# Patient Record
Sex: FEMALE | Race: WHITE | URBAN - METROPOLITAN AREA
[De-identification: names, ages, dates, MRNs, and addresses within clinical notes are randomized per-mention and may not be internally consistent; named-entity substitution may affect disease eponyms.]

---

## 2021-11-13 ENCOUNTER — EMERGENCY (EMERGENCY)
Facility: HOSPITAL | Age: 71
LOS: 0 days | Discharge: HOME | End: 2021-11-13
Attending: EMERGENCY MEDICINE
Payer: COMMERCIAL

## 2021-11-13 VITALS
OXYGEN SATURATION: 99 % | SYSTOLIC BLOOD PRESSURE: 170 MMHG | TEMPERATURE: 99 F | DIASTOLIC BLOOD PRESSURE: 78 MMHG | HEART RATE: 99 BPM | RESPIRATION RATE: 18 BRPM

## 2021-11-13 VITALS — HEIGHT: 64 IN | WEIGHT: 179.9 LBS

## 2021-11-13 DIAGNOSIS — J06.9 ACUTE UPPER RESPIRATORY INFECTION, UNSPECIFIED: ICD-10-CM

## 2021-11-13 DIAGNOSIS — R05.9 COUGH, UNSPECIFIED: ICD-10-CM

## 2021-11-13 DIAGNOSIS — J30.9 ALLERGIC RHINITIS, UNSPECIFIED: ICD-10-CM

## 2021-11-13 DIAGNOSIS — I10 ESSENTIAL (PRIMARY) HYPERTENSION: ICD-10-CM

## 2021-11-13 DIAGNOSIS — B97.89 OTHER VIRAL AGENTS AS THE CAUSE OF DISEASES CLASSIFIED ELSEWHERE: ICD-10-CM

## 2021-11-13 DIAGNOSIS — Z88.0 ALLERGY STATUS TO PENICILLIN: ICD-10-CM

## 2021-11-13 PROCEDURE — 99283 EMERGENCY DEPT VISIT LOW MDM: CPT

## 2021-11-13 PROCEDURE — 99282 EMERGENCY DEPT VISIT SF MDM: CPT

## 2021-11-13 NOTE — ED STATDOCS - PROGRESS NOTE DETAILS
70 yo female presents with post nasal drip, and cough. Pt had it since august which resolved initially after given montelukast but urgent cater. The end of august into september pt had the symptoms again and was tried on other medications. Went to urgent care again on 11/6 and started on clarithromycin without relief. Pt states she sometimes gets steroids with relief. Will prescribe prednisone and d/c to f/u with pmd. -Denys Flores PA-C

## 2021-11-13 NOTE — ED STATDOCS - PATIENT PORTAL LINK FT
You can access the FollowMyHealth Patient Portal offered by [facility] by registering at the following website: [email or web address]. By joining [employer]’s FollowMyHealth portal, you will also be able to view your health information using other applications (apps) compatible with our system.

## 2021-11-13 NOTE — ED STATDOCS - CLINICAL SUMMARY MEDICAL DECISION MAKING FREE TEXT BOX
Post nasal drip vs. URI vs. rhinitis. Will DC home with steroids. Post nasal drip vs. URI vs. rhinitis.  no suggestion of bacteria pna.  Pt well appearing. Will DC home with steroids.  Understands indications to return.  D/c home with strict return precautions and prompt outpatient f/u.

## 2021-11-13 NOTE — ED ADULT TRIAGE NOTE - CHIEF COMPLAINT QUOTE
pt. c/o post nasal drip with chronic cough, pt. states she went to  and was given nasal sprays and antibiotics but nothing has been helping

## 2021-11-13 NOTE — ED STATDOCS - OBJECTIVE STATEMENT
72 y/o female with PMHx of seasonal allergies, MS, thoracic radiculopathy, Lamberts Syndrome, GERD and HTN presents to the ED c/o cough. Pt went to Urgent Care last week and placed on antibiotics for yellow mucus since 11/6/21 with no relief. Pr reports she has been taking NyQuil and sucking on cough drops with no relief. Pt reports clear sputum when she coughs.

## 2021-11-13 NOTE — ED STATDOCS - CARE PLAN
Principal Discharge DX:	Allergic rhinitis with postnasal drip  Secondary Diagnosis:	Viral URI with cough   1

## 2021-11-17 PROBLEM — G35 MULTIPLE SCLEROSIS: Chronic | Status: ACTIVE | Noted: 2021-11-13

## 2021-11-17 PROBLEM — I10 ESSENTIAL (PRIMARY) HYPERTENSION: Chronic | Status: ACTIVE | Noted: 2021-11-13

## 2021-11-22 PROBLEM — Z00.00 ENCOUNTER FOR PREVENTIVE HEALTH EXAMINATION: Status: ACTIVE | Noted: 2021-11-22

## 2021-11-23 ENCOUNTER — APPOINTMENT (OUTPATIENT)
Dept: OTOLARYNGOLOGY | Facility: CLINIC | Age: 71
End: 2021-11-23
Payer: COMMERCIAL

## 2021-11-23 ENCOUNTER — NON-APPOINTMENT (OUTPATIENT)
Age: 71
End: 2021-11-23

## 2021-11-23 VITALS
TEMPERATURE: 97.9 F | HEIGHT: 64 IN | DIASTOLIC BLOOD PRESSURE: 90 MMHG | BODY MASS INDEX: 32.44 KG/M2 | SYSTOLIC BLOOD PRESSURE: 175 MMHG | HEART RATE: 92 BPM | WEIGHT: 190 LBS

## 2021-11-23 DIAGNOSIS — Z83.3 FAMILY HISTORY OF DIABETES MELLITUS: ICD-10-CM

## 2021-11-23 DIAGNOSIS — Z78.9 OTHER SPECIFIED HEALTH STATUS: ICD-10-CM

## 2021-11-23 DIAGNOSIS — H93.8X3 OTHER SPECIFIED DISORDERS OF EAR, BILATERAL: ICD-10-CM

## 2021-11-23 PROCEDURE — 69210 REMOVE IMPACTED EAR WAX UNI: CPT

## 2021-11-23 PROCEDURE — 99204 OFFICE O/P NEW MOD 45 MIN: CPT | Mod: 25

## 2021-11-23 PROCEDURE — 31231 NASAL ENDOSCOPY DX: CPT

## 2021-11-23 NOTE — HISTORY OF PRESENT ILLNESS
[de-identified] : c/o problems with cough. Does have hx of allergies and got allergy shots years ago.  c/o nasal congestion.  Cough worsened about 3 mos ago.  Was treated with bronchodilator at [place] [facility]  No help with flonase and mucinex. Was treated with flonase and azelastine - also had treatment with clarithromycin - also treated with prednisone.  \par Cough now somewhat improved - also has somewhat hoarse voice.  No hemoptysis or sputum.  \par Occ coffee and occ spicy foods.   Occ tomato - occ chocolate  - did have recent cxr = neg

## 2021-11-23 NOTE — PHYSICAL EXAM
[Midline] : trachea located in midline position [Normal] : no rashes [de-identified] : bilat impacted cerumen  [de-identified] : after cerumen removal

## 2021-11-23 NOTE — ASSESSMENT
[FreeTextEntry1] : Patient with problem with chronic cough for several mos.  No evidence of sinusitis at this time but patient does have impacted cerumen bilaterally - removed - plugged feeling better after removal.  Also has findings of LPR - patient advised that cough could be related to cerumen but since patient also has reflux recommended reflux diet and omeprazole once a day before breakfast.  Follow up in 2-3 mos   (discussed audio - patient deferred)

## 2021-12-02 ENCOUNTER — NON-APPOINTMENT (OUTPATIENT)
Age: 71
End: 2021-12-02

## 2022-02-14 ENCOUNTER — NON-APPOINTMENT (OUTPATIENT)
Age: 72
End: 2022-02-14

## 2022-02-28 ENCOUNTER — APPOINTMENT (OUTPATIENT)
Dept: OTOLARYNGOLOGY | Facility: CLINIC | Age: 72
End: 2022-02-28
Payer: COMMERCIAL

## 2022-02-28 VITALS — WEIGHT: 190 LBS | BODY MASS INDEX: 32.44 KG/M2 | HEIGHT: 64 IN | TEMPERATURE: 96.6 F

## 2022-02-28 PROCEDURE — 99213 OFFICE O/P EST LOW 20 MIN: CPT

## 2022-02-28 NOTE — ASSESSMENT
[FreeTextEntry1] : Patient with rhinitis and throat discomfort - has occ nasal congestion.  No evidence of sinusitis but patient does have findings of LPR and does have hx of large HH requiring surgery and prior esophageal surgery for stomach issues years ago.  recommended flonase and azelastine but strongly urged GI eval to further evaluate reflux sx.  Follow up after GI eval.

## 2022-02-28 NOTE — PHYSICAL EXAM
[Nasal Endoscopy Performed] : nasal endoscopy was performed, see procedure section for findings [] : septum deviated to the right [Midline] : trachea located in midline position [Normal] : no rashes [de-identified] : mod inferior turb hypertrophy

## 2022-02-28 NOTE — HISTORY OF PRESENT ILLNESS
[de-identified] : Patient  continues with watery itchy eyes and continues with coughing fits.   Feels somewhat better with meds but still has refux sx.  Having difficulty watching diet.    On omeprazole before breakfast.   Hx of huge HH noted in 1990's - was repaired and had esophageal procedure as well.   Many years since last gi eval.

## 2022-03-16 ENCOUNTER — APPOINTMENT (OUTPATIENT)
Dept: GASTROENTEROLOGY | Facility: CLINIC | Age: 72
End: 2022-03-16
Payer: COMMERCIAL

## 2022-03-16 VITALS — BODY MASS INDEX: 32.61 KG/M2 | HEIGHT: 64 IN | WEIGHT: 191 LBS

## 2022-03-16 PROCEDURE — 99204 OFFICE O/P NEW MOD 45 MIN: CPT

## 2022-03-16 NOTE — HISTORY OF PRESENT ILLNESS
[de-identified] : 73yo female for evaluation of cough\par \par She is with hx HH repair about 20 years ago. EGD and other testing led to diagnosis of large HH/ She underwent repair for years. She had significant gerd and dyspepsia - improved after procedure and has not recurred.\par \par She has noticed increased globus, with cough and increased nasal discharge\par Better with sitting up and water. Some issues with dysphagia a times, over last 6 weeks

## 2022-03-16 NOTE — ASSESSMENT
[FreeTextEntry1] : 71yo female with cough, globus\par \par Will check ugi series to characterize HH repair, ?changes\par Will check egd\par Risks and benefits of procedure(s) discussed with patient in detail, including but not limited to, perforation, bleeding, reaction to anesthesia, missed lesions.\par \par Omeprazole\par will add singulair back as has helped in the past\par \par Pt declining colonoscopy at this time

## 2022-03-29 ENCOUNTER — LABORATORY RESULT (OUTPATIENT)
Age: 72
End: 2022-03-29

## 2022-03-29 ENCOUNTER — APPOINTMENT (OUTPATIENT)
Dept: PEDIATRIC ALLERGY IMMUNOLOGY | Facility: CLINIC | Age: 72
End: 2022-03-29
Payer: COMMERCIAL

## 2022-03-29 VITALS
HEIGHT: 63.98 IN | HEART RATE: 96 BPM | BODY MASS INDEX: 32.97 KG/M2 | SYSTOLIC BLOOD PRESSURE: 133 MMHG | DIASTOLIC BLOOD PRESSURE: 66 MMHG | WEIGHT: 193.12 LBS

## 2022-03-29 DIAGNOSIS — T50.905A ADVERSE EFFECT OF UNSPECIFIED DRUGS, MEDICAMENTS AND BIOLOGICAL SUBSTANCES, INITIAL ENCOUNTER: ICD-10-CM

## 2022-03-29 PROCEDURE — 99204 OFFICE O/P NEW MOD 45 MIN: CPT

## 2022-03-29 RX ORDER — OMEPRAZOLE 40 MG/1
40 CAPSULE, DELAYED RELEASE ORAL
Qty: 90 | Refills: 0 | Status: DISCONTINUED | COMMUNITY
Start: 2021-11-23 | End: 2022-03-29

## 2022-03-29 RX ORDER — OMEPRAZOLE 40 MG/1
40 CAPSULE, DELAYED RELEASE ORAL
Qty: 60 | Refills: 1 | Status: DISCONTINUED | COMMUNITY
Start: 2022-02-14 | End: 2022-03-29

## 2022-03-29 NOTE — REVIEW OF SYSTEMS
[Rhinorrhea] : rhinorrhea [Nasal Congestion] : nasal congestion [Post Nasal Drip] : post nasal drip [Sneezing] : sneezing [Cough] : cough [Nl] : Genitourinary [Immunizations are up to date] : Immunizations are up to date [Received Influenza Vaccine this Past Year] : patient has received the Influenza vaccine this past year [Dry Skin] : ~L dry skin [FreeTextEntry8] : MS

## 2022-03-29 NOTE — HISTORY OF PRESENT ILLNESS
[Food Allergies] : food allergies [de-identified] : 72 year old female with HTN (followed by cardiology), GERD (followed by G), MS (followed by neurology), presents with chronic rhinitis, postnasal drip, cough, dry skin, food allergy/adverse food reaction and adverse drug reaction history:\par \par Patient has chronic nasal congestion, postnasal drip and associated cough. As a teenager she used to be on allergy shots for 3 years. She used to wear masks during high pollen counts at the time. Noticed an improvement of her symptoms after treatment with allergy shots. Was doing well until her 40s. Since her 60s has noticed increased symptoms of rhinitis, postnasal drip. Since the past summer, she has had increased runny nose, congestions, postnasal drip, watery eyes, headaches and cough.\par In August of last years she was seen at an urgent care, started on Montelukast, noticed an improvement with montelukast. Also treated with po steroids at the time. Reports limited symptom relief with Fluticasone nose and Azelastine nose spray. Also tried Allegra, Cetirizine with limited relief. Was advised against Claritin by her cardiologist due to hypertension. Tried Netti pots with no relief. Stopped Lisinopril in 2019 due to cough.\par Followed by GI for globus sensation and current diagnosis of GERD, planning to have EGD and esophagogram. Also had a hiatal hernia repair 20 years ago. \par Seen by ENT and diagnosed with laryngopharyngeal reflux, recommended use of Flonase and Azelastine nose sprays. Per patient report she had a normal CXR within the past year.\par \par She has dry skin, uses Dove unscented, and moisturizes with creams from [employer].\par \par She has h/o rash with penicillin intake. Denies associated symptoms of angioedema of the tongue, respiratory, gastrointestinal complaints, joint swelling, blistering or peeling of the skin, and did not require hospitalization for her reaction. \par Has h/o hallucinations with Percocet, which has been avoided since then.\par \par Has h/o perioral rash and lip swelling after eating saurav in 2018. H/o abdominal discomfort with peanuts, but no other symptoms such as rashes, swelling or respiratory symptoms. Doesn't eat tree nuts. No issues with dairy, egg, wheat or seafood.\par

## 2022-03-29 NOTE — REASON FOR VISIT
[Initial Consultation] : an initial consultation for [FreeTextEntry2] : chronic rhinitis, postnasal drip, cough, dry skin, food allergy/adverse food reaction and adverse drug reaction history

## 2022-03-29 NOTE — PHYSICAL EXAM
[Alert] : alert [Well Nourished] : well nourished [Healthy Appearance] : healthy appearance [No Acute Distress] : no acute distress [Well Developed] : well developed [Normal Pupil & Iris Size/Symmetry] : normal pupil and iris size and symmetry [No Discharge] : no discharge [No Photophobia] : no photophobia [Sclera Not Icteric] : sclera not icteric [Normal Outer Ear/Nose] : the ears and nose were normal in appearance [Supple] : the neck was supple [Normal Rate and Effort] : normal respiratory rhythm and effort [No Crackles] : no crackles [No Retractions] : no retractions [Bilateral Audible Breath Sounds] : bilateral audible breath sounds [Normal Rate] : heart rate was normal  [Normal S1, S2] : normal S1 and S2 [No murmur] : no murmur [Regular Rhythm] : with a regular rhythm [Normal Cervical Lymph Nodes] : cervical [Skin Intact] : skin intact  [No Rash] : no rash [No Skin Lesions] : no skin lesions [No Edema] : no edema [No Cyanosis] : no cyanosis [Normal Mood] : mood was normal [Normal Affect] : affect was normal [Alert, Awake, Oriented as Age-Appropriate] : alert, awake, oriented as age appropriate [Conjunctival Erythema] : no conjunctival erythema [Wheezing] : no wheezing was heard

## 2022-03-31 LAB
A ALTERNATA IGE QN: <0.1 KUA/L
A FUMIGATUS IGE QN: <0.1 KUA/L
C HERBARUM IGE QN: <0.1 KUA/L
C LUNATA IGE QN: <0.1 KUA/L
CAT (RFEL D) 1 IGE QN: <0.1 KUA/L
CAT (RFEL D) 4 IGE QN: <0.1 KUA/L
CAT (RFEL D) 7 IGE QN: <0.1 KUA/L
CAT DANDER IGE QN: <0.1 KUA/L
CAT SERUM ALB IGE QN: <0.1 KUA/L
CMN PIGWEED IGE QN: <0.1 KUA/L
COCKLEBUR IGE QN: <0.1 KUA/L
COCKSFOOT IGE QN: <0.1 KUA/L
COMMON RAGWEED IGE QN: <0.1 KUA/L
D FARINAE IGE QN: <0.1 KUA/L
D PTERONYSS IGE QN: <0.1 KUA/L
DEPRECATED A ALTERNATA IGE RAST QL: 0
DEPRECATED A FUMIGATUS IGE RAST QL: 0
DEPRECATED A PULLULANS IGE RAST QL: 0
DEPRECATED C HERBARUM IGE RAST QL: 0
DEPRECATED C LUNATA IGE RAST QL: 0
DEPRECATED CAT (RFEL D) 1 IGE RAST QL: 0
DEPRECATED CAT (RFEL D) 4 IGE RAST QL: 0
DEPRECATED CAT (RFEL D) 7 IGE RAST QL: 0
DEPRECATED CAT DANDER IGE RAST QL: 0
DEPRECATED CAT SERUM ALB IGE RAST QL: 0
DEPRECATED COCKLEBUR IGE RAST QL: 0
DEPRECATED COCKSFOOT IGE RAST QL: 0
DEPRECATED COMMON PIGWEED IGE RAST QL: 0
DEPRECATED COMMON RAGWEED IGE RAST QL: 0
DEPRECATED D FARINAE IGE RAST QL: 0
DEPRECATED D PTERONYSS IGE RAST QL: 0
DEPRECATED DOG (RCAN F) 1 IGE RAST QL: 0
DEPRECATED DOG (RCAN F) 2 IGE RAST QL: 0
DEPRECATED DOG (RCAN F) 4 IGE RAST QL: 0
DEPRECATED DOG (RCAN F) 5 IGE RAST QL: 0
DEPRECATED DOG (RCAN F) 6 IGE RAST QL: 0
DEPRECATED DOG DANDER IGE RAST QL: 0
DEPRECATED DOG SERUM ALB IGE RAST QL: 0
DEPRECATED ENGL PLANTAIN IGE RAST QL: 0
DEPRECATED F MONILIFORME IGE RAST QL: 0
DEPRECATED GIANT RAGWEED IGE RAST QL: 0
DEPRECATED GOOSE FEATHER IGE RAST QL: 0
DEPRECATED GOOSEFOOT IGE RAST QL: 0
DEPRECATED KENT BLUE GRASS IGE RAST QL: 0
DEPRECATED LONDON PLANE IGE RAST QL: 0
DEPRECATED MANGO IGE RAST QL: 0
DEPRECATED MUGWORT IGE RAST QL: 0
DEPRECATED P NOTATUM IGE RAST QL: 0
DEPRECATED PEANUT IGE RAST QL: 0
DEPRECATED R NIGRICANS IGE RAST QL: 0
DEPRECATED RED CEDAR IGE RAST QL: 0
DEPRECATED RED TOP GRASS IGE RAST QL: 0
DEPRECATED ROACH IGE RAST QL: 0
DEPRECATED RYE IGE RAST QL: 0
DEPRECATED SILVER BIRCH IGE RAST QL: 0
DEPRECATED TIMOTHY IGE RAST QL: 0
DEPRECATED WHITE ASH IGE RAST QL: 0
DEPRECATED WHITE HICKORY IGE RAST QL: 0
DEPRECATED WHITE OAK IGE RAST QL: 0
DOG (RCAN F) 1 IGE QN: <0.1 KUA/L
DOG (RCAN F) 2 IGE QN: <0.1 KUA/L
DOG (RCAN F) 4 IGE QN: <0.1 KUA/L
DOG (RCAN F) 5 IGE QN: <0.1 KUA/L
DOG (RCAN F) 6 IGE QN: <0.1 KUA/L
DOG DANDER IGE QN: <0.1 KUA/L
DOG SERUM ALB IGE QN: <0.1 KUA/L
ENGL PLANTAIN IGE QN: <0.1 KUA/L
F MONILIFORME IGE QN: <0.1 KUA/L
GIANT RAGWEED IGE QN: <0.1 KUA/L
GOOSE FEATHER IGE QN: <0.1 KUA/L
GOOSEFOOT IGE QN: <0.1 KUA/L
GRAY ALDER (T2) CLASS: 0
GRAY ALDER (T2) CONC: <0.1 KUA/L
HORSE (REQU C) 1 IGE QN: 0
HORSE REQU C 1 IGE E227 CONC: <0.1 KUA/L
KENT BLUE GRASS IGE QN: <0.1 KUA/L
LONDON PLANE IGE QN: <0.1 KUA/L
MANGO IGE QN: <0.1 KUA/L
MOLD (AUREOBASIDIUM M12) CONC: <0.1 KUA/L
MUGWORT IGE QN: <0.1 KUA/L
MULBERRY (T70) CLASS: 0
MULBERRY (T70) CONC: <0.1 KUA/L
P NOTATUM IGE QN: <0.1 KUA/L
PEANUT (RARA H) 1 IGE QN: <0.1 KUA/L
PEANUT (RARA H) 2 IGE QN: <0.1 KUA/L
PEANUT (RARA H) 3 IGE QN: <0.1 KUA/L
PEANUT (RARA H) 6 IGE QN: <0.1 KUA/L
PEANUT (RARA H) 8 IGE QN: <0.1 KUA/L
PEANUT (RARA H) 9 IGE QN: <0.1 KUA/L
PEANUT IGE QN: <0.1 KUA/L
R NIGRICANS IGE QN: <0.1 KUA/L
RARA H 6 STORAGE PROTEIN (F447) CLASS: 0
RARA H1 STORAGE PROTEIN (F422) CLASS: 0
RARA H2 STORAGE PROTEIN (F423) CLASS: 0
RARA H3 STORAGE PROTEIN (F424) CLASS: 0
RARA H8 PR-10 PROTEIN (F352) CLASS: 0
RARA H9 LIPID TRANSFERTP (F427) CLASS: 0
RED CEDAR IGE QN: <0.1 KUA/L
RED TOP GRASS IGE QN: <0.1 KUA/L
ROACH IGE QN: <0.1 KUA/L
RYE IGE QN: <0.1 KUA/L
SILVER BIRCH IGE QN: <0.1 KUA/L
TIMOTHY IGE QN: <0.1 KUA/L
WHITE ASH IGE QN: <0.1 KUA/L
WHITE ELM IGE QN: 0
WHITE ELM IGE QN: <0.1 KUA/L
WHITE HICKORY IGE QN: <0.1 KUA/L
WHITE OAK IGE QN: <0.1 KUA/L

## 2022-04-05 ENCOUNTER — INPATIENT (INPATIENT)
Facility: HOSPITAL | Age: 72
LOS: 2 days | Discharge: HOME | DRG: 59 | End: 2022-04-08
Attending: FAMILY MEDICINE | Admitting: INTERNAL MEDICINE
Payer: COMMERCIAL

## 2022-04-05 ENCOUNTER — APPOINTMENT (OUTPATIENT)
Dept: PEDIATRIC ALLERGY IMMUNOLOGY | Facility: CLINIC | Age: 72
End: 2022-04-05

## 2022-04-05 VITALS — HEIGHT: 64 IN

## 2022-04-05 DIAGNOSIS — R29.898 OTHER SYMPTOMS AND SIGNS INVOLVING THE MUSCULOSKELETAL SYSTEM: ICD-10-CM

## 2022-04-05 LAB
ALBUMIN SERPL ELPH-MCNC: 4.4 G/DL — SIGNIFICANT CHANGE UP (ref 3.3–5)
ALP SERPL-CCNC: 91 U/L — SIGNIFICANT CHANGE UP (ref 40–120)
ALT FLD-CCNC: 36 U/L — SIGNIFICANT CHANGE UP (ref 12–78)
ANION GAP SERPL CALC-SCNC: 7 MMOL/L — SIGNIFICANT CHANGE UP (ref 5–17)
APPEARANCE UR: CLEAR — SIGNIFICANT CHANGE UP
AST SERPL-CCNC: 28 U/L — SIGNIFICANT CHANGE UP (ref 15–37)
BASOPHILS # BLD AUTO: 0.04 K/UL — SIGNIFICANT CHANGE UP (ref 0–0.2)
BASOPHILS NFR BLD AUTO: 0.5 % — SIGNIFICANT CHANGE UP (ref 0–2)
BILIRUB SERPL-MCNC: 0.6 MG/DL — SIGNIFICANT CHANGE UP (ref 0.2–1.2)
BILIRUB UR-MCNC: NEGATIVE — SIGNIFICANT CHANGE UP
BUN SERPL-MCNC: 17 MG/DL — SIGNIFICANT CHANGE UP (ref 7–23)
CALCIUM SERPL-MCNC: 10.2 MG/DL — HIGH (ref 8.5–10.1)
CHLORIDE SERPL-SCNC: 105 MMOL/L — SIGNIFICANT CHANGE UP (ref 96–108)
CO2 SERPL-SCNC: 28 MMOL/L — SIGNIFICANT CHANGE UP (ref 22–31)
COLOR SPEC: YELLOW — SIGNIFICANT CHANGE UP
CREAT SERPL-MCNC: 0.68 MG/DL — SIGNIFICANT CHANGE UP (ref 0.5–1.3)
DIFF PNL FLD: ABNORMAL
EGFR: 92 ML/MIN/1.73M2 — SIGNIFICANT CHANGE UP
EOSINOPHIL # BLD AUTO: 0.01 K/UL — SIGNIFICANT CHANGE UP (ref 0–0.5)
EOSINOPHIL NFR BLD AUTO: 0.1 % — SIGNIFICANT CHANGE UP (ref 0–6)
GLUCOSE SERPL-MCNC: 122 MG/DL — HIGH (ref 70–99)
GLUCOSE UR QL: NEGATIVE — SIGNIFICANT CHANGE UP
HCT VFR BLD CALC: 42.1 % — SIGNIFICANT CHANGE UP (ref 34.5–45)
HGB BLD-MCNC: 13.8 G/DL — SIGNIFICANT CHANGE UP (ref 11.5–15.5)
IMM GRANULOCYTES NFR BLD AUTO: 0.2 % — SIGNIFICANT CHANGE UP (ref 0–1.5)
KETONES UR-MCNC: NEGATIVE — SIGNIFICANT CHANGE UP
LEUKOCYTE ESTERASE UR-ACNC: ABNORMAL
LYMPHOCYTES # BLD AUTO: 1.47 K/UL — SIGNIFICANT CHANGE UP (ref 1–3.3)
LYMPHOCYTES # BLD AUTO: 17.9 % — SIGNIFICANT CHANGE UP (ref 13–44)
MAGNESIUM SERPL-MCNC: 2.3 MG/DL — SIGNIFICANT CHANGE UP (ref 1.6–2.6)
MCHC RBC-ENTMCNC: 29.4 PG — SIGNIFICANT CHANGE UP (ref 27–34)
MCHC RBC-ENTMCNC: 32.8 GM/DL — SIGNIFICANT CHANGE UP (ref 32–36)
MCV RBC AUTO: 89.8 FL — SIGNIFICANT CHANGE UP (ref 80–100)
MONOCYTES # BLD AUTO: 0.49 K/UL — SIGNIFICANT CHANGE UP (ref 0–0.9)
MONOCYTES NFR BLD AUTO: 6 % — SIGNIFICANT CHANGE UP (ref 2–14)
NEUTROPHILS # BLD AUTO: 6.19 K/UL — SIGNIFICANT CHANGE UP (ref 1.8–7.4)
NEUTROPHILS NFR BLD AUTO: 75.3 % — SIGNIFICANT CHANGE UP (ref 43–77)
NITRITE UR-MCNC: NEGATIVE — SIGNIFICANT CHANGE UP
PH UR: 8 — SIGNIFICANT CHANGE UP (ref 5–8)
PHOSPHATE SERPL-MCNC: 3.1 MG/DL — SIGNIFICANT CHANGE UP (ref 2.5–4.5)
PLATELET # BLD AUTO: 314 K/UL — SIGNIFICANT CHANGE UP (ref 150–400)
POTASSIUM SERPL-MCNC: 3.1 MMOL/L — LOW (ref 3.5–5.3)
POTASSIUM SERPL-SCNC: 3.1 MMOL/L — LOW (ref 3.5–5.3)
PROT SERPL-MCNC: 8.1 GM/DL — SIGNIFICANT CHANGE UP (ref 6–8.3)
PROT UR-MCNC: NEGATIVE — SIGNIFICANT CHANGE UP
RAPID RVP RESULT: SIGNIFICANT CHANGE UP
RBC # BLD: 4.69 M/UL — SIGNIFICANT CHANGE UP (ref 3.8–5.2)
RBC # FLD: 14.6 % — HIGH (ref 10.3–14.5)
SARS-COV-2 RNA SPEC QL NAA+PROBE: SIGNIFICANT CHANGE UP
SODIUM SERPL-SCNC: 140 MMOL/L — SIGNIFICANT CHANGE UP (ref 135–145)
SP GR SPEC: 1.01 — SIGNIFICANT CHANGE UP (ref 1.01–1.02)
UROBILINOGEN FLD QL: NEGATIVE — SIGNIFICANT CHANGE UP
WBC # BLD: 8.22 K/UL — SIGNIFICANT CHANGE UP (ref 3.8–10.5)
WBC # FLD AUTO: 8.22 K/UL — SIGNIFICANT CHANGE UP (ref 3.8–10.5)

## 2022-04-05 PROCEDURE — 86803 HEPATITIS C AB TEST: CPT

## 2022-04-05 PROCEDURE — 80048 BASIC METABOLIC PNL TOTAL CA: CPT

## 2022-04-05 PROCEDURE — 71045 X-RAY EXAM CHEST 1 VIEW: CPT | Mod: 26

## 2022-04-05 PROCEDURE — 99223 1ST HOSP IP/OBS HIGH 75: CPT

## 2022-04-05 PROCEDURE — 83036 HEMOGLOBIN GLYCOSYLATED A1C: CPT

## 2022-04-05 PROCEDURE — 99283 EMERGENCY DEPT VISIT LOW MDM: CPT

## 2022-04-05 PROCEDURE — 82962 GLUCOSE BLOOD TEST: CPT

## 2022-04-05 PROCEDURE — 93010 ELECTROCARDIOGRAM REPORT: CPT

## 2022-04-05 PROCEDURE — 97162 PT EVAL MOD COMPLEX 30 MIN: CPT | Mod: GP

## 2022-04-05 PROCEDURE — 36415 COLL VENOUS BLD VENIPUNCTURE: CPT

## 2022-04-05 PROCEDURE — 93005 ELECTROCARDIOGRAM TRACING: CPT

## 2022-04-05 PROCEDURE — 99285 EMERGENCY DEPT VISIT HI MDM: CPT | Mod: GC

## 2022-04-05 PROCEDURE — 85027 COMPLETE CBC AUTOMATED: CPT

## 2022-04-05 PROCEDURE — 74174 CTA ABD&PLVS W/CONTRAST: CPT | Mod: 26,MA

## 2022-04-05 PROCEDURE — 71275 CT ANGIOGRAPHY CHEST: CPT | Mod: 26,MA

## 2022-04-05 PROCEDURE — 97116 GAIT TRAINING THERAPY: CPT | Mod: GP

## 2022-04-05 PROCEDURE — 97530 THERAPEUTIC ACTIVITIES: CPT | Mod: GP

## 2022-04-05 PROCEDURE — 94640 AIRWAY INHALATION TREATMENT: CPT

## 2022-04-05 RX ORDER — HYDRALAZINE HCL 50 MG
100 TABLET ORAL THREE TIMES A DAY
Refills: 0 | Status: DISCONTINUED | OUTPATIENT
Start: 2022-04-05 | End: 2022-04-08

## 2022-04-05 RX ORDER — FAMOTIDINE 10 MG/ML
20 INJECTION INTRAVENOUS AT BEDTIME
Refills: 0 | Status: DISCONTINUED | OUTPATIENT
Start: 2022-04-05 | End: 2022-04-08

## 2022-04-05 RX ORDER — ONDANSETRON 8 MG/1
4 TABLET, FILM COATED ORAL EVERY 8 HOURS
Refills: 0 | Status: DISCONTINUED | OUTPATIENT
Start: 2022-04-05 | End: 2022-04-08

## 2022-04-05 RX ORDER — ONDANSETRON 8 MG/1
4 TABLET, FILM COATED ORAL ONCE
Refills: 0 | Status: COMPLETED | OUTPATIENT
Start: 2022-04-05 | End: 2022-04-05

## 2022-04-05 RX ORDER — ENOXAPARIN SODIUM 100 MG/ML
40 INJECTION SUBCUTANEOUS EVERY 24 HOURS
Refills: 0 | Status: DISCONTINUED | OUTPATIENT
Start: 2022-04-05 | End: 2022-04-08

## 2022-04-05 RX ORDER — LANOLIN ALCOHOL/MO/W.PET/CERES
3 CREAM (GRAM) TOPICAL AT BEDTIME
Refills: 0 | Status: DISCONTINUED | OUTPATIENT
Start: 2022-04-05 | End: 2022-04-08

## 2022-04-05 RX ORDER — LOSARTAN POTASSIUM 100 MG/1
100 TABLET, FILM COATED ORAL DAILY
Refills: 0 | Status: DISCONTINUED | OUTPATIENT
Start: 2022-04-05 | End: 2022-04-08

## 2022-04-05 RX ORDER — FLUTICASONE PROPIONATE 50 MCG
1 SPRAY, SUSPENSION NASAL
Refills: 0 | Status: DISCONTINUED | OUTPATIENT
Start: 2022-04-05 | End: 2022-04-08

## 2022-04-05 RX ORDER — ACETAMINOPHEN 500 MG
650 TABLET ORAL EVERY 6 HOURS
Refills: 0 | Status: DISCONTINUED | OUTPATIENT
Start: 2022-04-05 | End: 2022-04-08

## 2022-04-05 RX ORDER — GABAPENTIN 400 MG/1
600 CAPSULE ORAL DAILY
Refills: 0 | Status: DISCONTINUED | OUTPATIENT
Start: 2022-04-05 | End: 2022-04-08

## 2022-04-05 RX ORDER — POTASSIUM CHLORIDE 20 MEQ
40 PACKET (EA) ORAL ONCE
Refills: 0 | Status: COMPLETED | OUTPATIENT
Start: 2022-04-05 | End: 2022-04-05

## 2022-04-05 RX ORDER — ATORVASTATIN CALCIUM 80 MG/1
40 TABLET, FILM COATED ORAL AT BEDTIME
Refills: 0 | Status: DISCONTINUED | OUTPATIENT
Start: 2022-04-05 | End: 2022-04-08

## 2022-04-05 RX ORDER — PANTOPRAZOLE SODIUM 20 MG/1
40 TABLET, DELAYED RELEASE ORAL
Refills: 0 | Status: DISCONTINUED | OUTPATIENT
Start: 2022-04-05 | End: 2022-04-08

## 2022-04-05 RX ORDER — GABAPENTIN 400 MG/1
300 CAPSULE ORAL
Refills: 0 | Status: DISCONTINUED | OUTPATIENT
Start: 2022-04-05 | End: 2022-04-08

## 2022-04-05 RX ORDER — BACLOFEN 100 %
10 POWDER (GRAM) MISCELLANEOUS
Refills: 0 | Status: DISCONTINUED | OUTPATIENT
Start: 2022-04-05 | End: 2022-04-08

## 2022-04-05 RX ORDER — AMLODIPINE BESYLATE 2.5 MG/1
10 TABLET ORAL DAILY
Refills: 0 | Status: DISCONTINUED | OUTPATIENT
Start: 2022-04-05 | End: 2022-04-08

## 2022-04-05 RX ORDER — MONTELUKAST 4 MG/1
10 TABLET, CHEWABLE ORAL DAILY
Refills: 0 | Status: DISCONTINUED | OUTPATIENT
Start: 2022-04-05 | End: 2022-04-06

## 2022-04-05 RX ADMIN — Medication 100 MILLIGRAM(S): at 22:19

## 2022-04-05 RX ADMIN — FAMOTIDINE 20 MILLIGRAM(S): 10 INJECTION INTRAVENOUS at 22:19

## 2022-04-05 RX ADMIN — ONDANSETRON 4 MILLIGRAM(S): 8 TABLET, FILM COATED ORAL at 12:02

## 2022-04-05 RX ADMIN — Medication 258 MILLIGRAM(S): at 14:22

## 2022-04-05 RX ADMIN — Medication 1 SPRAY(S): at 22:20

## 2022-04-05 RX ADMIN — MONTELUKAST 10 MILLIGRAM(S): 4 TABLET, CHEWABLE ORAL at 22:22

## 2022-04-05 RX ADMIN — Medication 10 MILLIGRAM(S): at 22:20

## 2022-04-05 RX ADMIN — GABAPENTIN 600 MILLIGRAM(S): 400 CAPSULE ORAL at 16:13

## 2022-04-05 RX ADMIN — ENOXAPARIN SODIUM 40 MILLIGRAM(S): 100 INJECTION SUBCUTANEOUS at 16:11

## 2022-04-05 RX ADMIN — ATORVASTATIN CALCIUM 40 MILLIGRAM(S): 80 TABLET, FILM COATED ORAL at 22:20

## 2022-04-05 RX ADMIN — Medication 40 MILLIEQUIVALENT(S): at 14:22

## 2022-04-05 RX ADMIN — GABAPENTIN 300 MILLIGRAM(S): 400 CAPSULE ORAL at 22:17

## 2022-04-05 NOTE — PHARMACOTHERAPY INTERVENTION NOTE - COMMENTS
Medication history complete, [facility] with [facility]

## 2022-04-05 NOTE — PHYSICAL THERAPY INITIAL EVALUATION ADULT - IMPAIRMENTS CONTRIBUTING TO GAIT DEVIATIONS, PT EVAL
decreased endurance/impaired balance/decreased ROM/decreased sensation/impaired sensory feedback/decreased strength

## 2022-04-05 NOTE — ED STATDOCS - PROGRESS NOTE DETAILS
Jason Jeffery for attending Dr. Dawson: 71 y/o female with a PMHx of HTN, MS presents to the ED c/o progressive weakness and difficulty ambulating. Pt reports she feels like she is having an MS exacerbation. Pt scheduled for an MRI in [facility] on 4/8. No other complaints at this time. Will send pt to main ED for further evaluation.

## 2022-04-05 NOTE — ED PROVIDER NOTE - CLINICAL SUMMARY MEDICAL DECISION MAKING FREE TEXT BOX
71 y/o female with likely ms flare. Pt symptoms so severe she is having trouble ambulating. Will admit to medicine for neuro consultation, high dose steroids and Physical Therapy. Pt also complains about vague upper abdominal band like pain. Will evaluate aorta.

## 2022-04-05 NOTE — PATIENT PROFILE ADULT - FALL HARM RISK - RISK INTERVENTIONS
Assistance OOB with selected safe patient handling equipment/Assistance with ambulation/Communicate Fall Risk and Risk Factors to all staff, patient, and family/Discuss with provider need for PT consult/Monitor gait and stability/Reinforce activity limits and safety measures with patient and family/Visual Cue: Yellow wristband/Bed in lowest position, wheels locked, appropriate side rails in place/Call bell, personal items and telephone in reach/Instruct patient to call for assistance before getting out of bed or chair/Non-slip footwear when patient is out of bed/[place] to call system/Physically safe environment - no spills, clutter or unnecessary equipment/Purposeful Proactive Rounding/Room/bathroom lighting operational, light cord in reach

## 2022-04-05 NOTE — H&P ADULT - ASSESSMENT
73 y/o female with PMHx of HTN and MS (initially dx in 1998) on no maintenance meds aside from baclofen/ neurontin who presented to  with worsening weakness, numbness, paresthesias and muscle stiffness in her lower legs and core.  Admitted for MS flair.      #Worsening weakness/ paresthesias/ muscle spasms-- concern for MS flair:    Admit to medsurg.    CT head reviewed.    Check MRI brain / cervical spine/ thoracic spine as per neuro.    Solumedrol 1 g IV daily for 3 days then prednisone taper as per neuro.    PT juan.    NELSON PRESTON d/w her neurologist @ [place].  Dr. Gem Palencia.    Cont baclofen/ neurontin for muscle spasms/ pain.      #Band like pain in thorax:    Concerning for as above-- MS flair.    CTA chest negative for dissection.    F/u MRI spine.      #Dysuria/ Frequency:    Patient notes this often happens with MS flairs.    UA negative.    F/u urine culture.    Hold on ABX for now.      #HTN:    Cont home meds.  Losaratan.  Hydralazine.  Norvasc.      #DVT Proph:  Lovenox.      #GI proph:  Protonix while on steroids.

## 2022-04-05 NOTE — H&P ADULT - NSHPPHYSICALEXAM_GEN_ALL_CORE
PEx  T(C): 36.8 (04-05-22 @ 15:10), Max: 36.9 (04-05-22 @ 10:08)  HR: 67 (04-05-22 @ 15:10) (67 - 87)  BP: 138/67 (04-05-22 @ 15:10) (138/67 - 171/73)  RR: 14 (04-05-22 @ 15:10) (14 - 19)  SpO2: 95% (04-05-22 @ 15:10) (95% - 96%)    General:     Well appearing, no distress, no identifying marks , scars, or tattoos.  Skin: no rash or prominent lesions  Head: normocephalic, atraumatic     Sinuses: non-tender  Nose: no external lesions, mucosa non-inflamed, septum and turbinates normal  Throat: no erythema, exudates or lesions.  Neck: Supple without lymphadenopathy. Thyroid no thyromegaly, no palpable thyroid nodules, no palpable nodules or masses, carotid arteries no bruits.   Breasts: No palpable masses or lesions.  Heart: RRR, no murmur or gallop.  Normal S1, S2.  No S3, S4.   Lungs: CTA bilaterally, no wheezes, rhonchi, rales.  Breathing unlabored.   Chest wall: Normal insp   Abdomen:  Soft, NT/ND, normal bowel sounds, no HSM, no masses.  No peritoneal signs.   Back: spine normal without deformity or tenderness.  Normal ROM   : Exam normal.  no inguinal hernias.  Extremities: No deformities, clubbing, cyanosis, or edema.  Musculoskeletal: stiffness and decreased ROM of legs/ hips/ knees/ ankles.    Neurologic: decreased sensation b/l lower legs.    decreased strength 4/5 hip flexors, knee extenders, knee flexors.  3/5 dorsi/ plantar flexion.    did not stand patient.    Psychiatric: Oriented X3, intact recent and remote memory, judgement and insight, normal mood and affect.

## 2022-04-05 NOTE — ED PROVIDER NOTE - NS ED ROS FT
Constitutional: nad, well appearing  HEENT:  no nasal congestion, eye drainage or ear pain.    CVS:  no cp  Resp:  No sob, no cough  GI:  no abdominal pain, no nausea or vomiting  :  no dysuria  MSK: no joint pain or limited ROM  Skin: no rash  Neuro: no change in mental status or level of consciousness, +weakness  Heme/lymph: no bleeding

## 2022-04-05 NOTE — CONSULT NOTE ADULT - ASSESSMENT
72 year old woman hx of MS, c/o increasing difficulty walking, dysesthesias of the legs. Likely MS exacerbation.  Suggest:  MR head, cervical, thoracic spine; w/wo  solumedrol 1 gm IVPB QD x 3 days, then followed by prednisone 60 mg po QD, then taper every third day by 10 mg.  PT evaluation  DVT prophylaxis  Once d/c f/u with her neurologist for further management.

## 2022-04-05 NOTE — ED PROVIDER NOTE - OBJECTIVE STATEMENT
72 y.o female with a PMH of HTN and MS presents to the ED for suspected MS flare up. Pt states for the past few weeks she has had difficulty walking due to weakness in back and legs. Pt states its hard to stand up straight. Complaining of electric like sensation in back and neck. Also with associated pins and needles and toes. Pt states tightening in upper abdomen present for weeks. Pt reports "ms katy". Pt states she hasn't been hospitalized in years for MS but feels like prior flare ups. Not on medication or steroids. Pt has had Ct on neck which was normal.  Pt has neuro stimulator which is functional. Pt scheduled for MRI on neck head/neck/ back two weeks from today. Denies fevers, chest pain ,sob. Pt reports two days of urinary frequency and urine smells off.

## 2022-04-05 NOTE — CONSULT NOTE ADULT - SUBJECTIVE AND OBJECTIVE BOX
Neurology Consult requested by:   Patient is a 72y old  Female who presents with a chief complaint of    HPI:  72 y.o woman with a PMH of HTN and MS presents to the ED for suspected MS flare up. Pt states for the past few weeks she has had difficulty walking, weakness,  numbness, pins and needle feeling from waist down to the legs. Worsening over the last few days. Not on medication or steroids.  Symptoms have been progressing over the last 6 months or longer, came today for worsening gait.  Is being followed by outside neurologist, who recently ordered lumbar spine Ct   Pt has neuro stimulator which is non functional. Pt was scheduled for MRI on neck head/neck/ back two weeks from today.   Denies fevers, chest pain ,sob. Pt reports two days of urinary frequency and urine smells off.    PAST MEDICAL & SURGICAL HISTORY:  HTN (hypertension)    MS (multiple sclerosis)      FAMILY HISTORY:    Social Hx:  Nonsmoker, no drug or alcohol use  Medications and Allergies ReviewedMEDICATIONS  (STANDING):  methylPREDNISolone sodium succinate IVPB 1000 milliGRAM(s) IV Intermittent Once  potassium chloride    Tablet ER 40 milliEquivalent(s) Oral once     ROS: Pertinent positives in HPI, all other ROS were reviewed and are negative.      Examination:   Vital Signs Last 24 Hrs  T(C): 36.9 (05 Apr 2022 10:08), Max: 36.9 (05 Apr 2022 10:08)  T(F): 98.5 (05 Apr 2022 10:08), Max: 98.5 (05 Apr 2022 10:08)  HR: 87 (05 Apr 2022 10:08) (87 - 87)  BP: 171/73 (05 Apr 2022 10:08) (171/73 - 171/73)  BP(mean): 102 (05 Apr 2022 10:08) (102 - 102)  RR: 19 (05 Apr 2022 10:08) (19 - 19)  SpO2: 96% (05 Apr 2022 10:08) (96% - 96%)  General: Cooperative, NAD   NECK: supple, no masses  ENT: Normal hearing   Vascular : no carotid bruits,   Lungs: CTAB  Chest: RRR, no murmurs  Extremities: nontender, no edema  Musculoskeletal: no adventitious movements, no joint stiffness  Skin: no rash    Neurological Examination:  NIHSS:4  MS: AOx3. Appropriately interactive, normal affect. Speech fluent w/o paraphasic error, repetition, naming,  intact   CN: VFFTC, PERLL, EOMI, V1-3 sensation intact, face symmetric, hearing intact, palate elevates symmetrically, tongue midline, SCM equal bilaterally  Motor: normal bulk,+ bilateral legs increased tone, no drift of the UE, + UEs tremor, UEs  5/5, LEs proximal ~ 4/5, distal R 4/5, L 3/5   Sens: decreased in legs to light touch, impaired JPS in leg, feet..    Reflexes: 0-1/4 all over, mute toes b/l  Coord:  No dysmetria, BRIAN slow bilaterally    Gait: Cannot test    Labs: Reviewed  Imaging:   Complete Blood Count   WBC Count: 8.22 K/uL   RBC Count: 4.69 M/uL   Hemoglobin: 13.8 g/dL   Hematocrit: 42.1 %   Mean Cell Volume: 89.8 fl   Mean Cell Hemoglobin: 29.4 pg   Mean Cell Hemoglobin Conc: 32.8 gm/dL   Red Cell Distrib Width: 14.6 %   Platelet Count - Automated: 314 K/uL     Comprehensive Metabolic Panel (04.05.22 @ 11:05)   Sodium, Serum: 140 mmol/L   Potassium, Serum: 3.1 mmol/L   Chloride, Serum: 105 mmol/L   Carbon Dioxide, Serum: 28 mmol/L   Anion Gap, Serum: 7 mmol/L   Blood Urea Nitrogen, Serum: 17 mg/dL   Creatinine, Serum: 0.68 mg/dL   Glucose, Serum: 122 mg/dL   Calcium, Total Serum: 10.2 mg/dL   Protein Total, Serum: 8.1 gm/dL   Albumin, Serum: 4.4 g/dL   Bilirubin Total, Serum: 0.6 mg/dL   Alkaline Phosphatase, Serum: 91 U/L   Aspartate Aminotransferase (AST/SGOT): 28 U/L   Alanine Aminotransferase (ALT/SGPT): 36 U/L   eGFR: 92:

## 2022-04-05 NOTE — H&P ADULT - NSHPLABSRESULTS_GEN_ALL_CORE
13.8   8  )-----------( 314      ( 2022 11:05 )             42.1     04-05    140  |  105  |  17  ----------------------------<  122<H>  3.1<L>   |  28  |  0.68    Ca    10.2<H>      2022 11:05  Phos  3.1     04-05  Mg     2.3     04-05    TPro  8.1  /  Alb  4.4  /  TBili  0.6  /  DBili  x   /  AST  28  /  ALT  36  /  AlkPhos  91  04-05    CAPILLARY BLOOD GLUCOSE          Urinalysis Basic - ( 2022 11:05 )    Color: Yellow / Appearance: Clear / S.010 / pH: x  Gluc: x / Ketone: Negative  / Bili: Negative / Urobili: Negative   Blood: x / Protein: Negative / Nitrite: Negative   Leuk Esterase: Small / RBC: Negative /HPF / WBC 3-5   Sq Epi: x / Non Sq Epi: Few / Bacteria: x    < from: CT Angio Chest Aorta w/wo IV Cont (22 @ 11:47) >      IMPRESSION:    No aortic aneurysm.    Left lower lobe 3 mm nodule. Recommend 1 year follow-up in a high risk   patient.    --- End of Report ---            < end of copied text >

## 2022-04-05 NOTE — ED PROVIDER NOTE - NS_ATTENDINGSCRIBE_ED_ALL_ED
9.39 (Emergency)
I personally performed the service described in the documentation recorded by the scribe in my presence, and it accurately and completely records my words and actions.

## 2022-04-05 NOTE — H&P ADULT - NSHPREVIEWOFSYSTEMS_GEN_ALL_CORE
ROS:  General:  weakness  Skin: No rash or bothersome skin lesions  Musculoskeletal: weakness, stiffness, numbness  Eyes: No visual changes or eye pain  Ears: No hearing loss , otorrhea or ear pain  Nose, Mouth, Throat: No nasal congestion, rhinorrhea, oral lesions, postnasal drip or sore throat  Cardio: No chest pain or palpitations. no lower extremity edema. no syncope. no claudication.   Respiratory: No cough, shortness of breath or wheezing   GI: No diarrhea, constipation, blood in stools, abdominal pain, vomiting or heartburn  : No urinary frequency, hematuria, incontinence, or dysuria  Neurologic: see HPI  Psychiatric:  No anxiety or depression  Lymphatic:  No easy bruising, easy bleeding or swollen glands  Allergic: No itching, sneezing , watery eyes, clear rhinorrhea or recurrent infections

## 2022-04-05 NOTE — ED PROVIDER NOTE - PROGRESS NOTE DETAILS
I spoke to Gem Will (her neurologist) who does not have privileges here asked to call house neurology.   Dr. Valiente called, agreed with plan for steroids and MRI brain/cervical/thorax and admission. Pt without lateralizing symptoms.  Was believed in past to have lesion to thoracic spine related to MS.  Has sensation and some mobility to LEs, but significantly worse than typical.  No obvious e/o underlying infectious process as etiology of symptoms.  Will admit for further w/u and monitoring.  Further care per inpatient treatment team.

## 2022-04-05 NOTE — ED PROVIDER NOTE - PHYSICAL EXAMINATION
Constitutional: NAD, well appearing  HEENT: no rhinorrhea, PERRL, no oropharyngeal erythema or exudates, midline uvula.  TMs clear.  CVS:  RRR, no m/r/g  Resp:  CTAB  GI: soft, ntnd  MSK:  no restriction to rom, full ROM to all extremities,  Neuro:  A&Ox3, SILT to all extremities, Arm Strength bilateral 5/5 . Leg strength 4/5 bilateral, CN 2-12 intact  Skin: no rash  psych: clear thought content  Heme/lymph:  No LAD

## 2022-04-05 NOTE — PHYSICAL THERAPY INITIAL EVALUATION ADULT - PRECAUTIONS/LIMITATIONS, REHAB EVAL
CTA CHEST/AP: No aortic aneurysm. Left lower lobe 3 mm nodule. Recommend 1 year follow-up in a high risk patient.; CXR:  No acute lung finding. Other findings as above./fall precautions DVT PPx  -On coumadin -Cont. finasteride

## 2022-04-05 NOTE — PHYSICAL THERAPY INITIAL EVALUATION ADULT - PERTINENT HX OF CURRENT PROBLEM, REHAB EVAL
72 y.o woman with a PMH of HTN and MS presents to the ED for suspected MS flare up. Pt states for the past few weeks she has had difficulty walking, weakness,  numbness, pins and needle feeling from waist down to the legs. Worsening over the last few days.

## 2022-04-05 NOTE — PHYSICAL THERAPY INITIAL EVALUATION ADULT - GENERAL OBSERVATIONS, REHAB EVAL
Pt seen for 45min PT Eval. Pt s/p MS flare up, UTI?. Pt rec'd semi supine in bed in NAD. sensory to Light touch impaired BLE, reports "feet feel as though they are in water when standing." Pt trans supine>sit indep. Pt trans sit<>Stand with S at RW. Pt amb 75ft with RW and S. Pt dem slow gait, crouched, dec step length. Pt returned semi supine all needs met, RN Chidi aware.

## 2022-04-05 NOTE — ED ADULT TRIAGE NOTE - CHIEF COMPLAINT QUOTE
pt presents to ed with progressive weakness and difficulty ambulating. pt feels that she is having a MS exacerbation. pt also feels that she may have a uti

## 2022-04-05 NOTE — H&P ADULT - HISTORY OF PRESENT ILLNESS
73 y/o female with PMHx of HTN and MS (initially dx in 1998) on no maintenance meds aside from baclofen/ neurontin who presented to  with worsening MS sx.  Patient notes she hasn't been doing well since August 2021.  Patient notes she has been having increasing trouble ambulating.  She has to lean forward when she walks to be comfortable.  She notes increasing difficulty with walking.  She does not use a cane or a walker at home.  She notes her legs feel increasingly heavy-- like bricks.  She also notes muscle spasms/ stiffness/ numbness/ paresthesias.  Her symptoms are bilateral but worse on the right side compared to her left.  She does not have significant symptoms in her arms.  She does have some slight sensation issues in her hands.  In the last 24 hours, she also noticed development of neuro symptoms around her chest-- particularly on the right side.  She calls this the "hug" and has had this before with MS flairs.  She was seeing her neurologist outpatient who initially did a CT looking more for spinal stenosis but this was reviewed and thought not to be signficant.  She was scheduled to have MRI of the brain/ neck/ thoracic spine this coming friday as outpatient but in the last 24 hours, she has had difficulty even standing/ getting around at home and presented to the ER for further evaluation. Seen by neuro who recommended admission for High dose IV steroids and MRI.        PAST MEDICAL & SURGICAL HISTORY:  HTN (hypertension)  MS (multiple sclerosis)      FAMILY HISTORY:  Mother:  living in 90's.    Family hx of HTN    Social History:    No tob/ etoh / drug use.    Retired nurse.      Allergies:    lisinopril (Unknown)  [facility] (Unknown)  peanuts (Unknown)  penicillin (Unknown)  Percocet (Unknown)

## 2022-04-06 ENCOUNTER — TRANSCRIPTION ENCOUNTER (OUTPATIENT)
Age: 72
End: 2022-04-06

## 2022-04-06 LAB
ANION GAP SERPL CALC-SCNC: 5 MMOL/L — SIGNIFICANT CHANGE UP (ref 5–17)
BUN SERPL-MCNC: 27 MG/DL — HIGH (ref 7–23)
CALCIUM SERPL-MCNC: 9.8 MG/DL — SIGNIFICANT CHANGE UP (ref 8.5–10.1)
CHLORIDE SERPL-SCNC: 105 MMOL/L — SIGNIFICANT CHANGE UP (ref 96–108)
CO2 SERPL-SCNC: 26 MMOL/L — SIGNIFICANT CHANGE UP (ref 22–31)
CREAT SERPL-MCNC: 0.75 MG/DL — SIGNIFICANT CHANGE UP (ref 0.5–1.3)
EGFR: 84 ML/MIN/1.73M2 — SIGNIFICANT CHANGE UP
GLUCOSE SERPL-MCNC: 158 MG/DL — HIGH (ref 70–99)
HCT VFR BLD CALC: 39.3 % — SIGNIFICANT CHANGE UP (ref 34.5–45)
HCV AB S/CO SERPL IA: 0.1 S/CO — SIGNIFICANT CHANGE UP (ref 0–0.99)
HCV AB SERPL-IMP: SIGNIFICANT CHANGE UP
HGB BLD-MCNC: 12.9 G/DL — SIGNIFICANT CHANGE UP (ref 11.5–15.5)
MCHC RBC-ENTMCNC: 29.5 PG — SIGNIFICANT CHANGE UP (ref 27–34)
MCHC RBC-ENTMCNC: 32.8 GM/DL — SIGNIFICANT CHANGE UP (ref 32–36)
MCV RBC AUTO: 89.9 FL — SIGNIFICANT CHANGE UP (ref 80–100)
PLATELET # BLD AUTO: 313 K/UL — SIGNIFICANT CHANGE UP (ref 150–400)
POTASSIUM SERPL-MCNC: 3.9 MMOL/L — SIGNIFICANT CHANGE UP (ref 3.5–5.3)
POTASSIUM SERPL-SCNC: 3.9 MMOL/L — SIGNIFICANT CHANGE UP (ref 3.5–5.3)
RBC # BLD: 4.37 M/UL — SIGNIFICANT CHANGE UP (ref 3.8–5.2)
RBC # FLD: 14.4 % — SIGNIFICANT CHANGE UP (ref 10.3–14.5)
SODIUM SERPL-SCNC: 136 MMOL/L — SIGNIFICANT CHANGE UP (ref 135–145)
WBC # BLD: 9.35 K/UL — SIGNIFICANT CHANGE UP (ref 3.8–10.5)
WBC # FLD AUTO: 9.35 K/UL — SIGNIFICANT CHANGE UP (ref 3.8–10.5)

## 2022-04-06 PROCEDURE — 99232 SBSQ HOSP IP/OBS MODERATE 35: CPT

## 2022-04-06 RX ORDER — NITROFURANTOIN MACROCRYSTAL 50 MG
100 CAPSULE ORAL
Refills: 0 | Status: DISCONTINUED | OUTPATIENT
Start: 2022-04-06 | End: 2022-04-08

## 2022-04-06 RX ORDER — MONTELUKAST 4 MG/1
10 TABLET, CHEWABLE ORAL DAILY
Refills: 0 | Status: DISCONTINUED | OUTPATIENT
Start: 2022-04-06 | End: 2022-04-08

## 2022-04-06 RX ADMIN — MONTELUKAST 10 MILLIGRAM(S): 4 TABLET, CHEWABLE ORAL at 11:18

## 2022-04-06 RX ADMIN — ENOXAPARIN SODIUM 40 MILLIGRAM(S): 100 INJECTION SUBCUTANEOUS at 15:28

## 2022-04-06 RX ADMIN — LOSARTAN POTASSIUM 100 MILLIGRAM(S): 100 TABLET, FILM COATED ORAL at 10:05

## 2022-04-06 RX ADMIN — Medication 100 MILLIGRAM(S): at 15:28

## 2022-04-06 RX ADMIN — GABAPENTIN 300 MILLIGRAM(S): 400 CAPSULE ORAL at 10:07

## 2022-04-06 RX ADMIN — ATORVASTATIN CALCIUM 40 MILLIGRAM(S): 80 TABLET, FILM COATED ORAL at 22:48

## 2022-04-06 RX ADMIN — Medication 100 MILLIGRAM(S): at 05:40

## 2022-04-06 RX ADMIN — GABAPENTIN 300 MILLIGRAM(S): 400 CAPSULE ORAL at 22:48

## 2022-04-06 RX ADMIN — Medication 3 MILLIGRAM(S): at 22:48

## 2022-04-06 RX ADMIN — Medication 1 SPRAY(S): at 22:48

## 2022-04-06 RX ADMIN — Medication 100 MILLIGRAM(S): at 22:48

## 2022-04-06 RX ADMIN — Medication 1 SPRAY(S): at 10:05

## 2022-04-06 RX ADMIN — AMLODIPINE BESYLATE 10 MILLIGRAM(S): 2.5 TABLET ORAL at 10:05

## 2022-04-06 RX ADMIN — FAMOTIDINE 20 MILLIGRAM(S): 10 INJECTION INTRAVENOUS at 22:48

## 2022-04-06 RX ADMIN — PANTOPRAZOLE SODIUM 40 MILLIGRAM(S): 20 TABLET, DELAYED RELEASE ORAL at 05:40

## 2022-04-06 RX ADMIN — Medication 10 MILLIGRAM(S): at 10:05

## 2022-04-06 RX ADMIN — GABAPENTIN 600 MILLIGRAM(S): 400 CAPSULE ORAL at 12:39

## 2022-04-06 RX ADMIN — Medication 172 MILLIGRAM(S): at 10:04

## 2022-04-06 RX ADMIN — Medication 10 MILLIGRAM(S): at 22:48

## 2022-04-06 NOTE — DISCHARGE NOTE NURSING/CASE MANAGEMENT/SOCIAL WORK - PATIENT PORTAL LINK FT
You can access the FollowMyHealth Patient Portal offered by [facility] by registering at the following website: [email or web address]. By joining [employer]’s FollowMyHealth portal, you will also be able to view your health information using other applications (apps) compatible with our system.

## 2022-04-06 NOTE — DISCHARGE NOTE NURSING/CASE MANAGEMENT/SOCIAL WORK - NSDCPEFALRISK_GEN_ALL_CORE
For information on Fall & Injury Prevention, visit: [email or web address] OR  [email or web address] OR  https://www.cdc.gov/steadi/patient.html

## 2022-04-06 NOTE — PROGRESS NOTE ADULT - SUBJECTIVE AND OBJECTIVE BOX
71 y/o female with PMHx of HTN and MS (initially dx in ) on no maintenance meds aside from baclofen/ neurontin who presented to  with worsening MS sx.  Patient notes she hasn't been doing well since 2021.  Patient notes she has been having increasing trouble ambulating.  She has to lean forward when she walks to be comfortable.  She notes increasing difficulty with walking.  She does not use a cane or a walker at home.  She notes her legs feel increasingly heavy-- like bricks.  She also notes muscle spasms/ stiffness/ numbness/ paresthesias.  Her symptoms are bilateral but worse on the right side compared to her left.  She does not have significant symptoms in her arms.  She does have some slight sensation issues in her hands.  In the last 24 hours, she also noticed development of neuro symptoms around her chest-- particularly on the right side.  She calls this the "hug" and has had this before with MS flairs.  She was seeing her neurologist outpatient who initially did a CT looking more for spinal stenosis but this was reviewed and thought not to be signficant.  She was scheduled to have MRI of the brain/ neck/ thoracic spine this coming friday as outpatient but in the last 24 hours, she has had difficulty even standing/ getting around at home and presented to the ER for further evaluation. Seen by neuro who recommended admission for High dose IV steroids and MRI.    71 y/o female with PMHx of HTN and MS (initially dx in ) on no maintenance meds aside from baclofen/ neurontin who presented to  with worsening MS sx.  Patient notes she hasn't been doing well since 2021.  Patient notes she has been having increasing trouble ambulating.  She has to lean forward when she walks to be comfortable.  She notes increasing difficulty with walking.  She does not use a cane or a walker at home.  She notes her legs feel increasingly heavy-- like bricks.  She also notes muscle spasms/ stiffness/ numbness/ paresthesias.  Her symptoms are bilateral but worse on the right side compared to her left.  She does not have significant symptoms in her arms.  She does have some slight sensation issues in her hands.  In the last 24 hours, she also noticed development of neuro symptoms around her chest-- particularly on the right side.  She calls this the "hug" and has had this before with MS flairs.  She was seeing her neurologist outpatient who initially did a CT looking more for spinal stenosis but this was reviewed and thought not to be signficant.  She was scheduled to have MRI of the brain/ neck/ thoracic spine this coming friday as outpatient but in the last 24 hours, she has had difficulty even standing/ getting around at home and presented to the ER for further evaluation. Seen by neuro who recommended admission for High dose IV steroids and MRI.    / pt reports dysuria , reports her weakness and paresthesia slightly improved today   ROS:  General:  weakness  Skin: No rash or bothersome skin lesions  Musculoskeletal: weakness, stiffness, numbness  Eyes: No visual changes or eye pain  Ears: No hearing loss , otorrhea or ear pain  Nose, Mouth, Throat: No nasal congestion, rhinorrhea, oral lesions, postnasal drip or sore throatCardio: No chest pain or palpitations. no lower extremity edema. no syncope. no claudication.   Respiratory: No cough, shortness of breath or wheezing   GI: No diarrhea, constipation, blood in stools, abdominal pain, vomiting or heartburn  : No urinary frequency, hematuria, incontinence, or dysuria  Neurologic: see HPI  Psychiatric:  No anxiety or depression  Lymphatic:  No easy bruising, easy bleeding or swollen glands  Allergic: No itching, sneezing , watery eyes, clear rhinorrhea or recurrent infections    Physical exam   General:     Well appearing, no distress, no identifying marks , scars, or tattoos.  Skin: no rash or prominent lesions  Head: normocephalic, atraumatic     Sinuses: non-tender  Nose: no external lesions, mucosa non-inflamed, septum and turbinates normal  Throat: no erythema, exudates or lesions.  Neck: Supple without lymphadenopathy. Thyroid no thyromegaly, no palpable thyroid nodules, no palpable nodules or masses, carotid arteries no bruits.   Breasts: No palpable masses or lesions.  Heart: RRR, no murmur or gallop.  Normal S1, S2.  No S3, S4.   Lungs: CTA bilaterally, no wheezes, rhonchi, rales.  Breathing unlabored.   Chest wall: Normal insp   Abdomen:  Soft, NT/ND, normal bowel sounds, no HSM, no masses.  No peritoneal signs.   Back: spine normal without deformity or tenderness.  Normal ROM   : Exam normal.  no inguinal hernias.  Extremities: No deformities, clubbing, cyanosis, or edema.  Musculoskeletal: stiffness and decreased ROM of legs/ hips/ knees/ ankles.        PHYSICAL EXAM:    Daily     Daily     ICU Vital Signs Last 24 Hrs  T(C): 36.9 (2022 09:04), Max: 37 (2022 16:00)  T(F): 98.4 (2022 09:04), Max: 98.6 (2022 16:00)  HR: 84 (2022 09:04) (63 - 84)  BP: 142/71 (2022 09:04) (113/55 - 143/73)  BP(mean): --  ABP: --  ABP(mean): --  RR: 17 (2022 09:04) (16 - 17)  SpO2: 95% (2022 09:04) (93% - 97%)                              12.9   9.35  )-----------( 313      ( 2022 06:48 )             39.3       CBC Full  -  ( 2022 06:48 )  WBC Count : 9.35 K/uL  RBC Count : 4.37 M/uL  Hemoglobin : 12.9 g/dL  Hematocrit : 39.3 %  Platelet Count - Automated : 313 K/uL  Mean Cell Volume : 89.9 fl  Mean Cell Hemoglobin : 29.5 pg  Mean Cell Hemoglobin Concentration : 32.8 gm/dL  Auto Neutrophil # : x  Auto Lymphocyte # : x  Auto Monocyte # : x  Auto Eosinophil # : x  Auto Basophil # : x  Auto Neutrophil % : x  Auto Lymphocyte % : x  Auto Monocyte % : x  Auto Eosinophil % : x  Auto Basophil % : x      -    136  |  105  |  27<H>  ----------------------------<  158<H>  3.9   |  26  |  0.75    Ca    9.8      2022 06:48  Phos  3.1     04-05  Mg     2.3     04-05    TPro  8.1  /  Alb  4.4  /  TBili  0.6  /  DBili  x   /  AST  28  /  ALT  36  /  AlkPhos  91  04-05      LIVER FUNCTIONS - ( 2022 11:05 )  Alb: 4.4 g/dL / Pro: 8.1 gm/dL / ALK PHOS: 91 U/L / ALT: 36 U/L / AST: 28 U/L / GGT: x                       Urinalysis Basic - ( 2022 11:05 )    Color: Yellow / Appearance: Clear / S.010 / pH: x  Gluc: x / Ketone: Negative  / Bili: Negative / Urobili: Negative   Blood: x / Protein: Negative / Nitrite: Negative   Leuk Esterase: Small / RBC: Negative /HPF / WBC 3-5   Sq Epi: x / Non Sq Epi: Few / Bacteria: x            MEDICATIONS  (STANDING):  amLODIPine   Tablet 10 milliGRAM(s) Oral daily  atorvastatin 40 milliGRAM(s) Oral at bedtime  baclofen 10 milliGRAM(s) Oral two times a day  enoxaparin Injectable 40 milliGRAM(s) SubCutaneous every 24 hours  famotidine  Oral Tab/Cap - Peds 20 milliGRAM(s) Oral at bedtime  fluticasone propionate 50 MICROgram(s)/spray Nasal Spray 1 Spray(s) Both Nostrils two times a day  gabapentin 300 milliGRAM(s) Oral two times a day  gabapentin 600 milliGRAM(s) Oral daily  hydrALAZINE 100 milliGRAM(s) Oral three times a day  losartan 100 milliGRAM(s) Oral daily  methylPREDNISolone sodium succinate IVPB 1000 milliGRAM(s) IV Intermittent daily  montelukast 10 milliGRAM(s) Oral daily  pantoprazole    Tablet 40 milliGRAM(s) Oral before breakfast        Neurologic: decreased sensation b/l lower legs.    decreased strength 4/5 hip flexors, knee extenders, knee flexors.  3/5 dorsi/ plantar flexion.    did not stand patient.    Psychiatric: Oriented X3, intact recent and remote memory, judgement and insight, normal mood and affect.

## 2022-04-06 NOTE — PROGRESS NOTE ADULT - ASSESSMENT
73 y/o female with PMHx of HTN and MS (initially dx in 1998) on no maintenance meds aside from baclofen/ neurontin who presented to  with worsening weakness, numbness, paresthesias and muscle stiffness in her lower legs and core.  Admitted for MS flair.      #Worsening weakness/ paresthesias/ muscle spasms-- concern for MS flair:    Admit to medsurg.    CT head reviewed.    Check MRI brain / cervical spine/ thoracic spine as per neuro.    Solumedrol 1 g IV daily for 3 days then prednisone taper as per neuro.    PT juan.    NELSON PRESTON d/w her neurologist @ [facility].  Dr. Gem Palencia.    Cont baclofen/ neurontin for muscle spasms/ pain.      #Band like pain in thorax:    Concerning for as above-- MS flair.    CTA chest negative for dissection.    F/u MRI spine.      #Dysuria/ Frequency:    Patient notes this often happens with MS flairs.    UA negative.    F/u urine culture.    ceftriaxone    #HTN:    Cont home meds.  Losaratan.  Hydralazine.  Norvasc.      #DVT Proph:  Lovenox.      #GI proph:  Protonix while on steroids.

## 2022-04-07 LAB
-  AMIKACIN: SIGNIFICANT CHANGE UP
-  AMOXICILLIN/CLAVULANIC ACID: SIGNIFICANT CHANGE UP
-  AMPICILLIN/SULBACTAM: SIGNIFICANT CHANGE UP
-  AMPICILLIN: SIGNIFICANT CHANGE UP
-  AZTREONAM: SIGNIFICANT CHANGE UP
-  CEFAZOLIN: SIGNIFICANT CHANGE UP
-  CEFEPIME: SIGNIFICANT CHANGE UP
-  CEFOXITIN: SIGNIFICANT CHANGE UP
-  CEFTRIAXONE: SIGNIFICANT CHANGE UP
-  CIPROFLOXACIN: SIGNIFICANT CHANGE UP
-  ERTAPENEM: SIGNIFICANT CHANGE UP
-  GENTAMICIN: SIGNIFICANT CHANGE UP
-  IMIPENEM: SIGNIFICANT CHANGE UP
-  LEVOFLOXACIN: SIGNIFICANT CHANGE UP
-  MEROPENEM: SIGNIFICANT CHANGE UP
-  NITROFURANTOIN: SIGNIFICANT CHANGE UP
-  PIPERACILLIN/TAZOBACTAM: SIGNIFICANT CHANGE UP
-  TIGECYCLINE: SIGNIFICANT CHANGE UP
-  TOBRAMYCIN: SIGNIFICANT CHANGE UP
-  TRIMETHOPRIM/SULFAMETHOXAZOLE: SIGNIFICANT CHANGE UP
ANION GAP SERPL CALC-SCNC: -3 MMOL/L — LOW (ref 5–17)
BUN SERPL-MCNC: 35 MG/DL — HIGH (ref 7–23)
CALCIUM SERPL-MCNC: 9.6 MG/DL — SIGNIFICANT CHANGE UP (ref 8.5–10.1)
CHLORIDE SERPL-SCNC: 105 MMOL/L — SIGNIFICANT CHANGE UP (ref 96–108)
CO2 SERPL-SCNC: 35 MMOL/L — HIGH (ref 22–31)
CREAT SERPL-MCNC: 0.75 MG/DL — SIGNIFICANT CHANGE UP (ref 0.5–1.3)
CULTURE RESULTS: SIGNIFICANT CHANGE UP
EGFR: 84 ML/MIN/1.73M2 — SIGNIFICANT CHANGE UP
GLUCOSE SERPL-MCNC: 155 MG/DL — HIGH (ref 70–99)
METHOD TYPE: SIGNIFICANT CHANGE UP
ORGANISM # SPEC MICROSCOPIC CNT: SIGNIFICANT CHANGE UP
ORGANISM # SPEC MICROSCOPIC CNT: SIGNIFICANT CHANGE UP
POTASSIUM SERPL-MCNC: 3.6 MMOL/L — SIGNIFICANT CHANGE UP (ref 3.5–5.3)
POTASSIUM SERPL-SCNC: 3.6 MMOL/L — SIGNIFICANT CHANGE UP (ref 3.5–5.3)
SODIUM SERPL-SCNC: 137 MMOL/L — SIGNIFICANT CHANGE UP (ref 135–145)
SPECIMEN SOURCE: SIGNIFICANT CHANGE UP

## 2022-04-07 PROCEDURE — 99232 SBSQ HOSP IP/OBS MODERATE 35: CPT

## 2022-04-07 RX ORDER — INSULIN LISPRO 100/ML
VIAL (ML) SUBCUTANEOUS
Refills: 0 | Status: DISCONTINUED | OUTPATIENT
Start: 2022-04-07 | End: 2022-04-08

## 2022-04-07 RX ORDER — DEXTROSE 50 % IN WATER 50 %
25 SYRINGE (ML) INTRAVENOUS ONCE
Refills: 0 | Status: DISCONTINUED | OUTPATIENT
Start: 2022-04-07 | End: 2022-04-08

## 2022-04-07 RX ORDER — SODIUM CHLORIDE 9 MG/ML
1000 INJECTION, SOLUTION INTRAVENOUS
Refills: 0 | Status: DISCONTINUED | OUTPATIENT
Start: 2022-04-07 | End: 2022-04-08

## 2022-04-07 RX ORDER — INSULIN LISPRO 100/ML
VIAL (ML) SUBCUTANEOUS AT BEDTIME
Refills: 0 | Status: DISCONTINUED | OUTPATIENT
Start: 2022-04-07 | End: 2022-04-08

## 2022-04-07 RX ORDER — GLUCAGON INJECTION, SOLUTION 0.5 MG/.1ML
1 INJECTION, SOLUTION SUBCUTANEOUS ONCE
Refills: 0 | Status: DISCONTINUED | OUTPATIENT
Start: 2022-04-07 | End: 2022-04-08

## 2022-04-07 RX ORDER — DEXTROSE 50 % IN WATER 50 %
15 SYRINGE (ML) INTRAVENOUS ONCE
Refills: 0 | Status: DISCONTINUED | OUTPATIENT
Start: 2022-04-07 | End: 2022-04-08

## 2022-04-07 RX ORDER — DEXTROSE 50 % IN WATER 50 %
12.5 SYRINGE (ML) INTRAVENOUS ONCE
Refills: 0 | Status: DISCONTINUED | OUTPATIENT
Start: 2022-04-07 | End: 2022-04-08

## 2022-04-07 RX ADMIN — AMLODIPINE BESYLATE 10 MILLIGRAM(S): 2.5 TABLET ORAL at 08:58

## 2022-04-07 RX ADMIN — Medication 1 SPRAY(S): at 22:09

## 2022-04-07 RX ADMIN — Medication 172 MILLIGRAM(S): at 08:56

## 2022-04-07 RX ADMIN — FAMOTIDINE 20 MILLIGRAM(S): 10 INJECTION INTRAVENOUS at 22:08

## 2022-04-07 RX ADMIN — PANTOPRAZOLE SODIUM 40 MILLIGRAM(S): 20 TABLET, DELAYED RELEASE ORAL at 06:33

## 2022-04-07 RX ADMIN — Medication 10 MILLIGRAM(S): at 22:08

## 2022-04-07 RX ADMIN — Medication 100 MILLIGRAM(S): at 22:08

## 2022-04-07 RX ADMIN — Medication 100 MILLIGRAM(S): at 13:54

## 2022-04-07 RX ADMIN — Medication 100 MILLIGRAM(S): at 08:57

## 2022-04-07 RX ADMIN — Medication 1: at 16:14

## 2022-04-07 RX ADMIN — LOSARTAN POTASSIUM 100 MILLIGRAM(S): 100 TABLET, FILM COATED ORAL at 08:57

## 2022-04-07 RX ADMIN — Medication 3 MILLIGRAM(S): at 22:07

## 2022-04-07 RX ADMIN — ENOXAPARIN SODIUM 40 MILLIGRAM(S): 100 INJECTION SUBCUTANEOUS at 16:11

## 2022-04-07 RX ADMIN — ATORVASTATIN CALCIUM 40 MILLIGRAM(S): 80 TABLET, FILM COATED ORAL at 22:08

## 2022-04-07 RX ADMIN — GABAPENTIN 600 MILLIGRAM(S): 400 CAPSULE ORAL at 13:32

## 2022-04-07 RX ADMIN — Medication 100 MILLIGRAM(S): at 06:33

## 2022-04-07 RX ADMIN — Medication 1 SPRAY(S): at 08:58

## 2022-04-07 RX ADMIN — GABAPENTIN 300 MILLIGRAM(S): 400 CAPSULE ORAL at 08:56

## 2022-04-07 RX ADMIN — Medication 10 MILLIGRAM(S): at 08:57

## 2022-04-07 RX ADMIN — GABAPENTIN 300 MILLIGRAM(S): 400 CAPSULE ORAL at 22:24

## 2022-04-07 RX ADMIN — MONTELUKAST 10 MILLIGRAM(S): 4 TABLET, CHEWABLE ORAL at 08:56

## 2022-04-07 NOTE — PROGRESS NOTE ADULT - SUBJECTIVE AND OBJECTIVE BOX
71 y/o female with PMHx of HTN and MS (initially dx in ) on no maintenance meds aside from baclofen/ neurontin who presented to  with worsening MS sx.  Patient notes she hasn't been doing well since 2021.  Patient notes she has been having increasing trouble ambulating.  She has to lean forward when she walks to be comfortable.  She notes increasing difficulty with walking.  She does not use a cane or a walker at home.  She notes her legs feel increasingly heavy-- like bricks.  She also notes muscle spasms/ stiffness/ numbness/ paresthesias.  Her symptoms are bilateral but worse on the right side compared to her left.  She does not have significant symptoms in her arms.  She does have some slight sensation issues in her hands.  In the last 24 hours, she also noticed development of neuro symptoms around her chest-- particularly on the right side.  She calls this the "hug" and has had this before with MS flairs.  She was seeing her neurologist outpatient who initially did a CT looking more for spinal stenosis but this was reviewed and thought not to be signficant.  She was scheduled to have MRI of the brain/ neck/ thoracic spine this coming friday as outpatient but in the last 24 hours, she has had difficulty even standing/ getting around at home and presented to the ER for further evaluation. Seen by neuro who recommended admission for High dose IV steroids and MRI.        - patient was seen and examined. stated that she feels better this AM compared to yesterday. Denies pain, fever or chills.    Vital Signs Last 24 Hrs  T(C): 37.1 (2022 21:56), Max: 37.1 (2022 21:56)  T(F): 98.8 (2022 21:56), Max: 98.8 (2022 21:56)  HR: 71 (2022 06:31) (71 - 83)  BP: 133/71 (2022 06:31) (107/87 - 133/71)  BP(mean): --  RR: 18 (2022 06:31) (18 - 18)  SpO2: 96% (2022 06:31) (93% - 96%)    ROS:   All 10 systems reviewed and found to be negative with the exception of what has been described above.  PE:  Constitutional: NAD, laying in bed  HEENT: NC/AT  Back: no tenderness  Respiratory: respirations even and non labored, LCTA  Cardiovascular: S1S2 regular, no murmurs  Abdomen: soft, not tender, not distended, positive BS  Genitourinary: voiding  Rectal: deferred  Musculoskeletal: no muscle tenderness, no joint swelling or tenderness  Extremities: no pedal edema   Neurological: no focal deficits          137  |  105  |  35<H>  ----------------------------<  155<H>  3.6   |  35<H>  |  0.75    Ca    9.6      2022 07:30                          12.9   9.35  )-----------( 313      ( 2022 06:48 )             39.3                           12.9   9.35  )-----------( 313      ( 2022 06:48 )             39.3             Urinalysis Basic - ( 2022 11:05 )    Color: Yellow / Appearance: Clear / S.010 / pH: x  Gluc: x / Ketone: Negative  / Bili: Negative / Urobili: Negative   Blood: x / Protein: Negative / Nitrite: Negative   Leuk Esterase: Small / RBC: Negative /HPF / WBC 3-5   Sq Epi: x / Non Sq Epi: Few / Bacteria: x            MEDICATIONS  (STANDING):  amLODIPine   Tablet 10 milliGRAM(s) Oral daily  atorvastatin 40 milliGRAM(s) Oral at bedtime  baclofen 10 milliGRAM(s) Oral two times a day  enoxaparin Injectable 40 milliGRAM(s) SubCutaneous every 24 hours  famotidine  Oral Tab/Cap - Peds 20 milliGRAM(s) Oral at bedtime  fluticasone propionate 50 MICROgram(s)/spray Nasal Spray 1 Spray(s) Both Nostrils two times a day  gabapentin 300 milliGRAM(s) Oral two times a day  gabapentin 600 milliGRAM(s) Oral daily  hydrALAZINE 100 milliGRAM(s) Oral three times a day  losartan 100 milliGRAM(s) Oral daily  methylPREDNISolone sodium succinate IVPB 1000 milliGRAM(s) IV Intermittent daily  montelukast 10 milliGRAM(s) Oral daily  pantoprazole    Tablet 40 milliGRAM(s) Oral before breakfast        Neurologic: decreased sensation b/l lower legs.    decreased strength 4/5 hip flexors, knee extenders, knee flexors.  3/5 dorsi/ plantar flexion.    did not stand patient.    Psychiatric: Oriented X3, intact recent and remote memory, judgement and insight, normal mood and affect.

## 2022-04-07 NOTE — PROGRESS NOTE ADULT - ASSESSMENT
73 y/o female with PMHx of HTN and MS (initially dx in 1998) on no maintenance meds aside from baclofen/ neurontin who presented to  with worsening weakness, numbness, paresthesias and muscle stiffness in her lower legs and core.  Admitted for MS flair.      #Worsening weakness/ paresthesias/ muscle spasms-- concern for MS flair:    CT head reviewed.    Check MRI brain / cervical spine/ thoracic spine as per neuro- unable to obtain MRI- pt with spinal stimulator     Solumedrol 1 g IV daily for 3 days then prednisone taper as per neuro.    PT juan.    NELSON PRESTON d/w her neurologist @ [place].  Dr. Gem Palencia.    Cont baclofen/ neurontin for muscle spasms/ pain.      #Band like pain in thorax:    Concerning for as above-- MS flair.    CTA chest negative for dissection.      #Dysuria/ Frequency:    Patient notes this often happens with MS flairs.    UC- Ecoli- awaiting sensitivities   Macrobid     #HTN:    Cont home meds.  Losaratan.  Hydralazine.  Norvasc.      #DVT Proph:  Lovenox.      #GI proph:  Protonix while on steroids.      Case d/w team on IDR.    71 y/o female with PMHx of HTN and MS (initially dx in 1998) on no maintenance meds aside from baclofen/ neurontin who presented to  with worsening weakness, numbness, paresthesias and muscle stiffness in her lower legs and core.  Admitted for MS flair.      #Worsening weakness/ paresthesias/ muscle spasms-- concern for MS flair:    CT head reviewed.    Check MRI brain / cervical spine/ thoracic spine as per neuro- unable to obtain MRI- pt with spinal stimulator     Solumedrol 1 g IV daily for 3 days then prednisone taper as per neuro.    PT juan.    NELSON PRESTON d/w her neurologist @ [place].  Dr. Gem Palencia.    Cont baclofen/ neurontin for muscle spasms/ pain.      #Band like pain in thorax:    Concerning for as above-- MS flair.    CTA chest negative for dissection.      #Dysuria/ Frequency:    Patient notes this often happens with MS flairs.    UC- Ecoli- awaiting sensitivities   Macrobid     #HTN:    Cont home meds.  Losaratan.  Hydralazine.  Norvasc.      #DVT Proph:  Lovenox.      #GI proph:  Protonix while on steroids.      #CT chest showed LLL nodule- outpatient f/u     Case d/w team on IDR.

## 2022-04-08 ENCOUNTER — TRANSCRIPTION ENCOUNTER (OUTPATIENT)
Age: 72
End: 2022-04-08

## 2022-04-08 VITALS
SYSTOLIC BLOOD PRESSURE: 157 MMHG | HEART RATE: 80 BPM | RESPIRATION RATE: 18 BRPM | OXYGEN SATURATION: 99 % | TEMPERATURE: 98 F | DIASTOLIC BLOOD PRESSURE: 79 MMHG

## 2022-04-08 LAB
A1C WITH ESTIMATED AVERAGE GLUCOSE RESULT: 6.1 % — HIGH (ref 4–5.6)
ESTIMATED AVERAGE GLUCOSE: 128 MG/DL — HIGH (ref 68–114)

## 2022-04-08 PROCEDURE — 99239 HOSP IP/OBS DSCHRG MGMT >30: CPT

## 2022-04-08 RX ORDER — LANOLIN ALCOHOL/MO/W.PET/CERES
1 CREAM (GRAM) TOPICAL
Qty: 0 | Refills: 0 | DISCHARGE
Start: 2022-04-08

## 2022-04-08 RX ORDER — ACETAMINOPHEN 500 MG
2 TABLET ORAL
Qty: 0 | Refills: 0 | DISCHARGE
Start: 2022-04-08

## 2022-04-08 RX ORDER — NITROFURANTOIN MACROCRYSTAL 50 MG
1 CAPSULE ORAL
Qty: 7 | Refills: 0
Start: 2022-04-08 | End: 2022-04-10

## 2022-04-08 RX ORDER — ONDANSETRON 8 MG/1
1 TABLET, FILM COATED ORAL
Qty: 9 | Refills: 0
Start: 2022-04-08 | End: 2022-04-10

## 2022-04-08 RX ADMIN — AMLODIPINE BESYLATE 10 MILLIGRAM(S): 2.5 TABLET ORAL at 09:18

## 2022-04-08 RX ADMIN — Medication 100 MILLIGRAM(S): at 09:18

## 2022-04-08 RX ADMIN — Medication 100 MILLIGRAM(S): at 13:39

## 2022-04-08 RX ADMIN — MONTELUKAST 10 MILLIGRAM(S): 4 TABLET, CHEWABLE ORAL at 09:20

## 2022-04-08 RX ADMIN — Medication 60 MILLIGRAM(S): at 06:08

## 2022-04-08 RX ADMIN — LOSARTAN POTASSIUM 100 MILLIGRAM(S): 100 TABLET, FILM COATED ORAL at 09:21

## 2022-04-08 RX ADMIN — Medication 10 MILLIGRAM(S): at 09:18

## 2022-04-08 RX ADMIN — Medication 1 SPRAY(S): at 09:17

## 2022-04-08 RX ADMIN — ONDANSETRON 4 MILLIGRAM(S): 8 TABLET, FILM COATED ORAL at 09:18

## 2022-04-08 RX ADMIN — GABAPENTIN 600 MILLIGRAM(S): 400 CAPSULE ORAL at 13:39

## 2022-04-08 RX ADMIN — PANTOPRAZOLE SODIUM 40 MILLIGRAM(S): 20 TABLET, DELAYED RELEASE ORAL at 06:08

## 2022-04-08 RX ADMIN — GABAPENTIN 300 MILLIGRAM(S): 400 CAPSULE ORAL at 09:20

## 2022-04-08 RX ADMIN — Medication 100 MILLIGRAM(S): at 06:08

## 2022-04-08 NOTE — DISCHARGE NOTE PROVIDER - NSDCCPCAREPLAN_GEN_ALL_CORE_FT
PRINCIPAL DISCHARGE DIAGNOSIS  Diagnosis: Lower extremity weakness  Assessment and Plan of Treatment: possible MS flair  completed IV steroid x3 days - you will need ot beon the taper- follow instructions as stated on your DC medication paperowrk  - f/u with neurolgist.  - notify PCP for worsening symptoms/ fever. pain, chills or SOB  c/w the rest of your MS meds  *UTI  complete antibiotic regimen as prescribed. Complete the full course of antibiotics- do not skip or miss doses- do not stop even when you start to feel better.   Notify your PCP if your symptom has worsened or if you develop excessive diarrhea while on antibiotic.   you will need 3 more dyas of Macrobid.

## 2022-04-08 NOTE — DISCHARGE NOTE PROVIDER - NSDCMRMEDTOKEN_GEN_ALL_CORE_FT
acetaminophen 325 mg oral tablet: 2 tab(s) orally every 6 hours, As needed, Temp greater or equal to 38C (100.4F), Mild Pain (1 - 3)  amLODIPine 10 mg oral tablet: 1 tab(s) orally once a day  atorvastatin 40 mg oral tablet: 1 tab(s) orally once a day (at bedtime)  azelastine 137 mcg/inh (0.1%) nasal spray: 2 spray(s) in each nostril 2 times a day  baclofen 5 mg oral tablet: 2 tab(s) orally 2 times a day  chlorthalidone 25 mg oral tablet: 1 tab(s) orally once a day  famotidine 20 mg oral tablet: 1 tab(s) orally once a day (at bedtime)  fluticasone 50 mcg/inh nasal spray: 1 spray(s) in each nostril once a day  gabapentin 300 mg oral capsule: 1 cap(s) orally 2 times a day in the morning and at bedtime  gabapentin 300 mg oral capsule: 2 cap(s) orally once a day (in the afternoon)  hydrALAZINE 100 mg oral tablet: 1 tab(s) orally 3 times a day  losartan 100 mg oral tablet: 1 tab(s) orally once a day  melatonin 3 mg oral tablet: 1 tab(s) orally once a day (at bedtime), As needed, Insomnia  montelukast 10 mg oral tablet: 1 tab(s) orally once a day (at bedtime)  Multiple Vitamins oral tablet: 1 tab(s) orally once a day  nitrofurantoin macrocrystals-monohydrate 100 mg oral capsule: 1 cap(s) orally 2 times a day  omeprazole 40 mg oral delayed release capsule: 1 cap(s) orally once a day before breakfast  predniSONE 10 mg oral tablet: 60mg po oral once daily x 2D  50mg po daily x3 days   40mg po oral daily x3D  30mg po oral daily x3D  20mg po oral daily x3D  10mg po daily x3 days then DC   acetaminophen 325 mg oral tablet: 2 tab(s) orally every 6 hours, As needed, Temp greater or equal to 38C (100.4F), Mild Pain (1 - 3)  amLODIPine 10 mg oral tablet: 1 tab(s) orally once a day  atorvastatin 40 mg oral tablet: 1 tab(s) orally once a day (at bedtime)  azelastine 137 mcg/inh (0.1%) nasal spray: 2 spray(s) in each nostril 2 times a day  baclofen 5 mg oral tablet: 2 tab(s) orally 2 times a day  chlorthalidone 25 mg oral tablet: 1 tab(s) orally once a day  famotidine 20 mg oral tablet: 1 tab(s) orally once a day (at bedtime)  fluticasone 50 mcg/inh nasal spray: 1 spray(s) in each nostril once a day  gabapentin 300 mg oral capsule: 1 cap(s) orally 2 times a day in the morning and at bedtime  gabapentin 300 mg oral capsule: 2 cap(s) orally once a day (in the afternoon)  hydrALAZINE 100 mg oral tablet: 1 tab(s) orally 3 times a day  losartan 100 mg oral tablet: 1 tab(s) orally once a day  melatonin 3 mg oral tablet: 1 tab(s) orally once a day (at bedtime), As needed, Insomnia  montelukast 10 mg oral tablet: 1 tab(s) orally once a day (at bedtime)  Multiple Vitamins oral tablet: 1 tab(s) orally once a day  nitrofurantoin macrocrystals-monohydrate 100 mg oral capsule: 1 cap(s) orally 2 times a day  omeprazole 40 mg oral delayed release capsule: 1 cap(s) orally once a day before breakfast  ondansetron 4 mg oral disintegrating strip: 1 each orally 3 times a day, As Needed -for nausea   predniSONE 10 mg oral tablet: 60mg po oral once daily x 2D  50mg po daily x3 days   40mg po oral daily x3D  30mg po oral daily x3D  20mg po oral daily x3D  10mg po daily x3 days then DC

## 2022-04-08 NOTE — DISCHARGE NOTE PROVIDER - YES NO FOR ML[facility] POSITIVE OR NEGATIVE COVID RESULT
Visit Information    Have you changed or started any medications since your last visit including any over-the-counter medicines, vitamins, or herbal medicines? no   Have you stopped taking any of your medications? Is so, why? -  no  Are you having any side effects from any of your medications? - no    Have you seen any other physician or provider since your last visit?  no   Have you had any other diagnostic tests since your last visit?  no   Have you been seen in the emergency room and/or had an admission in a hospital since we last saw you?  no   Have you had your routine dental cleaning in the past 6 months?  no     Do you have an active MyChart account? If no, what is the barrier?   Yes    Patient Care Team:  LORNA Young CNP as PCP - General (Nurse Practitioner)  LORNA Young CNP as PCP - [facility] Provider    Medical History Review  Past Medical, Family, and Social History reviewed and  contribute to the patient presenting condition    Health Maintenance   Topic Date Due    COVID-19 Vaccine (1) Never done    Hepatitis B vaccine (1 of 3 - Risk 3-dose series) Never done    Shingles Vaccine (2 of 3) 12/05/2017    Diabetic microalbuminuria test  01/14/2021    Diabetic retinal exam  07/02/2021    DTaP/Tdap/Td vaccine (1 - Tdap) 12/17/2021 (Originally 6/27/1984)    Cervical cancer screen  02/07/2031 (Originally 6/27/1985)    Flu vaccine (1) 09/01/2021    Diabetic foot exam  12/17/2021    A1C test (Diabetic or Prediabetic)  01/27/2022    Lipid screen  01/27/2022    Potassium monitoring  01/27/2022    Creatinine monitoring  01/27/2022    Breast cancer screen  09/09/2022    Colon cancer screen colonoscopy  11/18/2022    Pneumococcal 0-64 years Vaccine (2 of 2 - PPSV23) 06/27/2029    Hepatitis C screen  Completed    HIV screen  Completed    Hepatitis A vaccine  Aged Out    Hib vaccine  Aged Out    Meningococcal (ACWY) vaccine  Aged Out ,

## 2022-04-08 NOTE — DISCHARGE NOTE PROVIDER - HOSPITAL COURSE
73 y/o female with PMHx of HTN and MS (initially dx in 1998) on no maintenance meds aside from baclofen/ neurontin who presented to  with worsening MS sx.  Patient notes she hasn't been doing well since August 2021.  Patient notes she has been having increasing trouble ambulating.  She has to lean forward when she walks to be comfortable.  She notes increasing difficulty with walking.  She does not use a cane or a walker at home.  She notes her legs feel increasingly heavy-- like bricks.  She also notes muscle spasms/ stiffness/ numbness/ paresthesias.  Her symptoms are bilateral but worse on the right side compared to her left.  She does not have significant symptoms in her arms.  She does have some slight sensation issues in her hands.  In the last 24 hours, she also noticed development of neuro symptoms around her chest-- particularly on the right side.  She calls this the "hug" and has had this before with MS flairs.  She was seeing her neurologist outpatient who initially did a CT looking more for spinal stenosis but this was reviewed and thought not to be signficant.  She was scheduled to have MRI of the brain/ neck/ thoracic spine this coming friday as outpatient but in the last 24 hours, she has had difficulty even standing/ getting around at home and presented to the ER for further evaluation. Seen by neuro who recommended admission for High dose IV steroids and MRI.    Neurolgoist consulted and patient was started on IV solumedrol 1G x3 days- patient symptoms improved- plan for dc home on prednisone taper. - She will f/u with her outpatient neurologist next week at [facility].    4/8- patient was seen examined- stated that she feels 85% improved. NO acute overnight events.     Vital Signs Last 24 Hrs  T(C): 36.6 (08 Apr 2022 07:50), Max: 36.6 (07 Apr 2022 16:00)  T(F): 97.9 (08 Apr 2022 07:50), Max: 97.9 (07 Apr 2022 16:00)  HR: 80 (08 Apr 2022 07:50) (65 - 80)  BP: 157/79 (08 Apr 2022 07:50) (110/56 - 157/79)  BP(mean): --  RR: 18 (08 Apr 2022 07:50) (18 - 18)  SpO2: 99% (08 Apr 2022 07:50) (92% - 99%)    ROS:   All 10 systems reviewed and found to be negative with the exception of what has been described above.     PE:  Constitutional: NAD, laying in bed  HEENT: NC/AT  Back: no tenderness  Respiratory: respirations even and non labored, LCTA  Cardiovascular: S1S2 regular, no murmurs  Abdomen: soft, not tender, not distended, positive BS  Genitourinary: voiding  Musculoskeletal: no muscle tenderness, no joint swelling or tenderness  Extremities: no pedal edema   Neurological: no focal deficits    PLAN   #Worsening weakness/ paresthesias/ muscle spasms-- concern for MS flair:    CT head reviewed.    Check MRI brain / cervical spine/ thoracic spine as per neuro- unable to obtain MRI- pt with spinal stimulator     Solumedrol 1 g IV daily for 3 days then prednisone taper as per neuro.    PT juan.    NELSON PRESTON d/w her neurologist @ [place].  Dr. Gem Palencia.    Cont baclofen/ neurontin for muscle spasms/ pain.      #Band like pain in thorax- improved  Concerning for as above-- MS flair.    CTA chest negative for dissection.      #Dysuria/ Frequency:    Patient notes this often happens with MS flairs.    UC- Ecoli- awaiting sensitivities   Macrobid     #HTN:    Cont home meds.  Losaratan.  Hydralazine.  Norvasc.      #DVT Proph:  Lovenox.      #GI proph:  Protonix while on steroids.      #CT chest showed LLL nodule- outpatient f/u     Case d/w team on IDR. Plan for dc. 73 y/o female with PMHx of HTN and MS (initially dx in 1998) on no maintenance meds aside from baclofen/ neurontin who presented to  with worsening MS sx.  Patient notes she hasn't been doing well since August 2021.  Patient notes she has been having increasing trouble ambulating.  She has to lean forward when she walks to be comfortable.  She notes increasing difficulty with walking.  She does not use a cane or a walker at home.  She notes her legs feel increasingly heavy-- like bricks.  She also notes muscle spasms/ stiffness/ numbness/ paresthesias.  Her symptoms are bilateral but worse on the right side compared to her left.  She does not have significant symptoms in her arms.  She does have some slight sensation issues in her hands.  In the last 24 hours, she also noticed development of neuro symptoms around her chest-- particularly on the right side.  She calls this the "hug" and has had this before with MS flairs.  She was seeing her neurologist outpatient who initially did a CT looking more for spinal stenosis but this was reviewed and thought not to be signficant.  She was scheduled to have MRI of the brain/ neck/ thoracic spine this coming friday as outpatient but in the last 24 hours, she has had difficulty even standing/ getting around at home and presented to the ER for further evaluation. Seen by neuro who recommended admission for High dose IV steroids and MRI.    admitted for MS flare  Neurolgoist consulted and patient was started on IV solumedrol 1G x3 days- patient symptoms improved- plan for dc home on prednisone taper. - She will f/u with her outpatient neurologist next week at [facility].pt was also treated for uncomplicated ecoli cystitis     4/8- patient was seen examined- stated that she feels 85% improved. NO acute overnight events.     Vital Signs Last 24 Hrs  T(C): 36.6 (08 Apr 2022 07:50), Max: 36.6 (07 Apr 2022 16:00)  T(F): 97.9 (08 Apr 2022 07:50), Max: 97.9 (07 Apr 2022 16:00)  HR: 80 (08 Apr 2022 07:50) (65 - 80)  BP: 157/79 (08 Apr 2022 07:50) (110/56 - 157/79)  BP(mean): --  RR: 18 (08 Apr 2022 07:50) (18 - 18)  SpO2: 99% (08 Apr 2022 07:50) (92% - 99%)    ROS:   All 10 systems reviewed and found to be negative with the exception of what has been described above.     PE:  Constitutional: NAD, laying in bed  HEENT: NC/AT  Back: no tenderness  Respiratory: respirations even and non labored, LCTA  Cardiovascular: S1S2 regular, no murmurs  Abdomen: soft, not tender, not distended, positive BS  Genitourinary: voiding  Musculoskeletal: no muscle tenderness, no joint swelling or tenderness  Extremities: no pedal edema   Neurological: no focal deficits    A/P  #weakness/ paresthesias/ muscle spasms-- concern for MS flair:  sx resolved   CT head reviewed.    Check MRI brain / cervical spine/ thoracic spine as per neuro- unable to obtain MRI- pt with spinal stimulator   - pt does not have her remote control  Solumedrol 1 g IV daily for 3 days then prednisone taper as per neuro.    PT juan.    NELSON PRESTON d/w her neurologist @ [place].  Dr. Gem Palencia.    Cont baclofen/ neurontin for muscle spasms/ pain.      #Band like pain in thorax- improved  Concerning for as above-- MS flair.    CTA chest negative for dissection.      #Dysuria/ Frequency:    Patient notes this often happens with MS flairs.    UC- Ecoli- sensitive to macrobid  Macrobid     #HTN:    Cont home meds.  Losaratan.  Hydralazine.  Norvasc.      #DVT Proph:  Lovenox.      #GI proph:  Protonix while on steroids.      #CT chest showed LLL nodule- outpatient f/u     Case d/w team on IDR. Plan for dc.

## 2022-04-08 NOTE — DISCHARGE NOTE PROVIDER - ATTENDING DISCHARGE PHYSICAL EXAMINATION:
PE:  Constitutional: NAD, laying in bed  HEENT: NC/AT  Back: no tenderness  Respiratory: respirations even and non labored, LCTA  Cardiovascular: S1S2 regular, no murmurs  Abdomen: soft, not tender, not distended, positive BS  Genitourinary: voiding  Musculoskeletal: no muscle tenderness, no joint swelling or tenderness  Extremities: no pedal edema   Neurological: no focal deficits PE:  Constitutional: NAD, laying in bed  HEENT: NC/AT  Back: no tenderness  Respiratory: respirations even and non labored, LCTA  Cardiovascular: S1S2 regular, no murmurs  Abdomen: soft, not tender, not distended, positive BS  Genitourinary: voiding  Musculoskeletal: no muscle tenderness, no joint swelling or tenderness  Extremities: no pedal edema   Neurological: AOx3 decreased in legs to light touch but improved since admission 5/5, LEs proximal ~ 4/5, distal R 4/5, L 3/5

## 2022-04-13 DIAGNOSIS — B96.20 UNSPECIFIED ESCHERICHIA COLI [E. COLI] AS THE CAUSE OF DISEASES CLASSIFIED ELSEWHERE: ICD-10-CM

## 2022-04-13 DIAGNOSIS — N39.0 URINARY TRACT INFECTION, SITE NOT SPECIFIED: ICD-10-CM

## 2022-04-13 DIAGNOSIS — G35 MULTIPLE SCLEROSIS: ICD-10-CM

## 2022-04-13 DIAGNOSIS — Z79.899 OTHER LONG TERM (CURRENT) DRUG THERAPY: ICD-10-CM

## 2022-04-13 DIAGNOSIS — Z20.822 CONTACT WITH AND (SUSPECTED) EXPOSURE TO COVID-19: ICD-10-CM

## 2022-04-13 DIAGNOSIS — Z88.0 ALLERGY STATUS TO PENICILLIN: ICD-10-CM

## 2022-04-13 DIAGNOSIS — I10 ESSENTIAL (PRIMARY) HYPERTENSION: ICD-10-CM

## 2022-04-13 DIAGNOSIS — Z79.52 LONG TERM (CURRENT) USE OF SYSTEMIC STEROIDS: ICD-10-CM

## 2022-04-13 DIAGNOSIS — R91.1 SOLITARY PULMONARY NODULE: ICD-10-CM

## 2022-04-29 ENCOUNTER — NON-APPOINTMENT (OUTPATIENT)
Age: 72
End: 2022-04-29

## 2022-05-20 ENCOUNTER — APPOINTMENT (OUTPATIENT)
Dept: PEDIATRIC ALLERGY IMMUNOLOGY | Facility: CLINIC | Age: 72
End: 2022-05-20

## 2022-05-21 ENCOUNTER — EMERGENCY (EMERGENCY)
Facility: HOSPITAL | Age: 72
LOS: 0 days | Discharge: HOME | End: 2022-05-21
Attending: EMERGENCY MEDICINE
Payer: COMMERCIAL

## 2022-05-21 VITALS
HEART RATE: 75 BPM | TEMPERATURE: 99 F | DIASTOLIC BLOOD PRESSURE: 69 MMHG | OXYGEN SATURATION: 93 % | RESPIRATION RATE: 17 BRPM | SYSTOLIC BLOOD PRESSURE: 165 MMHG

## 2022-05-21 VITALS — HEIGHT: 64 IN | WEIGHT: 186.07 LBS

## 2022-05-21 DIAGNOSIS — N39.0 URINARY TRACT INFECTION, SITE NOT SPECIFIED: ICD-10-CM

## 2022-05-21 DIAGNOSIS — I10 ESSENTIAL (PRIMARY) HYPERTENSION: ICD-10-CM

## 2022-05-21 DIAGNOSIS — Z88.0 ALLERGY STATUS TO PENICILLIN: ICD-10-CM

## 2022-05-21 DIAGNOSIS — R35.0 FREQUENCY OF MICTURITION: ICD-10-CM

## 2022-05-21 DIAGNOSIS — Z91.010 ALLERGY TO PEANUTS: ICD-10-CM

## 2022-05-21 DIAGNOSIS — G35 MULTIPLE SCLEROSIS: ICD-10-CM

## 2022-05-21 DIAGNOSIS — Z91.018 ALLERGY TO OTHER FOODS: ICD-10-CM

## 2022-05-21 LAB
APPEARANCE UR: CLEAR — SIGNIFICANT CHANGE UP
BILIRUB UR-MCNC: NEGATIVE — SIGNIFICANT CHANGE UP
COLOR SPEC: YELLOW — SIGNIFICANT CHANGE UP
DIFF PNL FLD: ABNORMAL
GLUCOSE UR QL: NEGATIVE — SIGNIFICANT CHANGE UP
KETONES UR-MCNC: NEGATIVE — SIGNIFICANT CHANGE UP
LEUKOCYTE ESTERASE UR-ACNC: ABNORMAL
NITRITE UR-MCNC: NEGATIVE — SIGNIFICANT CHANGE UP
PH UR: 7 — SIGNIFICANT CHANGE UP (ref 5–8)
PROT UR-MCNC: NEGATIVE — SIGNIFICANT CHANGE UP
SP GR SPEC: 1.01 — SIGNIFICANT CHANGE UP (ref 1.01–1.02)
UROBILINOGEN FLD QL: NEGATIVE — SIGNIFICANT CHANGE UP

## 2022-05-21 PROCEDURE — 87086 URINE CULTURE/COLONY COUNT: CPT

## 2022-05-21 PROCEDURE — 99283 EMERGENCY DEPT VISIT LOW MDM: CPT

## 2022-05-21 PROCEDURE — 81001 URINALYSIS AUTO W/SCOPE: CPT

## 2022-05-21 PROCEDURE — 87077 CULTURE AEROBIC IDENTIFY: CPT

## 2022-05-21 PROCEDURE — 87186 SC STD MICRODIL/AGAR DIL: CPT

## 2022-05-21 RX ORDER — NITROFURANTOIN MACROCRYSTAL 50 MG
1 CAPSULE ORAL
Qty: 14 | Refills: 0
Start: 2022-05-21 | End: 2022-05-27

## 2022-05-21 RX ORDER — SODIUM CHLORIDE 9 MG/ML
1000 INJECTION INTRAMUSCULAR; INTRAVENOUS; SUBCUTANEOUS ONCE
Refills: 0 | Status: DISCONTINUED | OUTPATIENT
Start: 2022-05-21 | End: 2022-05-21

## 2022-05-21 RX ORDER — NITROFURANTOIN MACROCRYSTAL 50 MG
100 CAPSULE ORAL ONCE
Refills: 0 | Status: COMPLETED | OUTPATIENT
Start: 2022-05-21 | End: 2022-05-21

## 2022-05-21 RX ADMIN — Medication 100 MILLIGRAM(S): at 13:28

## 2022-05-21 NOTE — ED STATDOCS - OBJECTIVE STATEMENT
73 y/o F with PMHx of MS, HTN, UTI, presents to ED c/o UTI symptoms. Pt reports having MS exacerbation. Pt reports she may have waited too long. Pt reports she has "tingling" from MS. Thought it was MS exacerbation only. Was on prednisone. Had a UTI last year which was treated. Pt currently has neurostimulator, mri on May 13th. MS May 13th during MRI,  bp was 91/43 and vomiting. episode. No self- catheter. +urinary urgency. No fever. no chills. No prolapse. Pt takes gabapentin for MS.

## 2022-05-21 NOTE — ED STATDOCS - PATIENT PORTAL LINK FT
You can access the FollowMyHealth Patient Portal offered by [facility] by registering at the following website: [email or web address]. By joining [facility]’s FollowMyHealth portal, you will also be able to view your health information using other applications (apps) compatible with our system.

## 2022-05-21 NOTE — ED STATDOCS - PROGRESS NOTE DETAILS
71 yo female with a PMH of MS, (on gabapentin and baclofen), has a neurostimulator in place, HTN presents with tingling, dysuria, and urgency for the last few days. 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 Pt has tingling from her waist down from her MS buts states this felt diff and more consistent with her UTis. Denies any othe symptoms. Will check UA/UC and reeval. -Denys Flores PA-C

## 2022-05-21 NOTE — ED STATDOCS - NS ED ATTENDING STATEMENT MOD
This was a shared visit with the KESHAWN. I reviewed and verified the documentation and independently performed the documented:

## 2022-05-23 LAB
-  AMIKACIN: SIGNIFICANT CHANGE UP
-  AMOXICILLIN/CLAVULANIC ACID: SIGNIFICANT CHANGE UP
-  AMPICILLIN/SULBACTAM: SIGNIFICANT CHANGE UP
-  AMPICILLIN: SIGNIFICANT CHANGE UP
-  AZTREONAM: SIGNIFICANT CHANGE UP
-  CEFAZOLIN: SIGNIFICANT CHANGE UP
-  CEFEPIME: SIGNIFICANT CHANGE UP
-  CEFTRIAXONE: SIGNIFICANT CHANGE UP
-  CIPROFLOXACIN: SIGNIFICANT CHANGE UP
-  ERTAPENEM: SIGNIFICANT CHANGE UP
-  GENTAMICIN: SIGNIFICANT CHANGE UP
-  IMIPENEM: SIGNIFICANT CHANGE UP
-  LEVOFLOXACIN: SIGNIFICANT CHANGE UP
-  MEROPENEM: SIGNIFICANT CHANGE UP
-  NITROFURANTOIN: SIGNIFICANT CHANGE UP
-  PIPERACILLIN/TAZOBACTAM: SIGNIFICANT CHANGE UP
-  TIGECYCLINE: SIGNIFICANT CHANGE UP
-  TOBRAMYCIN: SIGNIFICANT CHANGE UP
-  TRIMETHOPRIM/SULFAMETHOXAZOLE: SIGNIFICANT CHANGE UP
CULTURE RESULTS: SIGNIFICANT CHANGE UP
METHOD TYPE: SIGNIFICANT CHANGE UP
ORGANISM # SPEC MICROSCOPIC CNT: SIGNIFICANT CHANGE UP
ORGANISM # SPEC MICROSCOPIC CNT: SIGNIFICANT CHANGE UP
SPECIMEN SOURCE: SIGNIFICANT CHANGE UP

## 2022-05-23 NOTE — ED POST DISCHARGE NOTE - DETAILS
LMTCB, pt sensitviity to Macrboid is "I"  pt has allergies to PCN and all other po antibiotics are "R" if she isn't better with Macrobid she may have to return for IV antibiotics. FINN ORELLANA pt here in Ed admitted receiving IV antibotics now for ESBL. FINN ORELLANA

## 2022-05-24 ENCOUNTER — INPATIENT (INPATIENT)
Facility: HOSPITAL | Age: 72
LOS: 5 days | Discharge: HOME | DRG: 690 | End: 2022-05-30
Attending: FAMILY MEDICINE | Admitting: FAMILY MEDICINE
Payer: COMMERCIAL

## 2022-05-24 VITALS
SYSTOLIC BLOOD PRESSURE: 158 MMHG | HEIGHT: 64 IN | TEMPERATURE: 99 F | WEIGHT: 184.97 LBS | OXYGEN SATURATION: 95 % | DIASTOLIC BLOOD PRESSURE: 85 MMHG | RESPIRATION RATE: 18 BRPM | HEART RATE: 87 BPM

## 2022-05-24 DIAGNOSIS — Z95.828 PRESENCE OF OTHER VASCULAR IMPLANTS AND GRAFTS: Chronic | ICD-10-CM

## 2022-05-24 DIAGNOSIS — N39.0 URINARY TRACT INFECTION, SITE NOT SPECIFIED: ICD-10-CM

## 2022-05-24 LAB
APPEARANCE UR: CLEAR — SIGNIFICANT CHANGE UP
BASOPHILS # BLD AUTO: 0.04 K/UL — SIGNIFICANT CHANGE UP (ref 0–0.2)
BASOPHILS NFR BLD AUTO: 0.4 % — SIGNIFICANT CHANGE UP (ref 0–2)
BILIRUB UR-MCNC: NEGATIVE — SIGNIFICANT CHANGE UP
COLOR SPEC: YELLOW — SIGNIFICANT CHANGE UP
DIFF PNL FLD: NEGATIVE — SIGNIFICANT CHANGE UP
EOSINOPHIL # BLD AUTO: 0 K/UL — SIGNIFICANT CHANGE UP (ref 0–0.5)
EOSINOPHIL NFR BLD AUTO: 0 % — SIGNIFICANT CHANGE UP (ref 0–6)
GLUCOSE UR QL: NEGATIVE — SIGNIFICANT CHANGE UP
HCT VFR BLD CALC: 37.9 % — SIGNIFICANT CHANGE UP (ref 34.5–45)
HGB BLD-MCNC: 12.4 G/DL — SIGNIFICANT CHANGE UP (ref 11.5–15.5)
IMM GRANULOCYTES NFR BLD AUTO: 0.5 % — SIGNIFICANT CHANGE UP (ref 0–1.5)
KETONES UR-MCNC: ABNORMAL
LEUKOCYTE ESTERASE UR-ACNC: ABNORMAL
LYMPHOCYTES # BLD AUTO: 1.13 K/UL — SIGNIFICANT CHANGE UP (ref 1–3.3)
LYMPHOCYTES # BLD AUTO: 10.8 % — LOW (ref 13–44)
MCHC RBC-ENTMCNC: 29.5 PG — SIGNIFICANT CHANGE UP (ref 27–34)
MCHC RBC-ENTMCNC: 32.7 GM/DL — SIGNIFICANT CHANGE UP (ref 32–36)
MCV RBC AUTO: 90 FL — SIGNIFICANT CHANGE UP (ref 80–100)
MONOCYTES # BLD AUTO: 0.89 K/UL — SIGNIFICANT CHANGE UP (ref 0–0.9)
MONOCYTES NFR BLD AUTO: 8.5 % — SIGNIFICANT CHANGE UP (ref 2–14)
NEUTROPHILS # BLD AUTO: 8.34 K/UL — HIGH (ref 1.8–7.4)
NEUTROPHILS NFR BLD AUTO: 79.8 % — HIGH (ref 43–77)
NITRITE UR-MCNC: NEGATIVE — SIGNIFICANT CHANGE UP
PH UR: 8 — SIGNIFICANT CHANGE UP (ref 5–8)
PLATELET # BLD AUTO: 375 K/UL — SIGNIFICANT CHANGE UP (ref 150–400)
PROT UR-MCNC: NEGATIVE — SIGNIFICANT CHANGE UP
RBC # BLD: 4.21 M/UL — SIGNIFICANT CHANGE UP (ref 3.8–5.2)
RBC # FLD: 14.5 % — SIGNIFICANT CHANGE UP (ref 10.3–14.5)
SARS-COV-2 RNA SPEC QL NAA+PROBE: SIGNIFICANT CHANGE UP
SP GR SPEC: 1.01 — SIGNIFICANT CHANGE UP (ref 1.01–1.02)
UROBILINOGEN FLD QL: NEGATIVE — SIGNIFICANT CHANGE UP
WBC # BLD: 10.45 K/UL — SIGNIFICANT CHANGE UP (ref 3.8–10.5)
WBC # FLD AUTO: 10.45 K/UL — SIGNIFICANT CHANGE UP (ref 3.8–10.5)

## 2022-05-24 PROCEDURE — 93010 ELECTROCARDIOGRAM REPORT: CPT

## 2022-05-24 PROCEDURE — 36415 COLL VENOUS BLD VENIPUNCTURE: CPT

## 2022-05-24 PROCEDURE — 83735 ASSAY OF MAGNESIUM: CPT

## 2022-05-24 PROCEDURE — 99223 1ST HOSP IP/OBS HIGH 75: CPT

## 2022-05-24 PROCEDURE — 71045 X-RAY EXAM CHEST 1 VIEW: CPT | Mod: 26

## 2022-05-24 PROCEDURE — 94640 AIRWAY INHALATION TREATMENT: CPT

## 2022-05-24 PROCEDURE — 85027 COMPLETE CBC AUTOMATED: CPT

## 2022-05-24 PROCEDURE — 99285 EMERGENCY DEPT VISIT HI MDM: CPT

## 2022-05-24 PROCEDURE — 80048 BASIC METABOLIC PNL TOTAL CA: CPT

## 2022-05-24 RX ORDER — POTASSIUM CHLORIDE 20 MEQ
40 PACKET (EA) ORAL ONCE
Refills: 0 | Status: COMPLETED | OUTPATIENT
Start: 2022-05-24 | End: 2022-05-24

## 2022-05-24 RX ORDER — LOSARTAN POTASSIUM 100 MG/1
100 TABLET, FILM COATED ORAL DAILY
Refills: 0 | Status: DISCONTINUED | OUTPATIENT
Start: 2022-05-24 | End: 2022-05-30

## 2022-05-24 RX ORDER — HYDROMORPHONE HYDROCHLORIDE 2 MG/ML
0.25 INJECTION INTRAMUSCULAR; INTRAVENOUS; SUBCUTANEOUS ONCE
Refills: 0 | Status: DISCONTINUED | OUTPATIENT
Start: 2022-05-24 | End: 2022-05-24

## 2022-05-24 RX ORDER — FLUTICASONE PROPIONATE 50 MCG
1 SPRAY, SUSPENSION NASAL
Refills: 0 | Status: DISCONTINUED | OUTPATIENT
Start: 2022-05-24 | End: 2022-05-30

## 2022-05-24 RX ORDER — MEROPENEM 1 G/30ML
1000 INJECTION INTRAVENOUS ONCE
Refills: 0 | Status: COMPLETED | OUTPATIENT
Start: 2022-05-24 | End: 2022-05-24

## 2022-05-24 RX ORDER — MONTELUKAST 4 MG/1
10 TABLET, CHEWABLE ORAL AT BEDTIME
Refills: 0 | Status: DISCONTINUED | OUTPATIENT
Start: 2022-05-24 | End: 2022-05-30

## 2022-05-24 RX ORDER — HYDRALAZINE HCL 50 MG
100 TABLET ORAL EVERY 8 HOURS
Refills: 0 | Status: DISCONTINUED | OUTPATIENT
Start: 2022-05-24 | End: 2022-05-30

## 2022-05-24 RX ORDER — GABAPENTIN 400 MG/1
1 CAPSULE ORAL
Qty: 0 | Refills: 0 | DISCHARGE

## 2022-05-24 RX ORDER — FAMOTIDINE 10 MG/ML
20 INJECTION INTRAVENOUS
Refills: 0 | Status: DISCONTINUED | OUTPATIENT
Start: 2022-05-24 | End: 2022-05-30

## 2022-05-24 RX ORDER — GABAPENTIN 400 MG/1
2 CAPSULE ORAL
Qty: 0 | Refills: 0 | DISCHARGE

## 2022-05-24 RX ORDER — PANTOPRAZOLE SODIUM 20 MG/1
40 TABLET, DELAYED RELEASE ORAL
Refills: 0 | Status: DISCONTINUED | OUTPATIENT
Start: 2022-05-24 | End: 2022-05-30

## 2022-05-24 RX ORDER — MEROPENEM 1 G/30ML
1000 INJECTION INTRAVENOUS EVERY 8 HOURS
Refills: 0 | Status: DISCONTINUED | OUTPATIENT
Start: 2022-05-24 | End: 2022-05-30

## 2022-05-24 RX ORDER — AMLODIPINE BESYLATE 2.5 MG/1
10 TABLET ORAL DAILY
Refills: 0 | Status: DISCONTINUED | OUTPATIENT
Start: 2022-05-24 | End: 2022-05-30

## 2022-05-24 RX ORDER — ATORVASTATIN CALCIUM 80 MG/1
40 TABLET, FILM COATED ORAL AT BEDTIME
Refills: 0 | Status: DISCONTINUED | OUTPATIENT
Start: 2022-05-24 | End: 2022-05-30

## 2022-05-24 RX ORDER — BACLOFEN 100 %
10 POWDER (GRAM) MISCELLANEOUS EVERY 12 HOURS
Refills: 0 | Status: DISCONTINUED | OUTPATIENT
Start: 2022-05-24 | End: 2022-05-30

## 2022-05-24 RX ORDER — ENOXAPARIN SODIUM 100 MG/ML
40 INJECTION SUBCUTANEOUS EVERY 24 HOURS
Refills: 0 | Status: DISCONTINUED | OUTPATIENT
Start: 2022-05-24 | End: 2022-05-30

## 2022-05-24 RX ORDER — CEFTRIAXONE 500 MG/1
1000 INJECTION, POWDER, FOR SOLUTION INTRAMUSCULAR; INTRAVENOUS ONCE
Refills: 0 | Status: COMPLETED | OUTPATIENT
Start: 2022-05-24 | End: 2022-05-24

## 2022-05-24 RX ADMIN — ENOXAPARIN SODIUM 40 MILLIGRAM(S): 100 INJECTION SUBCUTANEOUS at 15:18

## 2022-05-24 RX ADMIN — HYDROMORPHONE HYDROCHLORIDE 0.25 MILLIGRAM(S): 2 INJECTION INTRAMUSCULAR; INTRAVENOUS; SUBCUTANEOUS at 23:05

## 2022-05-24 RX ADMIN — Medication 100 MILLIGRAM(S): at 15:18

## 2022-05-24 RX ADMIN — MONTELUKAST 10 MILLIGRAM(S): 4 TABLET, CHEWABLE ORAL at 21:53

## 2022-05-24 RX ADMIN — CEFTRIAXONE 100 MILLIGRAM(S): 500 INJECTION, POWDER, FOR SOLUTION INTRAMUSCULAR; INTRAVENOUS at 10:43

## 2022-05-24 RX ADMIN — AMLODIPINE BESYLATE 10 MILLIGRAM(S): 2.5 TABLET ORAL at 12:00

## 2022-05-24 RX ADMIN — Medication 100 MILLIGRAM(S): at 21:53

## 2022-05-24 RX ADMIN — HYDROMORPHONE HYDROCHLORIDE 0.25 MILLIGRAM(S): 2 INJECTION INTRAMUSCULAR; INTRAVENOUS; SUBCUTANEOUS at 23:35

## 2022-05-24 RX ADMIN — PANTOPRAZOLE SODIUM 40 MILLIGRAM(S): 20 TABLET, DELAYED RELEASE ORAL at 11:59

## 2022-05-24 RX ADMIN — HYDROMORPHONE HYDROCHLORIDE 0.25 MILLIGRAM(S): 2 INJECTION INTRAMUSCULAR; INTRAVENOUS; SUBCUTANEOUS at 20:28

## 2022-05-24 RX ADMIN — LOSARTAN POTASSIUM 100 MILLIGRAM(S): 100 TABLET, FILM COATED ORAL at 12:00

## 2022-05-24 RX ADMIN — Medication 40 MILLIEQUIVALENT(S): at 11:59

## 2022-05-24 RX ADMIN — FAMOTIDINE 20 MILLIGRAM(S): 10 INJECTION INTRAVENOUS at 21:53

## 2022-05-24 RX ADMIN — MEROPENEM 100 MILLIGRAM(S): 1 INJECTION INTRAVENOUS at 21:55

## 2022-05-24 RX ADMIN — ATORVASTATIN CALCIUM 40 MILLIGRAM(S): 80 TABLET, FILM COATED ORAL at 21:53

## 2022-05-24 RX ADMIN — Medication 10 MILLIGRAM(S): at 21:53

## 2022-05-24 RX ADMIN — FAMOTIDINE 20 MILLIGRAM(S): 10 INJECTION INTRAVENOUS at 12:00

## 2022-05-24 RX ADMIN — HYDROMORPHONE HYDROCHLORIDE 0.25 MILLIGRAM(S): 2 INJECTION INTRAMUSCULAR; INTRAVENOUS; SUBCUTANEOUS at 21:00

## 2022-05-24 RX ADMIN — MEROPENEM 100 MILLIGRAM(S): 1 INJECTION INTRAVENOUS at 11:59

## 2022-05-24 NOTE — ED ADULT NURSE NOTE - NSIMPLEMENTINTERV_GEN_ALL_ED
Implemented All Fall Risk Interventions:  [place] to call system. Call bell, personal items and telephone within reach. Instruct patient to call for assistance. Room bathroom lighting operational. Non-slip footwear when patient is off stretcher. Physically safe environment: no spills, clutter or unnecessary equipment. Stretcher in lowest position, wheels locked, appropriate side rails in place. Provide visual cue, wrist band, yellow gown, etc. Monitor gait and stability. Monitor for mental status changes and reorient to person, place, and time. Review medications for side effects contributing to fall risk. Reinforce activity limits and safety measures with patient and family.

## 2022-05-24 NOTE — PATIENT PROFILE ADULT - FALL HARM RISK - RISK INTERVENTIONS
Assistance OOB with selected safe patient handling equipment/Assistance with ambulation/Communicate Fall Risk and Risk Factors to all staff, patient, and family/Discuss with provider need for PT consult/Monitor gait and stability/Reinforce activity limits and safety measures with patient and family/Visual Cue: Yellow wristband/Bed in lowest position, wheels locked, appropriate side rails in place/Call bell, personal items and telephone in reach/Instruct patient to call for assistance before getting out of bed or chair/Non-slip footwear when patient is out of bed/[place] to call system/Physically safe environment - no spills, clutter or unnecessary equipment/Purposeful Proactive Rounding/Room/bathroom lighting operational, light cord in reach

## 2022-05-24 NOTE — ED ADULT NURSE NOTE - CHIEF COMPLAINT QUOTE
pt seen friday at  for UTI where she was put on macrobid. pt states since yesterday she has been experiencing worsening groin pain and feels " pressure around abdomen and tingling in legs." pt has PMH of MS and feels like she might be having an MS flare up. pt alert and oriented. pt endorses nausea without vomiting, lightheaded and dizziness, and chills. denies chest pain and fevers at home.

## 2022-05-24 NOTE — H&P ADULT - ASSESSMENT
1. Generalized weakness/difficulty to ambulate - due to UTI induced MS manifestations vs MS exacerbation   - discussed with neurology, will treat UTI and reevaluate   - no MS exacerbation Tx at the moment    2. ESBL E.Coli UTI - IV meropenem, ID eval    3. HTN - resume home meds    4. Neuropathy with MS - cont current mes    5. VTE proph - LMWH    Discussed with Dr. Bernard, Dr. Pritchard    Time spent 64 min

## 2022-05-24 NOTE — ED ADULT TRIAGE NOTE - CHIEF COMPLAINT QUOTE
pt seen friday at  for UTI where she was put on macrobid. pt states since yesterday she has been experiencing worsening groin pain and feels " pressure around abdomen and tingling in legs." pt has PMH of MS and feels like she might be having an MS flare up. pt alert and oriented. pt endorses nausea without vomiting, lightheaded and dizziness, denies chest pain. pt seen friday at  for UTI where she was put on macrobid. pt states since yesterday she has been experiencing worsening groin pain and feels " pressure around abdomen and tingling in legs." pt has PMH of MS and feels like she might be having an MS flare up. pt alert and oriented. pt endorses nausea without vomiting, lightheaded and dizziness, and chills. denies chest pain and fevers at home.

## 2022-05-24 NOTE — PATIENT PROFILE ADULT - NSPRESCRALCFREQ_GEN_A_NUR
Subjective:       Patient ID: Pantera Posey is a 67 y.o. female.    Chief Complaint: No chief complaint on file.    HPI   Mrs. Posey returns today for follow up.  As of mid September 2017 she has been on anastrazole 1 mg daily.     Briefly, she  is a  66-year-old  female who has a remote history of breast cancer treated in [place] 20 years ago with mastectomy and five years of tamoxifen.      A recent routine mammogram on 06/27/2017 was read as BIRADS category 0 and showed an asymmetry in the upper region of the left breast   posteriorly.  A diagnostic mammogram two days later was read as BIRADS category 4 and a biopsy was performed on 07/11/2017 and showed a carcinoma that was ER strongly positive, MA positive, and HER-2 negative.    The patient was seen by Dr. Martin and eventually underwent a left-sided mastectomy and sentinel lymph node biopsy on 08/03/2017 with insertion of bilateral tissue expanders.    The pathology report from the procedure indicates that 4 left axillary lymph nodes were negative.  On the mastectomy sample, she had a 15 mm invasive ductal carcinoma; resection margins were clear.  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  There was no lymphovascular invasion.  Her Oncotype score was 10, suggesting a 9 % chance of recurrence at ten years with tamoxifen alone.    Her DXA scan showed mild osteopenia of the hip but normal density on the L spine.    The medical, surgical, as well as family history remain unchanged.        Review of Systems    Overall she feels well.  She states that her hot flashes intermittent joint stiffness have improved significantly. Her ECOG PS is 1.   She denies any anxiety, depression, easy bruising, fevers, chills, night  sweats, weight loss, nausea, vomiting, diarrhea, constipation, diplopia, blurred vision, headache, chest pain, palpitations, shortness of breath, breast pain, abdominal pain, extremity pain, or difficulty ambulating.  The  remainder of the ten-point ROS, including general, skin, lymph, H/N, cardiorespiratory, GI, , Neuro, Endocrine, and psychiatric is negative.       Objective:      Physical Exam    She is alert, oriented to time, place, person, pleasant, well      nourished, in no acute physical distress.   She is here with her .                                 VITAL SIGNS:  Reviewed                                      HEENT:    There are no nasal, oral, lip, gingival, auricular, lid,    or conjunctival lesions.  Mucosae are moist and pink, and there is no        thrush.  Pupils are equal, reactive to light and accommodation.              Extraocular muscle movements are intact.  Dentition is fair. Maxillary teeth are missing and she has dentures.                                       NECK:  Supple without JVD, adenopathy, or thyromegaly.                       LUNGS:  Clear to auscultation without wheezing, rales, or rhonchi.           CARDIOVASCULAR:  Reveals an S1, S2, no murmurs, no rubs, no gallops.         ABDOMEN:  Soft, nontender, without organomegaly.  Bowel sounds are    present.                                                                     EXTREMITIES:  No cyanosis, clubbing, or edema.                               BREASTS:  She is status post bilateral mastectomies with reconstructions,.  Her nipples have recently been tattooed.                                        LYMPHATIC:  There is no cervical, axillary, or supraclavicular adenopathy.   SKIN:  Warm and moist, without petechiae, rashes, induration, or ecchymoses.           NEUROLOGIC:  DTRs are 0-1+ bilaterally, symmetrical, motor function is 5/5,  and cranial nerves are  within normal limits.    Assessment:       1. Malignant neoplasm of lower-outer quadrant of left breast of female, estrogen receptor positive    2. Use of aromatase inhibitors      3.    Musculoskeletal pains secondary to AIs, improved.  Plan:        I have asked her to remain on  anastrazole, which she will take through September 2022. I will see her again in 4 months.  Her multiple questions were answered to her satisfaction.         Never

## 2022-05-24 NOTE — ED PROVIDER NOTE - CLINICAL SUMMARY MEDICAL DECISION MAKING FREE TEXT BOX
72 F with HTN and MS presents with UTI failing OP medication and pseudo MS exacerbation. will discuss with neurology, obtain blood work, given abx and admit for further workup and treatment. .given jaw pain will obtain serial troponin. pt appears mildly short of breath but declines feeling short of breath at this time.

## 2022-05-24 NOTE — H&P ADULT - NSHPPHYSICALEXAM_GEN_ALL_CORE
PHYSICAL EXAM:    General: emotional female in no acute distress  Eyes: PERRLA, EOMI; conjunctiva and sclera clear  Head: Normocephalic; atraumatic  ENMT: No nasal discharge; airway clear  Neck: Supple; non tender; no masses  Respiratory: No wheezes, rales or rhonchi  Cardiovascular: S1, S2 reg  Gastrointestinal: Soft non-distended; Normal bowel sounds, pt reports tenderness on exam in left groin  Genitourinary: No costovertebral angle tenderness  Extremities: No clubbing, cyanosis, + chronic BL LE edema  Vascular: Peripheral pulses palpable 2+ bilaterally  Neurological: Alert and oriented x4, DTR + 2-3 BL, no gross deficits, BL LE strength 3/5, UE 5/5  Skin: Warm and dry.   Musculoskeletal: Normal tone  Psychiatric: Cooperative

## 2022-05-24 NOTE — CONSULT NOTE ADULT - ASSESSMENT
72 F with PMHx of MS, HTN and HLD presented to ED on 5/2422 with c/o dysuria and worsening tingling in B/L LE. Pt was here at  ED on 5/21 for UTI sx, was placed on Macrobid, and reports her symptoms are not getting better. She is still experiencing dysuria, urgency and frequency. At 4 am today, she reports she began to experience tightness below her breasts and in her upper abdomen that wraps around her back, which she calls a 'MS hug,' a symptom she has experienced in the past from her MS, and the tingling in her legs worsened. She then told her cousin she wanted to come to the ED.     #Psuedo flare MS exacerbation; likely from active UTI    -Treat underlying UTI infection- pt was given Meropenem and ceftriaxone  -PT eval upon admission  -Continue f/u and tx of MS with Dr. Palencia from [place]  -DVT prophylaxis    Patient was discussed with Dr. Pritchard

## 2022-05-24 NOTE — PHARMACOTHERAPY INTERVENTION NOTE - COMMENTS
Medication history complete, reviewed medication with  list provided by patient and confirmed with DrFirst.

## 2022-05-24 NOTE — ED PROVIDER NOTE - NS ED ROS FT
Constitutional: No fever or chills  Eyes: No visual changes  HEENT: No throat pain  CV: No chest pain  Resp: No SOB no cough  GI: No abd pain, nausea or vomiting  : + dysuria  MSK: No musculoskeletal pain  Skin: No rash  Neuro: No headache

## 2022-05-24 NOTE — CONSULT NOTE ADULT - NS ATTEND AMEND GEN_ALL_CORE FT
Patient with MS, not on any disease modifying therapy who presents with worsening lower extremity weakness in the setting of a UTI.  Likely pseudo MS flare.  Treatment of UTI as per medicine.  Physical therapy.  Observe for improvement.  Unable to have MRI due to spinal cord stimulator.

## 2022-05-24 NOTE — ED PROVIDER NOTE - PHYSICAL EXAMINATION
Constitutional:  middle aged female, speaking in full sentences, appears anxious, AAOx3  Eyes: PERRLA EOMI  Head: Normocephalic atraumatic  Mouth: MMM  Cardiac: regular rate   Resp: Lungs CTAB  GI: Abd s/nd, no rebound or guarding. mild suprapubic TTP   Neuro: awake, alert, moving all extremities, cranial nerves 2-12 intact,   Skin: No rashes

## 2022-05-24 NOTE — ED PROVIDER NOTE - OBJECTIVE STATEMENT
72 F with MS, HTN presents to ED with dysuria  and tingling in LE. states that she was put on Macrobid for UTI and  states symptoms are not getting better. pt also complaining of dysuria, urgency,  frequency. also reports tingling in LE and  pressure around back. has had similar symptoms when she has had MS exacerbation in the past. not on any modulators for MS but has  a spinal stimulator. pt follows with neurologist, Dr. Palencia, at [place]. no SOB, CP. pt also c/o   right sided jaw pain. no precipitating, exacerbating or alleviating factors. has been able to ambulate in NAD

## 2022-05-24 NOTE — ED ADULT NURSE NOTE - OBJECTIVE STATEMENT
Pt presents to ED with complaints of discomfort in upper abdomen radiating to downward. Pt states she is also experiencing discomfort in her groin. Pt has UTI and history of MS. Pt describes discomfort as "MS girdle". Pt was taken from car to ED via wheelchair. A&O x4. Airway is patent, breathing is even and unlabored. Pt denies Pt presents to ED with complaints of discomfort in upper abdomen radiating to downward. Pt states she is also experiencing discomfort in her groin. Pt has UTI and history of MS. Pt describes discomfort as "MS girdle". Pt was taken from car to ED via wheelchair. A&O x4. Airway is patent, breathing is even and unlabored. Pt denies shortness of breath and difficulty breathing. Skin is warm and dry. Pt endorses numbness in legs and arms, believes it to be from her MS. 22G IV placed to right AC, flushes without difficulty. Blood, cultures and lactate sent to lab for analysis. Instructed Pt we may need a urine sample. No additional requests or complaints at this time. Patient safety maintained. Will continue to monitor.

## 2022-05-24 NOTE — CONSULT NOTE ADULT - SUBJECTIVE AND OBJECTIVE BOX
Patient is a 72y old Female who presents with a chief complaint of MS exacerbation/symptoms    HPI:  72 F with PMHx of MS, HTN and HLD presented to ED on 5/2422 with c/o dysuria and worsening tingling in B/L LE. Pt was here at  ED on 5/21 for UTI sx, was placed on Macrobid, and reports her symptoms are not getting better. She is still experiencing dysuria, urgency and frequency. At 4 am today, she reports she began to experience tightness below her breasts and in her upper abdomen that wraps around her back, which she calls a 'MS hug,' a symptom she has experienced in the past from her MS, and the tingling in her legs worsened. She then told her cousin she wanted to come to the ED.     Currently, pt reports continued pressure below her breasts and in her back. She states she always has some paresthesias in her legs at baseline from MS, but this has worsened, and her legs now also feel very heavy. She had similiar sx when she was last admitted in April- paresthesias, tightness and dysuria- but did not get MRIs d/t SCS. At that time, pt was given solumedrol and was discharged on prednisone taper. As of note, pt used to be on copaxone for years, but stopped taking it since it wasn't helping her. She follows with her neurologist Dr. Palencia at [place], had a MRI brain w/o contrast done recently (still cannot get MRIs of spine d/t implanted SCS). 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 She denies dizziness, vertigo, headache, visual changes or speech changes. Is having right sided jaw pain. She admits to some difficulty ambulating, and states she fell down 6 steps a few weeks ago.     PAST MEDICAL & SURGICAL HISTORY:  HTN (hypertension)  MS (multiple sclerosis)    FAMILY HISTORY: Noncontributory    Social Hx:  Nonsmoker, no drug or alcohol use    MEDICATIONS  (STANDING):  amLODIPine   Tablet 10 milliGRAM(s) Oral daily  atorvastatin 40 milliGRAM(s) Oral at bedtime  baclofen 10 milliGRAM(s) Oral every 12 hours  famotidine    Tablet 20 milliGRAM(s) Oral two times a day  fluticasone propionate 50 MICROgram(s)/spray Nasal Spray 1 Spray(s) Both Nostrils two times a day  hydrALAZINE 100 milliGRAM(s) Oral every 8 hours  losartan 100 milliGRAM(s) Oral daily  meropenem  IVPB 1000 milliGRAM(s) IV Intermittent Once  montelukast 10 milliGRAM(s) Oral at bedtime  pantoprazole    Tablet 40 milliGRAM(s) Oral before breakfast  potassium chloride    Tablet ER 40 milliEquivalent(s) Oral once    Allergies:  lisinopril (Unknown)  [place] (Unknown)  peanuts (Unknown)  penicillin (Unknown)  Percocet (Unknown)    ROS: Pertinent positives in HPI, all other ROS were reviewed and are negative.      Vital Signs Last 24 Hrs  T(C): 37 (24 May 2022 07:24), Max: 37 (24 May 2022 04:49)  T(F): 98.6 (24 May 2022 07:24), Max: 98.6 (24 May 2022 04:49)  HR: 74 (24 May 2022 07:24) (74 - 87)  BP: 154/83 (24 May 2022 07:24) (154/83 - 158/85)  BP(mean): 104 (24 May 2022 07:24) (104 - 108)  RR: 19 (24 May 2022 07:24) (18 - 19)  SpO2: 95% (24 May 2022 07:24) (95% - 95%)    Physical Exam:    General: Normocephalic, NAD  HEENT: PERRLA, EOMI,   Neck: Supple.  Respiratory: Breath sounds are clear bilaterally  Cardiovascular: S1 and S2  Extremities:  no edema  Vascular: Carotid Bruit - no  Musculoskeletal: no joint swelling/tenderness, no abnormal movements  Skin: No rashes    Neurological exam:  HF: A x O x 3. Appropriately interactive, normal affect. Speech fluent, No Aphasia or paraphasic errors. Naming /repetition intact   CN: MARY, EOMI, VFF, facial sensation normal, no NLFD, tongue midline  Motor: No pronator drift, generalized weakness noted in B/L LE ~3/5, normal bulk and tone, no tremor, rigidity or bradykinesia.    Sens: Intact to light touch throughout  Reflexes: Symmetric and hypereflexive, downgoing toes b/l  Coord:  No FNFA, dysmetria  Gait/Balance: Cannot test    Labs:   05-24    138  |  102  |  11  ----------------------------<  119<H>  3.1<L>   |  30  |  0.58    Ca    9.9      24 May 2022 09:14    TPro  7.1  /  Alb  3.4  /  TBili  0.6  /  DBili  x   /  AST  16  /  ALT  27  /  AlkPhos  83  05-24                      12.4   10.45 )-----------( 375      ( 24 May 2022 05:37 )             37.9     Radiology:  None pertinent to review

## 2022-05-24 NOTE — H&P ADULT - HISTORY OF PRESENT ILLNESS
72 y.o. female with PMHx of MS returned to ED with progressively worsening weakness and heaviness in BL LE L>R after recent evaluation for UTI - pt was treated and discharged on po macrobid. Pt c/o left groin pain since prior to ED visit w/o change, dysuria somewhat improved. Pt is very emotional.

## 2022-05-25 LAB
ANION GAP SERPL CALC-SCNC: 6 MMOL/L — SIGNIFICANT CHANGE UP (ref 5–17)
BUN SERPL-MCNC: 16 MG/DL — SIGNIFICANT CHANGE UP (ref 7–23)
CALCIUM SERPL-MCNC: 9.6 MG/DL — SIGNIFICANT CHANGE UP (ref 8.5–10.1)
CHLORIDE SERPL-SCNC: 103 MMOL/L — SIGNIFICANT CHANGE UP (ref 96–108)
CO2 SERPL-SCNC: 29 MMOL/L — SIGNIFICANT CHANGE UP (ref 22–31)
CREAT SERPL-MCNC: 0.57 MG/DL — SIGNIFICANT CHANGE UP (ref 0.5–1.3)
EGFR: 96 ML/MIN/1.73M2 — SIGNIFICANT CHANGE UP
GLUCOSE SERPL-MCNC: 103 MG/DL — HIGH (ref 70–99)
HCT VFR BLD CALC: 35.4 % — SIGNIFICANT CHANGE UP (ref 34.5–45)
HGB BLD-MCNC: 11.7 G/DL — SIGNIFICANT CHANGE UP (ref 11.5–15.5)
MAGNESIUM SERPL-MCNC: 2.3 MG/DL — SIGNIFICANT CHANGE UP (ref 1.6–2.6)
MCHC RBC-ENTMCNC: 29.4 PG — SIGNIFICANT CHANGE UP (ref 27–34)
MCHC RBC-ENTMCNC: 33.1 GM/DL — SIGNIFICANT CHANGE UP (ref 32–36)
MCV RBC AUTO: 88.9 FL — SIGNIFICANT CHANGE UP (ref 80–100)
PLATELET # BLD AUTO: 364 K/UL — SIGNIFICANT CHANGE UP (ref 150–400)
POTASSIUM SERPL-MCNC: 3.4 MMOL/L — LOW (ref 3.5–5.3)
POTASSIUM SERPL-SCNC: 3.4 MMOL/L — LOW (ref 3.5–5.3)
RBC # BLD: 3.98 M/UL — SIGNIFICANT CHANGE UP (ref 3.8–5.2)
RBC # FLD: 14.4 % — SIGNIFICANT CHANGE UP (ref 10.3–14.5)
SODIUM SERPL-SCNC: 138 MMOL/L — SIGNIFICANT CHANGE UP (ref 135–145)
WBC # BLD: 8.13 K/UL — SIGNIFICANT CHANGE UP (ref 3.8–10.5)
WBC # FLD AUTO: 8.13 K/UL — SIGNIFICANT CHANGE UP (ref 3.8–10.5)

## 2022-05-25 PROCEDURE — 99233 SBSQ HOSP IP/OBS HIGH 50: CPT

## 2022-05-25 PROCEDURE — 99232 SBSQ HOSP IP/OBS MODERATE 35: CPT

## 2022-05-25 RX ORDER — ONDANSETRON 8 MG/1
4 TABLET, FILM COATED ORAL ONCE
Refills: 0 | Status: DISCONTINUED | OUTPATIENT
Start: 2022-05-25 | End: 2022-05-25

## 2022-05-25 RX ORDER — ACETAMINOPHEN 500 MG
1000 TABLET ORAL ONCE
Refills: 0 | Status: COMPLETED | OUTPATIENT
Start: 2022-05-25 | End: 2022-05-25

## 2022-05-25 RX ORDER — ONDANSETRON 8 MG/1
4 TABLET, FILM COATED ORAL ONCE
Refills: 0 | Status: COMPLETED | OUTPATIENT
Start: 2022-05-25 | End: 2022-05-25

## 2022-05-25 RX ORDER — POTASSIUM CHLORIDE 20 MEQ
20 PACKET (EA) ORAL
Refills: 0 | Status: COMPLETED | OUTPATIENT
Start: 2022-05-25 | End: 2022-05-25

## 2022-05-25 RX ADMIN — MONTELUKAST 10 MILLIGRAM(S): 4 TABLET, CHEWABLE ORAL at 22:26

## 2022-05-25 RX ADMIN — Medication 100 MILLIGRAM(S): at 22:24

## 2022-05-25 RX ADMIN — MEROPENEM 100 MILLIGRAM(S): 1 INJECTION INTRAVENOUS at 06:14

## 2022-05-25 RX ADMIN — Medication 20 MILLIEQUIVALENT(S): at 11:58

## 2022-05-25 RX ADMIN — Medication 100 MILLIGRAM(S): at 06:14

## 2022-05-25 RX ADMIN — Medication 100 MILLIGRAM(S): at 14:00

## 2022-05-25 RX ADMIN — ONDANSETRON 4 MILLIGRAM(S): 8 TABLET, FILM COATED ORAL at 07:13

## 2022-05-25 RX ADMIN — PANTOPRAZOLE SODIUM 40 MILLIGRAM(S): 20 TABLET, DELAYED RELEASE ORAL at 06:16

## 2022-05-25 RX ADMIN — Medication 20 MILLIEQUIVALENT(S): at 14:00

## 2022-05-25 RX ADMIN — Medication 10 MILLIGRAM(S): at 22:26

## 2022-05-25 RX ADMIN — ATORVASTATIN CALCIUM 40 MILLIGRAM(S): 80 TABLET, FILM COATED ORAL at 22:25

## 2022-05-25 RX ADMIN — AMLODIPINE BESYLATE 10 MILLIGRAM(S): 2.5 TABLET ORAL at 09:59

## 2022-05-25 RX ADMIN — FAMOTIDINE 20 MILLIGRAM(S): 10 INJECTION INTRAVENOUS at 22:24

## 2022-05-25 RX ADMIN — Medication 10 MILLIGRAM(S): at 10:00

## 2022-05-25 RX ADMIN — MEROPENEM 100 MILLIGRAM(S): 1 INJECTION INTRAVENOUS at 14:00

## 2022-05-25 RX ADMIN — Medication 1 SPRAY(S): at 10:00

## 2022-05-25 RX ADMIN — MEROPENEM 100 MILLIGRAM(S): 1 INJECTION INTRAVENOUS at 22:27

## 2022-05-25 RX ADMIN — FAMOTIDINE 20 MILLIGRAM(S): 10 INJECTION INTRAVENOUS at 10:00

## 2022-05-25 RX ADMIN — LOSARTAN POTASSIUM 100 MILLIGRAM(S): 100 TABLET, FILM COATED ORAL at 09:59

## 2022-05-25 RX ADMIN — ENOXAPARIN SODIUM 40 MILLIGRAM(S): 100 INJECTION SUBCUTANEOUS at 14:06

## 2022-05-25 NOTE — CONSULT NOTE ADULT - SUBJECTIVE AND OBJECTIVE BOX
Patient is a 72y old  Female who presents with a chief complaint of Weakness, recent fall off stairs due to weakness (24 May 2022 12:10)    HPI:  72 y.o. female with PMHx of MS returned to ED with progressively worsening weakness and heaviness in BL LE L>R after recent evaluation for UTI - pt was treated and discharged on po macrobid. Pt c/o left groin pain since prior to ED visit w/o change, dysuria somewhat improved. Here urine cx growing ESBL Ecoli, was given IV meropenem.      PMH: as above  PSH: as above  Meds: per reconciliation sheet, noted below  MEDICATIONS  (STANDING):  amLODIPine   Tablet 10 milliGRAM(s) Oral daily  atorvastatin 40 milliGRAM(s) Oral at bedtime  baclofen 10 milliGRAM(s) Oral every 12 hours  enoxaparin Injectable 40 milliGRAM(s) SubCutaneous every 24 hours  famotidine    Tablet 20 milliGRAM(s) Oral two times a day  fluticasone propionate 50 MICROgram(s)/spray Nasal Spray 1 Spray(s) Both Nostrils two times a day  hydrALAZINE 100 milliGRAM(s) Oral every 8 hours  losartan 100 milliGRAM(s) Oral daily  meropenem  IVPB 1000 milliGRAM(s) IV Intermittent every 8 hours  montelukast 10 milliGRAM(s) Oral at bedtime  pantoprazole    Tablet 40 milliGRAM(s) Oral before breakfast  potassium chloride    Tablet ER 20 milliEquivalent(s) Oral every 2 hours    Allergies    lisinopril (Unknown)  [place] (Unknown)  peanuts (Unknown)  penicillin (Unknown)  Percocet (Unknown)    Intolerances      Social: no smoking, no alcohol, no illegal drugs; no recent travel, no exposure to TB  FAMILY HISTORY:  FH: HTN (hypertension) (Mother)       no history of premature cardiovascular disease in first degree relatives    ROS: the patient denies fever, no chills, no HA, no dizziness, no sore throat, no blurry vision, no CP, no palpitations, no SOB, no cough, no abdominal pain, no diarrhea, no N/V, + dysuria, no leg pain, no claudication, no rash, no joint aches, no rectal pain or bleeding, no night sweats    All other systems reviewed and are negative    Vital Signs Last 24 Hrs  T(C): 37 (25 May 2022 08:59), Max: 37.3 (24 May 2022 15:17)  T(F): 98.6 (25 May 2022 08:59), Max: 99.2 (24 May 2022 21:50)  HR: 70 (25 May 2022 08:59) (67 - 77)  BP: 128/64 (25 May 2022 08:59) (116/60 - 142/60)  BP(mean): 80 (24 May 2022 21:50) (80 - 82)  RR: 18 (25 May 2022 08:59) (18 - 19)  SpO2: 96% (25 May 2022 08:59) (96% - 100%)  Daily       PE:  Constitutional: NAD   HEENT: NC/AT, EOMI, PERRLA, conjunctivae clear; ears and nose atraumatic; pharynx benign  Neck: supple; thyroid not palpable  Back: no tenderness  Respiratory: respiratory effort normal; clear to auscultation  Cardiovascular: S1S2 regular, no murmurs  Abdomen: soft, not tender, not distended, positive BS; liver and spleen WNL  Genitourinary: no suprapubic tenderness  Lymphatic: no LN palpable  Musculoskeletal: no muscle tenderness, no joint swelling or tenderness  Extremities: no pedal edema  Neurological/ Psychiatric: AxOx3, Judgement and insight normal;  moving all extremities  Skin: no rashes; no palpable lesions    Labs: all available labs reviewed                        11.7   8.13  )-----------( 364      ( 25 May 2022 07:53 )             35.4     05-    138  |  103  |  16  ----------------------------<  103<H>  3.4<L>   |  29  |  0.57    Ca    9.6      25 May 2022 07:53  Mg     2.3     05-    TPro  7.1  /  Alb  3.4  /  TBili  0.6  /  DBili  x   /  AST  16  /  ALT  27  /  AlkPhos  83  05-24     LIVER FUNCTIONS - ( 24 May 2022 09:14 )  Alb: 3.4 g/dL / Pro: 7.1 gm/dL / ALK PHOS: 83 U/L / ALT: 27 U/L / AST: 16 U/L / GGT: x           Urinalysis Basic - ( 24 May 2022 05:37 )    Color: Yellow / Appearance: Clear / S.010 / pH: x  Gluc: x / Ketone: Small  / Bili: Negative / Urobili: Negative   Blood: x / Protein: Negative / Nitrite: Negative   Leuk Esterase: Moderate / RBC: 0-2 /HPF / WBC 6-10   Sq Epi: x / Non Sq Epi: Moderate / Bacteria: Few    Culture - Urine (22 @ 11:23)   - Meropenem: S <=1   - Nitrofurantoin: I 64 Should not be used to treat pyelonephritis   - Piperacillin/Tazobactam: S <=8   - Tigecycline: S <=2   - Tobramycin: R >8   - Trimethoprim/Sulfamethoxazole: R >2/38   - Amikacin: S <=16   - Amoxicillin/Clavulanic Acid: S <=8/4 Consider reserving for cystitis when ampicillin/sulbactam is resistant   - Ampicillin: R >16 These ampicillin results predict results for amoxicillin   - Ampicillin/Sulbactam: R >16/8 Enterobacter, Klebsiella aerogenes, Citrobacter, and Serratia may develop resistance during prolonged therapy (3-4 days)   - Aztreonam: R <=4   - Cefazolin: R >16 (MIC_CL_COM_ENTERIC_CEFAZU) For uncomplicated UTI with K. pneumoniae, E. coli, or P. mirablis: CARIE <=16 is sensitive and CARIE >=32 is resistant. This also predicts results for oral agents cefaclor, cefdinir, cefpodoxime, cefprozil, cefuroxime axetil, cephalexin and locarbef for uncomplicated UTI. Note that some isolates may be susceptible to these agents while testing resistant to cefazolin.   - Cefepime: R 4   - Ceftriaxone: R 8 Enterobacter, Klebsiella aerogenes, Citrobacter, and Serratia may develop resistance during prolonged therapy   - Ciprofloxacin: R >2   - Ertapenem: S <=0.5   - Gentamicin: R >8   - Imipenem: S <=1   - Levofloxacin: R 4   Specimen Source: Clean Catch None   Culture Results:   >100,000 CFU/ml Escherichia coli ESBL   Organism Identification: Gram Negative Rods   Organism: Gram Negative Rods   Method Type: CARIE       Radiology: all available radiological tests reviewed  < from: Xray Chest 1 View- PORTABLE-Urgent (Xray Chest 1 View- PORTABLE-Urgent .) (22 @ 08:11) >    ACC: [id number] EXAM:  XR CHEST PORTABLE URGENT 1V                          PROCEDURE DATE:  2022          INTERPRETATION:  AP chest on May 24, 2022 at 7:58 AM. Patient has chest   pain.    Shallow inspiration crowds the chest.    Heart magnified by technique but likely normal.    Spinal stimulator ending in the upper thoracic area again noted.    Lungs remain clear.    Chest is similar to  of this year.    IMPRESSION: No acute finding or change.    --- End of Report ---        < end of copied text >    Advanced directives addressed: full resuscitation

## 2022-05-25 NOTE — CONSULT NOTE ADULT - ASSESSMENT
72 y.o. female with PMHx of MS returned to ED with progressively worsening weakness and heaviness in BL LE L>R after recent evaluation for UTI - pt was treated and discharged on po macrobid. Pt c/o left groin pain since prior to ED visit w/o change, dysuria somewhat improved. Here urine cx growing ESBL Ecoli, was given IV meropenem    1. UTI with ESBL Ecoli. MS  - imaging and cultures reviewed  - agree with IV [id number]  - 3-5 day course  - monitor temps  - tolerating abx well so far; no side effects noted  - reason for abx use and side effects reviewed with patient  - supportive care  - contact precautions    2 .other issues - care per medicine

## 2022-05-26 PROCEDURE — 99232 SBSQ HOSP IP/OBS MODERATE 35: CPT

## 2022-05-26 RX ORDER — SENNA PLUS 8.6 MG/1
1 TABLET ORAL
Refills: 0 | Status: DISCONTINUED | OUTPATIENT
Start: 2022-05-26 | End: 2022-05-30

## 2022-05-26 RX ORDER — ACETAMINOPHEN 500 MG
650 TABLET ORAL EVERY 6 HOURS
Refills: 0 | Status: DISCONTINUED | OUTPATIENT
Start: 2022-05-26 | End: 2022-05-30

## 2022-05-26 RX ADMIN — FAMOTIDINE 20 MILLIGRAM(S): 10 INJECTION INTRAVENOUS at 21:59

## 2022-05-26 RX ADMIN — MEROPENEM 100 MILLIGRAM(S): 1 INJECTION INTRAVENOUS at 06:35

## 2022-05-26 RX ADMIN — Medication 400 MILLIGRAM(S): at 00:01

## 2022-05-26 RX ADMIN — Medication 100 MILLIGRAM(S): at 06:34

## 2022-05-26 RX ADMIN — MEROPENEM 100 MILLIGRAM(S): 1 INJECTION INTRAVENOUS at 15:23

## 2022-05-26 RX ADMIN — ENOXAPARIN SODIUM 40 MILLIGRAM(S): 100 INJECTION SUBCUTANEOUS at 15:24

## 2022-05-26 RX ADMIN — Medication 1000 MILLIGRAM(S): at 00:31

## 2022-05-26 RX ADMIN — SENNA PLUS 1 TABLET(S): 8.6 TABLET ORAL at 12:19

## 2022-05-26 RX ADMIN — PANTOPRAZOLE SODIUM 40 MILLIGRAM(S): 20 TABLET, DELAYED RELEASE ORAL at 06:35

## 2022-05-26 RX ADMIN — MEROPENEM 100 MILLIGRAM(S): 1 INJECTION INTRAVENOUS at 21:58

## 2022-05-26 RX ADMIN — Medication 650 MILLIGRAM(S): at 18:05

## 2022-05-26 RX ADMIN — Medication 100 MILLIGRAM(S): at 15:36

## 2022-05-26 RX ADMIN — Medication 10 MILLIGRAM(S): at 09:36

## 2022-05-26 RX ADMIN — MONTELUKAST 10 MILLIGRAM(S): 4 TABLET, CHEWABLE ORAL at 21:59

## 2022-05-26 RX ADMIN — FAMOTIDINE 20 MILLIGRAM(S): 10 INJECTION INTRAVENOUS at 09:36

## 2022-05-26 RX ADMIN — Medication 100 MILLIGRAM(S): at 21:59

## 2022-05-26 RX ADMIN — ATORVASTATIN CALCIUM 40 MILLIGRAM(S): 80 TABLET, FILM COATED ORAL at 22:00

## 2022-05-26 RX ADMIN — AMLODIPINE BESYLATE 10 MILLIGRAM(S): 2.5 TABLET ORAL at 09:36

## 2022-05-26 RX ADMIN — Medication 10 MILLIGRAM(S): at 21:59

## 2022-05-26 RX ADMIN — LOSARTAN POTASSIUM 100 MILLIGRAM(S): 100 TABLET, FILM COATED ORAL at 09:36

## 2022-05-27 PROCEDURE — 99232 SBSQ HOSP IP/OBS MODERATE 35: CPT

## 2022-05-27 RX ADMIN — Medication 10 MILLIGRAM(S): at 21:23

## 2022-05-27 RX ADMIN — LOSARTAN POTASSIUM 100 MILLIGRAM(S): 100 TABLET, FILM COATED ORAL at 09:48

## 2022-05-27 RX ADMIN — Medication 100 MILLIGRAM(S): at 21:23

## 2022-05-27 RX ADMIN — Medication 650 MILLIGRAM(S): at 01:31

## 2022-05-27 RX ADMIN — Medication 100 MILLIGRAM(S): at 15:53

## 2022-05-27 RX ADMIN — FAMOTIDINE 20 MILLIGRAM(S): 10 INJECTION INTRAVENOUS at 21:23

## 2022-05-27 RX ADMIN — Medication 650 MILLIGRAM(S): at 02:01

## 2022-05-27 RX ADMIN — FAMOTIDINE 20 MILLIGRAM(S): 10 INJECTION INTRAVENOUS at 09:48

## 2022-05-27 RX ADMIN — MEROPENEM 100 MILLIGRAM(S): 1 INJECTION INTRAVENOUS at 05:38

## 2022-05-27 RX ADMIN — Medication 650 MILLIGRAM(S): at 17:28

## 2022-05-27 RX ADMIN — MONTELUKAST 10 MILLIGRAM(S): 4 TABLET, CHEWABLE ORAL at 21:23

## 2022-05-27 RX ADMIN — AMLODIPINE BESYLATE 10 MILLIGRAM(S): 2.5 TABLET ORAL at 09:48

## 2022-05-27 RX ADMIN — PANTOPRAZOLE SODIUM 40 MILLIGRAM(S): 20 TABLET, DELAYED RELEASE ORAL at 05:40

## 2022-05-27 RX ADMIN — ENOXAPARIN SODIUM 40 MILLIGRAM(S): 100 INJECTION SUBCUTANEOUS at 15:53

## 2022-05-27 RX ADMIN — MEROPENEM 100 MILLIGRAM(S): 1 INJECTION INTRAVENOUS at 15:53

## 2022-05-27 RX ADMIN — MEROPENEM 100 MILLIGRAM(S): 1 INJECTION INTRAVENOUS at 21:23

## 2022-05-27 RX ADMIN — Medication 100 MILLIGRAM(S): at 05:38

## 2022-05-27 RX ADMIN — ATORVASTATIN CALCIUM 40 MILLIGRAM(S): 80 TABLET, FILM COATED ORAL at 21:23

## 2022-05-27 RX ADMIN — Medication 10 MILLIGRAM(S): at 09:48

## 2022-05-27 NOTE — PROGRESS NOTE ADULT - ASSESSMENT
72 F with PMHx of MS, HTN and HLD presented to ED on 5/2422 with c/o dysuria and worsening tingling in B/L LE. Pt was here at  ED on 5/21 for UTI sx, was placed on Macrobid, and reports her symptoms are not getting better. She is still experiencing dysuria, urgency and frequency. At 4 am today, she reports she began to experience tightness below her breasts and in her upper abdomen that wraps around her back, which she calls a 'MS hug,' a symptom she has experienced in the past from her MS, and the tingling in her legs worsened. She then told her cousin she wanted to come to the ED.     Lower extremity weakness and paresthesias  -Likely pseudo MS flare in the setting of UTI  -Has some improvement today  -Continue antibiotics as per medicine  -DVT prophylaxis  -PT    Will follow up as needed    
72 F with PMHx of MS, HTN and HLD presented to ED on 5/2422 with c/o dysuria and worsening tingling in B/L LE. Pt was here at  ED on 5/21 for UTI sx, was placed on Macrobid, and reports her symptoms are not getting better. She is still experiencing dysuria, urgency and frequency. At 4 am today, she reports she began to experience tightness below her breasts and in her upper abdomen that wraps around her back, which she calls a 'MS hug,' a symptom she has experienced in the past from her MS, and the tingling in her legs worsened. She then told her cousin she wanted to come to the ED.     Lower extremity weakness and paresthesias  -Likely pseudo MS flare in the setting of UTI  -Has had some gradual improvement in neuro symptoms.  -Continue antibiotics as per medicine, urology  -DVT prophylaxis  -PT  -she is unable to have MRI and it is unlikely to  (would not treat pseudo-flare with steroids, especially when she is getting better).    She should follow up with her outpatient neurologist and discuss options for disease modifying medication.    Please call back if additional input is needed from neurology service.    
72 y.o. female with PMHx of MS returned to ED with progressively worsening weakness and heaviness in BL LE L>R after recent evaluation for UTI - pt was treated and discharged on po macrobid. Pt c/o left groin pain since prior to ED visit w/o change, dysuria somewhat improved. Here urine cx growing ESBL Ecoli, was given IV meropenem    1. UTI with ESBL Ecoli. MS  - imaging and cultures reviewed  - on IV [id number] #4  - continue with antibiotic coverage   - complete 5 day course  - monitor temps  - tolerating abx well so far; no side effects noted  - reason for abx use and side effects reviewed with patient  - supportive care  - contact precautions    2 .other issues - care per medicine 
72 y.o. female with PMHx of MS returned to ED with progressively worsening weakness and heaviness in BL LE L>R after recent evaluation for UTI - pt was treated and discharged on po macrobid. Pt c/o left groin pain since prior to ED visit w/o change, dysuria somewhat improved. Here urine cx growing ESBL Ecoli, was given IV meropenem    1. UTI with ESBL Ecoli. MS  - imaging and cultures reviewed  - on IV [id number] #[id number]  - continue with antibiotic coverage   - 3-5 day course  - monitor temps  - tolerating abx well so far; no side effects noted  - reason for abx use and side effects reviewed with patient  - supportive care  - contact precautions    2 .other issues - care per medicine

## 2022-05-28 PROCEDURE — 99232 SBSQ HOSP IP/OBS MODERATE 35: CPT

## 2022-05-28 RX ADMIN — FAMOTIDINE 20 MILLIGRAM(S): 10 INJECTION INTRAVENOUS at 09:52

## 2022-05-28 RX ADMIN — Medication 650 MILLIGRAM(S): at 22:00

## 2022-05-28 RX ADMIN — Medication 10 MILLIGRAM(S): at 09:52

## 2022-05-28 RX ADMIN — MEROPENEM 100 MILLIGRAM(S): 1 INJECTION INTRAVENOUS at 15:33

## 2022-05-28 RX ADMIN — MONTELUKAST 10 MILLIGRAM(S): 4 TABLET, CHEWABLE ORAL at 21:25

## 2022-05-28 RX ADMIN — Medication 100 MILLIGRAM(S): at 15:33

## 2022-05-28 RX ADMIN — Medication 100 MILLIGRAM(S): at 21:25

## 2022-05-28 RX ADMIN — MEROPENEM 100 MILLIGRAM(S): 1 INJECTION INTRAVENOUS at 21:26

## 2022-05-28 RX ADMIN — FAMOTIDINE 20 MILLIGRAM(S): 10 INJECTION INTRAVENOUS at 21:25

## 2022-05-28 RX ADMIN — LOSARTAN POTASSIUM 100 MILLIGRAM(S): 100 TABLET, FILM COATED ORAL at 09:52

## 2022-05-28 RX ADMIN — Medication 10 MILLIGRAM(S): at 21:25

## 2022-05-28 RX ADMIN — MEROPENEM 100 MILLIGRAM(S): 1 INJECTION INTRAVENOUS at 05:38

## 2022-05-28 RX ADMIN — AMLODIPINE BESYLATE 10 MILLIGRAM(S): 2.5 TABLET ORAL at 09:52

## 2022-05-28 RX ADMIN — Medication 650 MILLIGRAM(S): at 21:25

## 2022-05-28 RX ADMIN — Medication 1 SPRAY(S): at 11:52

## 2022-05-28 RX ADMIN — ATORVASTATIN CALCIUM 40 MILLIGRAM(S): 80 TABLET, FILM COATED ORAL at 21:25

## 2022-05-28 RX ADMIN — PANTOPRAZOLE SODIUM 40 MILLIGRAM(S): 20 TABLET, DELAYED RELEASE ORAL at 05:39

## 2022-05-28 RX ADMIN — ENOXAPARIN SODIUM 40 MILLIGRAM(S): 100 INJECTION SUBCUTANEOUS at 15:33

## 2022-05-28 RX ADMIN — Medication 100 MILLIGRAM(S): at 05:42

## 2022-05-29 ENCOUNTER — TRANSCRIPTION ENCOUNTER (OUTPATIENT)
Age: 72
End: 2022-05-29

## 2022-05-29 PROCEDURE — 99232 SBSQ HOSP IP/OBS MODERATE 35: CPT

## 2022-05-29 RX ADMIN — Medication 10 MILLIGRAM(S): at 09:52

## 2022-05-29 RX ADMIN — FAMOTIDINE 20 MILLIGRAM(S): 10 INJECTION INTRAVENOUS at 09:51

## 2022-05-29 RX ADMIN — FAMOTIDINE 20 MILLIGRAM(S): 10 INJECTION INTRAVENOUS at 22:16

## 2022-05-29 RX ADMIN — Medication 10 MILLIGRAM(S): at 22:16

## 2022-05-29 RX ADMIN — ENOXAPARIN SODIUM 40 MILLIGRAM(S): 100 INJECTION SUBCUTANEOUS at 19:17

## 2022-05-29 RX ADMIN — MONTELUKAST 10 MILLIGRAM(S): 4 TABLET, CHEWABLE ORAL at 22:16

## 2022-05-29 RX ADMIN — ATORVASTATIN CALCIUM 40 MILLIGRAM(S): 80 TABLET, FILM COATED ORAL at 22:17

## 2022-05-29 RX ADMIN — LOSARTAN POTASSIUM 100 MILLIGRAM(S): 100 TABLET, FILM COATED ORAL at 09:51

## 2022-05-29 RX ADMIN — Medication 650 MILLIGRAM(S): at 19:16

## 2022-05-29 RX ADMIN — Medication 100 MILLIGRAM(S): at 19:14

## 2022-05-29 RX ADMIN — Medication 100 MILLIGRAM(S): at 06:07

## 2022-05-29 RX ADMIN — AMLODIPINE BESYLATE 10 MILLIGRAM(S): 2.5 TABLET ORAL at 09:51

## 2022-05-29 RX ADMIN — MEROPENEM 100 MILLIGRAM(S): 1 INJECTION INTRAVENOUS at 19:17

## 2022-05-29 RX ADMIN — MEROPENEM 100 MILLIGRAM(S): 1 INJECTION INTRAVENOUS at 06:07

## 2022-05-29 RX ADMIN — PANTOPRAZOLE SODIUM 40 MILLIGRAM(S): 20 TABLET, DELAYED RELEASE ORAL at 06:06

## 2022-05-29 NOTE — DISCHARGE NOTE PROVIDER - CARE PROVIDER_API CALL
Neurology [facility],   Phone: (   )    -  Fax: (   )    -  Follow Up Time: 1 week    urology,   Phone: (   )    -  Fax: (   )    -  Follow Up Time: 2 weeks

## 2022-05-29 NOTE — DISCHARGE NOTE PROVIDER - NSDCMRMEDTOKEN_GEN_ALL_CORE_FT
amLODIPine 10 mg oral tablet: 1 tab(s) orally once a day  atorvastatin 40 mg oral tablet: 1 tab(s) orally once a day (at bedtime)  azelastine 137 mcg/inh (0.1%) nasal spray: 2 spray(s) in each nostril 2 times a day, As Needed  baclofen 5 mg oral tablet: 2 tab(s) orally 2 times a day  chlorthalidone 25 mg oral tablet: 1 tab(s) orally once a day  famotidine 20 mg oral tablet: 1 tab(s) orally once a day (at bedtime)  fluticasone 50 mcg/inh nasal spray: 1 spray(s) in each nostril once a day, As Needed  hydrALAZINE 100 mg oral tablet: 1 tab(s) orally 3 times a day  losartan 100 mg oral tablet: 1 tab(s) orally once a day  montelukast 10 mg oral tablet: 1 tab(s) orally once a day (at bedtime)  omeprazole 40 mg oral delayed release capsule: 1 cap(s) orally once a day before breakfast   amLODIPine 10 mg oral tablet: 1 tab(s) orally once a day  atorvastatin 40 mg oral tablet: 1 tab(s) orally once a day (at bedtime)  baclofen 5 mg oral tablet: 2 tab(s) orally 2 times a day  famotidine 20 mg oral tablet: 1 tab(s) orally once a day (at bedtime)  fluticasone 50 mcg/inh nasal spray: 1 spray(s) in each nostril once a day, As Needed  hydrALAZINE 100 mg oral tablet: 1 tab(s) orally 3 times a day  losartan 100 mg oral tablet: 1 tab(s) orally once a day  montelukast 10 mg oral tablet: 1 tab(s) orally once a day (at bedtime)  omeprazole 40 mg oral delayed release capsule: 1 cap(s) orally once a day before breakfast

## 2022-05-29 NOTE — DISCHARGE NOTE PROVIDER - NSDCFUSCHEDAPPT_GEN_ALL_CORE_FT
Susannah Holbrook  [facility]  PED Allerg [address] [facility]   Scheduled Appointment: 06/21/2022

## 2022-05-29 NOTE — DISCHARGE NOTE PROVIDER - HOSPITAL COURSE
History of Present Illness:   72 y.o. female with PMHx of MS, Neurogenic bladder,  returned to ED with progressively worsening weakness and heaviness in BL LE L>R after recent evaluation for UTI - pt was treated and discharged on po macrobid. Pt c/o left groin pain since prior to ED visit w/o change, dysuria somewhat improved  -found to have Ecoli ESBL Uti    5.25: feels better today  5.26: less dysuria, no hematuria , less LE weakness   5.27: no dysuria today  5.28: feels well  5.29: no distress      REVIEW OF SYSTEMS:    CONSTITUTIONAL: No weakness, No fevers or chills  ENT: No ear ache, No sorethroat  NECK: No pain, No stiffness  RESPIRATORY: No cough, No wheezing, No hemoptysis; No dyspnea  CARDIOVASCULAR: No chest pain, No palpitations  GASTROINTESTINAL: No abd pain, No nausea, No vomiting, No hematemesis, No diarrhea or constipation. No melena, No hematochezia.  GENITOURINARY: No dysuria, No  hematuria  NEUROLOGICAL: No diplopia, No paresthesia, No motor dysfunction  MUSCULOSKELETAL: No arthralgia, No myalgia  SKIN: No rashes, or lesions   PSYCH: no anxiety, no suicidal ideation    All other review of systems is negative unless indicated above    Vital Signs Last 24 Hrs  T(C): 37.2 (29 May 2022 08:47), Max: 37.2 (29 May 2022 08:47)  T(F): 98.9 (29 May 2022 08:47), Max: 98.9 (29 May 2022 08:47)  HR: 66 (29 May 2022 08:47) (66 - 98)  BP: 131/70 (29 May 2022 08:47) (120/63 - 135/66)  RR: 18 (29 May 2022 08:47) (18 - 19)  SpO2: 97% (29 May 2022 08:47) (96% - 98%)    PHYSICAL EXAM:    GENERAL: NAD  HEENT:  NC/AT, EOMI, PERRLA, No scleral icterus, Moist mucous membranes  NECK: Supple, No JVD  CNS:  Alert & Oriented X3, Motor Strength 5/5 B/L upper and lower extremities; DTRs 2+ intact   LUNG: Normal Breath sounds, Clear to auscultation bilaterally, No rales, No rhonchi, No wheezing  HEART: RRR; No murmurs, No rubs  ABDOMEN: +BS, ST/ND/NT  GENITOURINARY: Voiding, Bladder not distended  EXTREMITIES:  2+ Peripheral Pulses, No clubbing, No cyanosis, No tibial edema  MUSCULOSKELTAL: Joints normal ROM, No TTP, No effusion  VAGINAL: deferred  SKIN: no rashes  RECTAL: deferred, not indicated  BREAST: deferred                 MEDICATIONS  (STANDING):  amLODIPine   Tablet 10 milliGRAM(s) Oral daily  atorvastatin 40 milliGRAM(s) Oral at bedtime  baclofen 10 milliGRAM(s) Oral every 12 hours  enoxaparin Injectable 40 milliGRAM(s) SubCutaneous every 24 hours  famotidine    Tablet 20 milliGRAM(s) Oral two times a day  fluticasone propionate 50 MICROgram(s)/spray Nasal Spray 1 Spray(s) Both Nostrils two times a day  hydrALAZINE 100 milliGRAM(s) Oral every 8 hours  losartan 100 milliGRAM(s) Oral daily  meropenem  IVPB 1000 milliGRAM(s) IV Intermittent every 8 hours  montelukast 10 milliGRAM(s) Oral at bedtime  pantoprazole    Tablet 40 milliGRAM(s) Oral before breakfast    MEDICATIONS  (PRN):      all labs reviewed  all imaging reviewed    a/p:    1. ESBL EColi Uti:  Meropenem day#5/5    will check Bladder residual : <100cc  should f/u with urology as outpatient     2. MS Pseudoflare due to Uti:  LE motor function has improved to baseline   no need for steroids     3. Htn:  c/w Hydralazine, Losartan, Norvasc        72 y.o. female with PMHx of MS, Neurogenic bladder,  presented  to ED with progressively worsening weakness and heaviness in BL LE L>R after recent evaluation for UTI - pt was treated and discharged on po Macrobid. Pt was called that CX showed not sensitivity to Macrobid., advised to come for IV Abxs.  Pt c/o left groin pain since prior to ED visit w/o change, dysuria somewhat improved. Pt Was evaluated by ID, started on IV meropenem, completed 5 days course. UCX came back +  Ecoli ESBL UTI. Bladder scan neg for retention  Pt reports feeling better now. Meds and outPt f/u discussed. Pt had appointment with her MS specialist, will be referred to Urologist. also advisedto hold diuretics. BP wnl, will follow outPt with her cardio    REVIEW OF SYSTEMS  All  review of systems is negative unless indicated above    Vital Signs Last 24 Hrs  T(C): 36.9 (30 May 2022 08:23), Max: 36.9 (29 May 2022 17:02)  T(F): 98.4 (30 May 2022 08:23), Max: 98.5 (29 May 2022 17:02)  HR: 60 (30 May 2022 08:23) (58 - 595)  BP: 120/67 (30 May 2022 08:23) (103/62 - 130/58)  RR: 18 (30 May 2022 08:23) (17 - 18)  SpO2: 96% (30 May 2022 08:23) (96% - 99%)    PHYSICAL EXAM:    GENERAL: NAD  HEENT:  NC/AT, EOMI, PERRLA, No scleral icterus, Moist mucous membranes  NECK: Supple, No JVD  CNS:  Alert & Oriented X3, Motor Strength 5/5 B/L upper and lower extremities; DTRs 2+ intact   LUNG: Normal Breath sounds, Clear to auscultation bilaterally, No rales, No rhonchi, No wheezing  HEART: RRR; No murmurs, No rubs  ABDOMEN: +BS, ST/ND/NT  GENITOURINARY: Voiding, Bladder not distended  EXTREMITIES:  2+ Peripheral Pulses, No clubbing, No cyanosis, No tibial edema  MUSCULOSKELTAL: Joints normal ROM, No TTP, No effusion          MEDICATIONS  (STANDING):  amLODIPine   Tablet 10 milliGRAM(s) Oral daily  atorvastatin 40 milliGRAM(s) Oral at bedtime  baclofen 10 milliGRAM(s) Oral every 12 hours  enoxaparin Injectable 40 milliGRAM(s) SubCutaneous every 24 hours  famotidine    Tablet 20 milliGRAM(s) Oral two times a day  fluticasone propionate 50 MICROgram(s)/spray Nasal Spray 1 Spray(s) Both Nostrils two times a day  hydrALAZINE 100 milliGRAM(s) Oral every 8 hours  losartan 100 milliGRAM(s) Oral daily  meropenem  IVPB 1000 milliGRAM(s) IV Intermittent every 8 hours  montelukast 10 milliGRAM(s) Oral at bedtime  pantoprazole    Tablet 40 milliGRAM(s) Oral before breakfast    MEDICATIONS  (PRN):  all labs reviewed  all imaging reviewed    A/P:     1. ESBL EColi Uti:  Completed Meropenem day#5/5  Bladder residual : <100cc  Will have urology evaluation outPt      2. MS Pseudoflare due to Uti:  LE motor function has improved to baseline   no need for steroids   OutPt f/u with neuro     3. HTN:  c/w Hydralazine, Losartan, Norvasc   Continue to hold Chlorthalidone     Dispo; stable for d/c home   fax d/c summary to PCP  Total time 38 min

## 2022-05-29 NOTE — DISCHARGE NOTE PROVIDER - PROVIDER TOKENS
FREE:[LAST:[Neurology [facility]],PHONE:[(   )    -],FAX:[(   )    -],FOLLOWUP:[1 week]],FREE:[LAST:[urology],PHONE:[(   )    -],FAX:[(   )    -],FOLLOWUP:[2 weeks]]

## 2022-05-29 NOTE — DISCHARGE NOTE PROVIDER - NSDCCPCAREPLAN_GEN_ALL_CORE_FT
PRINCIPAL DISCHARGE DIAGNOSIS  Diagnosis: Acute UTI  Assessment and Plan of Treatment:       SECONDARY DISCHARGE DIAGNOSES  Diagnosis: Multiple sclerosis  Assessment and Plan of Treatment:      PRINCIPAL DISCHARGE DIAGNOSIS  Diagnosis: Acute UTI  Assessment and Plan of Treatment: completed abxs  Urology evaluation recommended      SECONDARY DISCHARGE DIAGNOSES  Diagnosis: Multiple sclerosis  Assessment and Plan of Treatment: c/w home meds  F/u with neuro    Diagnosis: HTN (hypertension)  Assessment and Plan of Treatment: C/w Losartan, Amlidipine and Hydralazine  Hold Chlorthalidone for now  F/u with cardio or PCP for BP check

## 2022-05-30 ENCOUNTER — TRANSCRIPTION ENCOUNTER (OUTPATIENT)
Age: 72
End: 2022-05-30

## 2022-05-30 VITALS
RESPIRATION RATE: 18 BRPM | HEART RATE: 60 BPM | TEMPERATURE: 98 F | OXYGEN SATURATION: 96 % | DIASTOLIC BLOOD PRESSURE: 67 MMHG | SYSTOLIC BLOOD PRESSURE: 120 MMHG

## 2022-05-30 LAB
ANION GAP SERPL CALC-SCNC: 3 MMOL/L — LOW (ref 5–17)
BUN SERPL-MCNC: 22 MG/DL — SIGNIFICANT CHANGE UP (ref 7–23)
CALCIUM SERPL-MCNC: 9.5 MG/DL — SIGNIFICANT CHANGE UP (ref 8.5–10.1)
CHLORIDE SERPL-SCNC: 104 MMOL/L — SIGNIFICANT CHANGE UP (ref 96–108)
CO2 SERPL-SCNC: 31 MMOL/L — SIGNIFICANT CHANGE UP (ref 22–31)
CREAT SERPL-MCNC: 0.42 MG/DL — LOW (ref 0.5–1.3)
EGFR: 103 ML/MIN/1.73M2 — SIGNIFICANT CHANGE UP
GLUCOSE SERPL-MCNC: 96 MG/DL — SIGNIFICANT CHANGE UP (ref 70–99)
POTASSIUM SERPL-MCNC: 3.8 MMOL/L — SIGNIFICANT CHANGE UP (ref 3.5–5.3)
POTASSIUM SERPL-SCNC: 3.8 MMOL/L — SIGNIFICANT CHANGE UP (ref 3.5–5.3)
SODIUM SERPL-SCNC: 138 MMOL/L — SIGNIFICANT CHANGE UP (ref 135–145)

## 2022-05-30 PROCEDURE — 99233 SBSQ HOSP IP/OBS HIGH 50: CPT

## 2022-05-30 RX ORDER — AZELASTINE 137 UG/1
2 SPRAY, METERED NASAL
Qty: 0 | Refills: 0 | DISCHARGE

## 2022-05-30 RX ORDER — CHLORTHALIDONE 50 MG
1 TABLET ORAL
Qty: 0 | Refills: 0 | DISCHARGE

## 2022-05-30 RX ADMIN — Medication 100 MILLIGRAM(S): at 04:09

## 2022-05-30 RX ADMIN — AMLODIPINE BESYLATE 10 MILLIGRAM(S): 2.5 TABLET ORAL at 10:59

## 2022-05-30 RX ADMIN — MEROPENEM 100 MILLIGRAM(S): 1 INJECTION INTRAVENOUS at 03:24

## 2022-05-30 RX ADMIN — PANTOPRAZOLE SODIUM 40 MILLIGRAM(S): 20 TABLET, DELAYED RELEASE ORAL at 06:31

## 2022-05-30 RX ADMIN — Medication 10 MILLIGRAM(S): at 10:59

## 2022-05-30 RX ADMIN — FAMOTIDINE 20 MILLIGRAM(S): 10 INJECTION INTRAVENOUS at 10:59

## 2022-05-30 RX ADMIN — Medication 100 MILLIGRAM(S): at 13:41

## 2022-05-30 RX ADMIN — LOSARTAN POTASSIUM 100 MILLIGRAM(S): 100 TABLET, FILM COATED ORAL at 10:59

## 2022-05-30 RX ADMIN — Medication 1 SPRAY(S): at 10:59

## 2022-05-30 NOTE — PROGRESS NOTE ADULT - REASON FOR ADMISSION
Weakness, recent fall off stairs due to weakness

## 2022-05-30 NOTE — DISCHARGE NOTE NURSING/CASE MANAGEMENT/SOCIAL WORK - PATIENT PORTAL LINK FT
You can access the FollowMyHealth Patient Portal offered by [facility] by registering at the following website: [email or web address]. By joining [facility]’s FollowMyHealth portal, you will also be able to view your health information using other applications (apps) compatible with our system.

## 2022-05-30 NOTE — PROGRESS NOTE ADULT - PROVIDER SPECIALTY LIST ADULT
Hospitalist
Neurology
Hospitalist
Infectious Disease
Infectious Disease
Neurology

## 2022-05-30 NOTE — PROGRESS NOTE ADULT - SUBJECTIVE AND OBJECTIVE BOX
72 y.o. female with PMHx of MS, Neurogenic bladder,  presented  to ED with progressively worsening weakness and heaviness in BL LE L>R after recent evaluation for UTI - pt was treated and discharged on po Macrobid. Pt was called that CX showed not sensitivity to Macrobid., advised to come for IV Abxs.  Pt c/o left groin pain since prior to ED visit w/o change, dysuria somewhat improved. Pt Was evaluated by ID, started on IV meropenem, completed 5 days course. UCX came back +  Ecoli ESBL UTI. Bladder scan neg for retention  Pt reports feeling better now. Meds and outPt f/u discussed. Pt had appointment with her MS specialist, will be referred to Urologist. also advisedto hold diuretics. BP wnl, will follow outPt with her cardio    REVIEW OF SYSTEMS  All  review of systems is negative unless indicated above    Vital Signs Last 24 Hrs  T(C): 36.9 (30 May 2022 08:23), Max: 36.9 (29 May 2022 17:02)  T(F): 98.4 (30 May 2022 08:23), Max: 98.5 (29 May 2022 17:02)  HR: 60 (30 May 2022 08:23) (58 - 595)  BP: 120/67 (30 May 2022 08:23) (103/62 - 130/58)  RR: 18 (30 May 2022 08:23) (17 - 18)  SpO2: 96% (30 May 2022 08:23) (96% - 99%)    PHYSICAL EXAM:    GENERAL: NAD  HEENT:  NC/AT, EOMI, PERRLA, No scleral icterus, Moist mucous membranes  NECK: Supple, No JVD  CNS:  Alert & Oriented X3, Motor Strength 5/5 B/L upper and lower extremities; DTRs 2+ intact   LUNG: Normal Breath sounds, Clear to auscultation bilaterally, No rales, No rhonchi, No wheezing  HEART: RRR; No murmurs, No rubs  ABDOMEN: +BS, ST/ND/NT  GENITOURINARY: Voiding, Bladder not distended  EXTREMITIES:  2+ Peripheral Pulses, No clubbing, No cyanosis, No tibial edema  MUSCULOSKELTAL: Joints normal ROM, No TTP, No effusion          MEDICATIONS  (STANDING):  amLODIPine   Tablet 10 milliGRAM(s) Oral daily  atorvastatin 40 milliGRAM(s) Oral at bedtime  baclofen 10 milliGRAM(s) Oral every 12 hours  enoxaparin Injectable 40 milliGRAM(s) SubCutaneous every 24 hours  famotidine    Tablet 20 milliGRAM(s) Oral two times a day  fluticasone propionate 50 MICROgram(s)/spray Nasal Spray 1 Spray(s) Both Nostrils two times a day  hydrALAZINE 100 milliGRAM(s) Oral every 8 hours  losartan 100 milliGRAM(s) Oral daily  meropenem  IVPB 1000 milliGRAM(s) IV Intermittent every 8 hours  montelukast 10 milliGRAM(s) Oral at bedtime  pantoprazole    Tablet 40 milliGRAM(s) Oral before breakfast    MEDICATIONS  (PRN):  all labs reviewed  all imaging reviewed    A/P:     1. ESBL EColi Uti:  Completed Meropenem day#5/5  Bladder residual : <100cc  Will have urology evaluation outPt      2. MS Pseudoflare due to Uti:  LE motor function has improved to baseline   no need for steroids   OutPt f/u with neuro     3. HTN:  c/w Hydralazine, Losartan, Norvasc   Continue to hold Chlorthalidone     Dispo; stable for d/c home   fax d/c summary to PCP  Total time 38 min 
Date of service: 22 @ 09:49    pt seen and examined  feels better  has pins needles sensations down legs  denies dysuria  constipated     ROS: no fever or chills; denies dizziness, no HA, no SOB or cough, no abdominal pain, no diarrhea  no legs pain, no rashes    MEDICATIONS  (STANDING):  amLODIPine   Tablet 10 milliGRAM(s) Oral daily  atorvastatin 40 milliGRAM(s) Oral at bedtime  baclofen 10 milliGRAM(s) Oral every 12 hours  enoxaparin Injectable 40 milliGRAM(s) SubCutaneous every 24 hours  famotidine    Tablet 20 milliGRAM(s) Oral two times a day  fluticasone propionate 50 MICROgram(s)/spray Nasal Spray 1 Spray(s) Both Nostrils two times a day  hydrALAZINE 100 milliGRAM(s) Oral every 8 hours  losartan 100 milliGRAM(s) Oral daily  meropenem  IVPB 1000 milliGRAM(s) IV Intermittent every 8 hours  montelukast 10 milliGRAM(s) Oral at bedtime  pantoprazole    Tablet 40 milliGRAM(s) Oral before breakfast    Vital Signs Last 24 Hrs  T(C): 36.9 (27 May 2022 09:00), Max: 37.1 (26 May 2022 21:16)  T(F): 98.5 (27 May 2022 09:00), Max: 98.7 (26 May 2022 21:16)  HR: 72 (27 May 2022 09:00) (70 - 81)  BP: 114/58 (27 May 2022 09:00) (97/54 - 127/70)  BP(mean): 84 (26 May 2022 15:35) (84 - 84)  RR: 18 (27 May 2022 09:00) (17 - 18)  SpO2: 95% (27 May 2022 09:00) (93% - 95%)    PE:  Constitutional: NAD   HEENT: NC/AT, EOMI, PERRLA, conjunctivae clear; ears and nose atraumatic; pharynx benign  Neck: supple; thyroid not palpable  Back: no tenderness  Respiratory: respiratory effort normal; clear to auscultation  Cardiovascular: S1S2 regular, no murmurs  Abdomen: soft, not tender, not distended, positive BS; liver and spleen WNL  Genitourinary: no suprapubic tenderness  Lymphatic: no LN palpable  Musculoskeletal: no muscle tenderness, no joint swelling or tenderness  Extremities: no pedal edema  Neurological/ Psychiatric: AxOx3, Judgement and insight normal;  moving all extremities  Skin: no rashes; no palpable lesions    Labs: all available labs reviewed               Urinalysis Basic - ( 24 May 2022 05:37 )    Color: Yellow / Appearance: Clear / S.010 / pH: x  Gluc: x / Ketone: Small  / Bili: Negative / Urobili: Negative   Blood: x / Protein: Negative / Nitrite: Negative   Leuk Esterase: Moderate / RBC: 0-2 /HPF / WBC 6-10   Sq Epi: x / Non Sq Epi: Moderate / Bacteria: Few    Culture - Urine (22 @ 11:23)   - Meropenem: S <=1   - Nitrofurantoin: I 64 Should not be used to treat pyelonephritis   - Piperacillin/Tazobactam: S <=8   - Tigecycline: S <=2   - Tobramycin: R >8   - Trimethoprim/Sulfamethoxazole: R >2/38   - Amikacin: S <=16   - Amoxicillin/Clavulanic Acid: S <=8/4 Consider reserving for cystitis when ampicillin/sulbactam is resistant   - Ampicillin: R >16 These ampicillin results predict results for amoxicillin   - Ampicillin/Sulbactam: R >16/8 Enterobacter, Klebsiella aerogenes, Citrobacter, and Serratia may develop resistance during prolonged therapy (3-4 days)   - Aztreonam: R <=4   - Cefazolin: R >16 (MIC_CL_COM_ENTERIC_CEFAZU) For uncomplicated UTI with K. pneumoniae, E. coli, or P. mirablis: CARIE <=16 is sensitive and CARIE >=32 is resistant. This also predicts results for oral agents cefaclor, cefdinir, cefpodoxime, cefprozil, cefuroxime axetil, cephalexin and locarbef for uncomplicated UTI. Note that some isolates may be susceptible to these agents while testing resistant to cefazolin.   - Cefepime: R 4   - Ceftriaxone: R 8 Enterobacter, Klebsiella aerogenes, Citrobacter, and Serratia may develop resistance during prolonged therapy   - Ciprofloxacin: R >2   - Ertapenem: S <=0.5   - Gentamicin: R >8   - Imipenem: S <=1   - Levofloxacin: R 4   Specimen Source: Clean Catch None   Culture Results:   >100,000 CFU/ml Escherichia coli ESBL   Organism Identification: Gram Negative Rods   Organism: Gram Negative Rods   Method Type: CARIE       Radiology: all available radiological tests reviewed  < from: Xray Chest 1 View- PORTABLE-Urgent (Xray Chest 1 View- PORTABLE-Urgent .) (22 @ 08:11) >    ACC: [id number] EXAM:  XR CHEST PORTABLE URGENT 1V                          PROCEDURE DATE:  2022          INTERPRETATION:  AP chest on May 24, 2022 at 7:58 AM. Patient has chest   pain.    Shallow inspiration crowds the chest.    Heart magnified by technique but likely normal.    Spinal stimulator ending in the upper thoracic area again noted.    Lungs remain clear.    Chest is similar to  of this year.    IMPRESSION: No acute finding or change.    --- End of Report ---        < end of copied text >    Advanced directives addressed: full resuscitation
Interval History:  5/26/22: States she is starting to feel better.  Leg strength is improving.    MEDICATIONS  (STANDING):  amLODIPine   Tablet 10 milliGRAM(s) Oral daily  atorvastatin 40 milliGRAM(s) Oral at bedtime  baclofen 10 milliGRAM(s) Oral every 12 hours  enoxaparin Injectable 40 milliGRAM(s) SubCutaneous every 24 hours  famotidine    Tablet 20 milliGRAM(s) Oral two times a day  fluticasone propionate 50 MICROgram(s)/spray Nasal Spray 1 Spray(s) Both Nostrils two times a day  hydrALAZINE 100 milliGRAM(s) Oral every 8 hours  losartan 100 milliGRAM(s) Oral daily  meropenem  IVPB 1000 milliGRAM(s) IV Intermittent every 8 hours  montelukast 10 milliGRAM(s) Oral at bedtime  pantoprazole    Tablet 40 milliGRAM(s) Oral before breakfast    MEDICATIONS  (PRN):  senna 1 Tablet(s) Oral two times a day PRN Constipation      Allergies    lisinopril (Unknown)  Bettles (Unknown)  peanuts (Unknown)  penicillin (Unknown)  Percocet (Unknown)    Intolerances        PHYSICAL EXAM:  Vital Signs Last 24 Hrs  T(F): 98.1 (05-26-22 @ 08:27)  HR: 64 (05-26-22 @ 08:27)  BP: 117/52 (05-26-22 @ 08:27)  RR: 18 (05-26-22 @ 08:27)    GENERAL: NAD, well-groomed  HEAD:  Atraumatic, Normocephalic  Neuro:  Neuro:  Awake, alert, no aphasia  CN: PERRL, EOMI, no nystagmus, no facial weakness, tongue protrudes in the midline  motor: normal tone, full strength in upper extremities. Able to lift both legs against gravity - 5-/5  sensory: intact to light touch  coordination: finger to nose intact bilaterally        LABS:                        11.7   8.13  )-----------( 364      ( 25 May 2022 07:53 )             35.4     05-25    138  |  103  |  16  ----------------------------<  103<H>  3.4<L>   |  29  |  0.57    Ca    9.6      25 May 2022 07:53  Mg     2.3     05-25            RADIOLOGY & ADDITIONAL STUDIES:            
Reason for Admission: Weakness, recent fall off stairs due to weakness  History of Present Illness:   72 y.o. female with PMHx of MS, Neurogenic bladder,  returned to ED with progressively worsening weakness and heaviness in BL LE L>R after recent evaluation for UTI - pt was treated and discharged on po macrobid. Pt c/o left groin pain since prior to ED visit w/o change, dysuria somewhat improved  -found to have Ecoli ESBL Uti    5.25: feels better today  5.26: less dysuria, no hematuria , less LE weakness   5.27: no dysuria today  5.28: feels well      REVIEW OF SYSTEMS:    CONSTITUTIONAL: No weakness, No fevers or chills  ENT: No ear ache, No sorethroat  NECK: No pain, No stiffness  RESPIRATORY: No cough, No wheezing, No hemoptysis; No dyspnea  CARDIOVASCULAR: No chest pain, No palpitations  GASTROINTESTINAL: No abd pain, No nausea, No vomiting, No hematemesis, No diarrhea or constipation. No melena, No hematochezia.  GENITOURINARY: No dysuria, No  hematuria  NEUROLOGICAL: No diplopia, No paresthesia, No motor dysfunction  MUSCULOSKELETAL: No arthralgia, No myalgia  SKIN: No rashes, or lesions   PSYCH: no anxiety, no suicidal ideation    All other review of systems is negative unless indicated above    Vital Signs Last 24 Hrs  T(C): 36.8 (28 May 2022 08:57), Max: 37.3 (27 May 2022 15:43)  T(F): 98.3 (28 May 2022 08:57), Max: 99.2 (27 May 2022 15:43)  HR: 71 (28 May 2022 08:57) (67 - 73)  BP: 138/68 (28 May 2022 08:57) (112/58 - 138/68)  BP(mean): 82 (27 May 2022 15:43) (82 - 82)  RR: 18 (28 May 2022 08:57) (18 - 19)  SpO2: 98% (28 May 2022 08:57) (94% - 98%)    PHYSICAL EXAM:    GENERAL: NAD  HEENT:  NC/AT, EOMI, PERRLA, No scleral icterus, Moist mucous membranes  NECK: Supple, No JVD  CNS:  Alert & Oriented X3, Motor Strength 5/5 B/L upper and lower extremities; DTRs 2+ intact   LUNG: Normal Breath sounds, Clear to auscultation bilaterally, No rales, No rhonchi, No wheezing  HEART: RRR; No murmurs, No rubs  ABDOMEN: +BS, ST/ND/NT  GENITOURINARY: Voiding, Bladder not distended  EXTREMITIES:  2+ Peripheral Pulses, No clubbing, No cyanosis, No tibial edema  MUSCULOSKELTAL: Joints normal ROM, No TTP, No effusion  VAGINAL: deferred  SKIN: no rashes  RECTAL: deferred, not indicated  BREAST: deferred                          11.7   8.13  )-----------( 364      ( 25 May 2022 07:53 )             35.4     05-25    138  |  103  |  16  ----------------------------<  103<H>  3.4<L>   |  29  |  0.57    Ca    9.6      25 May 2022 07:53  Mg     2.3     05-25    TPro  7.1  /  Alb  3.4  /  TBili  0.6  /  DBili  x   /  AST  16  /  ALT  27  /  AlkPhos  83  05-24    Vancomycin levels:   Cultures:     MEDICATIONS  (STANDING):  amLODIPine   Tablet 10 milliGRAM(s) Oral daily  atorvastatin 40 milliGRAM(s) Oral at bedtime  baclofen 10 milliGRAM(s) Oral every 12 hours  enoxaparin Injectable 40 milliGRAM(s) SubCutaneous every 24 hours  famotidine    Tablet 20 milliGRAM(s) Oral two times a day  fluticasone propionate 50 MICROgram(s)/spray Nasal Spray 1 Spray(s) Both Nostrils two times a day  hydrALAZINE 100 milliGRAM(s) Oral every 8 hours  losartan 100 milliGRAM(s) Oral daily  meropenem  IVPB 1000 milliGRAM(s) IV Intermittent every 8 hours  montelukast 10 milliGRAM(s) Oral at bedtime  pantoprazole    Tablet 40 milliGRAM(s) Oral before breakfast    MEDICATIONS  (PRN):      all labs reviewed  all imaging reviewed    a/p:    1. ESBL EColi Uti:  Meropenem day#4/5    will check Bladder residual : <100cc    2. MS Pseudoflare due to Uti:  LE motor function has improved to baseline   no need for steroids     3. Htn:  c/w Hydralazine, Losartan, Norvasc 
Date of service: 22 @ 12:07    pt seen and examined  feels better  less dysuria  urinating ok  afebrile    ROS: no fever or chills; denies dizziness, no HA, no SOB or cough, no abdominal pain, no diarrhea or constipation;  no legs pain, no rashes    MEDICATIONS  (STANDING):  amLODIPine   Tablet 10 milliGRAM(s) Oral daily  atorvastatin 40 milliGRAM(s) Oral at bedtime  baclofen 10 milliGRAM(s) Oral every 12 hours  enoxaparin Injectable 40 milliGRAM(s) SubCutaneous every 24 hours  famotidine    Tablet 20 milliGRAM(s) Oral two times a day  fluticasone propionate 50 MICROgram(s)/spray Nasal Spray 1 Spray(s) Both Nostrils two times a day  hydrALAZINE 100 milliGRAM(s) Oral every 8 hours  losartan 100 milliGRAM(s) Oral daily  meropenem  IVPB 1000 milliGRAM(s) IV Intermittent every 8 hours  montelukast 10 milliGRAM(s) Oral at bedtime  pantoprazole    Tablet 40 milliGRAM(s) Oral before breakfast    Vital Signs Last 24 Hrs  T(C): 36.7 (26 May 2022 08:27), Max: 37.5 (25 May 2022 16:44)  T(F): 98.1 (26 May 2022 08:27), Max: 99.5 (25 May 2022 16:44)  HR: 64 (26 May 2022 08:27) (64 - 76)  BP: 117/52 (26 May 2022 08:27) (114/55 - 134/59)  BP(mean): 82 (26 May 2022 06:32) (72 - 82)  RR: 18 (26 May 2022 08:27) (18 - 18)  SpO2: 94% (26 May 2022 08:27) (94% - 96%)      PE:  Constitutional: NAD   HEENT: NC/AT, EOMI, PERRLA, conjunctivae clear; ears and nose atraumatic; pharynx benign  Neck: supple; thyroid not palpable  Back: no tenderness  Respiratory: respiratory effort normal; clear to auscultation  Cardiovascular: S1S2 regular, no murmurs  Abdomen: soft, not tender, not distended, positive BS; liver and spleen WNL  Genitourinary: no suprapubic tenderness  Lymphatic: no LN palpable  Musculoskeletal: no muscle tenderness, no joint swelling or tenderness  Extremities: no pedal edema  Neurological/ Psychiatric: AxOx3, Judgement and insight normal;  moving all extremities  Skin: no rashes; no palpable lesions    Labs: all available labs reviewed                                     )-----------( 364      ( 25 May 2022 07:53 )             35.4     -    138  |  103  |  16  ----------------------------<  103<H>  3.4<L>   |  29  |  0.57    Ca    9.6      25 May 2022 07:53  Mg     2.3               Urinalysis Basic - ( 24 May 2022 05:37 )    Color: Yellow / Appearance: Clear / S.010 / pH: x  Gluc: x / Ketone: Small  / Bili: Negative / Urobili: Negative   Blood: x / Protein: Negative / Nitrite: Negative   Leuk Esterase: Moderate / RBC: 0-2 /HPF / WBC 6-10   Sq Epi: x / Non Sq Epi: Moderate / Bacteria: Few    Culture - Urine (22 @ 11:23)   - Meropenem: S <=1   - Nitrofurantoin: I 64 Should not be used to treat pyelonephritis   - Piperacillin/Tazobactam: S <=8   - Tigecycline: S <=2   - Tobramycin: R >8   - Trimethoprim/Sulfamethoxazole: R >2/38   - Amikacin: S <=16   - Amoxicillin/Clavulanic Acid: S <=8/4 Consider reserving for cystitis when ampicillin/sulbactam is resistant   - Ampicillin: R >16 These ampicillin results predict results for amoxicillin   - Ampicillin/Sulbactam: R >16/8 Enterobacter, Klebsiella aerogenes, Citrobacter, and Serratia may develop resistance during prolonged therapy (3-4 days)   - Aztreonam: R <=4   - Cefazolin: R >16 (MIC_CL_COM_ENTERIC_CEFAZU) For uncomplicated UTI with K. pneumoniae, E. coli, or P. mirablis: CARIE <=16 is sensitive and CARIE >=32 is resistant. This also predicts results for oral agents cefaclor, cefdinir, cefpodoxime, cefprozil, cefuroxime axetil, cephalexin and locarbef for uncomplicated UTI. Note that some isolates may be susceptible to these agents while testing resistant to cefazolin.   - Cefepime: R 4   - Ceftriaxone: R 8 Enterobacter, Klebsiella aerogenes, Citrobacter, and Serratia may develop resistance during prolonged therapy   - Ciprofloxacin: R >2   - Ertapenem: S <=0.5   - Gentamicin: R >8   - Imipenem: S <=1   - Levofloxacin: R 4   Specimen Source: Clean Catch None   Culture Results:   >100,000 CFU/ml Escherichia coli ESBL   Organism Identification: Gram Negative Rods   Organism: Gram Negative Rods   Method Type: CARIE       Radiology: all available radiological tests reviewed  < from: Xray Chest 1 View- PORTABLE-Urgent (Xray Chest 1 View- PORTABLE-Urgent .) (22 @ 08:11) >    ACC: [id number] EXAM:  XR CHEST PORTABLE URGENT 1V                          PROCEDURE DATE:  2022          INTERPRETATION:  AP chest on May 24, 2022 at 7:58 AM. Patient has chest   pain.    Shallow inspiration crowds the chest.    Heart magnified by technique but likely normal.    Spinal stimulator ending in the upper thoracic area again noted.    Lungs remain clear.    Chest is similar to  of this year.    IMPRESSION: No acute finding or change.    --- End of Report ---        < end of copied text >    Advanced directives addressed: full resuscitation
Reason for Admission: Weakness, recent fall off stairs due to weakness  History of Present Illness:   72 y.o. female with PMHx of MS, Neurogenic bladder,  returned to ED with progressively worsening weakness and heaviness in BL LE L>R after recent evaluation for UTI - pt was treated and discharged on po macrobid. Pt c/o left groin pain since prior to ED visit w/o change, dysuria somewhat improved  -found to have Ecoli ESBL Uti    5.25: feels better today  5.26: less dysuria, no hematuria , less LE weakness   5.27: no dysuria today      REVIEW OF SYSTEMS:    CONSTITUTIONAL: No weakness, No fevers or chills  ENT: No ear ache, No sorethroat  NECK: No pain, No stiffness  RESPIRATORY: No cough, No wheezing, No hemoptysis; No dyspnea  CARDIOVASCULAR: No chest pain, No palpitations  GASTROINTESTINAL: No abd pain, No nausea, No vomiting, No hematemesis, No diarrhea or constipation. No melena, No hematochezia.  GENITOURINARY: No dysuria, No  hematuria  NEUROLOGICAL: No diplopia, No paresthesia, No motor dysfunction  MUSCULOSKELETAL: No arthralgia, No myalgia  SKIN: No rashes, or lesions   PSYCH: no anxiety, no suicidal ideation    All other review of systems is negative unless indicated above    Vital Signs Last 24 Hrs  T(C): 36.9 (27 May 2022 09:00), Max: 37.1 (26 May 2022 21:16)  T(F): 98.5 (27 May 2022 09:00), Max: 98.7 (26 May 2022 21:16)  HR: 72 (27 May 2022 09:00) (70 - 81)  BP: 114/58 (27 May 2022 09:00) (97/54 - 127/70)  BP(mean): 84 (26 May 2022 15:35) (84 - 84)  RR: 18 (27 May 2022 09:00) (17 - 18)  SpO2: 95% (27 May 2022 09:00) (93% - 95%)    PHYSICAL EXAM:    GENERAL: NAD  HEENT:  NC/AT, EOMI, PERRLA, No scleral icterus, Moist mucous membranes  NECK: Supple, No JVD  CNS:  Alert & Oriented X3, Motor Strength 5/5 B/L upper and lower extremities; DTRs 2+ intact   LUNG: Normal Breath sounds, Clear to auscultation bilaterally, No rales, No rhonchi, No wheezing  HEART: RRR; No murmurs, No rubs  ABDOMEN: +BS, ST/ND/NT  GENITOURINARY: Voiding, Bladder not distended  EXTREMITIES:  2+ Peripheral Pulses, No clubbing, No cyanosis, No tibial edema  MUSCULOSKELTAL: Joints normal ROM, No TTP, No effusion  VAGINAL: deferred  SKIN: no rashes  RECTAL: deferred, not indicated  BREAST: deferred                          11.7   8.13  )-----------( 364      ( 25 May 2022 07:53 )             35.4     05-25    138  |  103  |  16  ----------------------------<  103<H>  3.4<L>   |  29  |  0.57    Ca    9.6      25 May 2022 07:53  Mg     2.3     05-25    TPro  7.1  /  Alb  3.4  /  TBili  0.6  /  DBili  x   /  AST  16  /  ALT  27  /  AlkPhos  83  05-24    Vancomycin levels:   Cultures:     MEDICATIONS  (STANDING):  amLODIPine   Tablet 10 milliGRAM(s) Oral daily  atorvastatin 40 milliGRAM(s) Oral at bedtime  baclofen 10 milliGRAM(s) Oral every 12 hours  enoxaparin Injectable 40 milliGRAM(s) SubCutaneous every 24 hours  famotidine    Tablet 20 milliGRAM(s) Oral two times a day  fluticasone propionate 50 MICROgram(s)/spray Nasal Spray 1 Spray(s) Both Nostrils two times a day  hydrALAZINE 100 milliGRAM(s) Oral every 8 hours  losartan 100 milliGRAM(s) Oral daily  meropenem  IVPB 1000 milliGRAM(s) IV Intermittent every 8 hours  montelukast 10 milliGRAM(s) Oral at bedtime  pantoprazole    Tablet 40 milliGRAM(s) Oral before breakfast    MEDICATIONS  (PRN):      all labs reviewed  all imaging reviewed    a/p:    1. ESBL EColi Uti:  Meropenem day#3/5    will check Bladder residual : <100cc    2. MS Pseudoflare due to Uti:  LE motor function has improved to baseline   no need for steroids     3. Htn:  c/w Hydralazine, Losartan, Norvasc 
Reason for Admission: Weakness, recent fall off stairs due to weakness  History of Present Illness:   72 y.o. female with PMHx of MS, Neurogenic bladder,  returned to ED with progressively worsening weakness and heaviness in BL LE L>R after recent evaluation for UTI - pt was treated and discharged on po macrobid. Pt c/o left groin pain since prior to ED visit w/o change, dysuria somewhat improved  -found to have Ecoli ESBL Uti    5.25: feels better today      REVIEW OF SYSTEMS:    CONSTITUTIONAL: No weakness, No fevers or chills  ENT: No ear ache, No sorethroat  NECK: No pain, No stiffness  RESPIRATORY: No cough, No wheezing, No hemoptysis; No dyspnea  CARDIOVASCULAR: No chest pain, No palpitations  GASTROINTESTINAL: No abd pain, No nausea, No vomiting, No hematemesis, No diarrhea or constipation. No melena, No hematochezia.  GENITOURINARY: No dysuria, No  hematuria  NEUROLOGICAL: No diplopia, No paresthesia, No motor dysfunction  MUSCULOSKELETAL: No arthralgia, No myalgia  SKIN: No rashes, or lesions   PSYCH: no anxiety, no suicidal ideation    All other review of systems is negative unless indicated above    Vital Signs Last 24 Hrs  T(C): 37 (25 May 2022 08:59), Max: 37.3 (24 May 2022 15:17)  T(F): 98.6 (25 May 2022 08:59), Max: 99.2 (24 May 2022 21:50)  HR: 76 (25 May 2022 14:00) (67 - 76)  BP: 115/68 (25 May 2022 14:00) (115/68 - 142/60)  BP(mean): 80 (24 May 2022 21:50) (80 - 82)  RR: 18 (25 May 2022 14:00) (18 - 19)  SpO2: 96% (25 May 2022 14:00) (96% - 100%)    PHYSICAL EXAM:    GENERAL: NAD  HEENT:  NC/AT, EOMI, PERRLA, No scleral icterus, Moist mucous membranes  NECK: Supple, No JVD  CNS:  Alert & Oriented X3, Motor Strength 5/5 B/L upper and lower extremities; DTRs 2+ intact   LUNG: Normal Breath sounds, Clear to auscultation bilaterally, No rales, No rhonchi, No wheezing  HEART: RRR; No murmurs, No rubs  ABDOMEN: +BS, ST/ND/NT  GENITOURINARY: Voiding, Bladder not distended  EXTREMITIES:  2+ Peripheral Pulses, No clubbing, No cyanosis, No tibial edema  MUSCULOSKELTAL: Joints normal ROM, No TTP, No effusion  VAGINAL: deferred  SKIN: no rashes  RECTAL: deferred, not indicated  BREAST: deferred                          11.7   8.13  )-----------( 364      ( 25 May 2022 07:53 )             35.4     05-25    138  |  103  |  16  ----------------------------<  103<H>  3.4<L>   |  29  |  0.57    Ca    9.6      25 May 2022 07:53  Mg     2.3     05-25    TPro  7.1  /  Alb  3.4  /  TBili  0.6  /  DBili  x   /  AST  16  /  ALT  27  /  AlkPhos  83  05-24    Vancomycin levels:   Cultures:     MEDICATIONS  (STANDING):  amLODIPine   Tablet 10 milliGRAM(s) Oral daily  atorvastatin 40 milliGRAM(s) Oral at bedtime  baclofen 10 milliGRAM(s) Oral every 12 hours  enoxaparin Injectable 40 milliGRAM(s) SubCutaneous every 24 hours  famotidine    Tablet 20 milliGRAM(s) Oral two times a day  fluticasone propionate 50 MICROgram(s)/spray Nasal Spray 1 Spray(s) Both Nostrils two times a day  hydrALAZINE 100 milliGRAM(s) Oral every 8 hours  losartan 100 milliGRAM(s) Oral daily  meropenem  IVPB 1000 milliGRAM(s) IV Intermittent every 8 hours  montelukast 10 milliGRAM(s) Oral at bedtime  pantoprazole    Tablet 40 milliGRAM(s) Oral before breakfast    MEDICATIONS  (PRN):      all labs reviewed  all imaging reviewed    a/p:    1. ESBL EColi Uti:  Meropenem day#2/5    will check Bladder residual     2. MS Pseudoflare due to Uti    3. Htn:  c/w Hydralazine, Losartan, Norvasc 
Reason for Admission: Weakness, recent fall off stairs due to weakness  History of Present Illness:   72 y.o. female with PMHx of MS, Neurogenic bladder,  returned to ED with progressively worsening weakness and heaviness in BL LE L>R after recent evaluation for UTI - pt was treated and discharged on po macrobid. Pt c/o left groin pain since prior to ED visit w/o change, dysuria somewhat improved  -found to have Ecoli ESBL Uti    5.25: feels better today  5.26: less dysuria, no hematuria , less LE weakness       REVIEW OF SYSTEMS:    CONSTITUTIONAL: No weakness, No fevers or chills  ENT: No ear ache, No sorethroat  NECK: No pain, No stiffness  RESPIRATORY: No cough, No wheezing, No hemoptysis; No dyspnea  CARDIOVASCULAR: No chest pain, No palpitations  GASTROINTESTINAL: No abd pain, No nausea, No vomiting, No hematemesis, No diarrhea or constipation. No melena, No hematochezia.  GENITOURINARY: No dysuria, No  hematuria  NEUROLOGICAL: No diplopia, No paresthesia, No motor dysfunction  MUSCULOSKELETAL: No arthralgia, No myalgia  SKIN: No rashes, or lesions   PSYCH: no anxiety, no suicidal ideation    All other review of systems is negative unless indicated above    Vital Signs Last 24 Hrs  T(C): 36.7 (26 May 2022 08:27), Max: 37.5 (25 May 2022 16:44)  T(F): 98.1 (26 May 2022 08:27), Max: 99.5 (25 May 2022 16:44)  HR: 64 (26 May 2022 08:27) (64 - 75)  BP: 117/52 (26 May 2022 08:27) (114/55 - 134/59)  BP(mean): 82 (26 May 2022 06:32) (72 - 82)  RR: 18 (26 May 2022 08:27) (18 - 18)  SpO2: 94% (26 May 2022 08:27) (94% - 95%)    PHYSICAL EXAM:    GENERAL: NAD  HEENT:  NC/AT, EOMI, PERRLA, No scleral icterus, Moist mucous membranes  NECK: Supple, No JVD  CNS:  Alert & Oriented X3, Motor Strength 5/5 B/L upper and lower extremities; DTRs 2+ intact   LUNG: Normal Breath sounds, Clear to auscultation bilaterally, No rales, No rhonchi, No wheezing  HEART: RRR; No murmurs, No rubs  ABDOMEN: +BS, ST/ND/NT  GENITOURINARY: Voiding, Bladder not distended  EXTREMITIES:  2+ Peripheral Pulses, No clubbing, No cyanosis, No tibial edema  MUSCULOSKELTAL: Joints normal ROM, No TTP, No effusion  VAGINAL: deferred  SKIN: no rashes  RECTAL: deferred, not indicated  BREAST: deferred                          11.7   8.13  )-----------( 364      ( 25 May 2022 07:53 )             35.4     05-25    138  |  103  |  16  ----------------------------<  103<H>  3.4<L>   |  29  |  0.57    Ca    9.6      25 May 2022 07:53  Mg     2.3     05-25    TPro  7.1  /  Alb  3.4  /  TBili  0.6  /  DBili  x   /  AST  16  /  ALT  27  /  AlkPhos  83  05-24    Vancomycin levels:   Cultures:     MEDICATIONS  (STANDING):  amLODIPine   Tablet 10 milliGRAM(s) Oral daily  atorvastatin 40 milliGRAM(s) Oral at bedtime  baclofen 10 milliGRAM(s) Oral every 12 hours  enoxaparin Injectable 40 milliGRAM(s) SubCutaneous every 24 hours  famotidine    Tablet 20 milliGRAM(s) Oral two times a day  fluticasone propionate 50 MICROgram(s)/spray Nasal Spray 1 Spray(s) Both Nostrils two times a day  hydrALAZINE 100 milliGRAM(s) Oral every 8 hours  losartan 100 milliGRAM(s) Oral daily  meropenem  IVPB 1000 milliGRAM(s) IV Intermittent every 8 hours  montelukast 10 milliGRAM(s) Oral at bedtime  pantoprazole    Tablet 40 milliGRAM(s) Oral before breakfast    MEDICATIONS  (PRN):      all labs reviewed  all imaging reviewed    a/p:    1. ESBL EColi Uti:  Meropenem day#3/5    will check Bladder residual : <100cc    2. MS Pseudoflare due to Uti:  no need for steroids     3. Htn:  c/w Hydralazine, Losartan, Norvasc 
Reason for Admission: Weakness, recent fall off stairs due to weakness  History of Present Illness:   72 y.o. female with PMHx of MS, Neurogenic bladder,  returned to ED with progressively worsening weakness and heaviness in BL LE L>R after recent evaluation for UTI - pt was treated and discharged on po macrobid. Pt c/o left groin pain since prior to ED visit w/o change, dysuria somewhat improved  -found to have Ecoli ESBL Uti    5.25: feels better today  5.26: less dysuria, no hematuria , less LE weakness   5.27: no dysuria today  5.28: feels well  5.29: no distress      REVIEW OF SYSTEMS:    CONSTITUTIONAL: No weakness, No fevers or chills  ENT: No ear ache, No sorethroat  NECK: No pain, No stiffness  RESPIRATORY: No cough, No wheezing, No hemoptysis; No dyspnea  CARDIOVASCULAR: No chest pain, No palpitations  GASTROINTESTINAL: No abd pain, No nausea, No vomiting, No hematemesis, No diarrhea or constipation. No melena, No hematochezia.  GENITOURINARY: No dysuria, No  hematuria  NEUROLOGICAL: No diplopia, No paresthesia, No motor dysfunction  MUSCULOSKELETAL: No arthralgia, No myalgia  SKIN: No rashes, or lesions   PSYCH: no anxiety, no suicidal ideation    All other review of systems is negative unless indicated above    Vital Signs Last 24 Hrs  T(C): 37.2 (29 May 2022 08:47), Max: 37.2 (29 May 2022 08:47)  T(F): 98.9 (29 May 2022 08:47), Max: 98.9 (29 May 2022 08:47)  HR: 66 (29 May 2022 08:47) (66 - 98)  BP: 131/70 (29 May 2022 08:47) (120/63 - 135/66)  RR: 18 (29 May 2022 08:47) (18 - 19)  SpO2: 97% (29 May 2022 08:47) (96% - 98%)    PHYSICAL EXAM:    GENERAL: NAD  HEENT:  NC/AT, EOMI, PERRLA, No scleral icterus, Moist mucous membranes  NECK: Supple, No JVD  CNS:  Alert & Oriented X3, Motor Strength 5/5 B/L upper and lower extremities; DTRs 2+ intact   LUNG: Normal Breath sounds, Clear to auscultation bilaterally, No rales, No rhonchi, No wheezing  HEART: RRR; No murmurs, No rubs  ABDOMEN: +BS, ST/ND/NT  GENITOURINARY: Voiding, Bladder not distended  EXTREMITIES:  2+ Peripheral Pulses, No clubbing, No cyanosis, No tibial edema  MUSCULOSKELTAL: Joints normal ROM, No TTP, No effusion  VAGINAL: deferred  SKIN: no rashes  RECTAL: deferred, not indicated  BREAST: deferred                 MEDICATIONS  (STANDING):  amLODIPine   Tablet 10 milliGRAM(s) Oral daily  atorvastatin 40 milliGRAM(s) Oral at bedtime  baclofen 10 milliGRAM(s) Oral every 12 hours  enoxaparin Injectable 40 milliGRAM(s) SubCutaneous every 24 hours  famotidine    Tablet 20 milliGRAM(s) Oral two times a day  fluticasone propionate 50 MICROgram(s)/spray Nasal Spray 1 Spray(s) Both Nostrils two times a day  hydrALAZINE 100 milliGRAM(s) Oral every 8 hours  losartan 100 milliGRAM(s) Oral daily  meropenem  IVPB 1000 milliGRAM(s) IV Intermittent every 8 hours  montelukast 10 milliGRAM(s) Oral at bedtime  pantoprazole    Tablet 40 milliGRAM(s) Oral before breakfast    MEDICATIONS  (PRN):      all labs reviewed  all imaging reviewed    a/p:    1. ESBL EColi Uti:  Meropenem day#5/5    will check Bladder residual : <100cc    2. MS Pseudoflare due to Uti:  LE motor function has improved to baseline   no need for steroids     3. Htn:  c/w Hydralazine, Losartan, Norvasc     dc planning in am
Interval History:  22: States that she is feeling slight improvement in strength and numbness today.     MEDICATIONS  (STANDING):  amLODIPine   Tablet 10 milliGRAM(s) Oral daily  atorvastatin 40 milliGRAM(s) Oral at bedtime  baclofen 10 milliGRAM(s) Oral every 12 hours  enoxaparin Injectable 40 milliGRAM(s) SubCutaneous every 24 hours  famotidine    Tablet 20 milliGRAM(s) Oral two times a day  fluticasone propionate 50 MICROgram(s)/spray Nasal Spray 1 Spray(s) Both Nostrils two times a day  hydrALAZINE 100 milliGRAM(s) Oral every 8 hours  losartan 100 milliGRAM(s) Oral daily  meropenem  IVPB 1000 milliGRAM(s) IV Intermittent every 8 hours  montelukast 10 milliGRAM(s) Oral at bedtime  pantoprazole    Tablet 40 milliGRAM(s) Oral before breakfast    MEDICATIONS  (PRN):      Allergies    lisinopril (Unknown)  Eben (Unknown)  peanuts (Unknown)  penicillin (Unknown)  Percocet (Unknown)    Intolerances        PHYSICAL EXAM:  Vital Signs Last 24 Hrs  T(F): 98.6 (22 @ 08:59)  HR: 70 (22 @ 08:59)  BP: 128/64 (22 @ 08:59)  RR: 18 (22 @ 08:59)    GENERAL: NAD, well-groomed, well-developed  HEAD:  Atraumatic, Normocephalic  Neuro:  Awake, alert, no aphasia  CN: PERRL, EOMI, no nystagmus, no facial weakness, tongue protrudes in the midline  motor: normal tone, full strength in upper extremities. Able to lift both legs against gravity - -  sensory: intact to light touch  coordination: finger to nose intact bilaterally  gait: ambulates with walker    LABS:                        11.7   8.13  )-----------( 364      ( 25 May 2022 07:53 )             35.4         138  |  103  |  16  ----------------------------<  103<H>  3.4<L>   |  29  |  0.57    Ca    9.6      25 May 2022 07:53  Mg     2.3         TPro  7.1  /  Alb  3.4  /  TBili  0.6  /  DBili  x   /  AST  16  /  ALT  27  /  AlkPhos  83  05-24      Urinalysis Basic - ( 24 May 2022 05:37 )    Color: Yellow / Appearance: Clear / S.010 / pH: x  Gluc: x / Ketone: Small  / Bili: Negative / Urobili: Negative   Blood: x / Protein: Negative / Nitrite: Negative   Leuk Esterase: Moderate / RBC: 0-2 /HPF / WBC 6-10   Sq Epi: x / Non Sq Epi: Moderate / Bacteria: Few        RADIOLOGY & ADDITIONAL STUDIES:        PLAN:

## 2022-05-30 NOTE — DISCHARGE NOTE NURSING/CASE MANAGEMENT/SOCIAL WORK - NSDCPEFALRISK_GEN_ALL_CORE
For information on Fall & Injury Prevention, visit: [email or web address] OR  [email or web address] OR  https://www.cdc.gov/steadi/patient.html

## 2022-06-01 ENCOUNTER — INPATIENT (INPATIENT)
Facility: HOSPITAL | Age: 72
LOS: 3 days | Discharge: HOME HEALTH | DRG: 59 | End: 2022-06-05
Attending: INTERNAL MEDICINE | Admitting: INTERNAL MEDICINE
Payer: COMMERCIAL

## 2022-06-01 VITALS — WEIGHT: 181 LBS | HEIGHT: 64 IN

## 2022-06-01 DIAGNOSIS — Z95.828 PRESENCE OF OTHER VASCULAR IMPLANTS AND GRAFTS: Chronic | ICD-10-CM

## 2022-06-01 LAB
ALBUMIN SERPL ELPH-MCNC: 3.7 G/DL — SIGNIFICANT CHANGE UP (ref 3.3–5)
ALP SERPL-CCNC: 93 U/L — SIGNIFICANT CHANGE UP (ref 40–120)
ALT FLD-CCNC: 52 U/L — SIGNIFICANT CHANGE UP (ref 12–78)
ANION GAP SERPL CALC-SCNC: 6 MMOL/L — SIGNIFICANT CHANGE UP (ref 5–17)
APTT BLD: 33.1 SEC — SIGNIFICANT CHANGE UP (ref 27.5–35.5)
AST SERPL-CCNC: 35 U/L — SIGNIFICANT CHANGE UP (ref 15–37)
BASOPHILS # BLD AUTO: 0.05 K/UL — SIGNIFICANT CHANGE UP (ref 0–0.2)
BASOPHILS NFR BLD AUTO: 0.6 % — SIGNIFICANT CHANGE UP (ref 0–2)
BILIRUB SERPL-MCNC: 0.4 MG/DL — SIGNIFICANT CHANGE UP (ref 0.2–1.2)
BUN SERPL-MCNC: 24 MG/DL — HIGH (ref 7–23)
CALCIUM SERPL-MCNC: 10 MG/DL — SIGNIFICANT CHANGE UP (ref 8.5–10.1)
CHLORIDE SERPL-SCNC: 105 MMOL/L — SIGNIFICANT CHANGE UP (ref 96–108)
CO2 SERPL-SCNC: 26 MMOL/L — SIGNIFICANT CHANGE UP (ref 22–31)
CREAT SERPL-MCNC: 0.56 MG/DL — SIGNIFICANT CHANGE UP (ref 0.5–1.3)
EGFR: 96 ML/MIN/1.73M2 — SIGNIFICANT CHANGE UP
EOSINOPHIL # BLD AUTO: 0.02 K/UL — SIGNIFICANT CHANGE UP (ref 0–0.5)
EOSINOPHIL NFR BLD AUTO: 0.2 % — SIGNIFICANT CHANGE UP (ref 0–6)
GLUCOSE SERPL-MCNC: 101 MG/DL — HIGH (ref 70–99)
HCT VFR BLD CALC: 37.6 % — SIGNIFICANT CHANGE UP (ref 34.5–45)
HGB BLD-MCNC: 12.5 G/DL — SIGNIFICANT CHANGE UP (ref 11.5–15.5)
IMM GRANULOCYTES NFR BLD AUTO: 0.7 % — SIGNIFICANT CHANGE UP (ref 0–1.5)
INR BLD: 1.05 RATIO — SIGNIFICANT CHANGE UP (ref 0.88–1.16)
LACTATE SERPL-SCNC: 0.9 MMOL/L — SIGNIFICANT CHANGE UP (ref 0.7–2)
LYMPHOCYTES # BLD AUTO: 1.8 K/UL — SIGNIFICANT CHANGE UP (ref 1–3.3)
LYMPHOCYTES # BLD AUTO: 22 % — SIGNIFICANT CHANGE UP (ref 13–44)
MCHC RBC-ENTMCNC: 29.6 PG — SIGNIFICANT CHANGE UP (ref 27–34)
MCHC RBC-ENTMCNC: 33.2 GM/DL — SIGNIFICANT CHANGE UP (ref 32–36)
MCV RBC AUTO: 88.9 FL — SIGNIFICANT CHANGE UP (ref 80–100)
MONOCYTES # BLD AUTO: 0.74 K/UL — SIGNIFICANT CHANGE UP (ref 0–0.9)
MONOCYTES NFR BLD AUTO: 9 % — SIGNIFICANT CHANGE UP (ref 2–14)
NEUTROPHILS # BLD AUTO: 5.51 K/UL — SIGNIFICANT CHANGE UP (ref 1.8–7.4)
NEUTROPHILS NFR BLD AUTO: 67.5 % — SIGNIFICANT CHANGE UP (ref 43–77)
PLATELET # BLD AUTO: 385 K/UL — SIGNIFICANT CHANGE UP (ref 150–400)
POTASSIUM SERPL-MCNC: 3.7 MMOL/L — SIGNIFICANT CHANGE UP (ref 3.5–5.3)
POTASSIUM SERPL-SCNC: 3.7 MMOL/L — SIGNIFICANT CHANGE UP (ref 3.5–5.3)
PROT SERPL-MCNC: 7.4 GM/DL — SIGNIFICANT CHANGE UP (ref 6–8.3)
PROTHROM AB SERPL-ACNC: 12.2 SEC — SIGNIFICANT CHANGE UP (ref 10.5–13.4)
RBC # BLD: 4.23 M/UL — SIGNIFICANT CHANGE UP (ref 3.8–5.2)
RBC # FLD: 14.4 % — SIGNIFICANT CHANGE UP (ref 10.3–14.5)
SODIUM SERPL-SCNC: 137 MMOL/L — SIGNIFICANT CHANGE UP (ref 135–145)
WBC # BLD: 8.18 K/UL — SIGNIFICANT CHANGE UP (ref 3.8–10.5)
WBC # FLD AUTO: 8.18 K/UL — SIGNIFICANT CHANGE UP (ref 3.8–10.5)

## 2022-06-01 PROCEDURE — 99285 EMERGENCY DEPT VISIT HI MDM: CPT

## 2022-06-01 RX ORDER — SODIUM CHLORIDE 9 MG/ML
1000 INJECTION INTRAMUSCULAR; INTRAVENOUS; SUBCUTANEOUS ONCE
Refills: 0 | Status: COMPLETED | OUTPATIENT
Start: 2022-06-01 | End: 2022-06-01

## 2022-06-01 RX ADMIN — SODIUM CHLORIDE 1000 MILLILITER(S): 9 INJECTION INTRAMUSCULAR; INTRAVENOUS; SUBCUTANEOUS at 21:10

## 2022-06-01 NOTE — ED ADULT NURSE NOTE - OBJECTIVE STATEMENT
Pt presents to ED aox4, with c/o x3 episodes of hematuria. Pt discharged from  on 5/30/22. hematuria started today.  Pt denies dizziness, sob, chest pain, n/v, n/t in LE/UE, painful urination or burning urination, blood clots. not on blood thinners. at present time. Pt resting comfortably in bed, no acute distress noted. VSS on RA. Safety and fall precautions maintained. Will continue to monitor. Pt presents to ED aox4, with c/o x3 episodes of hematuria. Pt discharged from  on 5/30/22. hematuria started today.  Pt denies dizziness, sob, chest pain, n/v, n/t in LE/UE, painful urination or burning urination, blood clots. not on blood thinners. at present time. Pt resting comfortably in bed, no acute distress noted. VSS on RA. Safety and fall precautions maintained. Will continue to monitor. Pt ambulates with walker and +1 assist. Hx of MS.

## 2022-06-01 NOTE — ED ADULT TRIAGE NOTE - NS ED NURSE BANDS TYPE
70 y/o with 48 hours of abdominal pain mainly on right associated with nausea and subjective fever.   Name band;

## 2022-06-01 NOTE — ED ADULT NURSE NOTE - ALCOHOL PRE SCREEN (AUDIT - C)
Instructed pt to call her primary care physician regarding getting a refill on the Tretinoin Cream. It is a cream used for acne, wrinkles etc.    Statement Selected

## 2022-06-01 NOTE — ED ADULT TRIAGE NOTE - CHIEF COMPLAINT QUOTE
discharged from  on 5/30/22. hematuria started today. Denies abd pain, nausea, vomiting, fever/chills. Denies painful urination or burning urination, blood clots. not on blood thinners.

## 2022-06-01 NOTE — ED ADULT NURSE REASSESSMENT NOTE - NS ED NURSE REASSESS COMMENT FT1
Pt resting comfortably in bed, no acute distress noted. VSS on RA. Safety and fall precautions maintained. Will continue to monitor.

## 2022-06-01 NOTE — ED ADULT NURSE NOTE - NSIMPLEMENTINTERV_GEN_ALL_ED
Implemented All Fall with Harm Risk Interventions:  [place] to call system. Call bell, personal items and telephone within reach. Instruct patient to call for assistance. Room bathroom lighting operational. Non-slip footwear when patient is off stretcher. Physically safe environment: no spills, clutter or unnecessary equipment. Stretcher in lowest position, wheels locked, appropriate side rails in place. Provide visual cue, wrist band, yellow gown, etc. Monitor gait and stability. Monitor for mental status changes and reorient to person, place, and time. Review medications for side effects contributing to fall risk. Reinforce activity limits and safety measures with patient and family. Provide visual clues: red socks.

## 2022-06-02 DIAGNOSIS — N39.0 URINARY TRACT INFECTION, SITE NOT SPECIFIED: ICD-10-CM

## 2022-06-02 LAB
APPEARANCE UR: CLEAR — SIGNIFICANT CHANGE UP
BILIRUB UR-MCNC: NEGATIVE — SIGNIFICANT CHANGE UP
COLOR SPEC: YELLOW — SIGNIFICANT CHANGE UP
DIFF PNL FLD: ABNORMAL
GLUCOSE UR QL: NEGATIVE — SIGNIFICANT CHANGE UP
KETONES UR-MCNC: ABNORMAL
LEUKOCYTE ESTERASE UR-ACNC: ABNORMAL
NITRITE UR-MCNC: NEGATIVE — SIGNIFICANT CHANGE UP
PH UR: 6 — SIGNIFICANT CHANGE UP (ref 5–8)
PROT UR-MCNC: 15
SP GR SPEC: 1.02 — SIGNIFICANT CHANGE UP (ref 1.01–1.02)
UROBILINOGEN FLD QL: NEGATIVE — SIGNIFICANT CHANGE UP

## 2022-06-02 PROCEDURE — 80048 BASIC METABOLIC PNL TOTAL CA: CPT

## 2022-06-02 PROCEDURE — 85027 COMPLETE CBC AUTOMATED: CPT

## 2022-06-02 PROCEDURE — 36415 COLL VENOUS BLD VENIPUNCTURE: CPT

## 2022-06-02 PROCEDURE — 94640 AIRWAY INHALATION TREATMENT: CPT

## 2022-06-02 PROCEDURE — 97116 GAIT TRAINING THERAPY: CPT | Mod: GP

## 2022-06-02 PROCEDURE — 99497 ADVNCD CARE PLAN 30 MIN: CPT | Mod: 25

## 2022-06-02 PROCEDURE — 97530 THERAPEUTIC ACTIVITIES: CPT | Mod: GP

## 2022-06-02 PROCEDURE — 97162 PT EVAL MOD COMPLEX 30 MIN: CPT | Mod: GP

## 2022-06-02 PROCEDURE — 99223 1ST HOSP IP/OBS HIGH 75: CPT

## 2022-06-02 PROCEDURE — 83735 ASSAY OF MAGNESIUM: CPT

## 2022-06-02 PROCEDURE — 74176 CT ABD & PELVIS W/O CONTRAST: CPT | Mod: 26,MA

## 2022-06-02 RX ORDER — FAMOTIDINE 10 MG/ML
20 INJECTION INTRAVENOUS AT BEDTIME
Refills: 0 | Status: DISCONTINUED | OUTPATIENT
Start: 2022-06-02 | End: 2022-06-05

## 2022-06-02 RX ORDER — PANTOPRAZOLE SODIUM 20 MG/1
40 TABLET, DELAYED RELEASE ORAL
Refills: 0 | Status: DISCONTINUED | OUTPATIENT
Start: 2022-06-02 | End: 2022-06-05

## 2022-06-02 RX ORDER — MEROPENEM 1 G/30ML
1000 INJECTION INTRAVENOUS ONCE
Refills: 0 | Status: COMPLETED | OUTPATIENT
Start: 2022-06-02 | End: 2022-06-02

## 2022-06-02 RX ORDER — MEROPENEM 1 G/30ML
INJECTION INTRAVENOUS
Refills: 0 | Status: COMPLETED | OUTPATIENT
Start: 2022-06-02 | End: 2022-06-04

## 2022-06-02 RX ORDER — LANOLIN ALCOHOL/MO/W.PET/CERES
3 CREAM (GRAM) TOPICAL AT BEDTIME
Refills: 0 | Status: DISCONTINUED | OUTPATIENT
Start: 2022-06-02 | End: 2022-06-05

## 2022-06-02 RX ORDER — AMLODIPINE BESYLATE 2.5 MG/1
10 TABLET ORAL DAILY
Refills: 0 | Status: DISCONTINUED | OUTPATIENT
Start: 2022-06-02 | End: 2022-06-05

## 2022-06-02 RX ORDER — ACETAMINOPHEN 500 MG
650 TABLET ORAL ONCE
Refills: 0 | Status: COMPLETED | OUTPATIENT
Start: 2022-06-02 | End: 2022-06-02

## 2022-06-02 RX ORDER — ATORVASTATIN CALCIUM 80 MG/1
40 TABLET, FILM COATED ORAL AT BEDTIME
Refills: 0 | Status: DISCONTINUED | OUTPATIENT
Start: 2022-06-02 | End: 2022-06-05

## 2022-06-02 RX ORDER — ENOXAPARIN SODIUM 100 MG/ML
40 INJECTION SUBCUTANEOUS
Refills: 0 | Status: DISCONTINUED | OUTPATIENT
Start: 2022-06-02 | End: 2022-06-05

## 2022-06-02 RX ORDER — HYDRALAZINE HCL 50 MG
100 TABLET ORAL EVERY 8 HOURS
Refills: 0 | Status: DISCONTINUED | OUTPATIENT
Start: 2022-06-02 | End: 2022-06-05

## 2022-06-02 RX ORDER — LOSARTAN POTASSIUM 100 MG/1
100 TABLET, FILM COATED ORAL DAILY
Refills: 0 | Status: DISCONTINUED | OUTPATIENT
Start: 2022-06-02 | End: 2022-06-05

## 2022-06-02 RX ORDER — MONTELUKAST 4 MG/1
10 TABLET, CHEWABLE ORAL AT BEDTIME
Refills: 0 | Status: DISCONTINUED | OUTPATIENT
Start: 2022-06-02 | End: 2022-06-05

## 2022-06-02 RX ORDER — ONDANSETRON 8 MG/1
4 TABLET, FILM COATED ORAL EVERY 8 HOURS
Refills: 0 | Status: DISCONTINUED | OUTPATIENT
Start: 2022-06-02 | End: 2022-06-05

## 2022-06-02 RX ORDER — SODIUM CHLORIDE 9 MG/ML
1000 INJECTION INTRAMUSCULAR; INTRAVENOUS; SUBCUTANEOUS ONCE
Refills: 0 | Status: COMPLETED | OUTPATIENT
Start: 2022-06-02 | End: 2022-06-02

## 2022-06-02 RX ORDER — BACLOFEN 100 %
10 POWDER (GRAM) MISCELLANEOUS EVERY 12 HOURS
Refills: 0 | Status: DISCONTINUED | OUTPATIENT
Start: 2022-06-02 | End: 2022-06-05

## 2022-06-02 RX ORDER — MEROPENEM 1 G/30ML
1000 INJECTION INTRAVENOUS EVERY 8 HOURS
Refills: 0 | Status: COMPLETED | OUTPATIENT
Start: 2022-06-02 | End: 2022-06-04

## 2022-06-02 RX ORDER — FLUTICASONE PROPIONATE 50 MCG
1 SPRAY, SUSPENSION NASAL
Refills: 0 | Status: DISCONTINUED | OUTPATIENT
Start: 2022-06-02 | End: 2022-06-05

## 2022-06-02 RX ADMIN — Medication 100 MILLIGRAM(S): at 13:14

## 2022-06-02 RX ADMIN — MEROPENEM 100 MILLIGRAM(S): 1 INJECTION INTRAVENOUS at 00:54

## 2022-06-02 RX ADMIN — MONTELUKAST 10 MILLIGRAM(S): 4 TABLET, CHEWABLE ORAL at 22:34

## 2022-06-02 RX ADMIN — FAMOTIDINE 20 MILLIGRAM(S): 10 INJECTION INTRAVENOUS at 22:35

## 2022-06-02 RX ADMIN — Medication 10 MILLIGRAM(S): at 23:30

## 2022-06-02 RX ADMIN — MEROPENEM 100 MILLIGRAM(S): 1 INJECTION INTRAVENOUS at 22:34

## 2022-06-02 RX ADMIN — LOSARTAN POTASSIUM 100 MILLIGRAM(S): 100 TABLET, FILM COATED ORAL at 08:57

## 2022-06-02 RX ADMIN — ATORVASTATIN CALCIUM 40 MILLIGRAM(S): 80 TABLET, FILM COATED ORAL at 22:35

## 2022-06-02 RX ADMIN — SODIUM CHLORIDE 1000 MILLILITER(S): 9 INJECTION INTRAMUSCULAR; INTRAVENOUS; SUBCUTANEOUS at 00:54

## 2022-06-02 RX ADMIN — MEROPENEM 100 MILLIGRAM(S): 1 INJECTION INTRAVENOUS at 08:40

## 2022-06-02 RX ADMIN — AMLODIPINE BESYLATE 10 MILLIGRAM(S): 2.5 TABLET ORAL at 08:57

## 2022-06-02 RX ADMIN — Medication 650 MILLIGRAM(S): at 23:30

## 2022-06-02 RX ADMIN — Medication 650 MILLIGRAM(S): at 15:22

## 2022-06-02 RX ADMIN — Medication 1 SPRAY(S): at 08:56

## 2022-06-02 RX ADMIN — ENOXAPARIN SODIUM 40 MILLIGRAM(S): 100 INJECTION SUBCUTANEOUS at 08:57

## 2022-06-02 RX ADMIN — MEROPENEM 100 MILLIGRAM(S): 1 INJECTION INTRAVENOUS at 15:07

## 2022-06-02 NOTE — H&P ADULT - NSHPPHYSICALEXAM_GEN_ALL_CORE
PHYSICAL EXAM:  Vital Signs Last 24 Hrs  T(F): 98.4 (02 Jun 2022 07:07), Max: 98.7 (01 Jun 2022 20:03)  HR: 58 (02 Jun 2022 07:07) (53 - 95)  BP: 133/58 (02 Jun 2022 07:07) (121/57 - 143/60)  BP(mean): 81 (02 Jun 2022 07:07) (77 - 81)  RR: 16 (02 Jun 2022 07:07) (15 - 16)  SpO2: 96% (02 Jun 2022 07:07) (95% - 98%)  GENERAL: NAD, able to lie flat in bed  HEAD:  Atraumatic, Normocephalic  EYES: EOMI, PERRLA, normal sclera  ENT: Moist mucous membranes  NECK: Supple, No JVD, no nuchal rigidity  CHEST/LUNG: Clear to auscultation bilaterally; No rales, rhonchi, wheezing, or rubs. Unlabored respirations  HEART: Regular rate and rhythm; No murmurs, rubs, or gallops  ABDOMEN: Bowel sounds present; Soft, Nontender, Nondistended. No hepatomegaly  EXTREMITIES:  no pitting bilaterally  NERVOUS SYSTEM:  Alert & Oriented X3, speech clear. No focal motor or sensory deficits  MSK: FROM all 4 extremities, full and equal strength  SKIN: No rashes or lesions PHYSICAL EXAM:  Vital Signs Last 24 Hrs  T(F): 98.4 (02 Jun 2022 07:07), Max: 98.7 (01 Jun 2022 20:03)  HR: 58 (02 Jun 2022 07:07) (53 - 95)  BP: 133/58 (02 Jun 2022 07:07) (121/57 - 143/60)  BP(mean): 81 (02 Jun 2022 07:07) (77 - 81)  RR: 16 (02 Jun 2022 07:07) (15 - 16)  SpO2: 96% (02 Jun 2022 07:07) (95% - 98%)  GENERAL: NAD, able to lie flat in bed  HEAD:  Atraumatic, Normocephalic  EYES: EOMI, PERRLA, normal sclera  ENT: Moist mucous membranes  NECK: Supple, No JVD, no nuchal rigidity  CHEST/LUNG: Clear to auscultation bilaterally; No rales, rhonchi, wheezing, or rubs. Unlabored respirations  HEART: Regular rate and rhythm; No murmurs, rubs, or gallops  ABDOMEN: Bowel sounds present; Soft, Nontender, Nondistended. No hepatomegaly  EXTREMITIES:  no pitting bilaterally  NERVOUS SYSTEM:  Alert & Oriented X3, speech clear. LLE weakness, chronic   MSK: FROM all 4 extremities, full and equal strength  SKIN: No rashes or lesions

## 2022-06-02 NOTE — H&P ADULT - ASSESSMENT
Recurrent UTI, h.o ESBL ECOLI   MS/baclofen pump (not not working)   HTN HLP  GERD  h.o skin cancer years ago  denies h/o MI CVA or other cancers    Plan:  Meropenem, ID Cx for duration of rx  TO see Urology (rec by Dr Gem Palencia her neurologist as OP)   on baclofen po only as she did not tolerate gabapentin etc   Plans to have pump dced by NSx or neuro spine as OP   cont hydralazine ARB amlodipine   cont statin  is on PPI and famotidine as OP     GOC - DNR DNI, MOLST done   PT consult

## 2022-06-02 NOTE — ED PROVIDER NOTE - OBJECTIVE STATEMENT
Pt is a 71 yo f with hx ms, htn and esbl in urine who developed left groin pain at about 4pm and noted blood in urine . No fever but pt c/o feeling unwell. Feels ms is being exacerbated. Nonsmoker nondrinker no drugs. Pt is a 71 yo f with hx ms, htn and esbl in urine who developed left groin pain at about 4pm and noted blood in urine twice today. No fever but pt c/o feeling unwell. Feels ms is being exacerbated. Nonsmoker nondrinker no drugs.

## 2022-06-02 NOTE — ED ADULT NURSE REASSESSMENT NOTE - NS ED NURSE REASSESS COMMENT FT1
Pt A+Ox4. VSS. Temp 99.2. Meropenum given. Denies complaints. Monitored closely. Call bell in reach.

## 2022-06-02 NOTE — ED ADULT NURSE REASSESSMENT NOTE - NS ED NURSE REASSESS COMMENT FT1
Pt remains comfortable in bed, no s/s of acute distress noted. VSS on RA. Safety and fall precautions maintained. Will continue to monitor.

## 2022-06-02 NOTE — PATIENT PROFILE ADULT - FALL HARM RISK - RISK INTERVENTIONS
Assistance OOB with selected safe patient handling equipment/Assistance with ambulation/Communicate Fall Risk and Risk Factors to all staff, patient, and family/Discuss with provider need for PT consult/Monitor gait and stability/Reinforce activity limits and safety measures with patient and family/Visual Cue: Yellow wristband/Bed in lowest position, wheels locked, appropriate side rails in place/Call bell, personal items and telephone in reach/Instruct patient to call for assistance before getting out of bed or chair/Non-slip footwear when patient is out of bed/[place] to call system/Physically safe environment - no spills, clutter or unnecessary equipment/Purposeful Proactive Rounding/Room/bathroom lighting operational, light cord in reach

## 2022-06-02 NOTE — H&P ADULT - NSHPLABSRESULTS_GEN_ALL_CORE
LABS: All Labs Reviewed:                      12.5   8.18  )-----------( 385      ( 01 Jun 2022 20:21 )             37.6   137  |  105  |  24<H>  ----------------------------<  101<H>  3.7   |  26  |  0.56  Ca    10.0      01 Jun 2022 20:21  TPro  7.4  /  Alb  3.7  /  TBili  0.4  /  DBili  x   /  AST  35  /  ALT  52  /  AlkPhos  93  06-01  PT/INR - ( 01 Jun 2022 20:21 )   PT: 12.2 sec;   INR: 1.05 ratio    PTT - ( 01 Jun 2022 20:21 )  PTT:33.1 sec      RADIOLOGY/EKG:  < from: CT Abdomen and Pelvis No Cont (06.02.22 @ 00:52) >  LOWER CHEST: Small sliding-type hiatal hernia  LIVER: Unremarkable, unenhanced appearance. Incidental tiny cysts at the   hepatic dome is unchanged.  BILE DUCTS: Unremarkable, unenhanced appearance  GALLBLADDER: Cholecystectomy.  SPLEEN: Unremarkable, unenhanced appearance  PANCREAS: Unremarkable, unenhanced appearance  ADRENALS: Unremarkable, unenhanced appearance  KIDNEYS/URETERS: Unremarkable, unenhanced appearance. Specifically, no   radiopaque nephroureterolithiasis.  BLADDER: Unremarkable, unenhanced appearance  REPRODUCTIVE ORGANS: Hysterectomy.  BOWEL: No bowel obstruction. Normal appendix. Diverticulosis.  PERITONEUM: No free air or free fluid  VESSELS: Atherosclerotic changes.  RETROPERITONEUM/LYMPH NODES: No enlarged lymph node measuring greater   than 10 mm in short axis seen, within the limitations of noncontrast   imaging  ABDOMINAL WALL: Postsurgical changes.  BONES: No acute osseous abnormality. Pulse generator in the subcutaneous   tissues of the right flank region, with epidural leads extending above   the field of imaging.  IMPRESSION:  Unremarkable, unenhanced CT of the abdomen and pelvis. Cause of the   patient's pain is not identified.      MEDS:   amLODIPine   Tablet 10 milliGRAM(s) Oral daily  atorvastatin 40 milliGRAM(s) Oral at bedtime  baclofen 10 milliGRAM(s) Oral every 12 hours PRN  famotidine    Tablet 20 milliGRAM(s) Oral at bedtime  fluticasone propionate 50 MICROgram(s)/spray Nasal Spray 1 Spray(s) Both Nostrils <User Schedule>  hydrALAZINE 100 milliGRAM(s) Oral every 8 hours  losartan 100 milliGRAM(s) Oral daily  montelukast 10 milliGRAM(s) Oral at bedtime  pantoprazole    Tablet 40 milliGRAM(s) Oral before breakfast

## 2022-06-02 NOTE — ED PROVIDER NOTE - CARE PLAN
1 Principal Discharge DX:	UTI due to extended-spectrum beta lactamase (ESBL) producing Escherichia coli   Principal Discharge DX:	UTI due to extended-spectrum beta lactamase (ESBL) producing Escherichia coli  Secondary Diagnosis:	Exacerbation of multiple sclerosis

## 2022-06-02 NOTE — ED PROVIDER NOTE - CLINICAL SUMMARY MEDICAL DECISION MAKING FREE TEXT BOX
Pt with hx of ms, recent admission for esbl treated with five days of meropenam, here with recurrence of left groin pain and hematuria. No fever but c/o malaise and weakness similar to ms exacerbation. Labs, ivf, meropenam, ct abd and pelvis.

## 2022-06-02 NOTE — H&P ADULT - CONVERSATION DETAILS
I discussed with her re CPR ETT vs natural death  Patient has considered her GOC in the past as well and she wants to be DNR DNI  Patient wants to live comfortably, avoid procedures that would not benefit her and prefers to die naturally.  We discussed that in the future if she needed procedures such as an EGD COL or a biopsy (given her mom's h/o cancer) she would rescind it then to be able to undergo elective procedures.  She signed the MOLST - but has also been advised to inform us or the physician taking care of her if she ever wants to rescind it. Patient voiced understanding, AUBREE AKINS at bedside    TIme spent - 20 mins

## 2022-06-02 NOTE — H&P ADULT - HISTORY OF PRESENT ILLNESS
72 yr old female recently treated for UTI  She notes blood tinged urine and left groin pain when she urinates, present for the last 2 days  States she has a habit of trying to hold her urine  Never been cathed/ no neurogenic bladder per patient.  Denies ha cp palps sob abdo pain tingling or numbness anywhere.   She had chr LLE weakness from her MS  Feels very tired.  10 point ROS is otherwise neg.    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

## 2022-06-02 NOTE — PHARMACOTHERAPY INTERVENTION NOTE - COMMENTS
Patient is an excellent historian.  list verified with her and updated in [facility]. medrec complete. -HJ

## 2022-06-03 DIAGNOSIS — Z88.8 ALLERGY STATUS TO OTHER DRUGS, MEDICAMENTS AND BIOLOGICAL SUBSTANCES: ICD-10-CM

## 2022-06-03 DIAGNOSIS — Z91.018 ALLERGY TO OTHER FOODS: ICD-10-CM

## 2022-06-03 DIAGNOSIS — B96.20 UNSPECIFIED ESCHERICHIA COLI [E. COLI] AS THE CAUSE OF DISEASES CLASSIFIED ELSEWHERE: ICD-10-CM

## 2022-06-03 DIAGNOSIS — N39.0 URINARY TRACT INFECTION, SITE NOT SPECIFIED: ICD-10-CM

## 2022-06-03 DIAGNOSIS — I10 ESSENTIAL (PRIMARY) HYPERTENSION: ICD-10-CM

## 2022-06-03 DIAGNOSIS — G35 MULTIPLE SCLEROSIS: ICD-10-CM

## 2022-06-03 DIAGNOSIS — Z91.010 ALLERGY TO PEANUTS: ICD-10-CM

## 2022-06-03 DIAGNOSIS — Z88.0 ALLERGY STATUS TO PENICILLIN: ICD-10-CM

## 2022-06-03 DIAGNOSIS — G63 POLYNEUROPATHY IN DISEASES CLASSIFIED ELSEWHERE: ICD-10-CM

## 2022-06-03 LAB
ANION GAP SERPL CALC-SCNC: 7 MMOL/L — SIGNIFICANT CHANGE UP (ref 5–17)
BUN SERPL-MCNC: 18 MG/DL — SIGNIFICANT CHANGE UP (ref 7–23)
CALCIUM SERPL-MCNC: 9.2 MG/DL — SIGNIFICANT CHANGE UP (ref 8.5–10.1)
CHLORIDE SERPL-SCNC: 109 MMOL/L — HIGH (ref 96–108)
CO2 SERPL-SCNC: 27 MMOL/L — SIGNIFICANT CHANGE UP (ref 22–31)
CREAT SERPL-MCNC: 0.45 MG/DL — LOW (ref 0.5–1.3)
EGFR: 102 ML/MIN/1.73M2 — SIGNIFICANT CHANGE UP
GLUCOSE SERPL-MCNC: 89 MG/DL — SIGNIFICANT CHANGE UP (ref 70–99)
HCT VFR BLD CALC: 32.9 % — LOW (ref 34.5–45)
HGB BLD-MCNC: 10.4 G/DL — LOW (ref 11.5–15.5)
MAGNESIUM SERPL-MCNC: 2.4 MG/DL — SIGNIFICANT CHANGE UP (ref 1.6–2.6)
MCHC RBC-ENTMCNC: 28.7 PG — SIGNIFICANT CHANGE UP (ref 27–34)
MCHC RBC-ENTMCNC: 31.6 GM/DL — LOW (ref 32–36)
MCV RBC AUTO: 90.6 FL — SIGNIFICANT CHANGE UP (ref 80–100)
PLATELET # BLD AUTO: 295 K/UL — SIGNIFICANT CHANGE UP (ref 150–400)
POTASSIUM SERPL-MCNC: 3.6 MMOL/L — SIGNIFICANT CHANGE UP (ref 3.5–5.3)
POTASSIUM SERPL-SCNC: 3.6 MMOL/L — SIGNIFICANT CHANGE UP (ref 3.5–5.3)
RBC # BLD: 3.63 M/UL — LOW (ref 3.8–5.2)
RBC # FLD: 14.5 % — SIGNIFICANT CHANGE UP (ref 10.3–14.5)
SODIUM SERPL-SCNC: 143 MMOL/L — SIGNIFICANT CHANGE UP (ref 135–145)
WBC # BLD: 6.29 K/UL — SIGNIFICANT CHANGE UP (ref 3.8–10.5)
WBC # FLD AUTO: 6.29 K/UL — SIGNIFICANT CHANGE UP (ref 3.8–10.5)

## 2022-06-03 PROCEDURE — 99232 SBSQ HOSP IP/OBS MODERATE 35: CPT

## 2022-06-03 RX ORDER — ACETAMINOPHEN 500 MG
650 TABLET ORAL ONCE
Refills: 0 | Status: COMPLETED | OUTPATIENT
Start: 2022-06-03 | End: 2022-06-03

## 2022-06-03 RX ORDER — ACETAMINOPHEN 500 MG
650 TABLET ORAL EVERY 6 HOURS
Refills: 0 | Status: DISCONTINUED | OUTPATIENT
Start: 2022-06-03 | End: 2022-06-05

## 2022-06-03 RX ADMIN — MEROPENEM 100 MILLIGRAM(S): 1 INJECTION INTRAVENOUS at 21:21

## 2022-06-03 RX ADMIN — Medication 100 MILLIGRAM(S): at 13:19

## 2022-06-03 RX ADMIN — Medication 650 MILLIGRAM(S): at 21:19

## 2022-06-03 RX ADMIN — MONTELUKAST 10 MILLIGRAM(S): 4 TABLET, CHEWABLE ORAL at 21:20

## 2022-06-03 RX ADMIN — PANTOPRAZOLE SODIUM 40 MILLIGRAM(S): 20 TABLET, DELAYED RELEASE ORAL at 05:57

## 2022-06-03 RX ADMIN — Medication 1 SPRAY(S): at 10:05

## 2022-06-03 RX ADMIN — Medication 650 MILLIGRAM(S): at 00:00

## 2022-06-03 RX ADMIN — Medication 100 MILLIGRAM(S): at 21:24

## 2022-06-03 RX ADMIN — MEROPENEM 100 MILLIGRAM(S): 1 INJECTION INTRAVENOUS at 13:15

## 2022-06-03 RX ADMIN — LOSARTAN POTASSIUM 100 MILLIGRAM(S): 100 TABLET, FILM COATED ORAL at 10:06

## 2022-06-03 RX ADMIN — ENOXAPARIN SODIUM 40 MILLIGRAM(S): 100 INJECTION SUBCUTANEOUS at 10:06

## 2022-06-03 RX ADMIN — AMLODIPINE BESYLATE 10 MILLIGRAM(S): 2.5 TABLET ORAL at 10:06

## 2022-06-03 RX ADMIN — FAMOTIDINE 20 MILLIGRAM(S): 10 INJECTION INTRAVENOUS at 21:20

## 2022-06-03 RX ADMIN — Medication 650 MILLIGRAM(S): at 13:07

## 2022-06-03 RX ADMIN — ATORVASTATIN CALCIUM 40 MILLIGRAM(S): 80 TABLET, FILM COATED ORAL at 21:21

## 2022-06-03 RX ADMIN — MEROPENEM 100 MILLIGRAM(S): 1 INJECTION INTRAVENOUS at 05:56

## 2022-06-03 NOTE — PHYSICAL THERAPY INITIAL EVALUATION ADULT - PERTINENT HX OF CURRENT PROBLEM, REHAB EVAL
72 yr old female Recurrent UTI, h.o JEAN MARIE DILLARD, Pt. with chronic LLE weakness from her Exacerbation of multiple MS

## 2022-06-03 NOTE — PHYSICAL THERAPY INITIAL EVALUATION ADULT - ADDITIONAL COMMENTS
Pt. 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 Pt. is IND with all ADL's and owns cane and RW where she uses them as needed.

## 2022-06-03 NOTE — PHYSICAL THERAPY INITIAL EVALUATION ADULT - MODALITIES TREATMENT COMMENTS
Pt. amb with  ft SBA, Nego 12 steps with SBA L HR. Pt. got Gowned up sec to contact precautions to practice stairs with PT. Pt. @ PLOF, Skilled PT not indicated at this time.

## 2022-06-04 DIAGNOSIS — R33.9 RETENTION OF URINE, UNSPECIFIED: ICD-10-CM

## 2022-06-04 LAB
-  AMPICILLIN: SIGNIFICANT CHANGE UP
-  CIPROFLOXACIN: SIGNIFICANT CHANGE UP
-  LEVOFLOXACIN: SIGNIFICANT CHANGE UP
-  NITROFURANTOIN: SIGNIFICANT CHANGE UP
-  TETRACYCLINE: SIGNIFICANT CHANGE UP
-  VANCOMYCIN: SIGNIFICANT CHANGE UP
ANION GAP SERPL CALC-SCNC: 4 MMOL/L — LOW (ref 5–17)
BUN SERPL-MCNC: 18 MG/DL — SIGNIFICANT CHANGE UP (ref 7–23)
CALCIUM SERPL-MCNC: 9.3 MG/DL — SIGNIFICANT CHANGE UP (ref 8.5–10.1)
CHLORIDE SERPL-SCNC: 109 MMOL/L — HIGH (ref 96–108)
CO2 SERPL-SCNC: 28 MMOL/L — SIGNIFICANT CHANGE UP (ref 22–31)
CREAT SERPL-MCNC: 0.41 MG/DL — LOW (ref 0.5–1.3)
CULTURE RESULTS: SIGNIFICANT CHANGE UP
EGFR: 104 ML/MIN/1.73M2 — SIGNIFICANT CHANGE UP
GLUCOSE SERPL-MCNC: 107 MG/DL — HIGH (ref 70–99)
HCT VFR BLD CALC: 34.4 % — LOW (ref 34.5–45)
HGB BLD-MCNC: 11.3 G/DL — LOW (ref 11.5–15.5)
MAGNESIUM SERPL-MCNC: 2.4 MG/DL — SIGNIFICANT CHANGE UP (ref 1.6–2.6)
MCHC RBC-ENTMCNC: 29.4 PG — SIGNIFICANT CHANGE UP (ref 27–34)
MCHC RBC-ENTMCNC: 32.8 GM/DL — SIGNIFICANT CHANGE UP (ref 32–36)
MCV RBC AUTO: 89.6 FL — SIGNIFICANT CHANGE UP (ref 80–100)
METHOD TYPE: SIGNIFICANT CHANGE UP
ORGANISM # SPEC MICROSCOPIC CNT: SIGNIFICANT CHANGE UP
ORGANISM # SPEC MICROSCOPIC CNT: SIGNIFICANT CHANGE UP
PLATELET # BLD AUTO: 291 K/UL — SIGNIFICANT CHANGE UP (ref 150–400)
POTASSIUM SERPL-MCNC: 3.7 MMOL/L — SIGNIFICANT CHANGE UP (ref 3.5–5.3)
POTASSIUM SERPL-SCNC: 3.7 MMOL/L — SIGNIFICANT CHANGE UP (ref 3.5–5.3)
RBC # BLD: 3.84 M/UL — SIGNIFICANT CHANGE UP (ref 3.8–5.2)
RBC # FLD: 14.6 % — HIGH (ref 10.3–14.5)
SODIUM SERPL-SCNC: 141 MMOL/L — SIGNIFICANT CHANGE UP (ref 135–145)
SPECIMEN SOURCE: SIGNIFICANT CHANGE UP
WBC # BLD: 5.79 K/UL — SIGNIFICANT CHANGE UP (ref 3.8–10.5)
WBC # FLD AUTO: 5.79 K/UL — SIGNIFICANT CHANGE UP (ref 3.8–10.5)

## 2022-06-04 PROCEDURE — 99232 SBSQ HOSP IP/OBS MODERATE 35: CPT

## 2022-06-04 PROCEDURE — 99221 1ST HOSP IP/OBS SF/LOW 40: CPT

## 2022-06-04 RX ADMIN — FAMOTIDINE 20 MILLIGRAM(S): 10 INJECTION INTRAVENOUS at 21:25

## 2022-06-04 RX ADMIN — ATORVASTATIN CALCIUM 40 MILLIGRAM(S): 80 TABLET, FILM COATED ORAL at 21:25

## 2022-06-04 RX ADMIN — ENOXAPARIN SODIUM 40 MILLIGRAM(S): 100 INJECTION SUBCUTANEOUS at 09:05

## 2022-06-04 RX ADMIN — MEROPENEM 100 MILLIGRAM(S): 1 INJECTION INTRAVENOUS at 21:27

## 2022-06-04 RX ADMIN — Medication 100 MILLIGRAM(S): at 05:30

## 2022-06-04 RX ADMIN — MEROPENEM 100 MILLIGRAM(S): 1 INJECTION INTRAVENOUS at 05:30

## 2022-06-04 RX ADMIN — Medication 650 MILLIGRAM(S): at 21:26

## 2022-06-04 RX ADMIN — AMLODIPINE BESYLATE 10 MILLIGRAM(S): 2.5 TABLET ORAL at 09:04

## 2022-06-04 RX ADMIN — Medication 650 MILLIGRAM(S): at 09:38

## 2022-06-04 RX ADMIN — MEROPENEM 100 MILLIGRAM(S): 1 INJECTION INTRAVENOUS at 14:01

## 2022-06-04 RX ADMIN — MONTELUKAST 10 MILLIGRAM(S): 4 TABLET, CHEWABLE ORAL at 21:24

## 2022-06-04 RX ADMIN — LOSARTAN POTASSIUM 100 MILLIGRAM(S): 100 TABLET, FILM COATED ORAL at 09:04

## 2022-06-04 RX ADMIN — Medication 100 MILLIGRAM(S): at 21:25

## 2022-06-04 RX ADMIN — PANTOPRAZOLE SODIUM 40 MILLIGRAM(S): 20 TABLET, DELAYED RELEASE ORAL at 05:41

## 2022-06-04 NOTE — PROGRESS NOTE ADULT - SUBJECTIVE AND OBJECTIVE BOX
Date of service: 06-04-22 @ 09:58      Patient sitting in bed; overall feeling better, afebrile      ROS: no fever or chills; denies dizziness, no HA, no SOB or cough, no abdominal pain, no diarrhea or constipation; no dysuria, no urinary frequency, no legs pain, no rashes    MEDICATIONS  (STANDING):  amLODIPine   Tablet 10 milliGRAM(s) Oral daily  atorvastatin 40 milliGRAM(s) Oral at bedtime  enoxaparin Injectable 40 milliGRAM(s) SubCutaneous <User Schedule>  famotidine    Tablet 20 milliGRAM(s) Oral at bedtime  fluticasone propionate 50 MICROgram(s)/spray Nasal Spray 1 Spray(s) Both Nostrils <User Schedule>  hydrALAZINE 100 milliGRAM(s) Oral every 8 hours  losartan 100 milliGRAM(s) Oral daily  meropenem  IVPB      meropenem  IVPB 1000 milliGRAM(s) IV Intermittent every 8 hours  montelukast 10 milliGRAM(s) Oral at bedtime  pantoprazole    Tablet 40 milliGRAM(s) Oral before breakfast    MEDICATIONS  (PRN):  acetaminophen     Tablet .. 650 milliGRAM(s) Oral every 6 hours PRN Mild Pain (1 - 3)  aluminum hydroxide/magnesium hydroxide/simethicone Suspension 30 milliLiter(s) Oral every 4 hours PRN Dyspepsia  baclofen 10 milliGRAM(s) Oral every 12 hours PRN Muscle Spasm  melatonin 3 milliGRAM(s) Oral at bedtime PRN Insomnia  ondansetron Injectable 4 milliGRAM(s) IV Push every 8 hours PRN Nausea and/or Vomiting      Vital Signs Last 24 Hrs  T(C): 36.9 (04 Jun 2022 09:02), Max: 37.2 (03 Jun 2022 15:24)  T(F): 98.5 (04 Jun 2022 09:02), Max: 99 (03 Jun 2022 15:24)  HR: 60 (04 Jun 2022 09:02) (60 - 69)  BP: 143/70 (04 Jun 2022 09:02) (132/58 - 158/60)  BP(mean): --  RR: 18 (04 Jun 2022 09:02) (18 - 18)  SpO2: 97% (04 Jun 2022 09:02) (97% - 97%)        Physical Exam:        Constitutional: frail looking  HEENT: NC/AT, EOMI, PERRLA, conjunctivae clear; ears and nose atraumatic; pharynx clear  Neck: supple; thyroid not palpable  Back: no tenderness  Respiratory: respiratory effort normal; clear to auscultation  Cardiovascular: S1S2 regular, no murmurs  Abdomen: soft, not tender, not distended, positive BS; no liver or spleen organomegaly  Genitourinary: no suprapubic tenderness  Musculoskeletal: no muscle tenderness, no joint swelling or tenderness  Neurological/ Psychiatric: AxOx3, judgement and insight normal;  moving all extremities  Skin: no rashes; no palpable lesions    Labs: all available labs reviewed                       Labs:                        11.3   5.79  )-----------( 291      ( 04 Jun 2022 07:04 )             34.4     06-04    141  |  109<H>  |  18  ----------------------------<  107<H>  3.7   |  28  |  0.41<L>    Ca    9.3      04 Jun 2022 07:04  Mg     2.4     06-04             Cultures:       Culture - Blood (collected 06-01-22 @ 21:17)  Source: .Blood None  Preliminary Report (06-03-22 @ 04:02):    No growth to date.    Culture - Urine (collected 06-01-22 @ 20:21)  Source: Clean Catch Clean Catch (Midstream)  Preliminary Report (06-03-22 @ 16:51):    10,000 - 49,000 CFU/mL Gram positive organisms    Culture - Blood (collected 06-01-22 @ 20:21)  Source: .Blood Blood-Peripheral  Preliminary Report (06-03-22 @ 04:02):    No growth to date.            < from: CT Abdomen and Pelvis No Cont (06.02.22 @ 00:52) >    ACC: [id number] EXAM:  CT ABDOMEN AND PELVIS                          PROCEDURE DATE:  06/02/2022          INTERPRETATION:  CLINICAL INFORMATION: Left groin pain, hematuria    COMPARISON: April 7, 2022    CONTRAST/COMPLICATIONS:  IV Contrast: NONE  Oral Contrast: NONE  Complications: None reported at time of study completion    PROCEDURE:  CT of the Abdomen and Pelvis was performed.  Sagittal and coronal reformats were performed.    Note: The exam is limited because some types of pathology may not be   adequately demonstrated due to lack of contrast enhancement.    FINDINGS:  LOWER CHEST: Small sliding-type hiatal hernia    LIVER: Unremarkable, unenhanced appearance. Incidental tiny cysts at the   hepatic dome is unchanged.  BILE DUCTS: Unremarkable, unenhanced appearance  GALLBLADDER: Cholecystectomy.  SPLEEN: Unremarkable, unenhanced appearance  PANCREAS: Unremarkable, unenhanced appearance  ADRENALS: Unremarkable, unenhanced appearance  KIDNEYS/URETERS: Unremarkable, unenhanced appearance. Specifically, no   radiopaque nephroureterolithiasis.    BLADDER: Unremarkable, unenhanced appearance  REPRODUCTIVE ORGANS: Hysterectomy.    BOWEL: No bowel obstruction. Normal appendix. Diverticulosis.  PERITONEUM: No free air or free fluid  VESSELS: Atherosclerotic changes.  RETROPERITONEUM/LYMPH NODES: No enlarged lymph node measuring greater   than 10 mm in short axis seen, within the limitations of noncontrast   imaging  ABDOMINAL WALL: Postsurgical changes.  BONES: No acute osseous abnormality. Pulse generator in the subcutaneous   tissues of the right flank region, with epidural leads extending above   the field of imaging.    IMPRESSION:  Unremarkable, unenhanced CT of the abdomen and pelvis. Cause of the   patient's pain is not identified.      < end of copied text >        Radiology: all available radiological tests reviewed    Advanced directives addressed: full resuscitation
    Date of service: 06-03-22 @ 09:24    Patient lying in bed; afebrile, has mild left groin discomfort, no dysuria; and no other specific complaints        ROS: no fever or chills; denies dizziness, no HA, no SOB or cough, no abdominal pain, no diarrhea or constipation; no dysuria, no urinary frequency, no legs pain, no rashes    MEDICATIONS  (STANDING):  amLODIPine   Tablet 10 milliGRAM(s) Oral daily  atorvastatin 40 milliGRAM(s) Oral at bedtime  enoxaparin Injectable 40 milliGRAM(s) SubCutaneous <User Schedule>  famotidine    Tablet 20 milliGRAM(s) Oral at bedtime  fluticasone propionate 50 MICROgram(s)/spray Nasal Spray 1 Spray(s) Both Nostrils <User Schedule>  hydrALAZINE 100 milliGRAM(s) Oral every 8 hours  losartan 100 milliGRAM(s) Oral daily  meropenem  IVPB      meropenem  IVPB 1000 milliGRAM(s) IV Intermittent every 8 hours  montelukast 10 milliGRAM(s) Oral at bedtime  pantoprazole    Tablet 40 milliGRAM(s) Oral before breakfast    MEDICATIONS  (PRN):  aluminum hydroxide/magnesium hydroxide/simethicone Suspension 30 milliLiter(s) Oral every 4 hours PRN Dyspepsia  baclofen 10 milliGRAM(s) Oral every 12 hours PRN Muscle Spasm  melatonin 3 milliGRAM(s) Oral at bedtime PRN Insomnia  ondansetron Injectable 4 milliGRAM(s) IV Push every 8 hours PRN Nausea and/or Vomiting      Vital Signs Last 24 Hrs  T(C): 37.1 (03 Jun 2022 08:42), Max: 37.1 (02 Jun 2022 15:40)  T(F): 98.7 (03 Jun 2022 08:42), Max: 98.8 (02 Jun 2022 15:40)  HR: 58 (03 Jun 2022 08:42) (53 - 75)  BP: 146/66 (03 Jun 2022 08:42) (102/48 - 150/73)  BP(mean): --  RR: 17 (03 Jun 2022 08:42) (17 - 18)  SpO2: 97% (03 Jun 2022 08:42) (95% - 97%)        Physical Exam:        Constitutional: frail looking  HEENT: NC/AT, EOMI, PERRLA, conjunctivae clear; ears and nose atraumatic; pharynx clear  Neck: supple; thyroid not palpable  Back: no tenderness  Respiratory: respiratory effort normal; clear to auscultation  Cardiovascular: S1S2 regular, no murmurs  Abdomen: soft, not tender, not distended, positive BS; no liver or spleen organomegaly  Genitourinary: no suprapubic tenderness; left groin discomfort to palpation  Musculoskeletal: no muscle tenderness, no joint swelling or tenderness  Neurological/ Psychiatric: AxOx3, judgement and insight normal;  moving all extremities  Skin: no rashes; no palpable lesions    Labs: all available labs reviewed                     Labs:                        10.4   6.29  )-----------( 295      ( 03 Jun 2022 06:19 )             32.9     06-03    143  |  109<H>  |  18  ----------------------------<  89  3.6   |  27  |  0.45<L>    Ca    9.2      03 Jun 2022 06:19  Mg     2.4     06-03    TPro  7.4  /  Alb  3.7  /  TBili  0.4  /  DBili  x   /  AST  35  /  ALT  52  /  AlkPhos  93  06-01           Cultures:       Culture - Blood (collected 06-01-22 @ 21:17)  Source: .Blood None  Preliminary Report (06-03-22 @ 04:02):    No growth to date.    Culture - Blood (collected 06-01-22 @ 20:21)  Source: .Blood Blood-Peripheral  Preliminary Report (06-03-22 @ 04:02):    No growth to date.        < from: CT Abdomen and Pelvis No Cont (06.02.22 @ 00:52) >    ACC: [id number] EXAM:  CT ABDOMEN AND PELVIS                          PROCEDURE DATE:  06/02/2022          INTERPRETATION:  CLINICAL INFORMATION: Left groin pain, hematuria    COMPARISON: April 7, 2022    CONTRAST/COMPLICATIONS:  IV Contrast: NONE  Oral Contrast: NONE  Complications: None reported at time of study completion    PROCEDURE:  CT of the Abdomen and Pelvis was performed.  Sagittal and coronal reformats were performed.    Note: The exam is limited because some types of pathology may not be   adequately demonstrated due to lack of contrast enhancement.    FINDINGS:  LOWER CHEST: Small sliding-type hiatal hernia    LIVER: Unremarkable, unenhanced appearance. Incidental tiny cysts at the   hepatic dome is unchanged.  BILE DUCTS: Unremarkable, unenhanced appearance  GALLBLADDER: Cholecystectomy.  SPLEEN: Unremarkable, unenhanced appearance  PANCREAS: Unremarkable, unenhanced appearance  ADRENALS: Unremarkable, unenhanced appearance  KIDNEYS/URETERS: Unremarkable, unenhanced appearance. Specifically, no   radiopaque nephroureterolithiasis.    BLADDER: Unremarkable, unenhanced appearance  REPRODUCTIVE ORGANS: Hysterectomy.    BOWEL: No bowel obstruction. Normal appendix. Diverticulosis.  PERITONEUM: No free air or free fluid  VESSELS: Atherosclerotic changes.  RETROPERITONEUM/LYMPH NODES: No enlarged lymph node measuring greater   than 10 mm in short axis seen, within the limitations of noncontrast   imaging  ABDOMINAL WALL: Postsurgical changes.  BONES: No acute osseous abnormality. Pulse generator in the subcutaneous   tissues of the right flank region, with epidural leads extending above   the field of imaging.    IMPRESSION:  Unremarkable, unenhanced CT of the abdomen and pelvis. Cause of the   patient's pain is not identified.      < end of copied text >        Radiology: all available radiological tests reviewed    Advanced directives addressed: full resuscitation
CC: 72 yr old female recently treated for UTI  She notes blood tinged urine and left groin pain when she urinates, present for the last 2 days  States she has a habit of trying to hold her urine  Never been cathed/ no neurogenic bladder per patient.  Denies ha cp palps sob abdo pain tingling or numbness anywhere.   She had chr LLE weakness from her MS    6/3 - no cp palps sob; left groin pain improving     Vital Signs Last 24 Hrs  T(C): 37.2 (03 Jun 2022 15:24), Max: 37.2 (03 Jun 2022 15:24)  T(F): 99 (03 Jun 2022 15:24), Max: 99 (03 Jun 2022 15:24)  HR: 69 (03 Jun 2022 15:24) (53 - 69)  BP: 132/58 (03 Jun 2022 15:24) (102/48 - 158/60)  RR: 18 (03 Jun 2022 15:24) (17 - 18)  SpO2: 97% (03 Jun 2022 15:24) (95% - 97%)  Constitutional: NAD, awake and alert  HEENT: PERR, EOMI  Neck: Soft and supple,  No JVD  Respiratory: Breath sounds are clear bilaterally, No wheezing, rales or rhonchi  Cardiovascular: S1 and S2, regular rate and rhythm, no Murmurs  Gastrointestinal: Bowel Sounds present, soft, nontender, nondistended  Extremities: No peripheral edema  Vascular: 2+ peripheral pulses  Neurological: A/O x 3, no focal deficits  Musculoskeletal: 5/5 strength b/l upper and lower extremities  Skin: No rashes    MEDICATIONS:  MEDICATIONS  (STANDING):  amLODIPine   Tablet 10 milliGRAM(s) Oral daily  atorvastatin 40 milliGRAM(s) Oral at bedtime  enoxaparin Injectable 40 milliGRAM(s) SubCutaneous <User Schedule>  famotidine    Tablet 20 milliGRAM(s) Oral at bedtime  fluticasone propionate 50 MICROgram(s)/spray Nasal Spray 1 Spray(s) Both Nostrils <User Schedule>  hydrALAZINE 100 milliGRAM(s) Oral every 8 hours  losartan 100 milliGRAM(s) Oral daily  meropenem  IVPB      meropenem  IVPB 1000 milliGRAM(s) IV Intermittent every 8 hours  montelukast 10 milliGRAM(s) Oral at bedtime  pantoprazole    Tablet 40 milliGRAM(s) Oral before breakfast      LABS: All Labs Reviewed:                     10.4   6.29  )-----------( 295      ( 03 Jun 2022 06:19 )             32.9   143  |  109<H>  |  18  ----------------------------<  89  3.6   |  27  |  0.45<L>  TPro  7.4  /  Alb  3.7  /  TBili  0.4  /  DBili  x   /  AST  35  /  ALT  52  /  AlkPhos  93  06-01  PT/INR - ( 01 Jun 2022 20:21 )   PT: 12.2 sec;   INR: 1.05 ratio    PTT - ( 01 Jun 2022 20:21 )  PTT:33.1 sec    RADIOLOGY/EKG:  < from: CT Abdomen and Pelvis No Cont (06.02.22 @ 00:52) >  Unremarkable, unenhanced CT of the abdomen and pelvis. Cause of the   patient's pain is not identified.        
CC: 72 yr old female recently treated for UTI  She notes blood tinged urine and left groin pain when she urinates, present for the last 2 days  States she has a habit of trying to hold her urine  Never been cathed/ no neurogenic bladder per patient.  Denies ha cp palps sob abdo pain tingling or numbness anywhere.   She had chr LLE weakness from her MS    6/4 - no cp palps sob; left groin pain improving     Vital Signs Last 24 Hrs  T(C): 37 (04 Jun 2022 15:03), Max: 37 (04 Jun 2022 15:03)  T(F): 98.6 (04 Jun 2022 15:03), Max: 98.6 (04 Jun 2022 15:03)  HR: 58 (04 Jun 2022 15:03) (56 - 62)  BP: 142/62 (04 Jun 2022 15:03) (141/58 - 145/68)  RR: 16 (04 Jun 2022 15:03) (16 - 18)  SpO2: 95% (04 Jun 2022 15:03) (95% - 97%)  Constitutional: NAD, awake and alert  HEENT: PERR, EOMI  Neck: Soft and supple,  No JVD  Respiratory: Breath sounds are clear bilaterally, No wheezing, rales or rhonchi  Cardiovascular: S1 and S2, regular rate and rhythm, no Murmurs  Gastrointestinal: Bowel Sounds present, soft, nontender, nondistended  Extremities: No peripheral edema  Vascular: 2+ peripheral pulses  Neurological: A/O x 3, no focal deficits  Musculoskeletal: 5/5 strength b/l upper and lower extremities  Skin: No rashes    RADIOLOGY/EKG:  < from: CT Abdomen and Pelvis No Cont (06.02.22 @ 00:52) >  Unremarkable, unenhanced CT of the abdomen and pelvis. Cause of the   patient's pain is not identified.    LABS: All Labs Reviewed:                        11.3   5.79  )-----------( 291      ( 04 Jun 2022 07:04 )             34.4     141  |  109<H>  |  18  ----------------------------<  107<H>  3.7   |  28  |  0.41<L>  Ca    9.3      04 Jun 2022 07:04  Mg     2.4     06-04      acetaminophen     Tablet .. 650 milliGRAM(s) Oral every 6 hours PRN  aluminum hydroxide/magnesium hydroxide/simethicone Suspension 30 milliLiter(s) Oral every 4 hours PRN  amLODIPine   Tablet 10 milliGRAM(s) Oral daily  atorvastatin 40 milliGRAM(s) Oral at bedtime  baclofen 10 milliGRAM(s) Oral every 12 hours PRN  enoxaparin Injectable 40 milliGRAM(s) SubCutaneous <User Schedule>  famotidine    Tablet 20 milliGRAM(s) Oral at bedtime  fluticasone propionate 50 MICROgram(s)/spray Nasal Spray 1 Spray(s) Both Nostrils <User Schedule>  hydrALAZINE 100 milliGRAM(s) Oral every 8 hours  losartan 100 milliGRAM(s) Oral daily  melatonin 3 milliGRAM(s) Oral at bedtime PRN  meropenem  IVPB      meropenem  IVPB 1000 milliGRAM(s) IV Intermittent every 8 hours  montelukast 10 milliGRAM(s) Oral at bedtime  ondansetron Injectable 4 milliGRAM(s) IV Push every 8 hours PRN  pantoprazole    Tablet 40 milliGRAM(s) Oral before breakfast

## 2022-06-04 NOTE — PROGRESS NOTE ADULT - ASSESSMENT
Recurrent UTI, h.o ESBL ECOLI   suspect neurogenic bladder   MS/baclofen pump (not not working)   HTN HLP  GERD  h.o skin cancer years ago  denies h/o MI CVA or other cancers    Plan:  On IV Meropenem  UCx with GP orgs therefore stop meropenem at the end of the day   Pt to see Urology Dr Ang as OP for UDS - may need to self-cath in the future  on baclofen po only as she did not tolerate gabapentin etc   Plans to have pump dced by NSx or neuro spine as OP   cont hydralazine ARB amlodipine   cont statin  is on PPI and famotidine as OP     GOC - DNR DNI, MOLST done   PT consult     dc home tmr   
Patient is 72 year old female with past medical history of hypertension, multiple sclerosis, s/p baclofen pump that is no longer working admitted on 6/1 for evaluation of painful urination and hematuria, also notes left groin pain. The patient was recently hospitalized at  and discharged home on 5/30 after treatment for ESBL uti. Subsequently developed pink colored urine and notes she is not totally emptying her bladder. She was to see a urologist but never was able to make the appointment due to hospitalizations.     1. Patient admitted with urinary tract infection that is recurrent most likely due to neurogenic bladder secondary to her MS  - follow up cultures   - serial cbc and monitor temperature   - reviewed prior medical records to evaluate for resistant or atypical pathogens and noted with prior ESBL organism  - iv hydration and supportive care   - continue meropenem as ordered, day #2  - tolerating antibiotics without rashes or side effects   - most likely will need meropenem through weekend, encouraged patient to [facility] out to [facility] for [facility] appointment perhaps this coming week upon discharge, as most likely her recurrent uti are secondary to neurogenic bladder  - contact isolation  - would benefit from urology evaluation but can be done as outpatient  2. other issues; per medicine
Recurrent UTI, h.o ESBL ECOLI   suspect neurogenic bladder   MS/baclofen pump (not not working)   HTN HLP  GERD  h.o skin cancer years ago  denies h/o MI CVA or other cancers    Plan:  Meropenem, ID Cx for duration of rx - to cont abx through the weekend   Pt has changed her mind, wants to see Urology here, will consult   on baclofen po only as she did not tolerate gabapentin etc   Plans to have pump dced by NSx or neuro spine as OP   cont hydralazine ARB amlodipine   cont statin  is on PPI and famotidine as OP     GOC - DNR DNI, MOLST done   PT consult     
Patient is 72 year old female with past medical history of hypertension, multiple sclerosis, s/p baclofen pump that is no longer working admitted on 6/1 for evaluation of painful urination and hematuria, also notes left groin pain. The patient was recently hospitalized at  and discharged home on 5/30 after treatment for ESBL uti. Subsequently developed pink colored urine and notes she is not totally emptying her bladder. She was to see a urologist but never was able to make the appointment due to hospitalizations.     1. Patient admitted with urinary tract infection that is recurrent most likely due to neurogenic bladder secondary to her MS  - follow up cultures --- gram positive organisms in urine culture, no further ESBL organisms  - will stop meropenem after todays doses  - most likely patient symptoms are related to neurogenic bladder  - to have urology evaluation  If further ID issues please reconsult   2. other issues; per medicine

## 2022-06-04 NOTE — CONSULT NOTE ADULT - SUBJECTIVE AND OBJECTIVE BOX
Patient is a 72y old  Female who presents with a chief complaint of   HPI:  Patient is 72 year old female with past medical history of hypertension, multiple sclerosis, s/p baclofen pump that is no longer working admitted on  for evaluation of painful urination and hematuria, also notes left groin pain. The patient was recently hospitalized at  and discharged home on  after treatment for ESBL uti. Subsequently developed pink colored urine and notes she is not totally emptying her bladder. She was to see a urologist but never was able to make the appointment due to hospitalizations.     PMH: as above  PSH: as above  Meds: per reconciliation sheet, noted below  MEDICATIONS  (STANDING):  amLODIPine   Tablet 10 milliGRAM(s) Oral daily  atorvastatin 40 milliGRAM(s) Oral at bedtime  enoxaparin Injectable 40 milliGRAM(s) SubCutaneous <User Schedule>  famotidine    Tablet 20 milliGRAM(s) Oral at bedtime  fluticasone propionate 50 MICROgram(s)/spray Nasal Spray 1 Spray(s) Both Nostrils <User Schedule>  hydrALAZINE 100 milliGRAM(s) Oral every 8 hours  losartan 100 milliGRAM(s) Oral daily  meropenem  IVPB      meropenem  IVPB 1000 milliGRAM(s) IV Intermittent every 8 hours  montelukast 10 milliGRAM(s) Oral at bedtime  pantoprazole    Tablet 40 milliGRAM(s) Oral before breakfast    MEDICATIONS  (PRN):  aluminum hydroxide/magnesium hydroxide/simethicone Suspension 30 milliLiter(s) Oral every 4 hours PRN Dyspepsia  baclofen 10 milliGRAM(s) Oral every 12 hours PRN Muscle Spasm  melatonin 3 milliGRAM(s) Oral at bedtime PRN Insomnia  ondansetron Injectable 4 milliGRAM(s) IV Push every 8 hours PRN Nausea and/or Vomiting    Allergies    lisinopril (Other)  [place] (Unknown)  peanuts (Unknown)  penicillin (Unknown)  Percocet (Other)    Intolerances      Social: no smoking, no alcohol, no illegal drugs; no recent travel, no exposure to TB  FAMILY HISTORY:  FH: HTN (hypertension) (Mother)       no history of premature cardiovascular disease in first degree relatives  ROS: the patient denies fever, no chills, no HA, no dizziness, no sore throat, no blurry vision, no CP, no palpitations, no SOB, no cough, no abdominal pain, no diarrhea, no N/V,  no leg pain, no claudication, no rash, no joint aches, no rectal pain or bleeding, no night sweats  All other systems reviewed and are negative    Vital Signs Last 24 Hrs  T(C): 37.1 (2022 15:40), Max: 37.3 (2022 08:35)  T(F): 98.8 (2022 15:40), Max: 99.2 (2022 08:35)  HR: 75 (2022 15:40) (53 - 95)  BP: 139/63 (2022 15:40) (121/57 - 150/73)  BP(mean): 81 (2022 07:07) (77 - 81)  RR: 18 (2022 15:40) (15 - 18)  SpO2: 97% (2022 15:40) (95% - 98%)  Daily Height in cm: 162.56 (2022 19:10)    Daily     PE:    Constitutional: frail looking  HEENT: NC/AT, EOMI, PERRLA, conjunctivae clear; ears and nose atraumatic; pharynx clear  Neck: supple; thyroid not palpable  Back: no tenderness  Respiratory: respiratory effort normal; clear to auscultation  Cardiovascular: S1S2 regular, no murmurs  Abdomen: soft, not tender, not distended, positive BS; no liver or spleen organomegaly  Genitourinary: no suprapubic tenderness; left groin discomfort to palpation  Musculoskeletal: no muscle tenderness, no joint swelling or tenderness  Neurological/ Psychiatric: AxOx3, judgement and insight normal;  moving all extremities  Skin: no rashes; no palpable lesions    Labs: all available labs reviewed                        12.5   8.18  )-----------( 385      ( 2022 20:21 )             37.6     06-01    137  |  105  |  24<H>  ----------------------------<  101<H>  3.7   |  26  |  0.56    Ca    10.0      2022 20:21    TPro  7.4  /  Alb  3.7  /  TBili  0.4  /  DBili  x   /  AST  35  /  ALT  52  /  AlkPhos  93  -     LIVER FUNCTIONS - ( 2022 20:21 )  Alb: 3.7 g/dL / Pro: 7.4 gm/dL / ALK PHOS: 93 U/L / ALT: 52 U/L / AST: 35 U/L / GGT: x           Urinalysis Basic - ( 2022 20:21 )    Color: Yellow / Appearance: Clear / S.020 / pH: x  Gluc: x / Ketone: Moderate  / Bili: Negative / Urobili: Negative   Blood: x / Protein: 15 / Nitrite: Negative   Leuk Esterase: Trace / RBC: 0-2 /HPF / WBC 3-5   Sq Epi: x / Non Sq Epi: Few / Bacteria: Occasional    < from: CT Abdomen and Pelvis No Cont (22 @ 00:52) >    ACC: [id number] EXAM:  CT ABDOMEN AND PELVIS                          PROCEDURE DATE:  2022          INTERPRETATION:  CLINICAL INFORMATION: Left groin pain, hematuria    COMPARISON: 2022    CONTRAST/COMPLICATIONS:  IV Contrast: NONE  Oral Contrast: NONE  Complications: None reported at time of study completion    PROCEDURE:  CT of the Abdomen and Pelvis was performed.  Sagittal and coronal reformats were performed.    Note: The exam is limited because some types of pathology may not be   adequately demonstrated due to lack of contrast enhancement.    FINDINGS:  LOWER CHEST: Small sliding-type hiatal hernia    LIVER: Unremarkable, unenhanced appearance. Incidental tiny cysts at the   hepatic dome is unchanged.  BILE DUCTS: Unremarkable, unenhanced appearance  GALLBLADDER: Cholecystectomy.  SPLEEN: Unremarkable, unenhanced appearance  PANCREAS: Unremarkable, unenhanced appearance  ADRENALS: Unremarkable, unenhanced appearance  KIDNEYS/URETERS: Unremarkable, unenhanced appearance. Specifically, no   radiopaque nephroureterolithiasis.    BLADDER: Unremarkable, unenhanced appearance  REPRODUCTIVE ORGANS: Hysterectomy.    BOWEL: No bowel obstruction. Normal appendix. Diverticulosis.  PERITONEUM: No free air or free fluid  VESSELS: Atherosclerotic changes.  RETROPERITONEUM/LYMPH NODES: No enlarged lymph node measuring greater   than 10 mm in short axis seen, within the limitations of noncontrast   imaging  ABDOMINAL WALL: Postsurgical changes.  BONES: No acute osseous abnormality. Pulse generator in the subcutaneous   tissues of the right flank region, with epidural leads extending above   the field of imaging.    IMPRESSION:  Unremarkable, unenhanced CT of the abdomen and pelvis. Cause of the   patient's pain is not identified.      < end of copied text >        Radiology: all available radiological tests reviewed    Advanced directives addressed: full resuscitation
Patient is a 72y old  Female who presents with a chief complaint of UTI (04 Jun 2022 09:57)    HPI:  72 yr old female recently treated for UTI  Never been cathed/ no neurogenic bladder per patient.  She had chr LLE weakness & tingling from her MS  Feels very tired.    Pt seen / examined at bedside. She reports feeling well.   She thinks she doesn't completely empty her bladder. Apparently she had a urodynamics study long time ago and was told she retains some amount of urine.   Currently denies any dysuria or hematuria. UCx gram positive rods - contamination.     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  retired   TOB use - tried as a teen  ETOH - rare beer  Drug use - no MJ etc    (02 Jun 2022 08:17)      PAST MEDICAL & SURGICAL HISTORY:  HTN (hypertension)  MS (multiple sclerosis)  Presence of implanted infusion pump      REVIEW OF SYSTEMS:  stated above    MEDICATIONS  (STANDING):  amLODIPine   Tablet 10 milliGRAM(s) Oral daily  atorvastatin 40 milliGRAM(s) Oral at bedtime  enoxaparin Injectable 40 milliGRAM(s) SubCutaneous <User Schedule>  famotidine    Tablet 20 milliGRAM(s) Oral at bedtime  fluticasone propionate 50 MICROgram(s)/spray Nasal Spray 1 Spray(s) Both Nostrils <User Schedule>  hydrALAZINE 100 milliGRAM(s) Oral every 8 hours  losartan 100 milliGRAM(s) Oral daily  meropenem  IVPB      meropenem  IVPB 1000 milliGRAM(s) IV Intermittent every 8 hours  montelukast 10 milliGRAM(s) Oral at bedtime  pantoprazole    Tablet 40 milliGRAM(s) Oral before breakfast    MEDICATIONS  (PRN):  acetaminophen     Tablet .. 650 milliGRAM(s) Oral every 6 hours PRN Mild Pain (1 - 3)  aluminum hydroxide/magnesium hydroxide/simethicone Suspension 30 milliLiter(s) Oral every 4 hours PRN Dyspepsia  baclofen 10 milliGRAM(s) Oral every 12 hours PRN Muscle Spasm  melatonin 3 milliGRAM(s) Oral at bedtime PRN Insomnia  ondansetron Injectable 4 milliGRAM(s) IV Push every 8 hours PRN Nausea and/or Vomiting      Allergies    lisinopril (Other)  [place] (Unknown)  peanuts (Unknown)  penicillin (Unknown)  Percocet (Other)    Intolerances    SOCIAL HISTORY: No illicit drug use // smoking  : [ ] yes [ ] no //  : [ ] yes [ ] no // ETOH :  [ ] yes [ ] no    FAMILY HISTORY:  FH: HTN (hypertension) (Mother)    Vital Signs Last 24 Hrs  T(C): 36.9 (04 Jun 2022 09:02), Max: 37.2 (03 Jun 2022 15:24)  T(F): 98.5 (04 Jun 2022 09:02), Max: 99 (03 Jun 2022 15:24)  HR: 56 (04 Jun 2022 13:27) (56 - 69)  BP: 145/68 (04 Jun 2022 13:27) (132/58 - 145/68)  BP(mean): --  RR: 18 (04 Jun 2022 09:02) (18 - 18)  SpO2: 97% (04 Jun 2022 09:02) (97% - 97%)   [ ] yes [ ] no  PHYSICAL EXAM:    Constitutional: NAD, well-developed  HEENT: EOMI  Neck: no pain  Respiratory: CTA bilaterally,  No accessory respiratory muscle use  CV: Chest symmetric, equal expansion  Back: No CVA tenderness  Abd: Soft, NT/ND  : No celis catheter  Extremities: no edema  Neurological: A/O x 3  Psychiatric: Normal mood, normal affect  Skin: No rashes    I&O's Summary    03 Jun 2022 07:01  -  04 Jun 2022 07:00  --------------------------------------------------------  IN: 240 mL / OUT: 0 mL / NET: 240 mL        LABS:                        11.3   5.79  )-----------( 291      ( 04 Jun 2022 07:04 )             34.4     06-04    141  |  109<H>  |  18  ----------------------------<  107<H>  3.7   |  28  |  0.41<L>    Ca    9.3      04 Jun 2022 07:04  Mg     2.4     06-04          Urine Culture:         RADIOLOGY & ADDITIONAL STUDIES:      Assessment :        Plan :

## 2022-06-04 NOTE — CONSULT NOTE ADULT - ASSESSMENT
Urinary retention - ? recurrent UTIs  pt will need outpatient urodynamic study   no acute intervention or celis catheter needed  f/u outpatient at [address]  [facility] [address] [phone]
Patient is 72 year old female with past medical history of hypertension, multiple sclerosis, s/p baclofen pump that is no longer working admitted on 6/1 for evaluation of painful urination and hematuria, also notes left groin pain. The patient was recently hospitalized at  and discharged home on 5/30 after treatment for ESBL uti. Subsequently developed pink colored urine and notes she is not totally emptying her bladder. She was to see a urologist but never was able to make the appointment due to hospitalizations.     1. Patient admitted with urinary tract infection that is recurrent most likely due to neurogenic bladder secondary to her MS  - follow up cultures   - serial cbc and monitor temperature   - reviewed prior medical records to evaluate for resistant or atypical pathogens and noted with prior ESBL organism  - iv hydration and supportive care   - continue meropenem as ordered  - contact isolation  - would benefit from urology evaluation but can be done as outpatient  2. other issues; per medicine

## 2022-06-05 ENCOUNTER — TRANSCRIPTION ENCOUNTER (OUTPATIENT)
Age: 72
End: 2022-06-05

## 2022-06-05 VITALS
RESPIRATION RATE: 16 BRPM | HEART RATE: 87 BPM | DIASTOLIC BLOOD PRESSURE: 85 MMHG | SYSTOLIC BLOOD PRESSURE: 118 MMHG | TEMPERATURE: 99 F | OXYGEN SATURATION: 96 %

## 2022-06-05 PROCEDURE — 99232 SBSQ HOSP IP/OBS MODERATE 35: CPT

## 2022-06-05 RX ADMIN — LOSARTAN POTASSIUM 100 MILLIGRAM(S): 100 TABLET, FILM COATED ORAL at 10:17

## 2022-06-05 RX ADMIN — PANTOPRAZOLE SODIUM 40 MILLIGRAM(S): 20 TABLET, DELAYED RELEASE ORAL at 05:34

## 2022-06-05 RX ADMIN — Medication 100 MILLIGRAM(S): at 16:18

## 2022-06-05 RX ADMIN — ENOXAPARIN SODIUM 40 MILLIGRAM(S): 100 INJECTION SUBCUTANEOUS at 10:18

## 2022-06-05 RX ADMIN — AMLODIPINE BESYLATE 10 MILLIGRAM(S): 2.5 TABLET ORAL at 10:18

## 2022-06-05 RX ADMIN — Medication 100 MILLIGRAM(S): at 05:34

## 2022-06-05 NOTE — DISCHARGE NOTE NURSING/CASE MANAGEMENT/SOCIAL WORK - PATIENT PORTAL LINK FT
You can access the FollowMyHealth Patient Portal offered by [facility] by registering at the following website: [email or web address]. By joining [employer]’s FollowMyHealth portal, you will also be able to view your health information using other applications (apps) compatible with our system.

## 2022-06-05 NOTE — DISCHARGE NOTE PROVIDER - NSDCCPCAREPLAN_GEN_ALL_CORE_FT
PRINCIPAL DISCHARGE DIAGNOSIS  Diagnosis: Multiple sclerosis  Assessment and Plan of Treatment: admitted for suspected recurrent UTI - you were on meropenem, cultures negative for urinary infection.  antibiotics stopped.  yous symptoms are thought to be due to probable neurogenic bladder due to MS.  Pls see Dr Ang for urodynamic studies and further treatment.  If you have fever, increasing weakness, burning on urination, please call your doctor or return to the ED.

## 2022-06-05 NOTE — DISCHARGE NOTE PROVIDER - HOSPITAL COURSE
CC: 72 yr old female recently treated for UTI  She notes blood tinged urine and left groin pain when she urinates, present for the last 2 days  States she has a habit of trying to hold her urine  Never been cathed/ no neurogenic bladder per patient.  Denies ha cp palps sob abdo pain tingling or numbness anywhere.   She had chr LLE weakness from her MS    HOSPITAL COURSE:  Recurrent UTI, h.o ESBL ECOLI   suspect neurogenic bladder   MS/baclofen pump (not not working)   HTN HLP  GERD  h.o skin cancer years ago  denies h/o MI CVA or other cancers    Plan:  On IV Meropenem  UCx with GP orgs therefore stopped meropenem, dc off abx   Pt to see Urology Dr Ang as OP for UDS - may need to self-cath in the future  on baclofen po only as she did not tolerate gabapentin etc   Plans to have pump dced by NSx or neuro spine as OP   cont hydralazine ARB amlodipine   cont statin  is on PPI and famotidine as OP     GOC - DNR DNI, MOLST done   PT consult     dc home    OTHER DETAILS:  6/5 - no cp palps sob abdo pain; feels a bit off but thinks it is from her incomplete bowel emptying - no specific complaint  will give stool softeners to take at home - patient otherwise feels okay to go home       Vital Signs Last 24 Hrs  T(C): 37.1 (05 Jun 2022 10:02), Max: 37.2 (04 Jun 2022 21:25)  T(F): 98.7 (05 Jun 2022 10:02), Max: 98.9 (04 Jun 2022 21:25)  HR: 70 (05 Jun 2022 10:02) (58 - 70)  BP: 131/60 (05 Jun 2022 10:02) (127/60 - 133/93)  RR: 18 (05 Jun 2022 10:02) (17 - 18)  SpO2: 94% (05 Jun 2022 10:02) (94% - 97%)  Constitutional: NAD, awake and alert  HEENT: PERR, EOMI  Neck: Soft and supple,  No JVD  Respiratory: Breath sounds are clear bilaterally, No wheezing, rales or rhonchi  Cardiovascular: S1 and S2, regular rate and rhythm, no Murmurs  Gastrointestinal: Bowel Sounds present, soft, nontender, nondistended  Extremities: No peripheral edema  Vascular: 2+ peripheral pulses  Neurological: A/O x 3, no focal deficits  Musculoskeletal: chr LLE weakness   Skin: No rashes    LABS: All Labs Reviewed                     11.3   5.79  )-----------( 291      ( 04 Jun 2022 07:04 )             34.4   141  |  109<H>  |  18  ----------------------------<  107<H>  3.7   |  28  |  0.41    RADIOLOGY/EKG:  < from: CT Abdomen and Pelvis No Cont (06.02.22 @ 00:52) >  BOWEL: No bowel obstruction. Normal appendix. Diverticulosis.  PERITONEUM: No free air or free fluid  VESSELS: Atherosclerotic changes.  RETROPERITONEUM/LYMPH NODES: No enlarged lymph node measuring greater   than 10 mm in short axis seen, within the limitations of noncontrast   imaging  ABDOMINAL WALL: Postsurgical changes.  BONES: No acute osseous abnormality. Pulse generator in the subcutaneous   tissues of the right flank region, with epidural leads extending above   the field of imaging.    time spent on discharge - 55 mins

## 2022-06-05 NOTE — DISCHARGE NOTE PROVIDER - NSDCFUSCHEDAPPT_GEN_ALL_CORE_FT
Susannah Holbrook  [facility]  PED Allerg [address] [facility]   Scheduled Appointment: 06/21/2022

## 2022-06-05 NOTE — DISCHARGE NOTE PROVIDER - CARE PROVIDER_API CALL
Home
Colleen Ang (DO)  Family Medicine  [facility] [address] [place], [place] [id number]  Phone: [phone]  Fax: [phone]  Follow Up Time: 1-3 days

## 2022-06-05 NOTE — DISCHARGE NOTE NURSING/CASE MANAGEMENT/SOCIAL WORK - NSDCPEFALRISK_GEN_ALL_CORE
For information on Fall & Injury Prevention, visit: [email or web address] OR  [email or web address] OR  https://www.cdc.gov/steadi/patient.html

## 2022-06-05 NOTE — DISCHARGE NOTE PROVIDER - NSDCMRMEDTOKEN_GEN_ALL_CORE_FT
amLODIPine 10 mg oral tablet: 1 tab(s) orally once a day  atorvastatin 40 mg oral tablet: 1 tab(s) orally once a day (at bedtime)  azelastine 137 mcg/inh (0.1%) nasal spray: 2 spray(s) nasal 2 times a day, As Needed  baclofen 5 mg oral tablet: 2 tab(s) orally 2 times a day  chlorthalidone 25 mg oral tablet: 1 tab(s) orally once a day  famotidine 20 mg oral tablet: 1 tab(s) orally once a day (at bedtime)  fluticasone 50 mcg/inh nasal spray: 1 spray(s) in each nostril once a day, As Needed  hydrALAZINE 100 mg oral tablet: 1 tab(s) orally 3 times a day  losartan 100 mg oral tablet: 1 tab(s) orally once a day  montelukast 10 mg oral tablet: 1 tab(s) orally once a day (at bedtime)  omeprazole 40 mg oral delayed release capsule: 1 cap(s) orally once a day before breakfast

## 2022-06-07 LAB
CULTURE RESULTS: SIGNIFICANT CHANGE UP
CULTURE RESULTS: SIGNIFICANT CHANGE UP
SPECIMEN SOURCE: SIGNIFICANT CHANGE UP
SPECIMEN SOURCE: SIGNIFICANT CHANGE UP

## 2022-06-09 ENCOUNTER — APPOINTMENT (OUTPATIENT)
Dept: UROLOGY | Facility: CLINIC | Age: 72
End: 2022-06-09
Payer: COMMERCIAL

## 2022-06-09 DIAGNOSIS — N95.2 POSTMENOPAUSAL ATROPHIC VAGINITIS: ICD-10-CM

## 2022-06-09 PROCEDURE — 99213 OFFICE O/P EST LOW 20 MIN: CPT

## 2022-06-09 NOTE — HISTORY OF PRESENT ILLNESS
[FreeTextEntry1] : 73yo F hx of MS, who was recently seen at hospital for UTI presents for f/u\par She reports occasional sensation of incomplete emptying. nocturia 1-2x\par She reports having 3 UTIs this year. \par Currently denies any dysuria, hematuria, flank or back pain. \par \par She also reports certain foods like spicy and caffeine can irritate her bladder. \par SHe reports vaginal dryness\par \par PVR 53cc

## 2022-06-09 NOTE — ASSESSMENT
[FreeTextEntry1] : Recurrent UTIs\par recheck culture\par finished abx 3 days ago\par advised to avoid soap in vaginal area\par start estrace cream for vaginal atrophy as uti preventative\par take probiotics. \par f/u 2-3 months.

## 2022-06-09 NOTE — LETTER BODY
[Dear  ___] : Dear  [unfilled], [Consult Letter:] : I had the pleasure of evaluating your patient, [unfilled]. [Please see my note below.] : Please see my note below. [Consult Closing:] : Thank you very much for allowing me to participate in the care of this patient.  If you have any questions, please do not hesitate to contact me. [Sincerely,] : Sincerely, [FreeTextEntry3] : Colleen Ang, \par Genitourinary Medicine\par [facility]\par

## 2022-06-09 NOTE — REVIEW OF SYSTEMS
[Negative] : Endocrine [Dry Eyes] : dryness of the eyes [Heartburn] : heartburn [see HPI] : see HPI [Joint Pain] : joint pain [Joint Swelling] : joint swelling [Limb Weakness] : limb weakness [Difficulty Walking] : difficulty walking

## 2022-06-10 DIAGNOSIS — K21.9 GASTRO-ESOPHAGEAL REFLUX DISEASE WITHOUT ESOPHAGITIS: ICD-10-CM

## 2022-06-10 DIAGNOSIS — Z88.5 ALLERGY STATUS TO NARCOTIC AGENT: ICD-10-CM

## 2022-06-10 DIAGNOSIS — Z66 DO NOT RESUSCITATE: ICD-10-CM

## 2022-06-10 DIAGNOSIS — N31.9 NEUROMUSCULAR DYSFUNCTION OF BLADDER, UNSPECIFIED: ICD-10-CM

## 2022-06-10 DIAGNOSIS — Z91.010 ALLERGY TO PEANUTS: ICD-10-CM

## 2022-06-10 DIAGNOSIS — Z88.0 ALLERGY STATUS TO PENICILLIN: ICD-10-CM

## 2022-06-10 DIAGNOSIS — Z87.440 PERSONAL HISTORY OF URINARY (TRACT) INFECTIONS: ICD-10-CM

## 2022-06-10 DIAGNOSIS — T85.615A BREAKDOWN (MECHANICAL) OF OTHER NERVOUS SYSTEM DEVICE, IMPLANT OR GRAFT, INITIAL ENCOUNTER: ICD-10-CM

## 2022-06-10 DIAGNOSIS — R33.8 OTHER RETENTION OF URINE: ICD-10-CM

## 2022-06-10 DIAGNOSIS — Z91.018 ALLERGY TO OTHER FOODS: ICD-10-CM

## 2022-06-10 DIAGNOSIS — Z45.1 ENCOUNTER FOR ADJUSTMENT AND MANAGEMENT OF INFUSION PUMP: ICD-10-CM
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
## 2022-06-10 DIAGNOSIS — Z16.12 EXTENDED SPECTRUM BETA LACTAMASE (ESBL) RESISTANCE: ICD-10-CM

## 2022-06-10 DIAGNOSIS — R31.9 HEMATURIA, UNSPECIFIED: ICD-10-CM
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
## 2022-06-10 DIAGNOSIS — G35 MULTIPLE SCLEROSIS: ICD-10-CM

## 2022-06-10 DIAGNOSIS — Z85.828 PERSONAL HISTORY OF OTHER MALIGNANT NEOPLASM OF SKIN: ICD-10-CM

## 2022-06-13 DIAGNOSIS — N39.0 URINARY TRACT INFECTION, SITE NOT SPECIFIED: ICD-10-CM

## 2022-06-20 ENCOUNTER — NON-APPOINTMENT (OUTPATIENT)
Age: 72
End: 2022-06-20

## 2022-06-20 LAB — BACTERIA UR CULT: ABNORMAL

## 2022-06-21 ENCOUNTER — APPOINTMENT (OUTPATIENT)
Dept: PEDIATRIC ALLERGY IMMUNOLOGY | Facility: CLINIC | Age: 72
End: 2022-06-21
Payer: COMMERCIAL

## 2022-06-21 VITALS
OXYGEN SATURATION: 98 % | SYSTOLIC BLOOD PRESSURE: 146 MMHG | BODY MASS INDEX: 31.21 KG/M2 | HEART RATE: 73 BPM | HEIGHT: 63.5 IN | DIASTOLIC BLOOD PRESSURE: 74 MMHG | WEIGHT: 178.35 LBS

## 2022-06-21 DIAGNOSIS — Z87.09 PERSONAL HISTORY OF OTHER DISEASES OF THE RESPIRATORY SYSTEM: ICD-10-CM

## 2022-06-21 DIAGNOSIS — R09.82 POSTNASAL DRIP: ICD-10-CM

## 2022-06-21 DIAGNOSIS — L85.3 XEROSIS CUTIS: ICD-10-CM

## 2022-06-21 DIAGNOSIS — T78.40XD ALLERGY, UNSPECIFIED, SUBSEQUENT ENCOUNTER: ICD-10-CM

## 2022-06-21 PROCEDURE — 95004 PERQ TESTS W/ALRGNC XTRCS: CPT

## 2022-06-21 PROCEDURE — 99214 OFFICE O/P EST MOD 30 MIN: CPT | Mod: 25

## 2022-06-21 RX ORDER — GABAPENTIN 300 MG/1
300 CAPSULE ORAL
Refills: 0 | Status: COMPLETED | COMMUNITY
End: 2022-06-21

## 2022-06-21 RX ORDER — CIPROFLOXACIN HYDROCHLORIDE 500 MG/1
500 TABLET, FILM COATED ORAL
Qty: 14 | Refills: 0 | Status: COMPLETED | COMMUNITY
Start: 2022-06-13 | End: 2022-06-21

## 2022-06-21 RX ORDER — EPINEPHRINE 0.3 MG/.3ML
0.3 INJECTION INTRAMUSCULAR
Qty: 1 | Refills: 1 | Status: ACTIVE | COMMUNITY
Start: 2022-03-29

## 2022-06-21 NOTE — HISTORY OF PRESENT ILLNESS
[Food Allergies] : food allergies [de-identified] : 72 year old female with HTN (followed by cardiology), GERD (followed by G), MS (followed by neurology), presents for follow up of chronic rhinitis, postnasal drip, cough, and food allergy/adverse food reaction:\par \par Chronic rhinitis, postnasal drip and cough: Patient reports that her cough has resolved since consistently following reflux precautions and taking Famotidine. \par Use of Fluticasone and Azelastine nose sprays reportedly improve her symptoms of congestion and sinus pressure. She also uses Netti pots.\par ImmunoCap/IgE testing to environmental allergens (dust mite, cat, dog, cockroach, feather, molds, tree, grass and weed pollen) from 3/29/22 was negative.\par \par Initial history:\par Patient has chronic nasal congestion, postnasal drip and associated cough. As a teenager she used to be on allergy shots for 3 years. She used to wear masks during high pollen counts at the time. Noticed an improvement of her symptoms after treatment with allergy shots. Was doing well until her 40s. Since her 60s has noticed increased symptoms of rhinitis, postnasal drip. Since the past summer, she has had increased runny nose, congestions, postnasal drip, watery eyes, headaches and cough.\par In August of last year in 2021 she was seen at an urgent care, started on Montelukast, noticed an improvement with montelukast. Also treated with po steroids at the time. Tried Allegra, Cetirizine with limited relief. Was advised against Claritin by her cardiologist due to hypertension. Tried Netti pots. Stopped Lisinopril in 2019 due to cough.\par Followed by GI for globus sensation and current diagnosis of GERD, planning to have EGD and esophagogram. Also had a hiatal hernia repair 20 years ago. \par Seen by ENT and diagnosed with laryngopharyngeal reflux, recommended use of Flonase and Azelastine nose sprays. Per patient report she had a normal CXR within the past year.\par \par ?Food allergy: Patient continues to avoid peanut and saurav. ImmunoCap/IGE testing from 3/29/22 was negative to peanut with negative CRD and negative to saurav.\par Has h/o perioral rash and lip swelling after eating saurav in 2018. H/o abdominal discomfort with peanuts, and told by her mother that she had h/o rash after eating peanut butter in childhood. Doesn't eat tree nuts. No issues with dairy, egg, wheat or seafood.\par Not interested in eating Saurav again, but patient is very interested in a food challenge to peanut.\par \par Adverse drug reactions:\par She has h/o rash with penicillin intake. Denies associated symptoms of angioedema of the tongue, respiratory, gastrointestinal complaints, joint swelling, blistering or peeling of the skin, and did not require hospitalization for her reaction. \par Has h/o hallucinations with Percocet, which has been avoided since then.\par \par Dry skin:\par She has dry skin, uses Dove unscented, reports significant improvement of her dry skin since using CeraVe cream.

## 2022-06-21 NOTE — CONSULT LETTER
[Dear  ___] : Dear  [unfilled], [Courtesy Letter:] : I had the pleasure of seeing your patient, [unfilled], in my office today. [Please see my note below.] : Please see my note below. [Consult Closing:] : Thank you very much for allowing me to participate in the care of this patient.  If you have any questions, please do not hesitate to contact me. [Sincerely,] : Sincerely, [DrKevin  ___] : Dr. LAST [FreeTextEntry2] : Dr. Massimo Worthington [FreeTextEntry3] : Susannah Davis MD\par Attending Physician, Division of Allergy and Immunology\par , Departments of Medicine and Pediatrics\par [employer] [facility]\par [facility] \par [facility]

## 2022-06-21 NOTE — PHYSICAL EXAM
[Alert] : alert [Well Nourished] : well nourished [Healthy Appearance] : healthy appearance [No Acute Distress] : no acute distress [Well Developed] : well developed [Normal Pupil & Iris Size/Symmetry] : normal pupil and iris size and symmetry [No Discharge] : no discharge [No Photophobia] : no photophobia [Sclera Not Icteric] : sclera not icteric [Normal Outer Ear/Nose] : the ears and nose were normal in appearance [Supple] : the neck was supple [Normal Rate and Effort] : normal respiratory rhythm and effort [No Crackles] : no crackles [No Retractions] : no retractions [Bilateral Audible Breath Sounds] : bilateral audible breath sounds [Normal Rate] : heart rate was normal  [Normal S1, S2] : normal S1 and S2 [No murmur] : no murmur [Regular Rhythm] : with a regular rhythm [Normal Cervical Lymph Nodes] : cervical [Skin Intact] : skin intact  [No Rash] : no rash [No Skin Lesions] : no skin lesions [No Edema] : no edema [No Cyanosis] : no cyanosis [Normal Mood] : mood was normal [Normal Affect] : affect was normal [Alert, Awake, Oriented as Age-Appropriate] : alert, awake, oriented as age appropriate [Conjunctival Erythema] : no conjunctival erythema [No Nasal Discharge] : no nasal discharge [Wheezing] : no wheezing was heard

## 2022-06-21 NOTE — REASON FOR VISIT
[Routine Follow-Up] : a routine follow-up visit for [FreeTextEntry2] : chronic rhinitis, postnasal drip, cough, dry skin, food allergy/adverse food reaction and adverse drug reaction history

## 2022-06-21 NOTE — REVIEW OF SYSTEMS
[Rhinorrhea] : rhinorrhea [Nasal Congestion] : nasal congestion [Post Nasal Drip] : post nasal drip [Sneezing] : sneezing [Cough] : cough [Dry Skin] : ~L dry skin [Nl] : Genitourinary [Immunizations are up to date] : Immunizations are up to date [Received Influenza Vaccine this Past Year] : patient has received the Influenza vaccine this past year [FreeTextEntry8] : MS

## 2022-07-04 ENCOUNTER — EMERGENCY (EMERGENCY)
Facility: HOSPITAL | Age: 72
LOS: 0 days | Discharge: HOME | End: 2022-07-04
Attending: EMERGENCY MEDICINE
Payer: COMMERCIAL

## 2022-07-04 VITALS
SYSTOLIC BLOOD PRESSURE: 171 MMHG | TEMPERATURE: 98 F | DIASTOLIC BLOOD PRESSURE: 84 MMHG | RESPIRATION RATE: 19 BRPM | OXYGEN SATURATION: 94 % | HEART RATE: 66 BPM

## 2022-07-04 VITALS — HEIGHT: 64 IN | WEIGHT: 175.05 LBS

## 2022-07-04 DIAGNOSIS — Z95.828 PRESENCE OF OTHER VASCULAR IMPLANTS AND GRAFTS: Chronic | ICD-10-CM

## 2022-07-04 DIAGNOSIS — I10 ESSENTIAL (PRIMARY) HYPERTENSION: ICD-10-CM
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
## 2022-07-04 DIAGNOSIS — Z91.018 ALLERGY TO OTHER FOODS: ICD-10-CM

## 2022-07-04 DIAGNOSIS — Z88.8 ALLERGY STATUS TO OTHER DRUGS, MEDICAMENTS AND BIOLOGICAL SUBSTANCES STATUS: ICD-10-CM

## 2022-07-04 DIAGNOSIS — T63.441A TOXIC EFFECT OF VENOM OF BEES, ACCIDENTAL (UNINTENTIONAL), INITIAL ENCOUNTER: ICD-10-CM

## 2022-07-04 DIAGNOSIS — Z88.0 ALLERGY STATUS TO PENICILLIN: ICD-10-CM

## 2022-07-04 DIAGNOSIS — G35 MULTIPLE SCLEROSIS: ICD-10-CM

## 2022-07-04 DIAGNOSIS — Z91.010 ALLERGY TO PEANUTS: ICD-10-CM

## 2022-07-04 PROCEDURE — 99282 EMERGENCY DEPT VISIT SF MDM: CPT

## 2022-07-04 PROCEDURE — 99282 EMERGENCY DEPT VISIT SF MDM: CPT | Mod: FS

## 2022-07-04 NOTE — ED ADULT TRIAGE NOTE - CHIEF COMPLAINT QUOTE
pt presents to ED due to bee sting on right hand this AM concerned because it started to swell and shes immunocompromised benadryl taken PTA

## 2022-07-04 NOTE — ED STATDOCS - OBJECTIVE STATEMENT
73 y/o female with a PMHx of HTN, MS presents to the ED s/p bee sting to right hand this morning. Pt washed area and took Benadryl. No other complaints at this time.

## 2022-07-04 NOTE — ED STATDOCS - ATTENDING APP SHARED VISIT CONTRIBUTION OF CARE
I,Bryan Yoon MD,  performed the initial face to face bedside interview with this patient regarding history of present illness, review of symptoms and relevant past medical, social and family history.  I completed an independent physical examination.  I was the initial provider who evaluated this patient. I have signed out the follow up of any pending tests (i.e. labs, radiological studies) to the ACP.  I have communicated the patient’s plan of care and disposition with the ACP.  The history, relevant review of systems, past medical and surgical history, medical decision making, and physical examination was documented by the scribe in my presence and I attest to the accuracy of the documentation.

## 2022-07-04 NOTE — ED STATDOCS - PATIENT PORTAL LINK FT
You can access the FollowMyHealth Patient Portal offered by [facility] by registering at the following website: [email or web address]. By joining [email or web address]’s FollowMyHealth portal, you will also be able to view your health information using other applications (apps) compatible with our system.

## 2022-07-04 NOTE — ED STATDOCS - PROGRESS NOTE DETAILS
pt here with bee sting, want eval offers no complaints. pt well appearing and will fu with her PMD and knows to return to ED if needed. -Meseret Angela PA-C

## 2022-07-04 NOTE — ED STATDOCS - SKIN, MLM
skin normal color for race, warm, dry. Bee sting to dorsal aspect of right hand. No redness or streaking.

## 2022-07-12 ENCOUNTER — RX CHANGE (OUTPATIENT)
Age: 72
End: 2022-07-12

## 2022-07-20 NOTE — ED PROVIDER NOTE - ENMT, MLM
Central Venous Line w/wo PAC  Performed by: Anselmo España MD  Authorized by: Anselmo España MD     Patient Location*:  OR  Indication: central venous access    Prep*:  Chlorhexidine gluconate (CHG)  Max Sterile Barrier Technique*:  Hand Washing, Cap/Mask, Sterile gown, Sterile gloves, Sterile drape, Sterile dressing applied and Biopatch  Patient Position:  Trendelenburg  Laterality:  Left  Site:  Subclavian  Catheter Type:  Introducer  Number of Lumens*:  Double lumen  Catheter Size:  9 Fr  target vein identified, needle advanced into vein and blood aspirated and guidewire advanced into vein    Ultrasound-Guided: Yes    Ultrasound Info:  Permanent recording and image saved and vessel patent  Sterile Gel and Probe Cover Used for Ultrasound?: Yes    Intravenous Verification: verified by ultrasound, venous blood return and verified by x-ray    .: all ports aspirated, all ports flushed easily, guidewire was removed intact, biopatch was applied, line was sutured in place and dressing was applied    Events: patient tolerated procedure well with no complications    Attempts:  1  PA Catheter Placed?: Yes    Laterality:  Left  Site:  Subclavian  PA Cath Lumens:  Quadruple  Introducer Size:  9 Fr  PA Catheter Size:  8  PA Catheter Type:  Oximetric  Placement Confirmation: pressure tracing changes, verified by ANNIE and placement verified by x-ray    Events:  Patient tolerated procedure well with no complications  Performed By:  Anesthesiologist  Anesthesiologist:  Anselmo España MD     Airway patent, Nasal mucosa clear. Mouth with normal mucosa. Throat has no vesicles, no oropharyngeal exudates and uvula is midline.

## 2022-07-25 ENCOUNTER — APPOINTMENT (OUTPATIENT)
Dept: UROLOGY | Facility: CLINIC | Age: 72
End: 2022-07-25

## 2022-07-25 VITALS
WEIGHT: 173 LBS | HEART RATE: 74 BPM | DIASTOLIC BLOOD PRESSURE: 72 MMHG | HEIGHT: 63.5 IN | SYSTOLIC BLOOD PRESSURE: 151 MMHG | TEMPERATURE: 98 F | BODY MASS INDEX: 30.27 KG/M2

## 2022-07-25 PROCEDURE — 99213 OFFICE O/P EST LOW 20 MIN: CPT

## 2022-07-25 NOTE — ASSESSMENT
[FreeTextEntry1] : Recurrent UTIs\par recheck culture\par c/w estrace cream for vaginal atrophy as uti preventative\par c/w probiotics \par f/u 3 months

## 2022-07-25 NOTE — HISTORY OF PRESENT ILLNESS
[FreeTextEntry1] : 73yo F hx of MS presents for f/u\par \par Pt recently finished course of cipro for jellowjacket insect bite\par She reports since starting estrace cream, there is less vaginal dryness and she is overall feeling well.\par She denies any dysuria or hematuria or other uti symptoms since last visit\par She stopped using soap products in vaginal area\par Pt usually gets up ~1x/ night.\par \par She also reports certain foods like spicy and caffeine can irritate her bladder. \par \par

## 2022-07-25 NOTE — REVIEW OF SYSTEMS
[Dry Eyes] : dryness of the eyes [Heartburn] : heartburn [see HPI] : see HPI [Joint Pain] : joint pain [Joint Swelling] : joint swelling [Limb Weakness] : limb weakness [Difficulty Walking] : difficulty walking [Negative] : Endocrine

## 2022-07-25 NOTE — LETTER BODY
[Dear  ___] : Dear  [unfilled], [Consult Letter:] : I had the pleasure of evaluating your patient, [unfilled]. [Please see my note below.] : Please see my note below. [Consult Closing:] : Thank you very much for allowing me to participate in the care of this patient.  If you have any questions, please do not hesitate to contact me. [Sincerely,] : Sincerely, [FreeTextEntry3] : Colleen nAg, \par Genitourinary Medicine\par [facility]\par

## 2022-07-25 NOTE — PHYSICAL EXAM
[General Appearance - Well Developed] : well developed [General Appearance - Well Nourished] : well nourished [Normal Appearance] : normal appearance [Well Groomed] : well groomed [General Appearance - In No Acute Distress] : no acute distress [Abdomen Soft] : soft [Edema] : no peripheral edema [] : no respiratory distress [Respiration, Rhythm And Depth] : normal respiratory rhythm and effort [Exaggerated Use Of Accessory Muscles For Inspiration] : no accessory muscle use [Oriented To Time, Place, And Person] : oriented to person, place, and time [Affect] : the affect was normal [Mood] : the mood was normal [Not Anxious] : not anxious [Normal Station and Gait] : the gait and station were normal for the patient's age

## 2022-07-27 LAB — BACTERIA UR CULT: NORMAL

## 2022-08-17 ENCOUNTER — NON-APPOINTMENT (OUTPATIENT)
Age: 72
End: 2022-08-17

## 2022-09-07 NOTE — ED ADULT TRIAGE NOTE - BMI (KG/M2)
"ED Provider Note    CHIEF COMPLAINT  Chief Complaint   Patient presents with    Flank Pain     Right sided flank pain and lower generalized abdominal pain.    Painful Urination     Pt was recently seen at [facility] for UTI like symptoms. Pt finished a course of abx, but symptoms have since returned.        HPI  Ainsley Baker is a 33 y.o. female who presents with a chief complaint of right sided flank pain as well as bilateral lower quadrant pain. This initially started about one month ago and she was seen at a [facility] at which time she was diagnosed with a UTI and started on antibiotics. She notes that she was told she had blood in her urine. Her symptoms improved but then worsened within the past week and she complains of dysuria, urinary frequency, and urinary urgency. Her symptoms worsened today with colicky flank and abdominal pain associated with nausea but no vomiting. She denies any fevers, chest pain, shortness of breath, diarrhea, vaginal discharge, or concern for STI.    REVIEW OF SYSTEMS  See HPI for further details. Flank pain. Abdominal pain Nausea. Dysuria. Urinary frequency and urgency. All other systems are negative.     PAST MEDICAL HISTORY       SOCIAL HISTORY  Social History     Tobacco Use    Smoking status: Never    Smokeless tobacco: Never   Vaping Use    Vaping Use: Never used   Substance and Sexual Activity    Alcohol use: Yes     Comment: occ    Drug use: No    Sexual activity: Not on file       SURGICAL HISTORY   has a past surgical history that includes inguinal hernia repair child and abdominoplasty (2011).    CURRENT MEDICATIONS  Home Medications    **Home medications have not yet been reviewed for this encounter**         ALLERGIES  No Known Allergies    PHYSICAL EXAM  VITAL SIGNS: /71   Pulse 95   Temp 36.6 °C (97.8 °F) (Temporal)   Resp 16   Ht 1.575 m (5' 2\")   Wt 79.2 kg (174 lb 9.7 oz)   SpO2 97%   BMI 31.94 kg/m²    Pulse ox interpretation: I " interpret this pulse ox as normal.  Constitutional: Alert in no apparent distress.  HENT: No signs of trauma, Bilateral external ears normal, Nose normal. Tacky mucous membranes.  Eyes: Pupils are equal and reactive, Conjunctiva normal, Non-icteric.   Neck: Normal range of motion, No tenderness, Supple, No stridor.   Lymphatic: No lymphadenopathy noted.   Cardiovascular: Regular rate and rhythm, no murmurs. Pulses symmetrical.  Thorax & Lungs: Normal breath sounds, No respiratory distress, No wheezing, No chest tenderness.   Abdomen: Bowel sounds normal, Soft, Bilateral lower quadrant and suprapubic tenderness, No masses, No pulsatile masses. No peritoneal signs.  Skin: Warm, Dry, No erythema, No rash.   Back: No bony tenderness, no CVA tenderness.   Extremities: No cyanosis.  Musculoskeletal: No deformities noted.   Neurologic: Alert, No focal deficits noted.   Psychiatric: Affect normal, Judgment normal, Mood normal.     DIAGNOSTIC STUDIES / PROCEDURES    LABS  Results for orders placed or performed during the hospital encounter of 09/07/22   CBC WITH DIFFERENTIAL   Result Value Ref Range    WBC 9.8 4.8 - 10.8 K/uL    RBC 4.44 4.20 - 5.40 M/uL    Hemoglobin 13.5 12.0 - 16.0 g/dL    Hematocrit 40.7 37.0 - 47.0 %    MCV 91.7 81.4 - 97.8 fL    MCH 30.4 27.0 - 33.0 pg    MCHC 33.2 (L) 33.6 - 35.0 g/dL    RDW 45.1 35.9 - 50.0 fL    Platelet Count 315 164 - 446 K/uL    MPV 10.6 9.0 - 12.9 fL    Neutrophils-Polys 68.40 44.00 - 72.00 %    Lymphocytes 22.50 22.00 - 41.00 %    Monocytes 6.40 0.00 - 13.40 %    Eosinophils 1.90 0.00 - 6.90 %    Basophils 0.40 0.00 - 1.80 %    Immature Granulocytes 0.40 0.00 - 0.90 %    Nucleated RBC 0.00 /100 WBC    Neutrophils (Absolute) 6.69 2.00 - 7.15 K/uL    Lymphs (Absolute) 2.20 1.00 - 4.80 K/uL    Monos (Absolute) 0.63 0.00 - 0.85 K/uL    Eos (Absolute) 0.19 0.00 - 0.51 K/uL    Baso (Absolute) 0.04 0.00 - 0.12 K/uL    Immature Granulocytes (abs) 0.04 0.00 - 0.11 K/uL    NRBC  (Absolute) 0.00 K/uL   COMP METABOLIC PANEL   Result Value Ref Range    Sodium 135 135 - 145 mmol/L    Potassium 4.1 3.6 - 5.5 mmol/L    Chloride 102 96 - 112 mmol/L    Co2 24 20 - 33 mmol/L    Anion Gap 9.0 7.0 - 16.0    Glucose 101 (H) 65 - 99 mg/dL    Bun 13 8 - 22 mg/dL    Creatinine 0.55 0.50 - 1.40 mg/dL    Calcium 9.0 8.5 - 10.5 mg/dL    AST(SGOT) 13 12 - 45 U/L    ALT(SGPT) 10 2 - 50 U/L    Alkaline Phosphatase 88 30 - 99 U/L    Total Bilirubin 0.3 0.1 - 1.5 mg/dL    Albumin 4.3 3.2 - 4.9 g/dL    Total Protein 7.0 6.0 - 8.2 g/dL    Globulin 2.7 1.9 - 3.5 g/dL    A-G Ratio 1.6 g/dL   LIPASE   Result Value Ref Range    Lipase 23 11 - 82 U/L   HCG QUAL SERUM   Result Value Ref Range    Beta-Hcg Qualitative Serum Negative Negative   URINALYSIS,CULTURE IF INDICATED    Specimen: Urine   Result Value Ref Range    Color Yellow     Character Clear     Specific Gravity 1.026 <1.035    Ph 6.5 5.0 - 8.0    Glucose Negative Negative mg/dL    Ketones Negative Negative mg/dL    Protein Negative Negative mg/dL    Bilirubin Negative Negative    Urobilinogen, Urine 0.2 Negative    Nitrite Negative Negative    Leukocyte Esterase Small (A) Negative    Occult Blood Trace (A) Negative    Micro Urine Req Microscopic    ESTIMATED GFR   Result Value Ref Range    GFR (CKD-EPI) 123 >60 mL/min/1.73 m 2   URINE MICROSCOPIC (W/UA)   Result Value Ref Range    WBC 20-50 (A) /hpf    RBC 5-10 (A) /hpf    Bacteria Negative None /hpf    Epithelial Cells Negative /hpf    Hyaline Cast 0-2 /lpf     RADIOLOGY  US-PELVIC COMPLETE (TRANSABDOMINAL/TRANSVAGINAL) (COMBO)   Final Result      4.8 cm right intraovarian cyst. It is difficult to confirm vascularity within the ovary however, arterial waveform is confirmed along the periphery      IUD is centrally positioned, terminating slightly proximal to typical positioning      CT-RENAL COLIC EVALUATION(A/P W/O)   Final Result      1.  No renal stone or hydronephrosis.   2.  No secondary signs of acute  31.1 appendicitis, however the appendix is partially excluded.   3.  RIGHT ovarian cyst measuring 4.3 cm.  Consider ultrasound evaluation.   4.  IUD present, normal in position.   5.  Small RIGHT kidney cyst which no further evaluation is necessary.           COURSE & MEDICAL DECISION MAKING  Pertinent Labs & Imaging studies reviewed. (See chart for details)  This is a eleanor 33 year old female who was recently treated with antibiotics (she thinks macrobid) for what sounds like hemorrhagic cystitis now here with return, and worsening, of symptoms. DDx includes, but is not limited to, hemorrhagic cystitis, nephro/ureterolithiasis, pyelonephritis, STI, ovarian cyst, ruptured ovarian cyst, ovarian torsion, pregnancy, interstitial cystitis, ectopic pregnancy, ruptured ectopic, intraabdominal infection.    Arrives AF with normal VS. Appears dehydrated with tacky mucous membranes but non-toxic. She does have bilateral lower quadrant and suprapubic tenderness but no peritoneal signs to suggest surgical abdomen. She has no CVAT.    Patient started on IV fluids with pain and nausea medication and she had subsequent resolution of symptoms.    CBC is reassuring without leukocytosis or left shift. Lipase and metabolic panel are grossly normal with only slight hyperglycemia to 101. HCG is negative. UA reveals trace blood, small leukocyte esterase, and 20-50 WBCs.     CT scan performed to rule out nephro/ureterolithiasis. This did not demonstrate stone or hydronephrosis. Appendix was partially excluded but there were no secondary signs of acute appendicitis. Patient is diffusely tender in the lower abdomen with a negative Rovsing's sign. The tenderness in the RLQ is not greater than the tenderness elsewhere. Without fever or leukocytosis I think it is unlikely to represent appendicitis. A 4.3cm right ovarian cyst was noted and with the nausea and colicky pain I elected to move forward with an ultrasound to rule out torsion. I also  started patient on ceftriaxone and sent her urine for culture. Given her symptoms of dysuria, urinary frequency, and urinary urgency as well as recent antibiotic use I think it is reasonable to treat for UTI with possible sterile pyuria.    US reveals 4.8cm right intraovarian cyst but vascularity was difficult to confirm within the ovary. Arterial waveform was confirmed along the periphery.    I spoke with Dr. Lui, on-call gynecologist, who reviewed the images herself. She does not think this represents ovarian torsion based on my description of the patient's symptoms. She recommends close outpatient follow up and strict return precautions.    Patient was reevaluated at bedside. She is resting comfortably. We discussed lab/imaging results. Again, I feel it is reasonable to treat for UTI and have given patient prescription for keflex. [facility] was [facility] as Dr. Lui. She was given a prescription for pyridium. Discharged in good and stable condition with strict return precautions.    The patient will return for worsening symptoms and is stable at the time of discharge. The patient verbalizes understanding and will comply.    HYDRATION  HYDRATION: Based on the patient's presentation of Dehydration the patient was given IV fluids. IV Hydration was used because oral hydration was not as rapid as required. Upon recheck following hydration, the patient was improved.    FINAL IMPRESSION  1. Cyst of right ovary        2. Acute UTI  cephALEXin (KEFLEX) 500 MG Cap    phenazopyridine (PYRIDIUM) 200 MG Tab              Electronically signed by: Shin Roger M.D., 9/7/2022 11:14 AM

## 2022-09-08 ENCOUNTER — APPOINTMENT (OUTPATIENT)
Dept: OTOLARYNGOLOGY | Facility: CLINIC | Age: 72
End: 2022-09-08

## 2022-09-08 VITALS — BODY MASS INDEX: 29.95 KG/M2 | HEIGHT: 63 IN | WEIGHT: 169 LBS | TEMPERATURE: 97.6 F

## 2022-09-08 DIAGNOSIS — J31.0 CHRONIC RHINITIS: ICD-10-CM

## 2022-09-08 DIAGNOSIS — J34.3 HYPERTROPHY OF NASAL TURBINATES: ICD-10-CM

## 2022-09-08 DIAGNOSIS — J32.0 CHRONIC MAXILLARY SINUSITIS: ICD-10-CM

## 2022-09-08 PROCEDURE — 99214 OFFICE O/P EST MOD 30 MIN: CPT | Mod: 25

## 2022-09-08 PROCEDURE — 31231 NASAL ENDOSCOPY DX: CPT

## 2022-09-08 NOTE — HISTORY OF PRESENT ILLNESS
[de-identified] : Was doing well with cough but restarted cough for past several weeks.    Feels issues with mucous.  Hx of reflux and hx .  Hx of non allergic rhinitis - sees immunologist for this.  Carries epi pen.  Also hx of MS.  Patient also followed  by GI and has hx of HH repair.   On omeprazole and on flonase and azelastine.

## 2022-09-08 NOTE — PHYSICAL EXAM
[Nasal Endoscopy Performed] : nasal endoscopy was performed, see procedure section for findings [] : septum deviated to the right [Midline] : trachea located in midline position [Edentulous] : edentulous [Laryngoscopy Performed] : laryngoscopy was performed, see procedure section for findings [Normal] : no rashes [de-identified] : mod inferior turb hypertrophy

## 2022-09-08 NOTE — ASSESSMENT
[FreeTextEntry1] : Patient here for followup of cough and clearing throat.  Again has findings  of reflux and rhinitis.  Being treated for non allergic rhintis and is also followed by GI for HH and reflux.  Recommneded restart omeprazole before breakfast and added famotidine before bed.  Also started on flonase and azelastine.  Reflux diet discussed.  Follow up 3-4 mos -  discussed seeing GI for upper endo.  No evidence of sinusits

## 2022-09-15 NOTE — CONSULT LETTER
[facility] [address]  [address]  [facility] [address]  Dept Phone: [phone]      Patient: Osman Mcnulty Date: 9/15/2022     Employee's Work Status/Instructions:     May return to work with the following special restrictions on 9/15/2022: LIGHT DUTY. Limited overhead activity. Max lift and carry waist level 5 pounds. No pushing or pushing. No restraint. Careful with showering clients.... no aggressive activity.     IF THE EMPLOYER IS UNABLE TO ACCOMODATE THESE RESTRICTIONS, INDIVIDUAL SHOULD NOT WORK     Follow-Up Appointment: 4 weeks     If you cannot keep your appointment, kindly notify us at least 24 hours in advance.        Physician Signature: Phillip Alford MD             [Dear  ___] : Dear  [unfilled], [Consult Letter:] : I had the pleasure of evaluating your patient, [unfilled]. [Please see my note below.] : Please see my note below. [Consult Closing:] : Thank you very much for allowing me to participate in the care of this patient.  If you have any questions, please do not hesitate to contact me. [Sincerely,] : Sincerely, [DrKevin  ___] : Dr. LAST [FreeTextEntry2] : Dr. Massimo Worthington [FreeTextEntry3] : Susannah Davis MD\par Attending Physician, Division of Allergy and Immunology\par , Departments of Medicine and Pediatrics\par [employer] [facility]\par Brady Macias [facility] \par [facility]

## 2022-10-24 ENCOUNTER — APPOINTMENT (OUTPATIENT)
Dept: UROLOGY | Facility: CLINIC | Age: 72
End: 2022-10-24

## 2022-11-14 NOTE — ED ADULT TRIAGE NOTE - INTERNATIONAL TRAVEL
No Complex Repair And Dorsal Nasal Flap Text: The defect edges were debeveled with a #15 scalpel blade.  The primary defect was closed partially with a complex linear closure.  Given the location of the remaining defect, shape of the defect and the proximity to free margins a dorsal nasal flap was deemed most appropriate for complete closure of the defect.  Using a sterile surgical marker, an appropriate flap was drawn incorporating the defect and placing the expected incisions within the relaxed skin tension lines where possible.    The area thus outlined was incised deep to adipose tissue with a #15 scalpel blade.  The skin margins were undermined to an appropriate distance in all directions utilizing iris scissors.

## 2022-11-28 ENCOUNTER — APPOINTMENT (OUTPATIENT)
Dept: PEDIATRIC ALLERGY IMMUNOLOGY | Facility: CLINIC | Age: 72
End: 2022-11-28

## 2022-12-05 ENCOUNTER — APPOINTMENT (OUTPATIENT)
Dept: GASTROENTEROLOGY | Facility: AMBULATORY MEDICAL SERVICES | Age: 72
End: 2022-12-05

## 2022-12-05 ENCOUNTER — RESULT REVIEW (OUTPATIENT)
Age: 72
End: 2022-12-05

## 2022-12-05 PROCEDURE — 43239 EGD BIOPSY SINGLE/MULTIPLE: CPT | Mod: GC

## 2022-12-08 ENCOUNTER — OUTPATIENT (OUTPATIENT)
Dept: OUTPATIENT SERVICES | Facility: HOSPITAL | Age: 72
LOS: 1 days | End: 2022-12-08
Payer: COMMERCIAL

## 2022-12-08 VITALS
RESPIRATION RATE: 16 BRPM | OXYGEN SATURATION: 96 % | TEMPERATURE: 98 F | HEART RATE: 91 BPM | HEIGHT: 63 IN | SYSTOLIC BLOOD PRESSURE: 153 MMHG | WEIGHT: 171.96 LBS | DIASTOLIC BLOOD PRESSURE: 68 MMHG

## 2022-12-08 DIAGNOSIS — Z96.89 PRESENCE OF OTHER SPECIFIED FUNCTIONAL IMPLANTS: Chronic | ICD-10-CM

## 2022-12-08 DIAGNOSIS — Z90.710 ACQUIRED ABSENCE OF BOTH CERVIX AND UTERUS: Chronic | ICD-10-CM

## 2022-12-08 DIAGNOSIS — Z98.890 OTHER SPECIFIED POSTPROCEDURAL STATES: Chronic | ICD-10-CM

## 2022-12-08 DIAGNOSIS — Z90.49 ACQUIRED ABSENCE OF OTHER SPECIFIED PARTS OF DIGESTIVE TRACT: Chronic | ICD-10-CM

## 2022-12-08 DIAGNOSIS — C15.9 MALIGNANT NEOPLASM OF ESOPHAGUS, UNSPECIFIED: ICD-10-CM

## 2022-12-08 DIAGNOSIS — Z95.828 PRESENCE OF OTHER VASCULAR IMPLANTS AND GRAFTS: Chronic | ICD-10-CM

## 2022-12-08 LAB
ANION GAP SERPL CALC-SCNC: 5 MMOL/L — SIGNIFICANT CHANGE UP (ref 5–17)
APTT BLD: 34.8 SEC — SIGNIFICANT CHANGE UP (ref 27.5–35.5)
BASOPHILS # BLD AUTO: 0.04 K/UL — SIGNIFICANT CHANGE UP (ref 0–0.2)
BASOPHILS NFR BLD AUTO: 0.6 % — SIGNIFICANT CHANGE UP (ref 0–2)
BUN SERPL-MCNC: 26 MG/DL — HIGH (ref 7–23)
CALCIUM SERPL-MCNC: 10 MG/DL — SIGNIFICANT CHANGE UP (ref 8.5–10.1)
CHLORIDE SERPL-SCNC: 104 MMOL/L — SIGNIFICANT CHANGE UP (ref 96–108)
CO2 SERPL-SCNC: 28 MMOL/L — SIGNIFICANT CHANGE UP (ref 22–31)
CREAT SERPL-MCNC: 0.6 MG/DL — SIGNIFICANT CHANGE UP (ref 0.5–1.3)
EGFR: 95 ML/MIN/1.73M2 — SIGNIFICANT CHANGE UP
EOSINOPHIL # BLD AUTO: 0 K/UL — SIGNIFICANT CHANGE UP (ref 0–0.5)
EOSINOPHIL NFR BLD AUTO: 0 % — SIGNIFICANT CHANGE UP (ref 0–6)
GLUCOSE SERPL-MCNC: 122 MG/DL — HIGH (ref 70–99)
HCT VFR BLD CALC: 38.2 % — SIGNIFICANT CHANGE UP (ref 34.5–45)
HGB BLD-MCNC: 12.5 G/DL — SIGNIFICANT CHANGE UP (ref 11.5–15.5)
IMM GRANULOCYTES NFR BLD AUTO: 0.1 % — SIGNIFICANT CHANGE UP (ref 0–0.9)
INR BLD: 1.07 RATIO — SIGNIFICANT CHANGE UP (ref 0.88–1.16)
LYMPHOCYTES # BLD AUTO: 1.46 K/UL — SIGNIFICANT CHANGE UP (ref 1–3.3)
LYMPHOCYTES # BLD AUTO: 21.7 % — SIGNIFICANT CHANGE UP (ref 13–44)
MCHC RBC-ENTMCNC: 28.7 PG — SIGNIFICANT CHANGE UP (ref 27–34)
MCHC RBC-ENTMCNC: 32.7 GM/DL — SIGNIFICANT CHANGE UP (ref 32–36)
MCV RBC AUTO: 87.6 FL — SIGNIFICANT CHANGE UP (ref 80–100)
MONOCYTES # BLD AUTO: 0.45 K/UL — SIGNIFICANT CHANGE UP (ref 0–0.9)
MONOCYTES NFR BLD AUTO: 6.7 % — SIGNIFICANT CHANGE UP (ref 2–14)
NEUTROPHILS # BLD AUTO: 4.78 K/UL — SIGNIFICANT CHANGE UP (ref 1.8–7.4)
NEUTROPHILS NFR BLD AUTO: 70.9 % — SIGNIFICANT CHANGE UP (ref 43–77)
PLATELET # BLD AUTO: 324 K/UL — SIGNIFICANT CHANGE UP (ref 150–400)
POTASSIUM SERPL-MCNC: 3.1 MMOL/L — LOW (ref 3.5–5.3)
POTASSIUM SERPL-SCNC: 3.1 MMOL/L — LOW (ref 3.5–5.3)
PROTHROM AB SERPL-ACNC: 12.4 SEC — SIGNIFICANT CHANGE UP (ref 10.5–13.4)
RBC # BLD: 4.36 M/UL — SIGNIFICANT CHANGE UP (ref 3.8–5.2)
RBC # FLD: 14.7 % — HIGH (ref 10.3–14.5)
SARS-COV-2 RNA SPEC QL NAA+PROBE: SIGNIFICANT CHANGE UP
SODIUM SERPL-SCNC: 137 MMOL/L — SIGNIFICANT CHANGE UP (ref 135–145)
WBC # BLD: 6.74 K/UL — SIGNIFICANT CHANGE UP (ref 3.8–10.5)
WBC # FLD AUTO: 6.74 K/UL — SIGNIFICANT CHANGE UP (ref 3.8–10.5)

## 2022-12-08 PROCEDURE — 93010 ELECTROCARDIOGRAM REPORT: CPT

## 2022-12-08 PROCEDURE — 99214 OFFICE O/P EST MOD 30 MIN: CPT | Mod: 25

## 2022-12-08 PROCEDURE — 80048 BASIC METABOLIC PNL TOTAL CA: CPT

## 2022-12-08 PROCEDURE — U0005: CPT

## 2022-12-08 PROCEDURE — 85730 THROMBOPLASTIN TIME PARTIAL: CPT

## 2022-12-08 PROCEDURE — 85610 PROTHROMBIN TIME: CPT

## 2022-12-08 PROCEDURE — 93005 ELECTROCARDIOGRAM TRACING: CPT

## 2022-12-08 PROCEDURE — 85025 COMPLETE CBC W/AUTO DIFF WBC: CPT

## 2022-12-08 PROCEDURE — U0003: CPT

## 2022-12-08 NOTE — H&P PST ADULT - ASSESSMENT
72 y.o female scheduled for Endoscopic Ultrasound (EUS)   Plan:  1. NPO post midnight  2. Follow bowel prep instructions from Dr. Ocampo  3. Follow preop medication instructions from Dr. Ocampo  4. Labs, EKG as per Dr. Ocampo  5. COVID swab done today--results pending   6. Discuss case with Anesthesia Dr Franklin, no cardiac clearance necessary

## 2022-12-08 NOTE — H&P PST ADULT - NSICDXPASTSURGICALHX_GEN_ALL_CORE_FT
PAST SURGICAL HISTORY:  H/O: hysterectomy     History of cholecystectomy     History of Nissen fundoplication     Presence of implanted infusion pump     Spinal cord stimulator status

## 2022-12-08 NOTE — H&P PST ADULT - NSICDXPASTMEDICALHX_GEN_ALL_CORE_FT
PAST MEDICAL HISTORY:  Bifascicular block     Esophageal cancer     GERD (gastroesophageal reflux disease)     H/O hiatal hernia     HTN (hypertension)     MS (multiple sclerosis)     Sinus symptom     Skin cancer

## 2022-12-08 NOTE — H&P PST ADULT - RS GEN HX ROS MEA POS PC
Pneumococcal 65+ years Vaccine  Completed    AAA screen  Completed    Hepatitis C screen  Addressed    Hepatitis A vaccine  Aged Out    Hib vaccine  Aged Out    Meningococcal (ACWY) vaccine  Aged Out     Subjective:      Patient ID: Gilmer March is a 67 y.o. male. HPI  67-year-old male is seen in the office today for follow-up for his diabetes his blood sugars are running between 100-1 30 he is on Lantus and is watching his diet he also has neuropathy and is on medication from the pain clinic. Has hyperlipidemia on Zocor. Complains of  cramps in both of his legs will check potassium and magnesium he is on Lasix, has vitamin D deficiency and is taking vitamin D  Review of Systems   Constitutional: Negative for appetite change and unexpected weight change. HENT: Negative for ear pain, postnasal drip, sneezing and sore throat. Eyes: Negative for visual disturbance. Respiratory: Negative for cough, chest tightness and shortness of breath. Cardiovascular: Negative for chest pain and palpitations. Gastrointestinal: Negative for abdominal pain, constipation and nausea. Endocrine: Negative for polydipsia and polyuria. Genitourinary: Negative for frequency and urgency. Musculoskeletal: Negative for arthralgias and back pain. Skin: Negative for rash. Neurological: Negative for dizziness, syncope and headaches. Objective:   Physical Exam  Vitals signs and nursing note reviewed. Constitutional:       Appearance: He is well-developed. He is obese. Comments: /60   Pulse 74   Ht 6' (1.829 m)   Wt 228 lb 9.6 oz (103.7 kg)   SpO2 97%   BMI 31.00 kg/m²    HENT:      Head: Normocephalic. Mouth/Throat:      Mouth: Mucous membranes are moist.   Eyes:      Conjunctiva/sclera: Conjunctivae normal.   Neck:      Thyroid: No thyromegaly. Cardiovascular:      Rate and Rhythm: Normal rate and regular rhythm. Heart sounds: No murmur.    Pulmonary:      Breath sounds: Normal breath sounds. No rales. Abdominal:      Palpations: Abdomen is soft. Tenderness: There is no abdominal tenderness. Musculoskeletal:         General: No tenderness. Lymphadenopathy:      Cervical: No cervical adenopathy. Skin:     Findings: No rash. Neurological:      Mental Status: He is alert and oriented to person, place, and time. HBA1c today is 6.5    Assessment:       Diagnosis Orders   1. Type 2 diabetes mellitus with diabetic polyneuropathy, with long-term current use of insulin ([facility])   controlled  POCT glycosylated hemoglobin (Hb A1C)    Potassium   2. Hyperlipidemia, unspecified hyperlipidemia type     3. Vitamin D deficiency   on vitamin D           Plan:         Orders Placed This Encounter   Procedures    Potassium     Standing Status:   Future     Standing Expiration Date:   3/5/2021    POCT glycosylated hemoglobin (Hb A1C)     No orders of the defined types were placed in this encounter. Return in about 4 months (around 7/5/2020) for diabetes.     Continue current medications reviewed from the chart            TOMMIE RAPP cough

## 2022-12-08 NOTE — H&P PST ADULT - HISTORY OF PRESENT ILLNESS
72 y.o 72 y.o WD, WN female presents to PST with hx of a chronic cough since the Summer of 2021. She reports is subsiding briefly and then recurred this past Summer 2022. She reports a feeling of a "tickle in my throat". Patient followed with ENT and GI and had an EGD with biopsy. She states a positive cancer of the lower esophagus. She denies dysphagia . Patient now scheduled for an Endoscopic Ultrasound

## 2022-12-09 ENCOUNTER — OUTPATIENT (OUTPATIENT)
Dept: INPATIENT UNIT | Facility: HOSPITAL | Age: 72
LOS: 1 days | Discharge: HOME | End: 2022-12-09

## 2022-12-09 ENCOUNTER — APPOINTMENT (OUTPATIENT)
Dept: GASTROENTEROLOGY | Facility: HOSPITAL | Age: 72
End: 2022-12-09

## 2022-12-09 ENCOUNTER — TRANSCRIPTION ENCOUNTER (OUTPATIENT)
Age: 72
End: 2022-12-09

## 2022-12-09 VITALS
WEIGHT: 167.99 LBS | OXYGEN SATURATION: 98 % | SYSTOLIC BLOOD PRESSURE: 136 MMHG | DIASTOLIC BLOOD PRESSURE: 58 MMHG | HEIGHT: 63 IN | TEMPERATURE: 99 F | RESPIRATION RATE: 16 BRPM | HEART RATE: 85 BPM

## 2022-12-09 VITALS
SYSTOLIC BLOOD PRESSURE: 140 MMHG | OXYGEN SATURATION: 97 % | HEART RATE: 75 BPM | TEMPERATURE: 99 F | DIASTOLIC BLOOD PRESSURE: 82 MMHG | RESPIRATION RATE: 16 BRPM

## 2022-12-09 DIAGNOSIS — C15.9 MALIGNANT NEOPLASM OF ESOPHAGUS, UNSPECIFIED: ICD-10-CM

## 2022-12-09 DIAGNOSIS — Z90.49 ACQUIRED ABSENCE OF OTHER SPECIFIED PARTS OF DIGESTIVE TRACT: Chronic | ICD-10-CM

## 2022-12-09 DIAGNOSIS — Z98.890 OTHER SPECIFIED POSTPROCEDURAL STATES: Chronic | ICD-10-CM

## 2022-12-09 DIAGNOSIS — Z90.710 ACQUIRED ABSENCE OF BOTH CERVIX AND UTERUS: Chronic | ICD-10-CM

## 2022-12-09 DIAGNOSIS — Z95.828 PRESENCE OF OTHER VASCULAR IMPLANTS AND GRAFTS: Chronic | ICD-10-CM

## 2022-12-09 DIAGNOSIS — Z96.89 PRESENCE OF OTHER SPECIFIED FUNCTIONAL IMPLANTS: Chronic | ICD-10-CM

## 2022-12-09 PROCEDURE — 43259 EGD US EXAM DUODENUM/JEJUNUM: CPT

## 2022-12-09 NOTE — ASU PREOP CHECKLIST - HEIGHT IN FEET
Detail Level: Detailed Quality 110: Preventive Care And Screening: Influenza Immunization: Influenza Immunization previously received during influenza season 5

## 2022-12-09 NOTE — ASU PATIENT PROFILE, ADULT - FALL HARM RISK - RISK INTERVENTIONS

## 2022-12-09 NOTE — ASU PREOP CHECKLIST - WAS PATIENT ON BETA BLOCKER?
[place] [id number] [phone]        Ramakrishna Marshall   [place] [address]        April 1, 2021    Dear Ramakrishna,    We have received a referral request from Dr. Aceves to schedule a colonoscopy with [facility] .  Your health care is very important to us, and preventative care is the first step to good health.    Please contact our office at [phone] to inform us if you would or would not like to pursue this service.    If you have already returned our call and/or scheduled an appointment, please disregard this letter.      Sincerely,      Electronically signed by:  Tyrell Tapia,   [facility], [facility]   [address] [place] [address]  Dept Phone: [phone]        No

## 2022-12-09 NOTE — ASU DISCHARGE PLAN (ADULT/PEDIATRIC) - NURSING INSTRUCTIONS
Refer to the multicolored fact sheet for any problems you experience. If you have difficulty urinating or unable to urinate in 8 hours after surgery, call your Dr., or return to  emergency room.  Notify your Dr. if your fever is 101 or greater. If the pain medicine your  recjanellends does not help you, or you have severe pain call your Dr.. If you cannot reach the doctor, call [facility] Emergency Department at [phone] or go to your local Emergency Department. A responsible adult should be with you for the rest of the day and night for your safety, and to help you. Apply ice to affected area 20min on and 20min off for the  first 24-48 hours. Do not apply directly to skin, place barrier between ice and skin.  Resume your medications as listed on the attached Medication Record.

## 2022-12-12 PROBLEM — I45.2 BIFASCICULAR BLOCK: Chronic | Status: ACTIVE | Noted: 2022-12-08

## 2022-12-12 PROBLEM — K21.9 GASTRO-ESOPHAGEAL REFLUX DISEASE WITHOUT ESOPHAGITIS: Chronic | Status: ACTIVE | Noted: 2022-12-08

## 2022-12-12 PROBLEM — C15.9 MALIGNANT NEOPLASM OF ESOPHAGUS, UNSPECIFIED: Chronic | Status: ACTIVE | Noted: 2022-12-08

## 2022-12-12 PROBLEM — C44.90 UNSPECIFIED MALIGNANT NEOPLASM OF SKIN, UNSPECIFIED: Chronic | Status: ACTIVE | Noted: 2022-12-08

## 2022-12-12 PROBLEM — Z87.19 PERSONAL HISTORY OF OTHER DISEASES OF THE DIGESTIVE SYSTEM: Chronic | Status: ACTIVE | Noted: 2022-12-08

## 2022-12-12 PROBLEM — R09.89 OTHER SPECIFIED SYMPTOMS AND SIGNS INVOLVING THE CIRCULATORY AND RESPIRATORY SYSTEMS: Chronic | Status: ACTIVE | Noted: 2022-12-08

## 2022-12-13 ENCOUNTER — OUTPATIENT (OUTPATIENT)
Dept: OUTPATIENT SERVICES | Facility: HOSPITAL | Age: 72
LOS: 1 days | Discharge: HOME | End: 2022-12-13

## 2022-12-13 ENCOUNTER — APPOINTMENT (OUTPATIENT)
Dept: OTOLARYNGOLOGY | Facility: CLINIC | Age: 72
End: 2022-12-13

## 2022-12-13 VITALS — WEIGHT: 169 LBS | HEIGHT: 63 IN | BODY MASS INDEX: 29.95 KG/M2 | TEMPERATURE: 96.7 F

## 2022-12-13 DIAGNOSIS — Z95.828 PRESENCE OF OTHER VASCULAR IMPLANTS AND GRAFTS: Chronic | ICD-10-CM

## 2022-12-13 DIAGNOSIS — Z98.890 OTHER SPECIFIED POSTPROCEDURAL STATES: Chronic | ICD-10-CM

## 2022-12-13 DIAGNOSIS — Z90.710 ACQUIRED ABSENCE OF BOTH CERVIX AND UTERUS: Chronic | ICD-10-CM

## 2022-12-13 DIAGNOSIS — C15.9 MALIGNANT NEOPLASM OF ESOPHAGUS, UNSPECIFIED: ICD-10-CM

## 2022-12-13 DIAGNOSIS — Z90.49 ACQUIRED ABSENCE OF OTHER SPECIFIED PARTS OF DIGESTIVE TRACT: Chronic | ICD-10-CM

## 2022-12-13 DIAGNOSIS — Z96.89 PRESENCE OF OTHER SPECIFIED FUNCTIONAL IMPLANTS: Chronic | ICD-10-CM

## 2022-12-13 DIAGNOSIS — R05.9 COUGH, UNSPECIFIED: ICD-10-CM

## 2022-12-13 PROCEDURE — 99214 OFFICE O/P EST MOD 30 MIN: CPT

## 2022-12-13 NOTE — ASSESSMENT
[FreeTextEntry1] : Patient here for follow up of cough and reflux.  These sx improved with meds however did see GI MD as recommended and had upper endo which showed small distal esophageal ca acc to patient - She will be treated for this with surgery and chemo acc to patient.  She will stay on reflux meds as per GI MD - larynx exam today improved on indirect .  Follow up in 3-4 mos.

## 2022-12-13 NOTE — PHYSICAL EXAM
[] : septum deviated to the right [Midline] : trachea located in midline position [Edentulous] : edentulous [Laryngoscopy Performed] : laryngoscopy was performed, see procedure section for findings [Normal] : no rashes [de-identified] : mod inferior turb hypertrophy  [de-identified] : indirect exam - mod post glottic and interartytenoid edema

## 2022-12-13 NOTE — HISTORY OF PRESENT ILLNESS
[de-identified] : Here for follow up of cough and issues with reflux.  Did have upper endoscopy - had malignancy in esophaphagus at distal esophagus at area of .  Sched to be seen at cancer center - will be seeing oncology - will need surgery and chemo - \par Patient was also advised by GI MD (Dr Bender) to continue reflux diet and meds.

## 2022-12-14 ENCOUNTER — APPOINTMENT (OUTPATIENT)
Dept: RADIATION ONCOLOGY | Facility: CLINIC | Age: 72
End: 2022-12-14
Payer: COMMERCIAL

## 2022-12-14 VITALS
OXYGEN SATURATION: 96 % | RESPIRATION RATE: 23 BRPM | DIASTOLIC BLOOD PRESSURE: 71 MMHG | BODY MASS INDEX: 30.3 KG/M2 | WEIGHT: 171 LBS | SYSTOLIC BLOOD PRESSURE: 149 MMHG | HEIGHT: 63 IN | HEART RATE: 84 BPM

## 2022-12-14 DIAGNOSIS — Z91.010 ALLERGY TO PEANUTS: ICD-10-CM

## 2022-12-14 DIAGNOSIS — G35 MULTIPLE SCLEROSIS: ICD-10-CM

## 2022-12-14 DIAGNOSIS — Z88.5 ALLERGY STATUS TO NARCOTIC AGENT: ICD-10-CM

## 2022-12-14 DIAGNOSIS — Z88.8 ALLERGY STATUS TO OTHER DRUGS, MEDICAMENTS AND BIOLOGICAL SUBSTANCES STATUS: ICD-10-CM

## 2022-12-14 DIAGNOSIS — Z88.0 ALLERGY STATUS TO PENICILLIN: ICD-10-CM

## 2022-12-14 DIAGNOSIS — I10 ESSENTIAL (PRIMARY) HYPERTENSION: ICD-10-CM

## 2022-12-14 DIAGNOSIS — I45.2 BIFASCICULAR BLOCK: ICD-10-CM

## 2022-12-14 DIAGNOSIS — C15.5 MALIGNANT NEOPLASM OF LOWER THIRD OF ESOPHAGUS: ICD-10-CM

## 2022-12-14 DIAGNOSIS — K44.9 DIAPHRAGMATIC HERNIA WITHOUT OBSTRUCTION OR GANGRENE: ICD-10-CM

## 2022-12-14 DIAGNOSIS — K22.89 OTHER SPECIFIED DISEASE OF ESOPHAGUS: ICD-10-CM

## 2022-12-14 DIAGNOSIS — C16.0 MALIGNANT NEOPLASM OF CARDIA: ICD-10-CM

## 2022-12-14 DIAGNOSIS — Z91.018 ALLERGY TO OTHER FOODS: ICD-10-CM

## 2022-12-14 DIAGNOSIS — K21.9 GASTRO-ESOPHAGEAL REFLUX DISEASE WITHOUT ESOPHAGITIS: ICD-10-CM

## 2022-12-14 PROCEDURE — 99204 OFFICE O/P NEW MOD 45 MIN: CPT | Mod: 25

## 2022-12-14 NOTE — REVIEW OF SYSTEMS
[Chest Pain] : chest pain [Cough] : cough [Constipation] : constipation [Diarrhea] : diarrhea [Joint Pain] : joint pain [Gait Disturbance] : gait disturbance [Negative] : Heme/Lymph [Constipation: Grade 1 - Occasional or intermittent symptoms; occasional use of stool softeners, laxatives, dietary modification, or enema] : Constipation: Grade 1 - Occasional or intermittent symptoms; occasional use of stool softeners, laxatives, dietary modification, or enema [Diarrhea: Grade 1 - Increase of <4 stools per day over baseline; mild increase in ostomy output compared to baseline] : Diarrhea: Grade 1 - Increase of <4 stools per day over baseline; mild increase in ostomy output compared to baseline [Dysphagia: Grade 0] : Dysphagia: Grade 0 [Fatigue: Grade 1 - Fatigue relieved by rest] : Fatigue: Grade 1 - Fatigue relieved by rest [Cough: Grade 1 - Mild symptoms; nonprescription intervention indicated] : Cough: Grade 1 - Mild symptoms; nonprescription intervention indicated [FreeTextEntry1] : dry

## 2022-12-14 NOTE — VITALS
[Maximal Pain Intensity: 0/10] : 0/10 [Least Pain Intensity: 0/10] : 0/10 [ECOG Performance Status: 0 - Fully active, able to carry on all pre-disease performance without restriction] : Performance Status: 0 - Fully active, able to carry on all pre-disease performance without restriction [Date: ____________] : Patient's last distress assessment performed on [unfilled]. [0 - No Distress] : Distress Level: 0 [70: Cares for self; unalbe to carry on normal activity or do active work.] : 70: Cares for self; unable to carry on normal activity or do active work.

## 2022-12-14 NOTE — PHYSICAL EXAM
[Oropharynx] : The oropharynx was normal [Normal] : oriented to person, place and time, the affect was normal, the mood was normal and not anxious [Oriented To Time, Place, And Person] : oriented to person, place, and time [de-identified] : alopecia [de-identified] : edentulous [de-identified] : irregular gait

## 2022-12-14 NOTE — HISTORY OF PRESENT ILLNESS
[FreeTextEntry1] : This is a 72 year old female diagnosed with esophageal cancer in December 2022.\par \par She has a history of multiple sclerosis diagnosed in 1988 with periodic flares over the years. She also had hiatal hernia repair about 20 years ago, as well as GERD and dyspepsia. \par \par She presented to Dr. Worthington in September with complaints of coughing, clearing throat, and increased mucus which she believed were related to seasonal allergies. She was recommended to undergo endoscopy.. \par \par She underwent endoscopy with Dr. Bender on 12/5/22. Grade A esophagitis in the GE junction compatible with reflux esophagitis. Erythema in the antrum compatible with non-erosive gastritis. Biopsy of the GE junction ulceration showed infiltrating adenocarcinoma. Biopsy of the stomach was negative.\par \par Sonographic sophagogastroduodenoscopy performed on 12/9/22 by Dr. Ocampo showed a small ulcerated mass found in the lower third of the esophagus, 35 cm from the incisors. The mass was non-obstructing and not circumferential. A 3 cm hiatal hernia was present. Staging T3 N0\par She has not yet had any other imaging studies performed.  She has a neurostimulator in the left lower flank which is no longer functioning.  She is able to have MRIs superior to the neurotransmitter.\par \par She denies dysphagia and reports an intentional 50 pound weight loss over the past few years.  She is edentulous with dentures due to a reaction to actiq which she was taking for her multiple sclerosis years ago.  She also has alopecia.  She does not drive and is living currently with her 97-year-old mother and her cousin Luisa in [place].  Her permanent residence is [place] with her . She presents to discuss the role of radiation therapy in her care.

## 2022-12-19 ENCOUNTER — RESULT REVIEW (OUTPATIENT)
Age: 72
End: 2022-12-19

## 2022-12-19 ENCOUNTER — APPOINTMENT (OUTPATIENT)
Dept: HEMATOLOGY ONCOLOGY | Facility: CLINIC | Age: 72
End: 2022-12-19

## 2022-12-19 ENCOUNTER — NON-APPOINTMENT (OUTPATIENT)
Age: 72
End: 2022-12-19

## 2022-12-19 VITALS
HEIGHT: 61.81 IN | RESPIRATION RATE: 20 BRPM | SYSTOLIC BLOOD PRESSURE: 159 MMHG | OXYGEN SATURATION: 97 % | DIASTOLIC BLOOD PRESSURE: 71 MMHG | TEMPERATURE: 98.2 F | HEART RATE: 82 BPM | WEIGHT: 176 LBS | BODY MASS INDEX: 32.39 KG/M2

## 2022-12-19 LAB
BASOPHILS # BLD AUTO: 0.05 K/UL — SIGNIFICANT CHANGE UP (ref 0–0.2)
BASOPHILS NFR BLD AUTO: 0.7 % — SIGNIFICANT CHANGE UP (ref 0–2)
EOSINOPHIL # BLD AUTO: 0.08 K/UL — SIGNIFICANT CHANGE UP (ref 0–0.5)
EOSINOPHIL NFR BLD AUTO: 1.2 % — SIGNIFICANT CHANGE UP (ref 0–6)
HCT VFR BLD CALC: 37.7 % — SIGNIFICANT CHANGE UP (ref 34.5–45)
HGB BLD-MCNC: 12.5 G/DL — SIGNIFICANT CHANGE UP (ref 11.5–15.5)
IMM GRANULOCYTES NFR BLD AUTO: 0.3 % — SIGNIFICANT CHANGE UP (ref 0–0.9)
LYMPHOCYTES # BLD AUTO: 2.24 K/UL — SIGNIFICANT CHANGE UP (ref 1–3.3)
LYMPHOCYTES # BLD AUTO: 32.2 % — SIGNIFICANT CHANGE UP (ref 13–44)
MCHC RBC-ENTMCNC: 29.4 PG — SIGNIFICANT CHANGE UP (ref 27–34)
MCHC RBC-ENTMCNC: 33.2 GM/DL — SIGNIFICANT CHANGE UP (ref 32–36)
MCV RBC AUTO: 88.7 FL — SIGNIFICANT CHANGE UP (ref 80–100)
MONOCYTES # BLD AUTO: 0.64 K/UL — SIGNIFICANT CHANGE UP (ref 0–0.9)
MONOCYTES NFR BLD AUTO: 9.2 % — SIGNIFICANT CHANGE UP (ref 2–14)
NEUTROPHILS # BLD AUTO: 3.92 K/UL — SIGNIFICANT CHANGE UP (ref 1.8–7.4)
NEUTROPHILS NFR BLD AUTO: 56.4 % — SIGNIFICANT CHANGE UP (ref 43–77)
NRBC # BLD: 0 /100 WBCS — SIGNIFICANT CHANGE UP (ref 0–0)
PLATELET # BLD AUTO: 340 K/UL — SIGNIFICANT CHANGE UP (ref 150–400)
RBC # BLD: 4.25 M/UL — SIGNIFICANT CHANGE UP (ref 3.8–5.2)
RBC # FLD: 14.9 % — HIGH (ref 10.3–14.5)
WBC # BLD: 6.95 K/UL — SIGNIFICANT CHANGE UP (ref 3.8–10.5)
WBC # FLD AUTO: 6.95 K/UL — SIGNIFICANT CHANGE UP (ref 3.8–10.5)

## 2022-12-19 PROCEDURE — 99205 OFFICE O/P NEW HI 60 MIN: CPT

## 2022-12-19 RX ORDER — FLUTICASONE PROPIONATE 50 UG/1
50 SPRAY, METERED NASAL DAILY
Qty: 1 | Refills: 1 | Status: DISCONTINUED | COMMUNITY
Start: 2022-02-28 | End: 2022-12-19

## 2022-12-19 RX ORDER — AZELASTINE HYDROCHLORIDE 137 UG/1
0.1 SPRAY, METERED NASAL TWICE DAILY
Qty: 1 | Refills: 1 | Status: DISCONTINUED | COMMUNITY
Start: 2022-02-28 | End: 2022-12-19

## 2022-12-19 RX ORDER — FAMOTIDINE 20 MG/1
20 TABLET, FILM COATED ORAL
Qty: 90 | Refills: 3 | Status: DISCONTINUED | COMMUNITY
Start: 2022-03-29 | End: 2022-12-19

## 2022-12-19 RX ORDER — OMEPRAZOLE 40 MG/1
40 CAPSULE, DELAYED RELEASE ORAL
Qty: 60 | Refills: 1 | Status: DISCONTINUED | COMMUNITY
Start: 2022-02-28 | End: 2022-12-19

## 2022-12-19 RX ORDER — FAMOTIDINE 20 MG/1
20 TABLET, FILM COATED ORAL
Qty: 30 | Refills: 0 | Status: DISCONTINUED | COMMUNITY
Start: 2022-02-28 | End: 2022-12-19

## 2022-12-19 NOTE — PHYSICAL EXAM
[Ambulatory and capable of all self care but unable to carry out any work activities] : Status 2- Ambulatory and capable of all self care but unable to carry out any work activities. Up and about more than 50% of waking hours [Normal] : affect appropriate [de-identified] : edentulous [de-identified] : + grade 2 murmur  [de-identified] : chronic MS symptoms

## 2022-12-19 NOTE — REVIEW OF SYSTEMS
[Joint Pain] : joint pain [Muscle Pain] : muscle pain [Muscle Weakness] : muscle weakness [Negative] : Allergic/Immunologic [Recent Change In Weight] : ~T no recent weight change [Dysphagia] : no dysphagia [Chest Pain] : no chest pain [Shortness Of Breath] : no shortness of breath [Abdominal Pain] : no abdominal pain [FreeTextEntry9] : chronic from MS

## 2022-12-19 NOTE — RESULTS/DATA
[FreeTextEntry1] : 12/9/22 EUS: Hiatal hernia. Small mass at the GE junction. T3N0Mx on this exam. \par \par 12/5/22 Path: Stomach, biopsy: Benign gastric mucosa with minimal chronic inflammation and congestion in the lamina propria. A Diff-Quik stain for Helicobacter pylori is negative. Gastroesophageal junction, biopsy: Infiltrating adenocarcinoma. An immunohistochemical stain for HER2/luz is 0-1 +/negative. No loss of nuclear expression of MMR proteins (MLH1, MSH2, MSH6, and PMS2). These results show low probability of microsatellite instability-high (MSI-H).\par \par 12/5/22 EDG: Normal duodenum. Hiatal hernia. Grade A esophagitis in the gastroesophageal junction compatible with reflux esophagitis. (biopsy). Erythema in the antrum compatible with non-erosive gastritis. (biopsy).

## 2022-12-19 NOTE — CONSULT LETTER
[Dear  ___] : Dear  [unfilled], [Consult Letter:] : I had the pleasure of evaluating your patient, [unfilled]. [Consult Closing:] : Thank you very much for allowing me to participate in the care of this patient.  If you have any questions, please do not hesitate to contact me. [Sincerely,] : Sincerely, [DrKevin  ___] : Dr. LAST [DrKevin ___] : Dr. LAST [FreeTextEntry3] : Dr. Tiffanie Tripp

## 2022-12-19 NOTE — HISTORY OF PRESENT ILLNESS
[de-identified] : The patient was diagnosed with esophageal adenocarcinoma in December 2022 at the age of 72. She presented to Dr. Worthington in September with complaints of coughing, clearing throat, and increased mucus. On 12/5/22, she underwent an EGD with Dr. Bender which showed grade A esophagitis in the gastroesophageal junction compatible with reflux esophagitis. Erythema in the antrum was compatible with non-erosive gastritis. Pathology of the gastroesophageal junction biopsy showed infiltrating adenocarcinoma. An immunohistochemical stain for HER2/luz is 0-1 +/negative. No loss of nuclear expression of MMR proteins (MLH1, MSH2, MSH6, and PMS2). AnEUS on 12/9/22 by Dr. Ocampo showed a small ulcerated mass in the lower third of the esophagus, 35 cm from the incisors. The mass was non-obstructing and not circumferential. A 3 cm hiatal hernia was present. Staging uT3N0.  [de-identified] : Medical Hx: multiple sclerosis diagnosed in 1988 with periodic flares over the years; GERD and dyspepsia; HTN; bifascicular block (signal block); HL; atypical basal cell carcinoma [id number])\par Surgical Hx: hiatal hernia repair 1983;  cholecystectomy; many D &C's leading up to hysterectomy 1983; She has a neurostimulator in the left lower flank which is no longer functioning, wires trace up to her upper thoracic spine. \par Family Hx: mother colon ca; father multiple myeloma \par Social Hx: was a social smoker, quit smoking 25 years ago; ETOH social 1 drink 2 times weekly;  ( in [place]); no children; family close by, lives with her 89 yo mother currently.  She does not drive and is living currently with her 97-year-old mother and her cousin Luisa in [place]. Her permanent residence is [place] with her .  [de-identified] : She c/o cough that had become worse over the last year. She has diffuse chronic 6/10 pain / neuropathy from MS, no esophageal pain. She denies dysphagia and reports an intentional 50 pound weight loss over the past few years. She is edentulous with dentures due to a reaction to actiq which she was taking for her multiple sclerosis years ago. She also has alopecia.\par

## 2022-12-20 DIAGNOSIS — Z86.79 PERSONAL HISTORY OF OTHER DISEASES OF THE CIRCULATORY SYSTEM: ICD-10-CM

## 2022-12-20 DIAGNOSIS — G35 MULTIPLE SCLEROSIS: ICD-10-CM

## 2022-12-20 DIAGNOSIS — I45.4 NONSPECIFIC INTRAVENTRICULAR BLOCK: ICD-10-CM

## 2022-12-20 LAB
ALBUMIN SERPL ELPH-MCNC: 4.7 G/DL — SIGNIFICANT CHANGE UP (ref 3.3–5)
ALP SERPL-CCNC: 86 U/L — SIGNIFICANT CHANGE UP (ref 40–120)
ALT FLD-CCNC: 22 U/L — SIGNIFICANT CHANGE UP (ref 10–45)
ANION GAP SERPL CALC-SCNC: 11 MMOL/L — SIGNIFICANT CHANGE UP (ref 5–17)
AST SERPL-CCNC: 19 U/L — SIGNIFICANT CHANGE UP (ref 10–40)
BILIRUB SERPL-MCNC: 0.3 MG/DL — SIGNIFICANT CHANGE UP (ref 0.2–1.2)
BUN SERPL-MCNC: 22 MG/DL — SIGNIFICANT CHANGE UP (ref 7–23)
CALCIUM SERPL-MCNC: 10.2 MG/DL — SIGNIFICANT CHANGE UP (ref 8.4–10.5)
CHLORIDE SERPL-SCNC: 100 MMOL/L — SIGNIFICANT CHANGE UP (ref 96–108)
CO2 SERPL-SCNC: 30 MMOL/L — SIGNIFICANT CHANGE UP (ref 22–31)
CREAT SERPL-MCNC: 0.63 MG/DL — SIGNIFICANT CHANGE UP (ref 0.5–1.3)
EGFR: 94 ML/MIN/1.73M2 — SIGNIFICANT CHANGE UP
GLUCOSE SERPL-MCNC: 110 MG/DL — HIGH (ref 70–99)
MAGNESIUM SERPL-MCNC: 2.2 MG/DL — SIGNIFICANT CHANGE UP (ref 1.6–2.6)
POTASSIUM SERPL-MCNC: 4.3 MMOL/L — SIGNIFICANT CHANGE UP (ref 3.5–5.3)
POTASSIUM SERPL-SCNC: 4.3 MMOL/L — SIGNIFICANT CHANGE UP (ref 3.5–5.3)
PROT SERPL-MCNC: 6.9 G/DL — SIGNIFICANT CHANGE UP (ref 6–8.3)
SODIUM SERPL-SCNC: 141 MMOL/L — SIGNIFICANT CHANGE UP (ref 135–145)
T3FREE SERPL-MCNC: 2.79 PG/ML — SIGNIFICANT CHANGE UP (ref 2–4.4)
T4 FREE SERPL-MCNC: 1.4 NG/DL — SIGNIFICANT CHANGE UP (ref 0.9–1.8)
TSH SERPL-MCNC: 2.17 UIU/ML — SIGNIFICANT CHANGE UP (ref 0.27–4.2)

## 2022-12-21 ENCOUNTER — NON-APPOINTMENT (OUTPATIENT)
Age: 72
End: 2022-12-21

## 2022-12-28 ENCOUNTER — OUTPATIENT (OUTPATIENT)
Dept: OUTPATIENT SERVICES | Facility: HOSPITAL | Age: 72
LOS: 1 days | End: 2022-12-28
Payer: COMMERCIAL

## 2022-12-28 ENCOUNTER — APPOINTMENT (OUTPATIENT)
Dept: NUCLEAR MEDICINE | Facility: CLINIC | Age: 72
End: 2022-12-28
Payer: COMMERCIAL

## 2022-12-28 DIAGNOSIS — C15.9 MALIGNANT NEOPLASM OF ESOPHAGUS, UNSPECIFIED: ICD-10-CM

## 2022-12-28 DIAGNOSIS — Z95.828 PRESENCE OF OTHER VASCULAR IMPLANTS AND GRAFTS: Chronic | ICD-10-CM

## 2022-12-28 DIAGNOSIS — Z90.710 ACQUIRED ABSENCE OF BOTH CERVIX AND UTERUS: Chronic | ICD-10-CM

## 2022-12-28 DIAGNOSIS — Z90.49 ACQUIRED ABSENCE OF OTHER SPECIFIED PARTS OF DIGESTIVE TRACT: Chronic | ICD-10-CM

## 2022-12-28 DIAGNOSIS — Z98.890 OTHER SPECIFIED POSTPROCEDURAL STATES: Chronic | ICD-10-CM

## 2022-12-28 DIAGNOSIS — Z96.89 PRESENCE OF OTHER SPECIFIED FUNCTIONAL IMPLANTS: Chronic | ICD-10-CM

## 2022-12-28 PROCEDURE — A9552: CPT

## 2022-12-28 PROCEDURE — 78815 PET IMAGE W/CT SKULL-THIGH: CPT | Mod: 26,PI,MH

## 2022-12-28 PROCEDURE — 78815 PET IMAGE W/CT SKULL-THIGH: CPT

## 2023-01-03 PROCEDURE — 77263 THER RADIOLOGY TX PLNG CPLX: CPT

## 2023-01-06 ENCOUNTER — RESULT REVIEW (OUTPATIENT)
Age: 73
End: 2023-01-06

## 2023-01-06 ENCOUNTER — APPOINTMENT (OUTPATIENT)
Dept: INFUSION THERAPY | Facility: CLINIC | Age: 73
End: 2023-01-06

## 2023-01-06 LAB
BASOPHILS # BLD AUTO: 0.04 K/UL — SIGNIFICANT CHANGE UP (ref 0–0.2)
BASOPHILS NFR BLD AUTO: 0.7 % — SIGNIFICANT CHANGE UP (ref 0–2)
EOSINOPHIL # BLD AUTO: 0.05 K/UL — SIGNIFICANT CHANGE UP (ref 0–0.5)
EOSINOPHIL NFR BLD AUTO: 0.8 % — SIGNIFICANT CHANGE UP (ref 0–6)
HCT VFR BLD CALC: 39.3 % — SIGNIFICANT CHANGE UP (ref 34.5–45)
HGB BLD-MCNC: 13.2 G/DL — SIGNIFICANT CHANGE UP (ref 11.5–15.5)
IMM GRANULOCYTES NFR BLD AUTO: 0.3 % — SIGNIFICANT CHANGE UP (ref 0–0.9)
LYMPHOCYTES # BLD AUTO: 1.78 K/UL — SIGNIFICANT CHANGE UP (ref 1–3.3)
LYMPHOCYTES # BLD AUTO: 29.6 % — SIGNIFICANT CHANGE UP (ref 13–44)
MCHC RBC-ENTMCNC: 29.5 PG — SIGNIFICANT CHANGE UP (ref 27–34)
MCHC RBC-ENTMCNC: 33.6 GM/DL — SIGNIFICANT CHANGE UP (ref 32–36)
MCV RBC AUTO: 87.9 FL — SIGNIFICANT CHANGE UP (ref 80–100)
MONOCYTES # BLD AUTO: 0.49 K/UL — SIGNIFICANT CHANGE UP (ref 0–0.9)
MONOCYTES NFR BLD AUTO: 8.1 % — SIGNIFICANT CHANGE UP (ref 2–14)
NEUTROPHILS # BLD AUTO: 3.64 K/UL — SIGNIFICANT CHANGE UP (ref 1.8–7.4)
NEUTROPHILS NFR BLD AUTO: 60.5 % — SIGNIFICANT CHANGE UP (ref 43–77)
NRBC # BLD: 0 /100 WBCS — SIGNIFICANT CHANGE UP (ref 0–0)
PLATELET # BLD AUTO: 313 K/UL — SIGNIFICANT CHANGE UP (ref 150–400)
RBC # BLD: 4.47 M/UL — SIGNIFICANT CHANGE UP (ref 3.8–5.2)
RBC # FLD: 15.1 % — HIGH (ref 10.3–14.5)
WBC # BLD: 6.02 K/UL — SIGNIFICANT CHANGE UP (ref 3.8–10.5)
WBC # FLD AUTO: 6.02 K/UL — SIGNIFICANT CHANGE UP (ref 3.8–10.5)

## 2023-01-06 PROCEDURE — 77333 RADIATION TREATMENT AID(S): CPT

## 2023-01-07 LAB
ALBUMIN SERPL ELPH-MCNC: 4.6 G/DL — SIGNIFICANT CHANGE UP (ref 3.3–5)
ALP SERPL-CCNC: 85 U/L — SIGNIFICANT CHANGE UP (ref 40–120)
ALT FLD-CCNC: 22 U/L — SIGNIFICANT CHANGE UP (ref 10–45)
ANION GAP SERPL CALC-SCNC: 14 MMOL/L — SIGNIFICANT CHANGE UP (ref 5–17)
AST SERPL-CCNC: 31 U/L — SIGNIFICANT CHANGE UP (ref 10–40)
BILIRUB SERPL-MCNC: 0.5 MG/DL — SIGNIFICANT CHANGE UP (ref 0.2–1.2)
BUN SERPL-MCNC: 20 MG/DL — SIGNIFICANT CHANGE UP (ref 7–23)
CALCIUM SERPL-MCNC: 10.4 MG/DL — SIGNIFICANT CHANGE UP (ref 8.4–10.5)
CHLORIDE SERPL-SCNC: 101 MMOL/L — SIGNIFICANT CHANGE UP (ref 96–108)
CO2 SERPL-SCNC: 25 MMOL/L — SIGNIFICANT CHANGE UP (ref 22–31)
CREAT SERPL-MCNC: 0.56 MG/DL — SIGNIFICANT CHANGE UP (ref 0.5–1.3)
EGFR: 96 ML/MIN/1.73M2 — SIGNIFICANT CHANGE UP
GLUCOSE SERPL-MCNC: 86 MG/DL — SIGNIFICANT CHANGE UP (ref 70–99)
MAGNESIUM SERPL-MCNC: 2.2 MG/DL — SIGNIFICANT CHANGE UP (ref 1.6–2.6)
POTASSIUM SERPL-MCNC: 3.8 MMOL/L — SIGNIFICANT CHANGE UP (ref 3.5–5.3)
POTASSIUM SERPL-SCNC: 3.8 MMOL/L — SIGNIFICANT CHANGE UP (ref 3.5–5.3)
PROT SERPL-MCNC: 7.6 G/DL — SIGNIFICANT CHANGE UP (ref 6–8.3)
SODIUM SERPL-SCNC: 139 MMOL/L — SIGNIFICANT CHANGE UP (ref 135–145)

## 2023-01-09 DIAGNOSIS — R11.2 NAUSEA WITH VOMITING, UNSPECIFIED: ICD-10-CM

## 2023-01-09 DIAGNOSIS — Z51.11 ENCOUNTER FOR ANTINEOPLASTIC CHEMOTHERAPY: ICD-10-CM

## 2023-01-11 ENCOUNTER — RX RENEWAL (OUTPATIENT)
Age: 73
End: 2023-01-11

## 2023-01-12 PROCEDURE — 77301 RADIOTHERAPY DOSE PLAN IMRT: CPT

## 2023-01-12 PROCEDURE — 77293 RESPIRATOR MOTION MGMT SIMUL: CPT

## 2023-01-12 PROCEDURE — 77338 DESIGN MLC DEVICE FOR IMRT: CPT

## 2023-01-12 PROCEDURE — 77300 RADIATION THERAPY DOSE PLAN: CPT

## 2023-01-13 ENCOUNTER — RESULT REVIEW (OUTPATIENT)
Age: 73
End: 2023-01-13

## 2023-01-13 ENCOUNTER — APPOINTMENT (OUTPATIENT)
Dept: INFUSION THERAPY | Facility: CLINIC | Age: 73
End: 2023-01-13

## 2023-01-13 ENCOUNTER — APPOINTMENT (OUTPATIENT)
Dept: HEMATOLOGY ONCOLOGY | Facility: CLINIC | Age: 73
End: 2023-01-13
Payer: COMMERCIAL

## 2023-01-13 VITALS
OXYGEN SATURATION: 97 % | TEMPERATURE: 98.1 F | BODY MASS INDEX: 32.53 KG/M2 | WEIGHT: 172.31 LBS | RESPIRATION RATE: 18 BRPM | HEART RATE: 90 BPM | HEIGHT: 61 IN

## 2023-01-13 LAB
ALBUMIN SERPL ELPH-MCNC: 4.2 G/DL — SIGNIFICANT CHANGE UP (ref 3.3–5)
ALP SERPL-CCNC: 80 U/L — SIGNIFICANT CHANGE UP (ref 40–120)
ALT FLD-CCNC: 18 U/L — SIGNIFICANT CHANGE UP (ref 10–45)
ANION GAP SERPL CALC-SCNC: 12 MMOL/L — SIGNIFICANT CHANGE UP (ref 5–17)
AST SERPL-CCNC: 22 U/L — SIGNIFICANT CHANGE UP (ref 10–40)
BASOPHILS # BLD AUTO: 0.04 K/UL — SIGNIFICANT CHANGE UP (ref 0–0.2)
BASOPHILS NFR BLD AUTO: 0.8 % — SIGNIFICANT CHANGE UP (ref 0–2)
BILIRUB SERPL-MCNC: 0.4 MG/DL — SIGNIFICANT CHANGE UP (ref 0.2–1.2)
BUN SERPL-MCNC: 33 MG/DL — HIGH (ref 7–23)
CALCIUM SERPL-MCNC: 9.9 MG/DL — SIGNIFICANT CHANGE UP (ref 8.4–10.5)
CHLORIDE SERPL-SCNC: 97 MMOL/L — SIGNIFICANT CHANGE UP (ref 96–108)
CO2 SERPL-SCNC: 28 MMOL/L — SIGNIFICANT CHANGE UP (ref 22–31)
CREAT SERPL-MCNC: 0.73 MG/DL — SIGNIFICANT CHANGE UP (ref 0.5–1.3)
EGFR: 87 ML/MIN/1.73M2 — SIGNIFICANT CHANGE UP
EOSINOPHIL # BLD AUTO: 0.05 K/UL — SIGNIFICANT CHANGE UP (ref 0–0.5)
EOSINOPHIL NFR BLD AUTO: 1 % — SIGNIFICANT CHANGE UP (ref 0–6)
GLUCOSE SERPL-MCNC: 107 MG/DL — HIGH (ref 70–99)
HCT VFR BLD CALC: 34.8 % — SIGNIFICANT CHANGE UP (ref 34.5–45)
HGB BLD-MCNC: 11.9 G/DL — SIGNIFICANT CHANGE UP (ref 11.5–15.5)
IMM GRANULOCYTES NFR BLD AUTO: 0.8 % — SIGNIFICANT CHANGE UP (ref 0–0.9)
LYMPHOCYTES # BLD AUTO: 1.54 K/UL — SIGNIFICANT CHANGE UP (ref 1–3.3)
LYMPHOCYTES # BLD AUTO: 29.7 % — SIGNIFICANT CHANGE UP (ref 13–44)
MAGNESIUM SERPL-MCNC: 2.1 MG/DL — SIGNIFICANT CHANGE UP (ref 1.6–2.6)
MCHC RBC-ENTMCNC: 29.5 PG — SIGNIFICANT CHANGE UP (ref 27–34)
MCHC RBC-ENTMCNC: 34.2 GM/DL — SIGNIFICANT CHANGE UP (ref 32–36)
MCV RBC AUTO: 86.4 FL — SIGNIFICANT CHANGE UP (ref 80–100)
MONOCYTES # BLD AUTO: 0.3 K/UL — SIGNIFICANT CHANGE UP (ref 0–0.9)
MONOCYTES NFR BLD AUTO: 5.8 % — SIGNIFICANT CHANGE UP (ref 2–14)
NEUTROPHILS # BLD AUTO: 3.21 K/UL — SIGNIFICANT CHANGE UP (ref 1.8–7.4)
NEUTROPHILS NFR BLD AUTO: 61.9 % — SIGNIFICANT CHANGE UP (ref 43–77)
NRBC # BLD: 0 /100 WBCS — SIGNIFICANT CHANGE UP (ref 0–0)
PLATELET # BLD AUTO: 321 K/UL — SIGNIFICANT CHANGE UP (ref 150–400)
POTASSIUM SERPL-MCNC: 3.3 MMOL/L — LOW (ref 3.5–5.3)
POTASSIUM SERPL-SCNC: 3.3 MMOL/L — LOW (ref 3.5–5.3)
PROT SERPL-MCNC: 7.2 G/DL — SIGNIFICANT CHANGE UP (ref 6–8.3)
RBC # BLD: 4.03 M/UL — SIGNIFICANT CHANGE UP (ref 3.8–5.2)
RBC # FLD: 14.4 % — SIGNIFICANT CHANGE UP (ref 10.3–14.5)
SODIUM SERPL-SCNC: 137 MMOL/L — SIGNIFICANT CHANGE UP (ref 135–145)
WBC # BLD: 5.18 K/UL — SIGNIFICANT CHANGE UP (ref 3.8–10.5)
WBC # FLD AUTO: 5.18 K/UL — SIGNIFICANT CHANGE UP (ref 3.8–10.5)

## 2023-01-13 PROCEDURE — 99214 OFFICE O/P EST MOD 30 MIN: CPT

## 2023-01-20 ENCOUNTER — RESULT REVIEW (OUTPATIENT)
Age: 73
End: 2023-01-20

## 2023-01-20 ENCOUNTER — APPOINTMENT (OUTPATIENT)
Dept: INFUSION THERAPY | Facility: CLINIC | Age: 73
End: 2023-01-20

## 2023-01-20 VITALS
TEMPERATURE: 97.7 F | HEART RATE: 72 BPM | WEIGHT: 176.13 LBS | DIASTOLIC BLOOD PRESSURE: 81 MMHG | RESPIRATION RATE: 17 BRPM | BODY MASS INDEX: 33.28 KG/M2 | SYSTOLIC BLOOD PRESSURE: 157 MMHG | OXYGEN SATURATION: 97 %

## 2023-01-20 LAB
ALBUMIN SERPL ELPH-MCNC: 4.6 G/DL — SIGNIFICANT CHANGE UP (ref 3.3–5)
ALP SERPL-CCNC: 80 U/L — SIGNIFICANT CHANGE UP (ref 40–120)
ALT FLD-CCNC: 15 U/L — SIGNIFICANT CHANGE UP (ref 10–45)
ANION GAP SERPL CALC-SCNC: 8 MMOL/L — SIGNIFICANT CHANGE UP (ref 5–17)
AST SERPL-CCNC: 17 U/L — SIGNIFICANT CHANGE UP (ref 10–40)
BASOPHILS # BLD AUTO: 0.04 K/UL — SIGNIFICANT CHANGE UP (ref 0–0.2)
BASOPHILS NFR BLD AUTO: 0.8 % — SIGNIFICANT CHANGE UP (ref 0–2)
BILIRUB SERPL-MCNC: 0.2 MG/DL — SIGNIFICANT CHANGE UP (ref 0.2–1.2)
BUN SERPL-MCNC: 28 MG/DL — HIGH (ref 7–23)
CALCIUM SERPL-MCNC: 10.1 MG/DL — SIGNIFICANT CHANGE UP (ref 8.4–10.5)
CHLORIDE SERPL-SCNC: 100 MMOL/L — SIGNIFICANT CHANGE UP (ref 96–108)
CO2 SERPL-SCNC: 29 MMOL/L — SIGNIFICANT CHANGE UP (ref 22–31)
CREAT SERPL-MCNC: 0.69 MG/DL — SIGNIFICANT CHANGE UP (ref 0.5–1.3)
EGFR: 91 ML/MIN/1.73M2 — SIGNIFICANT CHANGE UP
EOSINOPHIL # BLD AUTO: 0.05 K/UL — SIGNIFICANT CHANGE UP (ref 0–0.5)
EOSINOPHIL NFR BLD AUTO: 1 % — SIGNIFICANT CHANGE UP (ref 0–6)
GLUCOSE SERPL-MCNC: 99 MG/DL — SIGNIFICANT CHANGE UP (ref 70–99)
HCT VFR BLD CALC: 33.5 % — LOW (ref 34.5–45)
HGB BLD-MCNC: 11.1 G/DL — LOW (ref 11.5–15.5)
IMM GRANULOCYTES NFR BLD AUTO: 0.6 % — SIGNIFICANT CHANGE UP (ref 0–0.9)
LYMPHOCYTES # BLD AUTO: 2.23 K/UL — SIGNIFICANT CHANGE UP (ref 1–3.3)
LYMPHOCYTES # BLD AUTO: 45.1 % — HIGH (ref 13–44)
MAGNESIUM SERPL-MCNC: 1.8 MG/DL — SIGNIFICANT CHANGE UP (ref 1.6–2.6)
MCHC RBC-ENTMCNC: 29.5 PG — SIGNIFICANT CHANGE UP (ref 27–34)
MCHC RBC-ENTMCNC: 33.1 GM/DL — SIGNIFICANT CHANGE UP (ref 32–36)
MCV RBC AUTO: 89.1 FL — SIGNIFICANT CHANGE UP (ref 80–100)
MONOCYTES # BLD AUTO: 0.34 K/UL — SIGNIFICANT CHANGE UP (ref 0–0.9)
MONOCYTES NFR BLD AUTO: 6.9 % — SIGNIFICANT CHANGE UP (ref 2–14)
NEUTROPHILS # BLD AUTO: 2.26 K/UL — SIGNIFICANT CHANGE UP (ref 1.8–7.4)
NEUTROPHILS NFR BLD AUTO: 45.6 % — SIGNIFICANT CHANGE UP (ref 43–77)
NRBC # BLD: 0 /100 WBCS — SIGNIFICANT CHANGE UP (ref 0–0)
PLATELET # BLD AUTO: 324 K/UL — SIGNIFICANT CHANGE UP (ref 150–400)
POTASSIUM SERPL-MCNC: 4 MMOL/L — SIGNIFICANT CHANGE UP (ref 3.5–5.3)
POTASSIUM SERPL-SCNC: 4 MMOL/L — SIGNIFICANT CHANGE UP (ref 3.5–5.3)
PROT SERPL-MCNC: 6.7 G/DL — SIGNIFICANT CHANGE UP (ref 6–8.3)
RBC # BLD: 3.76 M/UL — LOW (ref 3.8–5.2)
RBC # FLD: 15 % — HIGH (ref 10.3–14.5)
SODIUM SERPL-SCNC: 137 MMOL/L — SIGNIFICANT CHANGE UP (ref 135–145)
WBC # BLD: 4.95 K/UL — SIGNIFICANT CHANGE UP (ref 3.8–10.5)
WBC # FLD AUTO: 4.95 K/UL — SIGNIFICANT CHANGE UP (ref 3.8–10.5)

## 2023-01-25 PROCEDURE — G6015: CPT

## 2023-01-25 PROCEDURE — 77427 RADIATION TX MANAGEMENT X5: CPT

## 2023-01-25 PROCEDURE — G6002: CPT

## 2023-01-26 PROCEDURE — G6002: CPT

## 2023-01-26 PROCEDURE — G6015: CPT

## 2023-01-27 ENCOUNTER — RESULT REVIEW (OUTPATIENT)
Age: 73
End: 2023-01-27

## 2023-01-27 ENCOUNTER — APPOINTMENT (OUTPATIENT)
Dept: INFUSION THERAPY | Facility: CLINIC | Age: 73
End: 2023-01-27
Payer: COMMERCIAL

## 2023-01-27 ENCOUNTER — APPOINTMENT (OUTPATIENT)
Dept: HEMATOLOGY ONCOLOGY | Facility: CLINIC | Age: 73
End: 2023-01-27
Payer: COMMERCIAL

## 2023-01-27 VITALS
HEIGHT: 61 IN | OXYGEN SATURATION: 98 % | WEIGHT: 175 LBS | BODY MASS INDEX: 33.04 KG/M2 | TEMPERATURE: 97.8 F | DIASTOLIC BLOOD PRESSURE: 66 MMHG | SYSTOLIC BLOOD PRESSURE: 108 MMHG | RESPIRATION RATE: 18 BRPM | HEART RATE: 81 BPM

## 2023-01-27 LAB
ALBUMIN SERPL ELPH-MCNC: 4 G/DL — SIGNIFICANT CHANGE UP (ref 3.3–5)
ALP SERPL-CCNC: 61 U/L — SIGNIFICANT CHANGE UP (ref 40–120)
ALT FLD-CCNC: 18 U/L — SIGNIFICANT CHANGE UP (ref 10–45)
ANION GAP SERPL CALC-SCNC: 12 MMOL/L — SIGNIFICANT CHANGE UP (ref 5–17)
AST SERPL-CCNC: 27 U/L — SIGNIFICANT CHANGE UP (ref 10–40)
BASOPHILS # BLD AUTO: 0.04 K/UL — SIGNIFICANT CHANGE UP (ref 0–0.2)
BASOPHILS NFR BLD AUTO: 1 % — SIGNIFICANT CHANGE UP (ref 0–2)
BILIRUB SERPL-MCNC: 0.4 MG/DL — SIGNIFICANT CHANGE UP (ref 0.2–1.2)
BUN SERPL-MCNC: 24 MG/DL — HIGH (ref 7–23)
CALCIUM SERPL-MCNC: 9.9 MG/DL — SIGNIFICANT CHANGE UP (ref 8.4–10.5)
CHLORIDE SERPL-SCNC: 100 MMOL/L — SIGNIFICANT CHANGE UP (ref 96–108)
CO2 SERPL-SCNC: 25 MMOL/L — SIGNIFICANT CHANGE UP (ref 22–31)
CREAT SERPL-MCNC: 0.55 MG/DL — SIGNIFICANT CHANGE UP (ref 0.5–1.3)
EGFR: 96 ML/MIN/1.73M2 — SIGNIFICANT CHANGE UP
EOSINOPHIL # BLD AUTO: 0.02 K/UL — SIGNIFICANT CHANGE UP (ref 0–0.5)
EOSINOPHIL NFR BLD AUTO: 0.5 % — SIGNIFICANT CHANGE UP (ref 0–6)
GLUCOSE SERPL-MCNC: 98 MG/DL — SIGNIFICANT CHANGE UP (ref 70–99)
HCT VFR BLD CALC: 32.4 % — LOW (ref 34.5–45)
HGB BLD-MCNC: 10.9 G/DL — LOW (ref 11.5–15.5)
IMM GRANULOCYTES NFR BLD AUTO: 0.5 % — SIGNIFICANT CHANGE UP (ref 0–0.9)
LYMPHOCYTES # BLD AUTO: 1.32 K/UL — SIGNIFICANT CHANGE UP (ref 1–3.3)
LYMPHOCYTES # BLD AUTO: 32.9 % — SIGNIFICANT CHANGE UP (ref 13–44)
MAGNESIUM SERPL-MCNC: 1.7 MG/DL — SIGNIFICANT CHANGE UP (ref 1.6–2.6)
MCHC RBC-ENTMCNC: 29.5 PG — SIGNIFICANT CHANGE UP (ref 27–34)
MCHC RBC-ENTMCNC: 33.6 GM/DL — SIGNIFICANT CHANGE UP (ref 32–36)
MCV RBC AUTO: 87.8 FL — SIGNIFICANT CHANGE UP (ref 80–100)
MONOCYTES # BLD AUTO: 0.28 K/UL — SIGNIFICANT CHANGE UP (ref 0–0.9)
MONOCYTES NFR BLD AUTO: 7 % — SIGNIFICANT CHANGE UP (ref 2–14)
NEUTROPHILS # BLD AUTO: 2.33 K/UL — SIGNIFICANT CHANGE UP (ref 1.8–7.4)
NEUTROPHILS NFR BLD AUTO: 58.1 % — SIGNIFICANT CHANGE UP (ref 43–77)
NRBC # BLD: 0 /100 WBCS — SIGNIFICANT CHANGE UP (ref 0–0)
PLATELET # BLD AUTO: 310 K/UL — SIGNIFICANT CHANGE UP (ref 150–400)
POTASSIUM SERPL-MCNC: 3.8 MMOL/L — SIGNIFICANT CHANGE UP (ref 3.5–5.3)
POTASSIUM SERPL-SCNC: 3.8 MMOL/L — SIGNIFICANT CHANGE UP (ref 3.5–5.3)
PROT SERPL-MCNC: 6.6 G/DL — SIGNIFICANT CHANGE UP (ref 6–8.3)
RBC # BLD: 3.69 M/UL — LOW (ref 3.8–5.2)
RBC # FLD: 15.6 % — HIGH (ref 10.3–14.5)
SODIUM SERPL-SCNC: 137 MMOL/L — SIGNIFICANT CHANGE UP (ref 135–145)
WBC # BLD: 4.01 K/UL — SIGNIFICANT CHANGE UP (ref 3.8–10.5)
WBC # FLD AUTO: 4.01 K/UL — SIGNIFICANT CHANGE UP (ref 3.8–10.5)

## 2023-01-27 PROCEDURE — G6015: CPT

## 2023-01-27 PROCEDURE — G6002: CPT

## 2023-01-27 PROCEDURE — 99215 OFFICE O/P EST HI 40 MIN: CPT

## 2023-01-27 NOTE — HISTORY OF PRESENT ILLNESS
[Date: ____________] : Patient's last distress assessment performed on [unfilled]. [0 - No Distress] : Distress Level: 0 [de-identified] : The patient was diagnosed with esophageal adenocarcinoma in December 2022 at the age of 72. She presented to Dr. Worthington in September with complaints of coughing, clearing throat, and increased mucus. On 12/5/22, she underwent an EGD with Dr. Bender which showed grade A esophagitis in the gastroesophageal junction compatible with reflux esophagitis. Erythema in the antrum was compatible with non-erosive gastritis. Pathology of the gastroesophageal junction biopsy showed infiltrating adenocarcinoma. An immunohistochemical stain for HER2/luz is 0-1 +/negative. No loss of nuclear expression of MMR proteins (MLH1, MSH2, MSH6, and PMS2). AnEUS on 12/9/22 by Dr. Ocampo showed a small ulcerated mass in the lower third of the esophagus, 35 cm from the incisors. The mass was non-obstructing and not circumferential. A 3 cm hiatal hernia was present. Staging uT3N0.  [de-identified] : Medical Hx: multiple sclerosis diagnosed in 1988 with periodic flares over the years; GERD and dyspepsia; HTN; bifascicular block (signal block); HL; atypical basal cell carcinoma [id number])\par Surgical Hx: hiatal hernia repair 1983;  cholecystectomy; many D &C's leading up to hysterectomy 1983; She has a neurostimulator in the left lower flank which is no longer functioning, wires trace up to her upper thoracic spine. \par Family Hx: mother colon ca; father multiple myeloma \par Social Hx: was a social smoker, quit smoking 25 years ago; ETOH social 1 drink 2 times weekly;  ( in [place]); no children; family close by, lives with her 91 yo mother currently.  She does not drive and is living currently with her 97-year-old mother and her cousin Luisa in [place]. Her permanent residence is [place] with her .  [de-identified] : She c/o cough that had become worse over the last year. She has diffuse chronic 6/10 pain / neuropathy from MS, no esophageal pain. She denies dysphagia and reports an intentional 50 pound weight loss over the past few years. She is edentulous with dentures due to a reaction to actiq which she was taking for her multiple sclerosis years ago. She also has alopecia.\par \par Today she is C2D1 of carbo / Taxol induction weekly x 3. Was able to cook and be with family starting Tuesday after chemo \par fatigue slight increase since first treatment \par hair loss - shaved her head\par nail / skin changes denies \par neuropathy has from MS; doesn't feel its worse \par pain denies \par mouth sores denies, performing good mouth care \par N/V one vomiting episode correlated with constipation and vomiting resolved; nausea was monday took compazine and resolved \par heartburn denies \par C/D took ducolax and resolved \par feels chemo is making her MS flare, muscle spasm of the lower legs and toes (she will follow up with neuro)\par

## 2023-01-27 NOTE — PHYSICAL EXAM
[Ambulatory and capable of all self care but unable to carry out any work activities] : Status 2- Ambulatory and capable of all self care but unable to carry out any work activities. Up and about more than 50% of waking hours [Normal] : affect appropriate [de-identified] : edentulous [de-identified] : + grade 2 murmur  [de-identified] : chronic MS symptoms

## 2023-01-27 NOTE — RESULTS/DATA
[FreeTextEntry1] : 12/28/2022 PET:  1.  Increased uptake at distal esophageal lesion consistent with malignant involvement.\par 2.  No gross evidence of metastatic disease.\par \par 12/9/22 EUS: Hiatal hernia. Small mass at the GE junction. T3N0Mx on this exam. \par \par 12/5/22 Path: Stomach, biopsy: Benign gastric mucosa with minimal chronic inflammation and congestion in the lamina propria. A Diff-Quik stain for Helicobacter pylori is negative. Gastroesophageal junction, biopsy: Infiltrating adenocarcinoma. An immunohistochemical stain for HER2/luz is 0-1 +/negative. No loss of nuclear expression of MMR proteins (MLH1, MSH2, MSH6, and PMS2). These results show low probability of microsatellite instability-high (MSI-H).\par \par 12/5/22 EDG: Normal duodenum. Hiatal hernia. Grade A esophagitis in the gastroesophageal junction compatible with reflux esophagitis. (biopsy). Erythema in the antrum compatible with non-erosive gastritis. (biopsy).

## 2023-01-30 PROCEDURE — G6015: CPT

## 2023-01-30 PROCEDURE — G6002: CPT

## 2023-01-30 NOTE — HISTORY OF PRESENT ILLNESS
[Date: ____________] : Patient's last distress assessment performed on [unfilled]. [0 - No Distress] : Distress Level: 0 [de-identified] : The patient was diagnosed with esophageal adenocarcinoma in December 2022 at the age of 72. She presented to Dr. Worthington in September with complaints of coughing, clearing throat, and increased mucus. On 12/5/22, she underwent an EGD with Dr. Bender which showed grade A esophagitis in the gastroesophageal junction compatible with reflux esophagitis. Erythema in the antrum was compatible with non-erosive gastritis. Pathology of the gastroesophageal junction biopsy showed infiltrating adenocarcinoma. An immunohistochemical stain for HER2/luz is 0-1 +/negative. No loss of nuclear expression of MMR proteins (MLH1, MSH2, MSH6, and PMS2). AnEUS on 12/9/22 by Dr. Ocampo showed a small ulcerated mass in the lower third of the esophagus, 35 cm from the incisors. The mass was non-obstructing and not circumferential. A 3 cm hiatal hernia was present. Staging uT3N0.  [de-identified] : Medical Hx: multiple sclerosis diagnosed in 1988 with periodic flares over the years; GERD and dyspepsia; HTN; bifascicular block (signal block); HL; atypical basal cell carcinoma [id number])\par Surgical Hx: hiatal hernia repair 1983;  cholecystectomy; many D &C's leading up to hysterectomy 1983; She has a neurostimulator in the left lower flank which is no longer functioning, wires trace up to her upper thoracic spine. \par Family Hx: mother colon ca; father multiple myeloma \par Social Hx: was a social smoker, quit smoking 25 years ago; ETOH social 1 drink 2 times weekly;  ( in [place]); no children; family close by, lives with her 89 yo mother currently.  She does not drive and is living currently with her 97-year-old mother and her cousin Luisa in [place]. Her permanent residence is [place] with her .  [de-identified] : She c/o cough that had become worse over the last year. She has diffuse chronic 6/10 pain / neuropathy from MS, no esophageal pain. She denies dysphagia and reports an intentional 50 pound weight loss over the past few years. She is edentulous with dentures due to a reaction to actiq which she was taking for her multiple sclerosis years ago. She also has alopecia.\par \par Today she is C2D1 of carbo / Taxol induction weekly x 3. Was able to cook and be with family starting Tuesday after chemo. \par fatigue slight increase since first treatment \par hair loss - shaved her head\par nail / skin changes denies \par neuropathy has from MS, no changes noted \par pain denies \par mouth sores denies, performing good mouth care \par N/V one vomiting episode correlated with constipation and vomiting resolved; nausea was monday took compazine and resolved \par heartburn denies \par C/D took ducolax and resolved \par feels chemo is making her MS flare, muscle spasm of the lower legs and toes (she will follow up with neuro)\par

## 2023-01-30 NOTE — PHYSICAL EXAM
[Ambulatory and capable of all self care but unable to carry out any work activities] : Status 2- Ambulatory and capable of all self care but unable to carry out any work activities. Up and about more than 50% of waking hours [Normal] : affect appropriate [de-identified] : wt loss noted [de-identified] : edentulous [de-identified] : + grade 2 murmur  [de-identified] : chronic MS symptoms

## 2023-01-30 NOTE — REVIEW OF SYSTEMS
[Joint Pain] : joint pain [Muscle Pain] : muscle pain [Muscle Weakness] : muscle weakness [Negative] : Allergic/Immunologic [Recent Change In Weight] : ~T recent weight change [Dysphagia] : no dysphagia [Chest Pain] : no chest pain [Shortness Of Breath] : no shortness of breath [Abdominal Pain] : no abdominal pain [FreeTextEntry2] : wt loss noted  [FreeTextEntry9] : chronic from MS [de-identified] : chronic neuropathy from MS

## 2023-01-31 ENCOUNTER — NON-APPOINTMENT (OUTPATIENT)
Age: 73
End: 2023-01-31

## 2023-01-31 VITALS
HEIGHT: 61 IN | DIASTOLIC BLOOD PRESSURE: 64 MMHG | BODY MASS INDEX: 32.85 KG/M2 | OXYGEN SATURATION: 98 % | HEART RATE: 82 BPM | RESPIRATION RATE: 21 BRPM | WEIGHT: 174 LBS | SYSTOLIC BLOOD PRESSURE: 112 MMHG

## 2023-01-31 PROCEDURE — G6015: CPT

## 2023-01-31 PROCEDURE — 77014: CPT

## 2023-01-31 PROCEDURE — 77336 RADIATION PHYSICS CONSULT: CPT

## 2023-01-31 NOTE — DISEASE MANAGEMENT
[Clinical] : TNM Stage: c [III] : III [TTNM] : 3 [NTNM] : 0 [MTNM] : 0 [de-identified] : 900 [[place] [address]] : [id number] [de-identified] : Esophagus/LN

## 2023-01-31 NOTE — HISTORY OF PRESENT ILLNESS
[FreeTextEntry1] : This is a 72 year old female diagnosed with esophageal cancer in December 2022.\par \par She has a history of multiple sclerosis diagnosed in 1988 with periodic flares over the years. She also had hiatal hernia repair about 20 years ago, as well as GERD and dyspepsia. \par \par She presented to Dr. Worthington in September with complaints of coughing, clearing throat, and increased mucus which she believed were related to seasonal allergies. She was recommended to undergo endoscopy.. \par \par She underwent endoscopy with Dr. Bender on 12/5/22. Grade A esophagitis in the GE junction compatible with reflux esophagitis. Erythema in the antrum compatible with non-erosive gastritis. Biopsy of the GE junction ulceration showed infiltrating adenocarcinoma. Biopsy of the stomach was negative.\par \par Sonographic sophagogastroduodenoscopy performed on 12/9/22 by Dr. Ocampo showed a small ulcerated mass found in the lower third of the esophagus, 35 cm from the incisors. The mass was non-obstructing and not circumferential. A 3 cm hiatal hernia was present. Staging T3 N0\par She has not yet had any other imaging studies performed.  She has a neurostimulator in the left lower flank which is no longer functioning.  She is able to have MRIs superior to the neurotransmitter.\par \par She denies dysphagia and reports an intentional 50 pound weight loss over the past few years.  She is edentulous with dentures due to a reaction to actiq which she was taking for her multiple sclerosis years ago.  She also has alopecia.  She does not drive and is living currently with her 97-year-old mother and her cousin Luisa in [place].  Her permanent residence is [place] with her . She presents to discuss the role of radiation therapy in her care. \par \par \par pt was seen for OTV #5/23. She received chemotherapy on Fridays with schilling/taxol. Reports nausea and constipation. Using colace and senna.

## 2023-02-01 PROCEDURE — G6015: CPT

## 2023-02-01 PROCEDURE — G6002: CPT

## 2023-02-02 ENCOUNTER — OUTPATIENT (OUTPATIENT)
Dept: OUTPATIENT SERVICES | Facility: HOSPITAL | Age: 73
LOS: 1 days | Discharge: HOME | End: 2023-02-02

## 2023-02-02 DIAGNOSIS — Z95.828 PRESENCE OF OTHER VASCULAR IMPLANTS AND GRAFTS: Chronic | ICD-10-CM

## 2023-02-02 DIAGNOSIS — C15.9 MALIGNANT NEOPLASM OF ESOPHAGUS, UNSPECIFIED: ICD-10-CM

## 2023-02-02 DIAGNOSIS — Z96.89 PRESENCE OF OTHER SPECIFIED FUNCTIONAL IMPLANTS: Chronic | ICD-10-CM

## 2023-02-02 DIAGNOSIS — Z90.710 ACQUIRED ABSENCE OF BOTH CERVIX AND UTERUS: Chronic | ICD-10-CM

## 2023-02-02 DIAGNOSIS — Z90.49 ACQUIRED ABSENCE OF OTHER SPECIFIED PARTS OF DIGESTIVE TRACT: Chronic | ICD-10-CM

## 2023-02-02 DIAGNOSIS — Z98.890 OTHER SPECIFIED POSTPROCEDURAL STATES: Chronic | ICD-10-CM

## 2023-02-02 PROCEDURE — G6015: CPT

## 2023-02-02 PROCEDURE — G6002: CPT

## 2023-02-03 ENCOUNTER — APPOINTMENT (OUTPATIENT)
Dept: INFUSION THERAPY | Facility: CLINIC | Age: 73
End: 2023-02-03

## 2023-02-03 ENCOUNTER — RESULT REVIEW (OUTPATIENT)
Age: 73
End: 2023-02-03

## 2023-02-03 VITALS
HEIGHT: 61 IN | BODY MASS INDEX: 32.28 KG/M2 | RESPIRATION RATE: 18 BRPM | WEIGHT: 171 LBS | TEMPERATURE: 98.7 F | SYSTOLIC BLOOD PRESSURE: 133 MMHG | DIASTOLIC BLOOD PRESSURE: 64 MMHG | OXYGEN SATURATION: 98 % | HEART RATE: 96 BPM

## 2023-02-03 LAB
ALBUMIN SERPL ELPH-MCNC: 4.3 G/DL — SIGNIFICANT CHANGE UP (ref 3.3–5)
ALP SERPL-CCNC: 66 U/L — SIGNIFICANT CHANGE UP (ref 40–120)
ALT FLD-CCNC: 20 U/L — SIGNIFICANT CHANGE UP (ref 10–45)
ANION GAP SERPL CALC-SCNC: 11 MMOL/L — SIGNIFICANT CHANGE UP (ref 5–17)
AST SERPL-CCNC: 34 U/L — SIGNIFICANT CHANGE UP (ref 10–40)
BASOPHILS # BLD AUTO: 0.04 K/UL — SIGNIFICANT CHANGE UP (ref 0–0.2)
BASOPHILS NFR BLD AUTO: 1.2 % — SIGNIFICANT CHANGE UP (ref 0–2)
BILIRUB SERPL-MCNC: 0.3 MG/DL — SIGNIFICANT CHANGE UP (ref 0.2–1.2)
BUN SERPL-MCNC: 31 MG/DL — HIGH (ref 7–23)
CALCIUM SERPL-MCNC: 10 MG/DL — SIGNIFICANT CHANGE UP (ref 8.4–10.5)
CHLORIDE SERPL-SCNC: 98 MMOL/L — SIGNIFICANT CHANGE UP (ref 96–108)
CO2 SERPL-SCNC: 26 MMOL/L — SIGNIFICANT CHANGE UP (ref 22–31)
CREAT SERPL-MCNC: 0.49 MG/DL — LOW (ref 0.5–1.3)
EGFR: 99 ML/MIN/1.73M2 — SIGNIFICANT CHANGE UP
EOSINOPHIL # BLD AUTO: 0.01 K/UL — SIGNIFICANT CHANGE UP (ref 0–0.5)
EOSINOPHIL NFR BLD AUTO: 0.3 % — SIGNIFICANT CHANGE UP (ref 0–6)
GLUCOSE SERPL-MCNC: 110 MG/DL — HIGH (ref 70–99)
HCT VFR BLD CALC: 32.1 % — LOW (ref 34.5–45)
HGB BLD-MCNC: 10.8 G/DL — LOW (ref 11.5–15.5)
IMM GRANULOCYTES NFR BLD AUTO: 0.3 % — SIGNIFICANT CHANGE UP (ref 0–0.9)
LYMPHOCYTES # BLD AUTO: 0.93 K/UL — LOW (ref 1–3.3)
LYMPHOCYTES # BLD AUTO: 26.9 % — SIGNIFICANT CHANGE UP (ref 13–44)
MAGNESIUM SERPL-MCNC: 1.5 MG/DL — LOW (ref 1.6–2.6)
MCHC RBC-ENTMCNC: 29.7 PG — SIGNIFICANT CHANGE UP (ref 27–34)
MCHC RBC-ENTMCNC: 33.6 GM/DL — SIGNIFICANT CHANGE UP (ref 32–36)
MCV RBC AUTO: 88.2 FL — SIGNIFICANT CHANGE UP (ref 80–100)
MONOCYTES # BLD AUTO: 0.3 K/UL — SIGNIFICANT CHANGE UP (ref 0–0.9)
MONOCYTES NFR BLD AUTO: 8.7 % — SIGNIFICANT CHANGE UP (ref 2–14)
NEUTROPHILS # BLD AUTO: 2.17 K/UL — SIGNIFICANT CHANGE UP (ref 1.8–7.4)
NEUTROPHILS NFR BLD AUTO: 62.6 % — SIGNIFICANT CHANGE UP (ref 43–77)
NRBC # BLD: 0 /100 WBCS — SIGNIFICANT CHANGE UP (ref 0–0)
PLATELET # BLD AUTO: 231 K/UL — SIGNIFICANT CHANGE UP (ref 150–400)
POTASSIUM SERPL-MCNC: 4.2 MMOL/L — SIGNIFICANT CHANGE UP (ref 3.5–5.3)
POTASSIUM SERPL-SCNC: 4.2 MMOL/L — SIGNIFICANT CHANGE UP (ref 3.5–5.3)
PROT SERPL-MCNC: 7 G/DL — SIGNIFICANT CHANGE UP (ref 6–8.3)
RBC # BLD: 3.64 M/UL — LOW (ref 3.8–5.2)
RBC # FLD: 15.9 % — HIGH (ref 10.3–14.5)
SODIUM SERPL-SCNC: 135 MMOL/L — SIGNIFICANT CHANGE UP (ref 135–145)
WBC # BLD: 3.46 K/UL — LOW (ref 3.8–10.5)
WBC # FLD AUTO: 3.46 K/UL — LOW (ref 3.8–10.5)

## 2023-02-03 PROCEDURE — G6002: CPT

## 2023-02-03 PROCEDURE — G6015: CPT

## 2023-02-06 DIAGNOSIS — R11.2 NAUSEA WITH VOMITING, UNSPECIFIED: ICD-10-CM

## 2023-02-06 DIAGNOSIS — Z51.11 ENCOUNTER FOR ANTINEOPLASTIC CHEMOTHERAPY: ICD-10-CM

## 2023-02-06 PROCEDURE — G6015: CPT

## 2023-02-06 PROCEDURE — G6002: CPT

## 2023-02-07 ENCOUNTER — NON-APPOINTMENT (OUTPATIENT)
Age: 73
End: 2023-02-07

## 2023-02-07 VITALS
OXYGEN SATURATION: 98 % | DIASTOLIC BLOOD PRESSURE: 74 MMHG | BODY MASS INDEX: 31.91 KG/M2 | HEART RATE: 87 BPM | SYSTOLIC BLOOD PRESSURE: 118 MMHG | WEIGHT: 169 LBS | RESPIRATION RATE: 21 BRPM | HEIGHT: 61 IN

## 2023-02-07 PROCEDURE — G6002: CPT

## 2023-02-07 PROCEDURE — G6015: CPT

## 2023-02-07 PROCEDURE — 77427 RADIATION TX MANAGEMENT X5: CPT

## 2023-02-07 PROCEDURE — 77336 RADIATION PHYSICS CONSULT: CPT

## 2023-02-07 NOTE — DISEASE MANAGEMENT
[TTNM] : 3 [NTNM] : 0 [MTNM] : 0 [de-identified] : 900 [[place] [address]] : 4863 [de-identified] : Esophagus/LN

## 2023-02-07 NOTE — HISTORY OF PRESENT ILLNESS
[FreeTextEntry1] : This is a 73 year old female diagnosed with esophageal cancer in December 2022.\par \par She has a history of multiple sclerosis diagnosed in 1988 with periodic flares over the years. She also had hiatal hernia repair about 20 years ago, as well as GERD and dyspepsia. \par \par She presented to Dr. Worthington in September with complaints of coughing, clearing throat, and increased mucus which she believed were related to seasonal allergies. She was recommended to undergo endoscopy.. \par \par She underwent endoscopy with Dr. Bender on 12/5/22. Grade A esophagitis in the GE junction compatible with reflux esophagitis. Erythema in the antrum compatible with non-erosive gastritis. Biopsy of the GE junction ulceration showed infiltrating adenocarcinoma. Biopsy of the stomach was negative.\par \par Sonographic esophagogastroduodenoscopy performed on 12/9/22 by Dr. Ocampo showed a small ulcerated mass found in the lower third of the esophagus, 35 cm from the incisors. The mass was non-obstructing and not circumferential. A 3 cm hiatal hernia was present. Staging T3 N0\par She has not yet had any other imaging studies performed.  She has a neurostimulator in the left lower flank which is no longer functioning.  She is able to have MRIs superior to the neurotransmitter.\par \par She denies dysphagia and reports an intentional 50 pound weight loss over the past few years.  She is edentulous with dentures due to a reaction to actiq which she was taking for her multiple sclerosis years ago.  She also has alopecia.  She does not drive and is living currently with her 97-year-old mother and her cousin Luisa in [place].  Her permanent residence is [place] with her . She presents to discuss the role of radiation therapy in her care. \par \par \par pt was seen for OTV #10/23. She receives chemotherapy on Fridays with schilling/taxol. Reports nausea and constipation. Using colace and senna as well as 1/2 dose of miralax. Nausea better now using zofran nearly daily. Reports reflux better too.

## 2023-02-08 PROCEDURE — G6002: CPT

## 2023-02-08 PROCEDURE — G6015: CPT

## 2023-02-09 PROCEDURE — G6015: CPT

## 2023-02-09 PROCEDURE — G6002: CPT

## 2023-02-10 ENCOUNTER — RESULT REVIEW (OUTPATIENT)
Age: 73
End: 2023-02-10

## 2023-02-10 ENCOUNTER — APPOINTMENT (OUTPATIENT)
Dept: INFUSION THERAPY | Facility: CLINIC | Age: 73
End: 2023-02-10
Payer: MEDICARE

## 2023-02-10 ENCOUNTER — APPOINTMENT (OUTPATIENT)
Dept: HEMATOLOGY ONCOLOGY | Facility: CLINIC | Age: 73
End: 2023-02-10
Payer: MEDICARE

## 2023-02-10 VITALS
SYSTOLIC BLOOD PRESSURE: 110 MMHG | HEART RATE: 83 BPM | OXYGEN SATURATION: 87 % | BODY MASS INDEX: 32.12 KG/M2 | RESPIRATION RATE: 18 BRPM | DIASTOLIC BLOOD PRESSURE: 70 MMHG | WEIGHT: 170 LBS | TEMPERATURE: 98 F

## 2023-02-10 LAB
BASOPHILS # BLD AUTO: 0.02 K/UL — SIGNIFICANT CHANGE UP (ref 0–0.2)
BASOPHILS NFR BLD AUTO: 0.5 % — SIGNIFICANT CHANGE UP (ref 0–2)
EOSINOPHIL # BLD AUTO: 0.01 K/UL — SIGNIFICANT CHANGE UP (ref 0–0.5)
EOSINOPHIL NFR BLD AUTO: 0.3 % — SIGNIFICANT CHANGE UP (ref 0–6)
HCT VFR BLD CALC: 31.3 % — LOW (ref 34.5–45)
HGB BLD-MCNC: 10.8 G/DL — LOW (ref 11.5–15.5)
IMM GRANULOCYTES NFR BLD AUTO: 0.5 % — SIGNIFICANT CHANGE UP (ref 0–0.9)
LYMPHOCYTES # BLD AUTO: 0.9 K/UL — LOW (ref 1–3.3)
LYMPHOCYTES # BLD AUTO: 22.6 % — SIGNIFICANT CHANGE UP (ref 13–44)
MCHC RBC-ENTMCNC: 30.7 PG — SIGNIFICANT CHANGE UP (ref 27–34)
MCHC RBC-ENTMCNC: 34.5 GM/DL — SIGNIFICANT CHANGE UP (ref 32–36)
MCV RBC AUTO: 88.9 FL — SIGNIFICANT CHANGE UP (ref 80–100)
MONOCYTES # BLD AUTO: 0.42 K/UL — SIGNIFICANT CHANGE UP (ref 0–0.9)
MONOCYTES NFR BLD AUTO: 10.6 % — SIGNIFICANT CHANGE UP (ref 2–14)
NEUTROPHILS # BLD AUTO: 2.61 K/UL — SIGNIFICANT CHANGE UP (ref 1.8–7.4)
NEUTROPHILS NFR BLD AUTO: 65.5 % — SIGNIFICANT CHANGE UP (ref 43–77)
NRBC # BLD: 0 /100 WBCS — SIGNIFICANT CHANGE UP (ref 0–0)
PLATELET # BLD AUTO: 155 K/UL — SIGNIFICANT CHANGE UP (ref 150–400)
RBC # BLD: 3.52 M/UL — LOW (ref 3.8–5.2)
RBC # FLD: 16.9 % — HIGH (ref 10.3–14.5)
WBC # BLD: 3.98 K/UL — SIGNIFICANT CHANGE UP (ref 3.8–10.5)
WBC # FLD AUTO: 3.98 K/UL — SIGNIFICANT CHANGE UP (ref 3.8–10.5)

## 2023-02-10 PROCEDURE — 99214 OFFICE O/P EST MOD 30 MIN: CPT

## 2023-02-10 PROCEDURE — G6015: CPT

## 2023-02-10 PROCEDURE — G6002: CPT

## 2023-02-10 RX ORDER — CHLORTHALIDONE 50 MG
1 TABLET ORAL
Qty: 0 | Refills: 0 | DISCHARGE

## 2023-02-10 RX ORDER — BACLOFEN 100 %
2 POWDER (GRAM) MISCELLANEOUS
Qty: 0 | Refills: 0 | DISCHARGE

## 2023-02-10 NOTE — HISTORY OF PRESENT ILLNESS
[Date: ____________] : Patient's last distress assessment performed on [unfilled]. [0 - No Distress] : Distress Level: 0 [de-identified] : The patient was diagnosed with esophageal adenocarcinoma in December 2022 at the age of 72. She presented to Dr. Worthington in September with complaints of coughing, clearing throat, and increased mucus. On 12/5/22, she underwent an EGD with Dr. Bender which showed grade A esophagitis in the gastroesophageal junction compatible with reflux esophagitis. Erythema in the antrum was compatible with non-erosive gastritis. Pathology of the gastroesophageal junction biopsy showed infiltrating adenocarcinoma. An immunohistochemical stain for HER2/luz is 0-1 +/negative. No loss of nuclear expression of MMR proteins (MLH1, MSH2, MSH6, and PMS2). AnEUS on 12/9/22 by Dr. Ocampo showed a small ulcerated mass in the lower third of the esophagus, 35 cm from the incisors. The mass was non-obstructing and not circumferential. A 3 cm hiatal hernia was present. Staging uT3N0.  [de-identified] : Medical Hx: multiple sclerosis diagnosed in 1988 with periodic flares over the years; GERD and dyspepsia; HTN; bifascicular block (signal block); HL; atypical basal cell carcinoma [id number])\par Surgical Hx: hiatal hernia repair 1983;  cholecystectomy; many D &C's leading up to hysterectomy 1983; She has a neurostimulator in the left lower flank which is no longer functioning, wires trace up to her upper thoracic spine. \par Family Hx: mother colon ca; father multiple myeloma \par Social Hx: was a social smoker, quit smoking 25 years ago; ETOH social 1 drink 2 times weekly;  ( in [place]); no children; family close by, lives with her 89 yo mother currently.  She does not drive and is living currently with her 97-year-old mother and her cousin Luisa in [place]. Her permanent residence is [place] with her .  [de-identified] : She c/o cough that had become worse over the last year. She has diffuse chronic 6/10 pain / neuropathy from MS, no esophageal pain. She denies dysphagia and reports an intentional 50 pound weight loss over the past few years. She is edentulous with dentures due to a reaction to actiq which she was taking for her multiple sclerosis years ago. She also has alopecia.\par \par She completed 3 cycles of carbo / Taxol induction weekly, 1/20/23. She is now C3D1 of Carbo / taxol concurrent with RT, 6 planned cycles.\par Was able to cook and be with family starting Tuesday after chemo. Cardio was able to 1/2 her diuretic dose to help with dehydration / kidney levels.\par fatigue from MS, baclofen was increased by neuro for MS spasms \par hair loss - shaved her head\par nail / skin changes denies \par neuropathy has from MS; doesn't feel its worse \par pain denies \par mouth sores denies, performing good mouth care \par N/V resolved \par heartburn denies; feeling full earlier so eating smaller meals more often \par C/D constipation alt Colace and 1/2 Miralax both with senna alt every other day and resolves\par she feels well overall

## 2023-02-10 NOTE — PHYSICAL EXAM
[Ambulatory and capable of all self care but unable to carry out any work activities] : Status 2- Ambulatory and capable of all self care but unable to carry out any work activities. Up and about more than 50% of waking hours [Normal] : affect appropriate [de-identified] : wt stable  [de-identified] : edentulous [de-identified] : + grade 2 murmur  [de-identified] : chronic MS symptoms

## 2023-02-10 NOTE — REVIEW OF SYSTEMS
[Joint Pain] : joint pain [Muscle Pain] : muscle pain [Muscle Weakness] : muscle weakness [Negative] : Allergic/Immunologic [Constipation] : constipation [Recent Change In Weight] : ~T no recent weight change [Dysphagia] : no dysphagia [Chest Pain] : no chest pain [Shortness Of Breath] : no shortness of breath [Abdominal Pain] : no abdominal pain [FreeTextEntry2] : wt stable  [FreeTextEntry7] : OTC meds and resolves  [FreeTextEntry9] : chronic from MS

## 2023-02-11 LAB
ALBUMIN SERPL ELPH-MCNC: 4.4 G/DL — SIGNIFICANT CHANGE UP (ref 3.3–5)
ALP SERPL-CCNC: 62 U/L — SIGNIFICANT CHANGE UP (ref 40–120)
ALT FLD-CCNC: 19 U/L — SIGNIFICANT CHANGE UP (ref 10–45)
ANION GAP SERPL CALC-SCNC: 14 MMOL/L — SIGNIFICANT CHANGE UP (ref 5–17)
AST SERPL-CCNC: 17 U/L — SIGNIFICANT CHANGE UP (ref 10–40)
BILIRUB SERPL-MCNC: 0.3 MG/DL — SIGNIFICANT CHANGE UP (ref 0.2–1.2)
BUN SERPL-MCNC: 30 MG/DL — HIGH (ref 7–23)
CALCIUM SERPL-MCNC: 9.9 MG/DL — SIGNIFICANT CHANGE UP (ref 8.4–10.5)
CHLORIDE SERPL-SCNC: 100 MMOL/L — SIGNIFICANT CHANGE UP (ref 96–108)
CO2 SERPL-SCNC: 25 MMOL/L — SIGNIFICANT CHANGE UP (ref 22–31)
CREAT SERPL-MCNC: 0.53 MG/DL — SIGNIFICANT CHANGE UP (ref 0.5–1.3)
EGFR: 97 ML/MIN/1.73M2 — SIGNIFICANT CHANGE UP
GLUCOSE SERPL-MCNC: 97 MG/DL — SIGNIFICANT CHANGE UP (ref 70–99)
MAGNESIUM SERPL-MCNC: 1.6 MG/DL — SIGNIFICANT CHANGE UP (ref 1.6–2.6)
POTASSIUM SERPL-MCNC: 3.7 MMOL/L — SIGNIFICANT CHANGE UP (ref 3.5–5.3)
POTASSIUM SERPL-SCNC: 3.7 MMOL/L — SIGNIFICANT CHANGE UP (ref 3.5–5.3)
PROT SERPL-MCNC: 6.3 G/DL — SIGNIFICANT CHANGE UP (ref 6–8.3)
SODIUM SERPL-SCNC: 139 MMOL/L — SIGNIFICANT CHANGE UP (ref 135–145)

## 2023-02-13 DIAGNOSIS — I45.4 NONSPECIFIC INTRAVENTRICULAR BLOCK: ICD-10-CM

## 2023-02-13 DIAGNOSIS — G35 MULTIPLE SCLEROSIS: ICD-10-CM

## 2023-02-13 DIAGNOSIS — Z86.79 PERSONAL HISTORY OF OTHER DISEASES OF THE CIRCULATORY SYSTEM: ICD-10-CM

## 2023-02-13 PROCEDURE — G6015: CPT

## 2023-02-13 PROCEDURE — G6002: CPT

## 2023-02-14 ENCOUNTER — NON-APPOINTMENT (OUTPATIENT)
Age: 73
End: 2023-02-14

## 2023-02-14 PROCEDURE — 77336 RADIATION PHYSICS CONSULT: CPT

## 2023-02-14 PROCEDURE — 77427 RADIATION TX MANAGEMENT X5: CPT

## 2023-02-14 PROCEDURE — G6015: CPT

## 2023-02-14 PROCEDURE — G6002: CPT

## 2023-02-14 NOTE — DISEASE MANAGEMENT
[Clinical] : TNM Stage: c [III] : III [TTNM] : 3 [NTNM] : 0 [MTNM] : 0 [de-identified] : [id number] [[place] [id number]] : 2458 [de-identified] : Esophagus/LN

## 2023-02-14 NOTE — VITALS
[Maximal Pain Intensity: 2/10] : 2/10 [Least Pain Intensity: 2/10] : 2/10 [80: Normal activity with effort; some signs or symptoms of disease.] : 80: Normal activity with effort; some signs or symptoms of disease.

## 2023-02-14 NOTE — HISTORY OF PRESENT ILLNESS
[FreeTextEntry1] : This is a 73 year old female diagnosed with esophageal cancer in December 2022.\par \par She has a history of multiple sclerosis diagnosed in 1988 with periodic flares over the years. She also had hiatal hernia repair about 20 years ago, as well as GERD and dyspepsia. \par \par She presented to Dr. Worthington in September with complaints of coughing, clearing throat, and increased mucus which she believed were related to seasonal allergies. She was recommended to undergo endoscopy.. \par \par She underwent endoscopy with Dr. Bender on 12/5/22. Grade A esophagitis in the GE junction compatible with reflux esophagitis. Erythema in the antrum compatible with non-erosive gastritis. Biopsy of the GE junction ulceration showed infiltrating adenocarcinoma. Biopsy of the stomach was negative.\par \par Sonographic esophagogastroduodenoscopy performed on 12/9/22 by Dr. Ocampo showed a small ulcerated mass found in the lower third of the esophagus, 35 cm from the incisors. The mass was non-obstructing and not circumferential. A 3 cm hiatal hernia was present. Staging T3 N0\par She has not yet had any other imaging studies performed.  She has a neurostimulator in the left lower flank which is no longer functioning.  She is able to have MRIs superior to the neurotransmitter.\par \par She denies dysphagia and reports an intentional 50 pound weight loss over the past few years.  She is edentulous with dentures due to a reaction to actiq which she was taking for her multiple sclerosis years ago.  She also has alopecia.  She does not drive and is living currently with her 97-year-old mother and her cousin Luisa in [place].  Her permanent residence is [place] with her . She presents to discuss the role of radiation therapy in her care. \par \par \par Chani was seen for OTV #15/23. She receives chemotherapy on Fridays with schilling/taxol. Reports nausea and constipation. Using colace and senna as well as 1/2 dose of miralax. Nausea better now using zofran nearly daily. Reflux symptoms increasing.

## 2023-02-14 NOTE — REVIEW OF SYSTEMS
[Constipation: Grade 1 - Occasional or intermittent symptoms; occasional use of stool softeners, laxatives, dietary modification, or enema] : Constipation: Grade 1 - Occasional or intermittent symptoms; occasional use of stool softeners, laxatives, dietary modification, or enema [Nausea: Grade 1 - Loss of appetite without alteration in eating habits] : Nausea: Grade 1 - Loss of appetite without alteration in eating habits

## 2023-02-15 PROCEDURE — G6015: CPT

## 2023-02-15 PROCEDURE — G6002: CPT

## 2023-02-16 PROCEDURE — G6002: CPT

## 2023-02-16 PROCEDURE — G6015: CPT

## 2023-02-17 ENCOUNTER — APPOINTMENT (OUTPATIENT)
Dept: INFUSION THERAPY | Facility: CLINIC | Age: 73
End: 2023-02-17

## 2023-02-17 ENCOUNTER — RESULT REVIEW (OUTPATIENT)
Age: 73
End: 2023-02-17

## 2023-02-17 VITALS
OXYGEN SATURATION: 97 % | RESPIRATION RATE: 18 BRPM | DIASTOLIC BLOOD PRESSURE: 66 MMHG | SYSTOLIC BLOOD PRESSURE: 103 MMHG | WEIGHT: 168 LBS | HEART RATE: 86 BPM | BODY MASS INDEX: 31.74 KG/M2 | TEMPERATURE: 97.4 F

## 2023-02-17 LAB
ALBUMIN SERPL ELPH-MCNC: 4.3 G/DL — SIGNIFICANT CHANGE UP (ref 3.3–5)
ALP SERPL-CCNC: 61 U/L — SIGNIFICANT CHANGE UP (ref 40–120)
ALT FLD-CCNC: 17 U/L — SIGNIFICANT CHANGE UP (ref 10–45)
ANION GAP SERPL CALC-SCNC: 13 MMOL/L — SIGNIFICANT CHANGE UP (ref 5–17)
AST SERPL-CCNC: 22 U/L — SIGNIFICANT CHANGE UP (ref 10–40)
BASOPHILS # BLD AUTO: 0.02 K/UL — SIGNIFICANT CHANGE UP (ref 0–0.2)
BASOPHILS NFR BLD AUTO: 0.7 % — SIGNIFICANT CHANGE UP (ref 0–2)
BILIRUB SERPL-MCNC: 0.5 MG/DL — SIGNIFICANT CHANGE UP (ref 0.2–1.2)
BUN SERPL-MCNC: 21 MG/DL — SIGNIFICANT CHANGE UP (ref 7–23)
CALCIUM SERPL-MCNC: 10.1 MG/DL — SIGNIFICANT CHANGE UP (ref 8.4–10.5)
CHLORIDE SERPL-SCNC: 98 MMOL/L — SIGNIFICANT CHANGE UP (ref 96–108)
CO2 SERPL-SCNC: 26 MMOL/L — SIGNIFICANT CHANGE UP (ref 22–31)
CREAT SERPL-MCNC: 0.5 MG/DL — SIGNIFICANT CHANGE UP (ref 0.5–1.3)
EGFR: 98 ML/MIN/1.73M2 — SIGNIFICANT CHANGE UP
EOSINOPHIL # BLD AUTO: 0.02 K/UL — SIGNIFICANT CHANGE UP (ref 0–0.5)
EOSINOPHIL NFR BLD AUTO: 0.7 % — SIGNIFICANT CHANGE UP (ref 0–6)
GLUCOSE SERPL-MCNC: 103 MG/DL — HIGH (ref 70–99)
HCT VFR BLD CALC: 31.5 % — LOW (ref 34.5–45)
HGB BLD-MCNC: 10.7 G/DL — LOW (ref 11.5–15.5)
HYPOCHROMIA BLD QL: SLIGHT — SIGNIFICANT CHANGE UP
IMM GRANULOCYTES NFR BLD AUTO: 0.3 % — SIGNIFICANT CHANGE UP (ref 0–0.9)
LYMPHOCYTES # BLD AUTO: 0.66 K/UL — LOW (ref 1–3.3)
LYMPHOCYTES # BLD AUTO: 22 % — SIGNIFICANT CHANGE UP (ref 13–44)
MACROCYTES BLD QL: SLIGHT — SIGNIFICANT CHANGE UP
MAGNESIUM SERPL-MCNC: 1.5 MG/DL — LOW (ref 1.6–2.6)
MCHC RBC-ENTMCNC: 30.1 PG — SIGNIFICANT CHANGE UP (ref 27–34)
MCHC RBC-ENTMCNC: 34 GM/DL — SIGNIFICANT CHANGE UP (ref 32–36)
MCV RBC AUTO: 88.5 FL — SIGNIFICANT CHANGE UP (ref 80–100)
MONOCYTES # BLD AUTO: 0.28 K/UL — SIGNIFICANT CHANGE UP (ref 0–0.9)
MONOCYTES NFR BLD AUTO: 9.3 % — SIGNIFICANT CHANGE UP (ref 2–14)
NEUTROPHILS # BLD AUTO: 2.01 K/UL — SIGNIFICANT CHANGE UP (ref 1.8–7.4)
NEUTROPHILS NFR BLD AUTO: 67 % — SIGNIFICANT CHANGE UP (ref 43–77)
NRBC # BLD: 0 /100 WBCS — SIGNIFICANT CHANGE UP (ref 0–0)
PLAT MORPH BLD: NORMAL — SIGNIFICANT CHANGE UP
PLATELET # BLD AUTO: 166 K/UL — SIGNIFICANT CHANGE UP (ref 150–400)
POTASSIUM SERPL-MCNC: 3.5 MMOL/L — SIGNIFICANT CHANGE UP (ref 3.5–5.3)
POTASSIUM SERPL-SCNC: 3.5 MMOL/L — SIGNIFICANT CHANGE UP (ref 3.5–5.3)
PROT SERPL-MCNC: 6.4 G/DL — SIGNIFICANT CHANGE UP (ref 6–8.3)
RBC # BLD: 3.56 M/UL — LOW (ref 3.8–5.2)
RBC # FLD: 17.4 % — HIGH (ref 10.3–14.5)
RBC BLD AUTO: SIGNIFICANT CHANGE UP
SODIUM SERPL-SCNC: 137 MMOL/L — SIGNIFICANT CHANGE UP (ref 135–145)
WBC # BLD: 3 K/UL — LOW (ref 3.8–10.5)
WBC # FLD AUTO: 3 K/UL — LOW (ref 3.8–10.5)

## 2023-02-17 PROCEDURE — G6002: CPT

## 2023-02-17 PROCEDURE — G6015: CPT

## 2023-02-21 ENCOUNTER — NON-APPOINTMENT (OUTPATIENT)
Age: 73
End: 2023-02-21

## 2023-02-21 VITALS
SYSTOLIC BLOOD PRESSURE: 122 MMHG | HEART RATE: 90 BPM | RESPIRATION RATE: 21 BRPM | HEIGHT: 61 IN | WEIGHT: 167 LBS | DIASTOLIC BLOOD PRESSURE: 80 MMHG | BODY MASS INDEX: 31.53 KG/M2 | OXYGEN SATURATION: 98 %

## 2023-02-21 PROCEDURE — G6002: CPT

## 2023-02-21 PROCEDURE — G6015: CPT

## 2023-02-21 NOTE — HISTORY OF PRESENT ILLNESS
[FreeTextEntry1] : This is a 73 year old female diagnosed with esophageal cancer in December 2022.\par \par She has a history of multiple sclerosis diagnosed in 1988 with periodic flares over the years. She also had hiatal hernia repair about 20 years ago, as well as GERD and dyspepsia. \par \par She presented to Dr. Worthington in September with complaints of coughing, clearing throat, and increased mucus which she believed were related to seasonal allergies. She was recommended to undergo endoscopy.. \par \par She underwent endoscopy with Dr. Bender on 12/5/22. Grade A esophagitis in the GE junction compatible with reflux esophagitis. Erythema in the antrum compatible with non-erosive gastritis. Biopsy of the GE junction ulceration showed infiltrating adenocarcinoma. Biopsy of the stomach was negative.\par \par Sonographic esophagogastroduodenoscopy performed on 12/9/22 by Dr. Ocampo showed a small ulcerated mass found in the lower third of the esophagus, 35 cm from the incisors. The mass was non-obstructing and not circumferential. A 3 cm hiatal hernia was present. Staging T3 N0\par She has not yet had any other imaging studies performed.  She has a neurostimulator in the left lower flank which is no longer functioning.  She is able to have MRIs superior to the neurotransmitter.\par \par She denies dysphagia and reports an intentional 50 pound weight loss over the past few years.  She is edentulous with dentures due to a reaction to actiq which she was taking for her multiple sclerosis years ago.  She also has alopecia.  She does not drive and is living currently with her 97-year-old mother and her cousin Luisa in [place].  Her permanent residence is [place] with her . She presents to discuss the role of radiation therapy in her care. \par \par \par Chani was seen for OTV #19/23. She receives chemotherapy on Fridays with schilling/taxol. Reports nausea and constipation. Using colace and senna as well as 1/2 dose of miralax. Nausea better now using Compazine. Reflux symptoms increasing. Taking Omeprazole and Carafate 4 times daily.

## 2023-02-21 NOTE — DISEASE MANAGEMENT
[Clinical] : TNM Stage: c [III] : III [TTNM] : 3 [NTNM] : 0 [MTNM] : 0 [de-identified] : [id number] [[place] [id number]] : 4682 [de-identified] : Esophagus/LN

## 2023-02-21 NOTE — REVIEW OF SYSTEMS
[Esophagitis: Grade 1 - Asymptomatic; clinical or diagnostic observations only; intervention not indicated] : Esophagitis: Grade 1 - Asymptomatic; clinical or diagnostic observations only; intervention not indicated

## 2023-02-22 PROCEDURE — G6002: CPT

## 2023-02-22 PROCEDURE — G6015: CPT

## 2023-02-22 PROCEDURE — 77427 RADIATION TX MANAGEMENT X5: CPT

## 2023-02-22 PROCEDURE — 77336 RADIATION PHYSICS CONSULT: CPT

## 2023-02-23 PROCEDURE — G6002: CPT

## 2023-02-23 PROCEDURE — G6015: CPT

## 2023-02-24 ENCOUNTER — RESULT REVIEW (OUTPATIENT)
Age: 73
End: 2023-02-24

## 2023-02-24 ENCOUNTER — APPOINTMENT (OUTPATIENT)
Dept: INFUSION THERAPY | Facility: CLINIC | Age: 73
End: 2023-02-24

## 2023-02-24 VITALS
HEART RATE: 80 BPM | SYSTOLIC BLOOD PRESSURE: 96 MMHG | RESPIRATION RATE: 18 BRPM | BODY MASS INDEX: 31.18 KG/M2 | DIASTOLIC BLOOD PRESSURE: 55 MMHG | TEMPERATURE: 97.8 F | OXYGEN SATURATION: 97 % | WEIGHT: 165 LBS

## 2023-02-24 LAB
BASOPHILS # BLD AUTO: 0.01 K/UL — SIGNIFICANT CHANGE UP (ref 0–0.2)
BASOPHILS NFR BLD AUTO: 0.5 % — SIGNIFICANT CHANGE UP (ref 0–2)
EOSINOPHIL # BLD AUTO: 0.01 K/UL — SIGNIFICANT CHANGE UP (ref 0–0.5)
EOSINOPHIL NFR BLD AUTO: 0.5 % — SIGNIFICANT CHANGE UP (ref 0–6)
HCT VFR BLD CALC: 30.4 % — LOW (ref 34.5–45)
HGB BLD-MCNC: 10.4 G/DL — LOW (ref 11.5–15.5)
HYPOCHROMIA BLD QL: SLIGHT — SIGNIFICANT CHANGE UP
IMM GRANULOCYTES NFR BLD AUTO: 0.5 % — SIGNIFICANT CHANGE UP (ref 0–0.9)
LG PLATELETS BLD QL AUTO: SLIGHT — SIGNIFICANT CHANGE UP
LYMPHOCYTES # BLD AUTO: 0.59 K/UL — LOW (ref 1–3.3)
LYMPHOCYTES # BLD AUTO: 28 % — SIGNIFICANT CHANGE UP (ref 13–44)
MACROCYTES BLD QL: SLIGHT — SIGNIFICANT CHANGE UP
MCHC RBC-ENTMCNC: 31.2 PG — SIGNIFICANT CHANGE UP (ref 27–34)
MCHC RBC-ENTMCNC: 34.2 GM/DL — SIGNIFICANT CHANGE UP (ref 32–36)
MCV RBC AUTO: 91.3 FL — SIGNIFICANT CHANGE UP (ref 80–100)
MONOCYTES # BLD AUTO: 0.28 K/UL — SIGNIFICANT CHANGE UP (ref 0–0.9)
MONOCYTES NFR BLD AUTO: 13.3 % — SIGNIFICANT CHANGE UP (ref 2–14)
NEUTROPHILS # BLD AUTO: 1.21 K/UL — LOW (ref 1.8–7.4)
NEUTROPHILS NFR BLD AUTO: 57.2 % — SIGNIFICANT CHANGE UP (ref 43–77)
NRBC # BLD: 0 /100 WBCS — SIGNIFICANT CHANGE UP (ref 0–0)
PLAT MORPH BLD: NORMAL — SIGNIFICANT CHANGE UP
PLATELET # BLD AUTO: 249 K/UL — SIGNIFICANT CHANGE UP (ref 150–400)
POLYCHROMASIA BLD QL SMEAR: SLIGHT — SIGNIFICANT CHANGE UP
RBC # BLD: 3.33 M/UL — LOW (ref 3.8–5.2)
RBC # FLD: 18.4 % — HIGH (ref 10.3–14.5)
RBC BLD AUTO: SIGNIFICANT CHANGE UP
WBC # BLD: 2.11 K/UL — LOW (ref 3.8–10.5)
WBC # FLD AUTO: 2.11 K/UL — LOW (ref 3.8–10.5)

## 2023-02-25 LAB
ALBUMIN SERPL ELPH-MCNC: 4.2 G/DL — SIGNIFICANT CHANGE UP (ref 3.3–5)
ALP SERPL-CCNC: 63 U/L — SIGNIFICANT CHANGE UP (ref 40–120)
ALT FLD-CCNC: 16 U/L — SIGNIFICANT CHANGE UP (ref 10–45)
ANION GAP SERPL CALC-SCNC: 14 MMOL/L — SIGNIFICANT CHANGE UP (ref 5–17)
AST SERPL-CCNC: 19 U/L — SIGNIFICANT CHANGE UP (ref 10–40)
BILIRUB SERPL-MCNC: 0.4 MG/DL — SIGNIFICANT CHANGE UP (ref 0.2–1.2)
BUN SERPL-MCNC: 22 MG/DL — SIGNIFICANT CHANGE UP (ref 7–23)
CALCIUM SERPL-MCNC: 9.6 MG/DL — SIGNIFICANT CHANGE UP (ref 8.4–10.5)
CHLORIDE SERPL-SCNC: 98 MMOL/L — SIGNIFICANT CHANGE UP (ref 96–108)
CO2 SERPL-SCNC: 26 MMOL/L — SIGNIFICANT CHANGE UP (ref 22–31)
CREAT SERPL-MCNC: 0.62 MG/DL — SIGNIFICANT CHANGE UP (ref 0.5–1.3)
EGFR: 93 ML/MIN/1.73M2 — SIGNIFICANT CHANGE UP
GLUCOSE SERPL-MCNC: 100 MG/DL — HIGH (ref 70–99)
MAGNESIUM SERPL-MCNC: 1.6 MG/DL — SIGNIFICANT CHANGE UP (ref 1.6–2.6)
POTASSIUM SERPL-MCNC: 3.3 MMOL/L — LOW (ref 3.5–5.3)
POTASSIUM SERPL-SCNC: 3.3 MMOL/L — LOW (ref 3.5–5.3)
PROT SERPL-MCNC: 6.3 G/DL — SIGNIFICANT CHANGE UP (ref 6–8.3)
SODIUM SERPL-SCNC: 138 MMOL/L — SIGNIFICANT CHANGE UP (ref 135–145)

## 2023-02-27 ENCOUNTER — NON-APPOINTMENT (OUTPATIENT)
Age: 73
End: 2023-02-27

## 2023-02-27 PROCEDURE — G6015: CPT

## 2023-02-27 PROCEDURE — G6002: CPT

## 2023-02-28 ENCOUNTER — NON-APPOINTMENT (OUTPATIENT)
Age: 73
End: 2023-02-28

## 2023-02-28 VITALS
DIASTOLIC BLOOD PRESSURE: 80 MMHG | HEIGHT: 61 IN | SYSTOLIC BLOOD PRESSURE: 106 MMHG | BODY MASS INDEX: 31.53 KG/M2 | OXYGEN SATURATION: 98 % | HEART RATE: 80 BPM | WEIGHT: 167 LBS | RESPIRATION RATE: 18 BRPM

## 2023-02-28 PROCEDURE — G6015: CPT

## 2023-02-28 PROCEDURE — G6002: CPT

## 2023-02-28 PROCEDURE — 77427 RADIATION TX MANAGEMENT X5: CPT

## 2023-02-28 PROCEDURE — 77336 RADIATION PHYSICS CONSULT: CPT

## 2023-02-28 NOTE — HISTORY OF PRESENT ILLNESS
[FreeTextEntry1] : This is a 73 year old female diagnosed with esophageal cancer in December 2022.\par \par She has a history of multiple sclerosis diagnosed in 1988 with periodic flares over the years. She also had hiatal hernia repair about 20 years ago, as well as GERD and dyspepsia. \par \par She presented to Dr. Worthington in September with complaints of coughing, clearing throat, and increased mucus which she believed were related to seasonal allergies. She was recommended to undergo endoscopy.. \par \par She underwent endoscopy with Dr. Bender on 12/5/22. Grade A esophagitis in the GE junction compatible with reflux esophagitis. Erythema in the antrum compatible with non-erosive gastritis. Biopsy of the GE junction ulceration showed infiltrating adenocarcinoma. Biopsy of the stomach was negative.\par \par Sonographic esophagogastroduodenoscopy performed on 12/9/22 by Dr. Ocampo showed a small ulcerated mass found in the lower third of the esophagus, 35 cm from the incisors. The mass was non-obstructing and not circumferential. A 3 cm hiatal hernia was present. Staging T3 N0\par She has not yet had any other imaging studies performed.  She has a neurostimulator in the left lower flank which is no longer functioning.  She is able to have MRIs superior to the neurotransmitter.\par \par She denies dysphagia and reports an intentional 50 pound weight loss over the past few years.  She is edentulous with dentures due to a reaction to actiq which she was taking for her multiple sclerosis years ago.  She also has alopecia.  She does not drive and is living currently with her 97-year-old mother and her cousin Luisa in [place].  Her permanent residence is [place] with her . She presents to discuss the role of radiation therapy in her care. \par \par \par Chani was seen for OTV #23/23. She receives chemotherapy on Fridays with schilling/taxol. Reports nausea and constipation. Using colace and senna as well as 1/2 dose of miralax. Nausea better now using Compazine. Reflux symptoms increasing. Taking Omeprazole and Carafate 4 times daily. MIssed chemotherapy last week due to cytopenias

## 2023-02-28 NOTE — PHYSICAL EXAM
[Normal] : normal skin color and pigmentation and no rash [Skin Color & Pigmentation] : normal skin color and pigmentation

## 2023-02-28 NOTE — DISEASE MANAGEMENT
[Clinical] : TNM Stage: c [III] : III [TTNM] : 3 [NTNM] : 0 [MTNM] : 0 [de-identified] : 6[address] [[place] [address]] : 0223 [de-identified] : Esophagus/LN

## 2023-03-03 ENCOUNTER — RESULT REVIEW (OUTPATIENT)
Age: 73
End: 2023-03-03

## 2023-03-03 ENCOUNTER — APPOINTMENT (OUTPATIENT)
Dept: HEMATOLOGY ONCOLOGY | Facility: CLINIC | Age: 73
End: 2023-03-03
Payer: MEDICARE

## 2023-03-03 ENCOUNTER — APPOINTMENT (OUTPATIENT)
Dept: HEMATOLOGY ONCOLOGY | Facility: CLINIC | Age: 73
End: 2023-03-03

## 2023-03-03 ENCOUNTER — APPOINTMENT (OUTPATIENT)
Dept: INFUSION THERAPY | Facility: CLINIC | Age: 73
End: 2023-03-03

## 2023-03-03 VITALS
RESPIRATION RATE: 18 BRPM | DIASTOLIC BLOOD PRESSURE: 68 MMHG | HEART RATE: 99 BPM | BODY MASS INDEX: 31.37 KG/M2 | WEIGHT: 166.13 LBS | TEMPERATURE: 97.8 F | HEIGHT: 61 IN | OXYGEN SATURATION: 96 % | SYSTOLIC BLOOD PRESSURE: 110 MMHG

## 2023-03-03 LAB
ALBUMIN SERPL ELPH-MCNC: 4.1 G/DL — SIGNIFICANT CHANGE UP (ref 3.3–5)
ALP SERPL-CCNC: 61 U/L — SIGNIFICANT CHANGE UP (ref 40–120)
ALT FLD-CCNC: 18 U/L — SIGNIFICANT CHANGE UP (ref 10–45)
ANION GAP SERPL CALC-SCNC: 16 MMOL/L — SIGNIFICANT CHANGE UP (ref 5–17)
AST SERPL-CCNC: 46 U/L — HIGH (ref 10–40)
BASOPHILS # BLD AUTO: 0 K/UL — SIGNIFICANT CHANGE UP (ref 0–0.2)
BASOPHILS NFR BLD AUTO: 0 % — SIGNIFICANT CHANGE UP (ref 0–2)
BILIRUB SERPL-MCNC: 0.3 MG/DL — SIGNIFICANT CHANGE UP (ref 0.2–1.2)
BUN SERPL-MCNC: 23 MG/DL — SIGNIFICANT CHANGE UP (ref 7–23)
CALCIUM SERPL-MCNC: 10.1 MG/DL — SIGNIFICANT CHANGE UP (ref 8.4–10.5)
CHLORIDE SERPL-SCNC: 99 MMOL/L — SIGNIFICANT CHANGE UP (ref 96–108)
CO2 SERPL-SCNC: 20 MMOL/L — LOW (ref 22–31)
CREAT SERPL-MCNC: 0.59 MG/DL — SIGNIFICANT CHANGE UP (ref 0.5–1.3)
EGFR: 95 ML/MIN/1.73M2 — SIGNIFICANT CHANGE UP
ELLIPTOCYTES BLD QL SMEAR: SLIGHT — SIGNIFICANT CHANGE UP
EOSINOPHIL # BLD AUTO: 0 K/UL — SIGNIFICANT CHANGE UP (ref 0–0.5)
EOSINOPHIL NFR BLD AUTO: 0 % — SIGNIFICANT CHANGE UP (ref 0–6)
GLUCOSE SERPL-MCNC: 106 MG/DL — HIGH (ref 70–99)
HCT VFR BLD CALC: 33.3 % — LOW (ref 34.5–45)
HGB BLD-MCNC: 11.2 G/DL — LOW (ref 11.5–15.5)
HYPOCHROMIA BLD QL: SLIGHT — SIGNIFICANT CHANGE UP
LYMPHOCYTES # BLD AUTO: 0.54 K/UL — LOW (ref 1–3.3)
LYMPHOCYTES # BLD AUTO: 22 % — SIGNIFICANT CHANGE UP (ref 13–44)
MACROCYTES BLD QL: SLIGHT — SIGNIFICANT CHANGE UP
MAGNESIUM SERPL-MCNC: 1.7 MG/DL — SIGNIFICANT CHANGE UP (ref 1.6–2.6)
MCHC RBC-ENTMCNC: 31.3 PG — SIGNIFICANT CHANGE UP (ref 27–34)
MCHC RBC-ENTMCNC: 33.6 GM/DL — SIGNIFICANT CHANGE UP (ref 32–36)
MCV RBC AUTO: 93 FL — SIGNIFICANT CHANGE UP (ref 80–100)
MICROCYTES BLD QL: SLIGHT — SIGNIFICANT CHANGE UP
MONOCYTES # BLD AUTO: 0.71 K/UL — SIGNIFICANT CHANGE UP (ref 0–0.9)
MONOCYTES NFR BLD AUTO: 29 % — HIGH (ref 2–14)
NEUTROPHILS # BLD AUTO: 1.2 K/UL — LOW (ref 1.8–7.4)
NEUTROPHILS NFR BLD AUTO: 49 % — SIGNIFICANT CHANGE UP (ref 43–77)
NRBC # BLD: 0 /100 — SIGNIFICANT CHANGE UP (ref 0–0)
NRBC # BLD: SIGNIFICANT CHANGE UP /100 WBCS (ref 0–0)
PLAT MORPH BLD: NORMAL — SIGNIFICANT CHANGE UP
PLATELET # BLD AUTO: 298 K/UL — SIGNIFICANT CHANGE UP (ref 150–400)
POLYCHROMASIA BLD QL SMEAR: SLIGHT — SIGNIFICANT CHANGE UP
POTASSIUM SERPL-MCNC: 4.4 MMOL/L — SIGNIFICANT CHANGE UP (ref 3.5–5.3)
POTASSIUM SERPL-SCNC: 4.4 MMOL/L — SIGNIFICANT CHANGE UP (ref 3.5–5.3)
PROT SERPL-MCNC: 7 G/DL — SIGNIFICANT CHANGE UP (ref 6–8.3)
RBC # BLD: 3.58 M/UL — LOW (ref 3.8–5.2)
RBC # FLD: 19 % — HIGH (ref 10.3–14.5)
RBC BLD AUTO: ABNORMAL
SCHISTOCYTES BLD QL AUTO: SLIGHT — SIGNIFICANT CHANGE UP
SODIUM SERPL-SCNC: 135 MMOL/L — SIGNIFICANT CHANGE UP (ref 135–145)
WBC # BLD: 2.44 K/UL — LOW (ref 3.8–10.5)
WBC # FLD AUTO: 2.44 K/UL — LOW (ref 3.8–10.5)

## 2023-03-03 PROCEDURE — 99215 OFFICE O/P EST HI 40 MIN: CPT

## 2023-03-03 NOTE — PHYSICAL EXAM
[Ambulatory and capable of all self care but unable to carry out any work activities] : Status 2- Ambulatory and capable of all self care but unable to carry out any work activities. Up and about more than 50% of waking hours [Normal] : affect appropriate [de-identified] : wt stable  [de-identified] : edentulous [de-identified] : + grade 2 murmur  [de-identified] : chronic MS symptoms

## 2023-03-03 NOTE — HISTORY OF PRESENT ILLNESS
[Date: ____________] : Patient's last distress assessment performed on [unfilled]. [0 - No Distress] : Distress Level: 0 [de-identified] : The patient was diagnosed with esophageal adenocarcinoma in December 2022 at the age of 72. She presented to Dr. Worthington in September with complaints of coughing, clearing throat, and increased mucus. On 12/5/22, she underwent an EGD with Dr. Bender which showed grade A esophagitis in the gastroesophageal junction compatible with reflux esophagitis. Erythema in the antrum was compatible with non-erosive gastritis. Pathology of the gastroesophageal junction biopsy showed infiltrating adenocarcinoma. An immunohistochemical stain for HER2/luz is 0-1 +/negative. No loss of nuclear expression of MMR proteins (MLH1, MSH2, MSH6, and PMS2). AnEUS on 12/9/22 by Dr. Ocampo showed a small ulcerated mass in the lower third of the esophagus, 35 cm from the incisors. The mass was non-obstructing and not circumferential. A 3 cm hiatal hernia was present. Staging uT3N0.  [de-identified] : Medical Hx: multiple sclerosis diagnosed in 1988 with periodic flares over the years; GERD and dyspepsia; HTN; bifascicular block (signal block); HL; atypical basal cell carcinoma [id number])\par Surgical Hx: hiatal hernia repair 1983;  cholecystectomy; many D &C's leading up to hysterectomy 1983; She has a neurostimulator in the left lower flank which is no longer functioning, wires trace up to her upper thoracic spine. \par Family Hx: mother colon ca; father multiple myeloma \par Social Hx: was a social smoker, quit smoking 25 years ago; ETOH social 1 drink 2 times weekly;  ( in [place]); no children; family close by, lives with her 91 yo mother currently.  She does not drive and is living currently with her 97-year-old mother and her cousin Luisa in [place]. Her permanent residence is [place] with her .  [de-identified] : She c/o cough that had become worse over the last year. She has diffuse chronic 6/10 pain / neuropathy from MS, no esophageal pain. She denies dysphagia and reports an intentional 50 pound weight loss over the past few years. She is edentulous with dentures due to a reaction to actiq which she was taking for her multiple sclerosis years ago. She also has alopecia.\par \par She completed 3 cycles of carbo / Taxol induction weekly, 1/20/23. Today is C6 of Carbo / taxol concurrent with RT, 6 planned cycles.\par Was able to cook and be with family starting Tuesday after chemo. Cardio was able to 1/2 her diuretic dose to help with dehydration / kidney levels.\par fatigue from MS, baclofen was increased by neuro for MS spasms \par hair loss - shaved her head\par nail / skin changes denies \par neuropathy has from MS; doesn't feel its worse \par pain denies \par mouth sores denies, performing good mouth care \par N/V resolved \par heartburn denies; feeling full earlier so eating smaller meals more often \par C/D constipation alt Colace and 1/2 Miralax both with senna alt every other day and resolves\par she feels well overall

## 2023-03-03 NOTE — REVIEW OF SYSTEMS
[Constipation] : constipation [Joint Pain] : joint pain [Muscle Pain] : muscle pain [Muscle Weakness] : muscle weakness [Negative] : Allergic/Immunologic [Recent Change In Weight] : ~T no recent weight change [Dysphagia] : no dysphagia [Chest Pain] : no chest pain [Shortness Of Breath] : no shortness of breath [Abdominal Pain] : no abdominal pain [FreeTextEntry2] : wt stable  [FreeTextEntry7] : OTC meds and resolves  [FreeTextEntry9] : chronic from MS

## 2023-03-04 ENCOUNTER — RX RENEWAL (OUTPATIENT)
Age: 73
End: 2023-03-04

## 2023-03-07 ENCOUNTER — RESULT REVIEW (OUTPATIENT)
Age: 73
End: 2023-03-07

## 2023-03-07 ENCOUNTER — NON-APPOINTMENT (OUTPATIENT)
Age: 73
End: 2023-03-07

## 2023-03-08 ENCOUNTER — RX RENEWAL (OUTPATIENT)
Age: 73
End: 2023-03-08

## 2023-03-10 ENCOUNTER — RX RENEWAL (OUTPATIENT)
Age: 73
End: 2023-03-10

## 2023-03-17 ENCOUNTER — NON-APPOINTMENT (OUTPATIENT)
Age: 73
End: 2023-03-17

## 2023-03-19 ENCOUNTER — RX RENEWAL (OUTPATIENT)
Age: 73
End: 2023-03-19

## 2023-03-23 ENCOUNTER — TRANSCRIPTION ENCOUNTER (OUTPATIENT)
Age: 73
End: 2023-03-23

## 2023-03-23 ENCOUNTER — APPOINTMENT (OUTPATIENT)
Dept: SURGICAL ONCOLOGY | Facility: CLINIC | Age: 73
End: 2023-03-23
Payer: MEDICARE

## 2023-03-23 VITALS
HEIGHT: 61 IN | BODY MASS INDEX: 31.15 KG/M2 | RESPIRATION RATE: 22 BRPM | HEART RATE: 103 BPM | OXYGEN SATURATION: 98 % | DIASTOLIC BLOOD PRESSURE: 74 MMHG | SYSTOLIC BLOOD PRESSURE: 146 MMHG | WEIGHT: 165 LBS

## 2023-03-23 PROCEDURE — 99205 OFFICE O/P NEW HI 60 MIN: CPT

## 2023-03-23 RX ORDER — AMLODIPINE BESYLATE 10 MG/1
10 TABLET ORAL
Refills: 0 | Status: DISCONTINUED | COMMUNITY
End: 2023-03-23

## 2023-03-23 RX ORDER — PROCHLORPERAZINE MALEATE 10 MG/1
10 TABLET ORAL
Qty: 120 | Refills: 0 | Status: DISCONTINUED | COMMUNITY
Start: 2022-12-19 | End: 2023-03-23

## 2023-03-23 RX ORDER — ATORVASTATIN CALCIUM 40 MG/1
40 TABLET, FILM COATED ORAL
Refills: 0 | Status: DISCONTINUED | COMMUNITY
End: 2023-03-23

## 2023-03-23 RX ORDER — CHLORTHALIDONE 25 MG/1
25 TABLET ORAL
Refills: 0 | Status: DISCONTINUED | COMMUNITY
End: 2023-03-23

## 2023-03-23 RX ORDER — BACLOFEN 10 MG/1
10 TABLET ORAL
Refills: 0 | Status: DISCONTINUED | COMMUNITY
End: 2023-03-23

## 2023-03-23 RX ORDER — LOSARTAN POTASSIUM 100 MG/1
100 TABLET, FILM COATED ORAL
Refills: 0 | Status: DISCONTINUED | COMMUNITY
End: 2023-03-23

## 2023-03-23 RX ORDER — HYDRALAZINE HYDROCHLORIDE 10 MG/1
10 TABLET ORAL
Refills: 0 | Status: DISCONTINUED | COMMUNITY
End: 2023-03-23

## 2023-03-23 RX ORDER — DALFAMPRIDINE 10 MG/1
10 TABLET, FILM COATED, EXTENDED RELEASE ORAL
Refills: 0 | Status: DISCONTINUED | COMMUNITY
End: 2023-03-23

## 2023-03-23 RX ORDER — POTASSIUM CHLORIDE 1500 MG/1
20 TABLET, FILM COATED, EXTENDED RELEASE ORAL
Qty: 30 | Refills: 0 | Status: DISCONTINUED | COMMUNITY
Start: 2023-02-27 | End: 2023-03-23

## 2023-03-24 NOTE — CONSULT LETTER
[Dear  ___] : Dear  [unfilled], [Consult Letter:] : I had the pleasure of evaluating your patient, [unfilled]. [Please see my note below.] : Please see my note below. [Consult Closing:] : Thank you very much for allowing me to participate in the care of this patient.  If you have any questions, please do not hesitate to contact me. [Sincerely,] : Sincerely, [FreeTextEntry3] : Alan Alva MD, MPH, FACS, FSSO\par , [facility]\par [facility] [place]\par Associate Professor of Surgery\par [employer] [facility]  [DrKevin  ___] : Dr. LAST [DrKevin ___] : Dr. LAST

## 2023-03-24 NOTE — ASSESSMENT
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  \par \par She is s/p 3 doses induction carbo/taxol completed 1/20/23, followed by 4 cycles of chemotherapy weekly concurrent with RT.  Due to neutropenia, 2 cycles were held.\par \par PLAN:\par 1) Restaging CT A/P and PET on 4/4/23\par 2) We discussed the risks, benefits, and alternatives of the procedure with the patient, she expressed understanding and agree to proceed with robotic-assisted laparoscopic esophagogastrectomy, EGD, possible open.  This will be dependent on lack of progression or metastasis on imaging.\par 3) Referral to thoracic surgery

## 2023-03-24 NOTE — HISTORY OF PRESENT ILLNESS
[de-identified] : 73 year female presents for an initial consultation for evaluation of esophageal adenocarcinoma, referred by Dr. Tiffanie Tripp.\par \par She presented to Dr. Worthington in September 2022 with complaints of coughing, clearing throat, and increased mucus. On 12/5/22, she underwent an EGD with Dr. Bender which showed grade A esophagitis in the GEJ, compatible with reflux esophagitis.  Multiple biopsies taken.  Pathology of the gastroesophageal junction biopsy showed infiltrating adenocarcinoma, HER2 negative with no loss of nuclear expression of MMR proteins (MLH1, MSH2, MSH6, and PMS2). \par \par EUS was performed on 12/9/22 by Dr. Ocampo and showed a small ulcerated mass in the lower third of the esophagus, 35 cm from the incisors. The mass was non-obstructing and not circumferential. A 3 cm hiatal hernia was present. Staging uT3N0. \par \par PET/CT performed 12/28/22- Increased uptake at distal esophageal lesion consistent with malignant involvement.  No gross evidence of metastatic disease.\par \par She consulted with Dr. Tiffanie Tripp from medical oncology and Dr. Mara Wolfe from radiation oncology.  She is s/p 3 doses induction carbo/taxol completed 1/20/23, followed by 4 cycles of chemotherapy weekly concurrent with RT. Last radiation treatment on 2/28/23.  Due to neutropenia, 2 cycles were held.\par \par She is scheduled for restaging PET and CT scans on 4/4/23.\par \par PMH: multiple sclerosis diagnosed in 1988 with periodic flares over the years; GERD and dyspepsia; HTN; bifascicular block (signal block); HL; atypical basal cell carcinoma [id number])\par Surgical Hx: hiatal hernia repair 1983; cholecystectomy; many D &C's leading up to hysterectomy 1983; She has a neurostimulator in the left lower flank which is no longer functioning, wires trace up to her upper thoracic spine. \par Family Hx: mother colon ca; father multiple myeloma \par Social Hx: was a social smoker, quit smoking 25 years ago; ETOH social 1 drink 2 times weekly;  ( in [place]); no children; family close by.  She does not drive and is living currently with her 97-year-old mother and her cousin Luisa in [place]. Her permanent residence is [place] with her .

## 2023-04-04 ENCOUNTER — APPOINTMENT (OUTPATIENT)
Dept: NUCLEAR MEDICINE | Facility: CLINIC | Age: 73
End: 2023-04-04
Payer: MEDICARE

## 2023-04-04 ENCOUNTER — APPOINTMENT (OUTPATIENT)
Dept: CT IMAGING | Facility: CLINIC | Age: 73
End: 2023-04-04
Payer: MEDICARE

## 2023-04-04 ENCOUNTER — OUTPATIENT (OUTPATIENT)
Dept: OUTPATIENT SERVICES | Facility: HOSPITAL | Age: 73
LOS: 1 days | End: 2023-04-04
Payer: MEDICARE

## 2023-04-04 DIAGNOSIS — Z00.8 ENCOUNTER FOR OTHER GENERAL EXAMINATION: ICD-10-CM

## 2023-04-04 DIAGNOSIS — Z90.710 ACQUIRED ABSENCE OF BOTH CERVIX AND UTERUS: Chronic | ICD-10-CM

## 2023-04-04 DIAGNOSIS — C15.9 MALIGNANT NEOPLASM OF ESOPHAGUS, UNSPECIFIED: ICD-10-CM

## 2023-04-04 DIAGNOSIS — Z98.890 OTHER SPECIFIED POSTPROCEDURAL STATES: Chronic | ICD-10-CM

## 2023-04-04 DIAGNOSIS — Z95.828 PRESENCE OF OTHER VASCULAR IMPLANTS AND GRAFTS: Chronic | ICD-10-CM

## 2023-04-04 DIAGNOSIS — Z96.89 PRESENCE OF OTHER SPECIFIED FUNCTIONAL IMPLANTS: Chronic | ICD-10-CM

## 2023-04-04 DIAGNOSIS — Z90.49 ACQUIRED ABSENCE OF OTHER SPECIFIED PARTS OF DIGESTIVE TRACT: Chronic | ICD-10-CM

## 2023-04-04 PROCEDURE — A9552: CPT

## 2023-04-04 PROCEDURE — 74177 CT ABD & PELVIS W/CONTRAST: CPT | Mod: 26

## 2023-04-04 PROCEDURE — 71260 CT THORAX DX C+: CPT | Mod: 26

## 2023-04-04 PROCEDURE — 78815 PET IMAGE W/CT SKULL-THIGH: CPT | Mod: 26,PS

## 2023-04-04 PROCEDURE — 74177 CT ABD & PELVIS W/CONTRAST: CPT

## 2023-04-04 PROCEDURE — 78815 PET IMAGE W/CT SKULL-THIGH: CPT

## 2023-04-04 PROCEDURE — 71260 CT THORAX DX C+: CPT

## 2023-04-05 ENCOUNTER — APPOINTMENT (OUTPATIENT)
Dept: RADIATION ONCOLOGY | Facility: CLINIC | Age: 73
End: 2023-04-05
Payer: MEDICARE

## 2023-04-05 VITALS
WEIGHT: 167 LBS | BODY MASS INDEX: 31.53 KG/M2 | HEIGHT: 61 IN | OXYGEN SATURATION: 98 % | RESPIRATION RATE: 18 BRPM | HEART RATE: 80 BPM | SYSTOLIC BLOOD PRESSURE: 136 MMHG | DIASTOLIC BLOOD PRESSURE: 72 MMHG

## 2023-04-05 PROCEDURE — 99024 POSTOP FOLLOW-UP VISIT: CPT

## 2023-04-05 NOTE — HISTORY OF PRESENT ILLNESS
[FreeTextEntry1] : This is a 73 year old female diagnosed with esophageal cancer in December 2022.\par \par She has a history of multiple sclerosis diagnosed in 1988 with periodic flares over the years. She also had hiatal hernia repair about 20 years ago, as well as GERD and dyspepsia. \par \par She presented to Dr. Worthington in September with complaints of coughing, clearing throat, and increased mucus which she believed were related to seasonal allergies. She was recommended to undergo endoscopy.. \par \par She underwent endoscopy with Dr. Bender on 12/5/22. Grade A esophagitis in the GE junction compatible with reflux esophagitis. Erythema in the antrum compatible with non-erosive gastritis. Biopsy of the GE junction ulceration showed infiltrating adenocarcinoma. Biopsy of the stomach was negative.\par \par Sonographic esophagogastroduodenoscopy performed on 12/9/22 by Dr. Ocampo showed a small ulcerated mass found in the lower third of the esophagus, 35 cm from the incisors. The mass was non-obstructing and not circumferential. A 3 cm hiatal hernia was present. Staging T3 N0\par She has not yet had any other imaging studies performed.  She has a neurostimulator in the left lower flank which is no longer functioning.  She is able to have MRIs superior to the neurotransmitter.\par \par She denies dysphagia and reports an intentional 50 pound weight loss over the past few years.  She is edentulous with dentures due to a reaction to actiq which she was taking for her multiple sclerosis years ago.  She also has alopecia.  She does not drive and is living currently with her 97-year-old mother and her cousin Luisa in [place].  Her permanent residence is [place] with her . She presents to discuss the role of radiation therapy in her care. \par \par She completed RT to the esophagus/LN, total dose of 4140 cGy in 23 fx, on 2/28/23. \par \par She presents for a 1 month PTE. She completed chemotherapy on Fridays with schilling/taxol. Taking Carafate 2-4 times daily. PET scan performed yesterday and not read yet but I reviewed images and appears to have had an excellent response to neoadjuvant therapy with expected postchemotherapy bone marrow activity.

## 2023-04-06 ENCOUNTER — OUTPATIENT (OUTPATIENT)
Dept: OUTPATIENT SERVICES | Facility: HOSPITAL | Age: 73
LOS: 1 days | Discharge: HOME | End: 2023-04-06

## 2023-04-06 DIAGNOSIS — Z98.890 OTHER SPECIFIED POSTPROCEDURAL STATES: Chronic | ICD-10-CM

## 2023-04-06 DIAGNOSIS — Z96.89 PRESENCE OF OTHER SPECIFIED FUNCTIONAL IMPLANTS: Chronic | ICD-10-CM

## 2023-04-06 DIAGNOSIS — Z90.49 ACQUIRED ABSENCE OF OTHER SPECIFIED PARTS OF DIGESTIVE TRACT: Chronic | ICD-10-CM

## 2023-04-06 DIAGNOSIS — C15.9 MALIGNANT NEOPLASM OF ESOPHAGUS, UNSPECIFIED: ICD-10-CM

## 2023-04-06 DIAGNOSIS — Z95.828 PRESENCE OF OTHER VASCULAR IMPLANTS AND GRAFTS: Chronic | ICD-10-CM

## 2023-04-06 DIAGNOSIS — Z90.710 ACQUIRED ABSENCE OF BOTH CERVIX AND UTERUS: Chronic | ICD-10-CM

## 2023-04-07 ENCOUNTER — RESULT REVIEW (OUTPATIENT)
Age: 73
End: 2023-04-07

## 2023-04-07 ENCOUNTER — APPOINTMENT (OUTPATIENT)
Dept: HEMATOLOGY ONCOLOGY | Facility: CLINIC | Age: 73
End: 2023-04-07
Payer: MEDICARE

## 2023-04-07 VITALS
RESPIRATION RATE: 18 BRPM | HEART RATE: 70 BPM | OXYGEN SATURATION: 97 % | DIASTOLIC BLOOD PRESSURE: 64 MMHG | SYSTOLIC BLOOD PRESSURE: 130 MMHG | TEMPERATURE: 97.6 F

## 2023-04-07 LAB
ALBUMIN SERPL ELPH-MCNC: 4.2 G/DL — SIGNIFICANT CHANGE UP (ref 3.3–5)
ALP SERPL-CCNC: 72 U/L — SIGNIFICANT CHANGE UP (ref 40–120)
ALT FLD-CCNC: 16 U/L — SIGNIFICANT CHANGE UP (ref 10–45)
ANION GAP SERPL CALC-SCNC: 12 MMOL/L — SIGNIFICANT CHANGE UP (ref 5–17)
AST SERPL-CCNC: 19 U/L — SIGNIFICANT CHANGE UP (ref 10–40)
BASOPHILS # BLD AUTO: 0.02 K/UL — SIGNIFICANT CHANGE UP (ref 0–0.2)
BASOPHILS NFR BLD AUTO: 0.4 % — SIGNIFICANT CHANGE UP (ref 0–2)
BILIRUB SERPL-MCNC: 0.4 MG/DL — SIGNIFICANT CHANGE UP (ref 0.2–1.2)
BUN SERPL-MCNC: 19 MG/DL — SIGNIFICANT CHANGE UP (ref 7–23)
CALCIUM SERPL-MCNC: 9.9 MG/DL — SIGNIFICANT CHANGE UP (ref 8.4–10.5)
CHLORIDE SERPL-SCNC: 104 MMOL/L — SIGNIFICANT CHANGE UP (ref 96–108)
CO2 SERPL-SCNC: 25 MMOL/L — SIGNIFICANT CHANGE UP (ref 22–31)
CREAT SERPL-MCNC: 0.56 MG/DL — SIGNIFICANT CHANGE UP (ref 0.5–1.3)
EGFR: 96 ML/MIN/1.73M2 — SIGNIFICANT CHANGE UP
EOSINOPHIL # BLD AUTO: 0.07 K/UL — SIGNIFICANT CHANGE UP (ref 0–0.5)
EOSINOPHIL NFR BLD AUTO: 1.3 % — SIGNIFICANT CHANGE UP (ref 0–6)
GLUCOSE SERPL-MCNC: 106 MG/DL — HIGH (ref 70–99)
HCT VFR BLD CALC: 33.8 % — LOW (ref 34.5–45)
HGB BLD-MCNC: 11.2 G/DL — LOW (ref 11.5–15.5)
IMM GRANULOCYTES NFR BLD AUTO: 0.2 % — SIGNIFICANT CHANGE UP (ref 0–0.9)
LYMPHOCYTES # BLD AUTO: 0.89 K/UL — LOW (ref 1–3.3)
LYMPHOCYTES # BLD AUTO: 16.9 % — SIGNIFICANT CHANGE UP (ref 13–44)
MAGNESIUM SERPL-MCNC: 2 MG/DL — SIGNIFICANT CHANGE UP (ref 1.6–2.6)
MCHC RBC-ENTMCNC: 30.9 PG — SIGNIFICANT CHANGE UP (ref 27–34)
MCHC RBC-ENTMCNC: 33.1 GM/DL — SIGNIFICANT CHANGE UP (ref 32–36)
MCV RBC AUTO: 93.4 FL — SIGNIFICANT CHANGE UP (ref 80–100)
MONOCYTES # BLD AUTO: 0.65 K/UL — SIGNIFICANT CHANGE UP (ref 0–0.9)
MONOCYTES NFR BLD AUTO: 12.4 % — SIGNIFICANT CHANGE UP (ref 2–14)
NEUTROPHILS # BLD AUTO: 3.62 K/UL — SIGNIFICANT CHANGE UP (ref 1.8–7.4)
NEUTROPHILS NFR BLD AUTO: 68.8 % — SIGNIFICANT CHANGE UP (ref 43–77)
NRBC # BLD: 0 /100 WBCS — SIGNIFICANT CHANGE UP (ref 0–0)
PLATELET # BLD AUTO: 266 K/UL — SIGNIFICANT CHANGE UP (ref 150–400)
POTASSIUM SERPL-MCNC: 4 MMOL/L — SIGNIFICANT CHANGE UP (ref 3.5–5.3)
POTASSIUM SERPL-SCNC: 4 MMOL/L — SIGNIFICANT CHANGE UP (ref 3.5–5.3)
PROT SERPL-MCNC: 6.3 G/DL — SIGNIFICANT CHANGE UP (ref 6–8.3)
RBC # BLD: 3.62 M/UL — LOW (ref 3.8–5.2)
RBC # FLD: 16.9 % — HIGH (ref 10.3–14.5)
SODIUM SERPL-SCNC: 141 MMOL/L — SIGNIFICANT CHANGE UP (ref 135–145)
T3FREE SERPL-MCNC: 2.55 PG/ML — SIGNIFICANT CHANGE UP (ref 2–4.4)
T4 FREE SERPL-MCNC: 1.2 NG/DL — SIGNIFICANT CHANGE UP (ref 0.9–1.8)
TSH SERPL-MCNC: 1.79 UIU/ML — SIGNIFICANT CHANGE UP (ref 0.27–4.2)
WBC # BLD: 5.26 K/UL — SIGNIFICANT CHANGE UP (ref 3.8–10.5)
WBC # FLD AUTO: 5.26 K/UL — SIGNIFICANT CHANGE UP (ref 3.8–10.5)

## 2023-04-07 PROCEDURE — 99215 OFFICE O/P EST HI 40 MIN: CPT

## 2023-04-07 NOTE — RESULTS/DATA
[FreeTextEntry1] : 4/4/23 PET: Findings most supportive of a complete/near complete metabolic response to therapy. A small mildly FDG avid focus seen immediately proximal to the original esophageal  lesion is of uncertain clinical significance, SUV 3.7. No definite metabolic evidence of metastatic disease. Subcentimeter right inguinal mildly FDG avid lymph node is noted likely inflammatory in nature. Asymmetric uptake in the left fossa of [place] may be inflammatory/infectious in nature.\par 4/4/23 CT C/A/P: No evidence of metastatic disease within the chest, abdomen or pelvis. No lymphadenopathy.\par \par 12/28/2022 PET:  1.  Increased uptake at distal esophageal lesion consistent with malignant involvement.\par 2.  No gross evidence of metastatic disease.\par \par 12/9/22 EUS: Hiatal hernia. Small mass at the GE junction. T3N0Mx on this exam. \par \par 12/5/22 Path: Stomach, biopsy: Benign gastric mucosa with minimal chronic inflammation and congestion in the lamina propria. A Diff-Quik stain for Helicobacter pylori is negative. Gastroesophageal junction, biopsy: Infiltrating adenocarcinoma. An immunohistochemical stain for HER2/luz is 0-1 +/negative. No loss of nuclear expression of MMR proteins (MLH1, MSH2, MSH6, and PMS2). These results show low probability of microsatellite instability-high (MSI-H).\par \par 12/5/22 EDG: Normal duodenum. Hiatal hernia. Grade A esophagitis in the gastroesophageal junction compatible with reflux esophagitis. (biopsy). Erythema in the antrum compatible with non-erosive gastritis. (biopsy).

## 2023-04-07 NOTE — PHYSICAL EXAM
[Ambulatory and capable of all self care but unable to carry out any work activities] : Status 2- Ambulatory and capable of all self care but unable to carry out any work activities. Up and about more than 50% of waking hours [Normal] : affect appropriate [de-identified] : wt stable  [de-identified] : edentulous [de-identified] : + grade 2 murmur  [de-identified] : chronic MS symptoms

## 2023-04-07 NOTE — HISTORY OF PRESENT ILLNESS
[Date: ____________] : Patient's last distress assessment performed on [unfilled]. [0 - No Distress] : Distress Level: 0 [de-identified] : The patient was diagnosed with esophageal adenocarcinoma in December 2022 at the age of 72. She presented to Dr. Worthington in September with complaints of coughing, clearing throat, and increased mucus. On 12/5/22, she underwent an EGD with Dr. Bender which showed grade A esophagitis in the gastroesophageal junction compatible with reflux esophagitis. Erythema in the antrum was compatible with non-erosive gastritis. Pathology of the gastroesophageal junction biopsy showed infiltrating adenocarcinoma. An immunohistochemical stain for HER2/luz is 0-1 +/negative. No loss of nuclear expression of MMR proteins (MLH1, MSH2, MSH6, and PMS2). AnEUS on 12/9/22 by Dr. Ocampo showed a small ulcerated mass in the lower third of the esophagus, 35 cm from the incisors. The mass was non-obstructing and not circumferential. A 3 cm hiatal hernia was present. Staging uT3N0.  [de-identified] : Medical Hx: multiple sclerosis diagnosed in 1988 with periodic flares over the years; GERD and dyspepsia; HTN; bifascicular block (signal block); HL; atypical basal cell carcinoma [id number])\par Surgical Hx: hiatal hernia repair 1983;  cholecystectomy; many D &C's leading up to hysterectomy 1983; She has a neurostimulator in the left lower flank which is no longer functioning, wires trace up to her upper thoracic spine. \par Family Hx: mother colon ca; father multiple myeloma \par Social Hx: was a social smoker, quit smoking 25 years ago; ETOH social 1 drink 2 times weekly;  ( in [place]); no children; family close by, lives with her 91 yo mother currently.  She does not drive and is living currently with her 97-year-old mother and her cousin Luisa in [place]. Her permanent residence is [place] with her .  [de-identified] : She c/o cough that had become worse over the last year. She has diffuse chronic 6/10 pain / neuropathy from MS, no esophageal pain. She denies dysphagia and reports an intentional 50 pound weight loss over the past few years. She is edentulous with dentures due to a reaction to actiq which she was taking for her multiple sclerosis years ago. She also has alopecia.\par \par She completed 3 cycles of carbo / Taxol induction weekly, 1/20/23. Completed 4/6 cycles of Carbo / taxol concurrent with RT, on 2/17/23 due to low ANC last 2 cycles..\par Was able to cook and be with family starting Tuesday after chemo. Cardio was able to 1/2 her diuretic dose to help with dehydration / kidney levels.\par fatigue from MS, baclofen was increased by neuro for MS spasms \par hair loss - shaved her head\par nail / skin changes denies \par neuropathy has from MS; doesn't feel its worse \par pain denies \par mouth sores denies, performing good mouth care \par N/V resolved \par heartburn denies; feeling full earlier so eating smaller meals more often \par C/D constipation alt Colace and 1/2 Miralax both with senna alt every other day and resolves\par she feels well overall

## 2023-04-10 ENCOUNTER — APPOINTMENT (OUTPATIENT)
Dept: OTOLARYNGOLOGY | Facility: CLINIC | Age: 73
End: 2023-04-10

## 2023-04-10 DIAGNOSIS — H61.23 IMPACTED CERUMEN, BILATERAL: ICD-10-CM

## 2023-04-10 DIAGNOSIS — I45.4 NONSPECIFIC INTRAVENTRICULAR BLOCK: ICD-10-CM

## 2023-04-10 DIAGNOSIS — G35 MULTIPLE SCLEROSIS: ICD-10-CM

## 2023-04-10 DIAGNOSIS — Z86.79 PERSONAL HISTORY OF OTHER DISEASES OF THE CIRCULATORY SYSTEM: ICD-10-CM

## 2023-04-10 PROBLEM — Z80.9 FAMILY HISTORY OF MALIGNANT NEOPLASM: Status: ACTIVE | Noted: 2021-11-23

## 2023-04-13 ENCOUNTER — APPOINTMENT (OUTPATIENT)
Dept: THORACIC SURGERY | Facility: CLINIC | Age: 73
End: 2023-04-13
Payer: MEDICARE

## 2023-04-13 VITALS
DIASTOLIC BLOOD PRESSURE: 80 MMHG | BODY MASS INDEX: 31.91 KG/M2 | WEIGHT: 169 LBS | SYSTOLIC BLOOD PRESSURE: 171 MMHG | HEART RATE: 97 BPM | TEMPERATURE: 99.2 F | OXYGEN SATURATION: 96 % | RESPIRATION RATE: 18 BRPM | HEIGHT: 61 IN

## 2023-04-13 DIAGNOSIS — Z80.9 FAMILY HISTORY OF MALIGNANT NEOPLASM, UNSPECIFIED: ICD-10-CM

## 2023-04-13 PROCEDURE — 99205 OFFICE O/P NEW HI 60 MIN: CPT

## 2023-04-13 RX ORDER — DALFAMPRIDINE 10 MG/1
10 TABLET, FILM COATED, EXTENDED RELEASE ORAL
Refills: 0 | Status: ACTIVE | COMMUNITY

## 2023-04-13 RX ORDER — LOSARTAN POTASSIUM 100 MG/1
100 TABLET, FILM COATED ORAL
Refills: 0 | Status: ACTIVE | COMMUNITY

## 2023-04-13 RX ORDER — BACLOFEN 10 MG/1
10 TABLET ORAL
Refills: 0 | Status: ACTIVE | COMMUNITY

## 2023-04-13 RX ORDER — ATORVASTATIN CALCIUM 40 MG/1
40 TABLET, FILM COATED ORAL
Refills: 0 | Status: ACTIVE | COMMUNITY

## 2023-04-13 RX ORDER — HYDRALAZINE HYDROCHLORIDE 100 MG/1
100 TABLET ORAL
Refills: 0 | Status: ACTIVE | COMMUNITY

## 2023-04-13 RX ORDER — OMEPRAZOLE 40 MG/1
40 CAPSULE, DELAYED RELEASE ORAL
Qty: 90 | Refills: 1 | Status: COMPLETED | COMMUNITY
Start: 2022-09-08 | End: 2023-04-13

## 2023-04-13 RX ORDER — AMLODIPINE BESYLATE 10 MG/1
10 TABLET ORAL
Refills: 0 | Status: ACTIVE | COMMUNITY

## 2023-04-13 RX ORDER — AZELASTINE HYDROCHLORIDE 137 UG/1
137 SPRAY, METERED NASAL
Qty: 1 | Refills: 1 | Status: COMPLETED | COMMUNITY
Start: 2023-01-11 | End: 2023-04-13

## 2023-04-13 NOTE — CONSULT LETTER
[Dear  ___] : Dear  [unfilled], [Consult Letter:] : I had the pleasure of evaluating your patient, [unfilled]. [Please see my note below.] : Please see my note below. [Consult Closing:] : Thank you very much for allowing me to participate in the care of this patient.  If you have any questions, please do not hesitate to contact me. [Sincerely,] : Sincerely, [FreeTextEntry2] : Dr Alan Alva [FreeTextEntry3] : Preston Altamirano MD\par Director of Thoracic Surgery, [facility]\par  Cardiovascular & Thoracic Surgery\par [facility]\par [address] [place] [address]\[place] [address]\par Tel. [phone]\par Fax [phone]  [DrKevin  ___] : Dr. LAST

## 2023-04-13 NOTE — HISTORY OF PRESENT ILLNESS
[FreeTextEntry1] : Ms. JUAN NOVAK is a 73 year old female referred by Dr. Alva who presents for consultation regarding esophageal cancer which was diagnosed in December 2022. Staging uT3N0. She underwent chemotherapy and radiation therapy. Repeat PET scan from 4/4/23 shows \par \par Her pertinent past medical history includes multiple sclerosis diagnosed in 1988 with periodic flares over the years; GERD and dyspepsia; HTN; bifascicular block (signal block); HL; atypical basal cell carcinoma [id number])\par Surgical Hx: hiatal hernia repair 1983; cholecystectomy; many D &C's leading up to hysterectomy 1983; She has a neurostimulator in the left lower flank which is no longer functioning, wires trace up to her upper thoracic spine. \par \par At this point she has completed chemotherapy and radiation and is set for esophagectomy.\par \par Today, the patient reports never having esophageal symptoms but does have multiple sclerosis which has flared up since radiation and chemotherapy. She has peripheral neuropathy. She also had developed chest pain and workup revealed hiatal hernia. She also had a cough at that time. She was placed on medication but without resolution. She ultimately had an upper endoscopy which showed a malignancy.

## 2023-04-13 NOTE — ASSESSMENT
[FreeTextEntry1] : I had the pleasure of seeing Ms. NOVAK in the office today to discuss surgical treatment of esophageal cancer.\par \par Briefly, this is a 73 year old female with gastroesophageal reflux disease with esophageal cancer status post course of radiation and chemotherapy with complete metabolic response now presents for surgical excision.\par \par The cancer is in the lower third of the esophagus. Dr. Alva does the abdominal portion from the stomach and I will complete the portion in the chest. I will access chest through 3-4 small incisions in the chest. I will bring up the stomach, remove the cancer and connect the two ends. Postoperative course, including a stay in intensive care, described including a swallow test 6 days after surgery and hope she can be discharged soon after that. On average it is an 8-10 day hospital stay.\par \par The planned procedure, hospital stay and recovery was discussed in detail. All risks, benefits and alternatives were discussed at length with the patient. All questions addressed. The patient fully understood and would like to proceed with surgical intervention as discussed. \par \par I have fully reviewed and evaluated all available results. All questions were answered and concerns were addressed. \par \par Plan:\par - robot assisted esophagogastrectomy on 5/16 at [facility] with Dr. Alva\par - Patient is in full understanding and agreement with the plan going forward. \par \par I, Dr. Preston Altamirano, personally performed the evaluation and management (E/M) services for this new patient.  That E/M includes conducting the initial examination, assessing all conditions, and establishing the plan of care.  Today, Jose Maria Petty PA-C, was here to observe my evaluation and management services for this patient to be followed going forward.\par \par

## 2023-04-13 NOTE — PHYSICAL EXAM
[General Appearance - Alert] : alert [General Appearance - In No Acute Distress] : in no acute distress [Sclera] : the sclera and conjunctiva were normal [Outer Ear] : the ears and nose were normal in appearance [Neck Appearance] : the appearance of the neck was normal [] : the neck was supple [Respiration, Rhythm And Depth] : normal respiratory rhythm and effort [Auscultation Breath Sounds / Voice Sounds] : lungs were clear to auscultation bilaterally [Heart Rate And Rhythm] : heart rate was normal and rhythm regular [Systolic grade ___/6] : A grade [unfilled]/6 systolic murmur was heard. [Heart Sounds] : normal S1 and S2 [Bowel Sounds] : normal bowel sounds [Abdomen Soft] : soft [Skin Color & Pigmentation] : normal skin color and pigmentation [Abnormal Walk] : normal gait [Skin Turgor] : normal skin turgor [No Focal Deficits] : no focal deficits [Oriented To Time, Place, And Person] : oriented to person, place, and time [Impaired Insight] : insight and judgment were intact [FreeTextEntry2] : trace edema left lower extremity

## 2023-04-13 NOTE — DATA REVIEWED
[FreeTextEntry1] :    -4/4/23 PET: Findings most supportive of a complete/near complete metabolic response\par     to therapy. A small mildly FDG avid focus seen immediately proximal to\par     the original esophageal lesion is of uncertain clinical significance, SUV 3.7. No definite\par     metabolic evidence of metastatic disease. Subcentimeter right inguinal mildly FDG avid\par     lymph node is noted likely inflammatory in nature. Clinical correlation as indicated.\par     Asymmetric uptake in the left fossa of Rosenmuller may be inflammatory/infectious in\par     nature.\par     -4/4/23 CT C/A/P: No evidence of metastatic disease within the chest, abdomen or\par     pelvis. No lymphadenopathy.

## 2023-05-02 ENCOUNTER — RX RENEWAL (OUTPATIENT)
Age: 73
End: 2023-05-02

## 2023-05-04 ENCOUNTER — RX RENEWAL (OUTPATIENT)
Age: 73
End: 2023-05-04

## 2023-05-05 ENCOUNTER — OUTPATIENT (OUTPATIENT)
Dept: OUTPATIENT SERVICES | Facility: HOSPITAL | Age: 73
LOS: 1 days | End: 2023-05-05
Payer: MEDICARE

## 2023-05-05 VITALS
WEIGHT: 166.01 LBS | SYSTOLIC BLOOD PRESSURE: 132 MMHG | HEIGHT: 61 IN | HEART RATE: 76 BPM | OXYGEN SATURATION: 98 % | TEMPERATURE: 98 F | RESPIRATION RATE: 16 BRPM | DIASTOLIC BLOOD PRESSURE: 82 MMHG

## 2023-05-05 DIAGNOSIS — C15.9 MALIGNANT NEOPLASM OF ESOPHAGUS, UNSPECIFIED: ICD-10-CM

## 2023-05-05 DIAGNOSIS — Z95.828 PRESENCE OF OTHER VASCULAR IMPLANTS AND GRAFTS: Chronic | ICD-10-CM

## 2023-05-05 DIAGNOSIS — I45.2 BIFASCICULAR BLOCK: ICD-10-CM

## 2023-05-05 DIAGNOSIS — Z90.710 ACQUIRED ABSENCE OF BOTH CERVIX AND UTERUS: Chronic | ICD-10-CM

## 2023-05-05 DIAGNOSIS — Z90.49 ACQUIRED ABSENCE OF OTHER SPECIFIED PARTS OF DIGESTIVE TRACT: Chronic | ICD-10-CM

## 2023-05-05 DIAGNOSIS — Z98.890 OTHER SPECIFIED POSTPROCEDURAL STATES: Chronic | ICD-10-CM

## 2023-05-05 DIAGNOSIS — Z96.82 PRESENCE OF NEUROSTIMULATOR: ICD-10-CM

## 2023-05-05 DIAGNOSIS — Z96.89 PRESENCE OF OTHER SPECIFIED FUNCTIONAL IMPLANTS: Chronic | ICD-10-CM

## 2023-05-05 LAB
A1C WITH ESTIMATED AVERAGE GLUCOSE RESULT: 5.7 % — HIGH (ref 4–5.6)
ANION GAP SERPL CALC-SCNC: 13 MMOL/L — SIGNIFICANT CHANGE UP (ref 7–14)
BLD GP AB SCN SERPL QL: NEGATIVE — SIGNIFICANT CHANGE UP
BUN SERPL-MCNC: 12 MG/DL — SIGNIFICANT CHANGE UP (ref 7–23)
CALCIUM SERPL-MCNC: 9.9 MG/DL — SIGNIFICANT CHANGE UP (ref 8.4–10.5)
CHLORIDE SERPL-SCNC: 104 MMOL/L — SIGNIFICANT CHANGE UP (ref 98–107)
CO2 SERPL-SCNC: 23 MMOL/L — SIGNIFICANT CHANGE UP (ref 22–31)
CREAT SERPL-MCNC: 0.54 MG/DL — SIGNIFICANT CHANGE UP (ref 0.5–1.3)
EGFR: 97 ML/MIN/1.73M2 — SIGNIFICANT CHANGE UP
ESTIMATED AVERAGE GLUCOSE: 117 — SIGNIFICANT CHANGE UP
GLUCOSE SERPL-MCNC: 107 MG/DL — HIGH (ref 70–99)
HCT VFR BLD CALC: 37.5 % — SIGNIFICANT CHANGE UP (ref 34.5–45)
HGB BLD-MCNC: 12.1 G/DL — SIGNIFICANT CHANGE UP (ref 11.5–15.5)
MCHC RBC-ENTMCNC: 30.9 PG — SIGNIFICANT CHANGE UP (ref 27–34)
MCHC RBC-ENTMCNC: 32.3 GM/DL — SIGNIFICANT CHANGE UP (ref 32–36)
MCV RBC AUTO: 95.9 FL — SIGNIFICANT CHANGE UP (ref 80–100)
NRBC # BLD: 0 /100 WBCS — SIGNIFICANT CHANGE UP (ref 0–0)
NRBC # FLD: 0 K/UL — SIGNIFICANT CHANGE UP (ref 0–0)
PLATELET # BLD AUTO: 253 K/UL — SIGNIFICANT CHANGE UP (ref 150–400)
POTASSIUM SERPL-MCNC: 3.2 MMOL/L — LOW (ref 3.5–5.3)
POTASSIUM SERPL-SCNC: 3.2 MMOL/L — LOW (ref 3.5–5.3)
RBC # BLD: 3.91 M/UL — SIGNIFICANT CHANGE UP (ref 3.8–5.2)
RBC # FLD: 14 % — SIGNIFICANT CHANGE UP (ref 10.3–14.5)
RH IG SCN BLD-IMP: POSITIVE — SIGNIFICANT CHANGE UP
SODIUM SERPL-SCNC: 140 MMOL/L — SIGNIFICANT CHANGE UP (ref 135–145)
WBC # BLD: 5.38 K/UL — SIGNIFICANT CHANGE UP (ref 3.8–10.5)
WBC # FLD AUTO: 5.38 K/UL — SIGNIFICANT CHANGE UP (ref 3.8–10.5)

## 2023-05-05 PROCEDURE — 93010 ELECTROCARDIOGRAM REPORT: CPT

## 2023-05-05 RX ORDER — SODIUM CHLORIDE 9 MG/ML
1000 INJECTION, SOLUTION INTRAVENOUS
Refills: 0 | Status: DISCONTINUED | OUTPATIENT
Start: 2023-05-16 | End: 2023-05-16

## 2023-05-05 NOTE — H&P PST ADULT - ANESTHESIA, PREVIOUS REACTION, PROFILE
"Patient states was intubated after boing in recovery room ,when she woke up after her gallbladder surgery in 1983"

## 2023-05-05 NOTE — H&P PST ADULT - HISTORY OF PRESENT ILLNESS
76 y.o WD, WN female with h/o esophageal cancer had had chemo and radiation completed March 3rd, 2023 presents for preop eval to have robotic esophagogastroduodenoscopy, laparoscopic esophagogastrectomy possible open on 5/16/23. 76 y.o WD, WN female with h/o esophageal cancer had chemo and radiation completed March 3rd, 2023 presents for preop eval to have robotic esophagogastroduodenoscopy, laparoscopic esophagogastrectomy possible open on 5/16/23.

## 2023-05-05 NOTE — H&P PST ADULT - MUSCULOSKELETAL
normal/ROM intact/no joint swelling/no calf tenderness/normal gait/strength 5/5 bilateral lower extremities details…

## 2023-05-05 NOTE — H&P PST ADULT - NSANTHOSAYNRD_GEN_A_CORE
never tested/No. RAHEEM screening performed.  STOP BANG Legend: 0-2 = LOW Risk; 3-4 = INTERMEDIATE Risk; 5-8 = HIGH Risk

## 2023-05-05 NOTE — H&P PST ADULT - ATTENDING COMMENTS
Risks, benefits, and alternatives discussed with the patient - she expressed understanding and agrees to proceed with robotic-assisted laparoscopic esophagogastrectomy, esophagogastroduodenoscopy, possible open.

## 2023-05-05 NOTE — H&P PST ADULT - NSICDXPASTSURGICALHX_GEN_ALL_CORE_FT
PAST SURGICAL HISTORY:  H/O: hysterectomy     History of cholecystectomy     History of Nissen fundoplication     Spinal cord stimulator status

## 2023-05-05 NOTE — H&P PST ADULT - ASSESSMENT
76 y.o female with h/o esophageal cancer, had chemo and radiation completed March 3rd, 2023 presents for preop eval to have robotic esophagogastroduodenoscopy, laparoscopic esophagogastrectomy possible open on 5/16/23.

## 2023-05-05 NOTE — H&P PST ADULT - PROBLEM SELECTOR PLAN 3
neuro stimulator inserted for MS in 2007.  Stimulator is nonfunctional and turned off.  OR booking notified via fax. neuro stimulator inserted for MS in 2007.  Stimulator is nonfunctional and turned off.  Intermediate risk for RAHEEM identified by screening tool.   Airway precautions recommended.  OR booking notified via fax.

## 2023-05-05 NOTE — H&P PST ADULT - PROBLEM SELECTOR PLAN 1
76 y.o female with h/o esophageal cancer, had chemo and radiation completed March 3rd, 2023 presents for preop eval to have robotic esophagogastroduodenoscopy, laparoscopic esophagogastrectomy possible open on 5/16/23.  labs pending, ekg in chart.  Preop instructions provided to pt.  Famotidine and chlorhexidine scrubs provided to pt with instructions.  Pt ad medical clearance - will obtain copy

## 2023-05-09 ENCOUNTER — APPOINTMENT (OUTPATIENT)
Dept: OTOLARYNGOLOGY | Facility: CLINIC | Age: 73
End: 2023-05-09
Payer: MEDICARE

## 2023-05-09 VITALS — BODY MASS INDEX: 31.15 KG/M2 | WEIGHT: 165 LBS | HEIGHT: 61 IN | TEMPERATURE: 97.2 F

## 2023-05-09 DIAGNOSIS — J38.7 OTHER DISEASES OF LARYNX: ICD-10-CM

## 2023-05-09 PROCEDURE — 31575 DIAGNOSTIC LARYNGOSCOPY: CPT

## 2023-05-09 PROCEDURE — 99214 OFFICE O/P EST MOD 30 MIN: CPT | Mod: 25

## 2023-05-09 NOTE — PHYSICAL EXAM
[] : septum deviated to the right [Midline] : trachea located in midline position [Edentulous] : edentulous [Laryngoscopy Performed] : laryngoscopy was performed, see procedure section for findings [Normal] : no rashes [de-identified] : mod inferior turb hypertrophy  [de-identified] : indirect exam - mod post glottic and interartytenoid edema

## 2023-05-09 NOTE — HISTORY OF PRESENT ILLNESS
[de-identified] : Here for follow up of LPR.  Has had RT and chemo for distal adenoca of esophagus.   Also sched for procedure for HH.  Good response noted on PET scan.   Here for re eval of allergies.   Also only minimal reflux sx recently but occ does cough again.  Feels reflux improved.  Being followed at cancer center.  Has been on montelucast and famotidine - advised to continue famotidine.

## 2023-05-09 NOTE — ASSESSMENT
[FreeTextEntry1] : Patient here for followup of LPR - has esophageal ca and has had RT and chemo and doing well.  Sched for surgery also in about 2 weeks and was advised to continue famotidine.  Exam shows improvement in larynx - will continue famotidine and recommended follow up in several mos after surgery done.  No lesion of larynx seen

## 2023-05-11 ENCOUNTER — APPOINTMENT (OUTPATIENT)
Dept: HEMATOLOGY ONCOLOGY | Facility: CLINIC | Age: 73
End: 2023-05-11

## 2023-05-16 ENCOUNTER — INPATIENT (INPATIENT)
Facility: HOSPITAL | Age: 73
LOS: 9 days | Discharge: HOME | End: 2023-05-26
Attending: SURGERY | Admitting: SURGERY
Payer: MEDICARE

## 2023-05-16 ENCOUNTER — RESULT REVIEW (OUTPATIENT)
Age: 73
End: 2023-05-16

## 2023-05-16 ENCOUNTER — TRANSCRIPTION ENCOUNTER (OUTPATIENT)
Age: 73
End: 2023-05-16

## 2023-05-16 ENCOUNTER — APPOINTMENT (OUTPATIENT)
Dept: SURGICAL ONCOLOGY | Facility: HOSPITAL | Age: 73
End: 2023-05-16

## 2023-05-16 ENCOUNTER — APPOINTMENT (OUTPATIENT)
Dept: THORACIC SURGERY | Facility: HOSPITAL | Age: 73
End: 2023-05-16

## 2023-05-16 VITALS
WEIGHT: 166.01 LBS | TEMPERATURE: 98 F | HEART RATE: 85 BPM | OXYGEN SATURATION: 100 % | HEIGHT: 61 IN | RESPIRATION RATE: 14 BRPM | SYSTOLIC BLOOD PRESSURE: 138 MMHG | DIASTOLIC BLOOD PRESSURE: 77 MMHG

## 2023-05-16 DIAGNOSIS — Z90.49 ACQUIRED ABSENCE OF OTHER SPECIFIED PARTS OF DIGESTIVE TRACT: Chronic | ICD-10-CM

## 2023-05-16 DIAGNOSIS — Z96.89 PRESENCE OF OTHER SPECIFIED FUNCTIONAL IMPLANTS: Chronic | ICD-10-CM

## 2023-05-16 DIAGNOSIS — Z98.890 OTHER SPECIFIED POSTPROCEDURAL STATES: Chronic | ICD-10-CM

## 2023-05-16 DIAGNOSIS — Z90.710 ACQUIRED ABSENCE OF BOTH CERVIX AND UTERUS: Chronic | ICD-10-CM

## 2023-05-16 DIAGNOSIS — C15.9 MALIGNANT NEOPLASM OF ESOPHAGUS, UNSPECIFIED: ICD-10-CM

## 2023-05-16 LAB
ANION GAP SERPL CALC-SCNC: 16 MMOL/L — HIGH (ref 7–14)
BLOOD GAS ARTERIAL - LYTES,HGB,ICA,LACT RESULT: SIGNIFICANT CHANGE UP
BUN SERPL-MCNC: 14 MG/DL — SIGNIFICANT CHANGE UP (ref 7–23)
CALCIUM SERPL-MCNC: 9.3 MG/DL — SIGNIFICANT CHANGE UP (ref 8.4–10.5)
CHLORIDE SERPL-SCNC: 101 MMOL/L — SIGNIFICANT CHANGE UP (ref 98–107)
CO2 SERPL-SCNC: 21 MMOL/L — LOW (ref 22–31)
CREAT SERPL-MCNC: 0.45 MG/DL — LOW (ref 0.5–1.3)
EGFR: 101 ML/MIN/1.73M2 — SIGNIFICANT CHANGE UP
GAS PNL BLDA: SIGNIFICANT CHANGE UP
GLUCOSE BLDC GLUCOMTR-MCNC: 206 MG/DL — HIGH (ref 70–99)
GLUCOSE SERPL-MCNC: 206 MG/DL — HIGH (ref 70–99)
HCT VFR BLD CALC: 31.8 % — LOW (ref 34.5–45)
HGB BLD-MCNC: 10.5 G/DL — LOW (ref 11.5–15.5)
MAGNESIUM SERPL-MCNC: 1.5 MG/DL — LOW (ref 1.6–2.6)
MCHC RBC-ENTMCNC: 31.4 PG — SIGNIFICANT CHANGE UP (ref 27–34)
MCHC RBC-ENTMCNC: 33 GM/DL — SIGNIFICANT CHANGE UP (ref 32–36)
MCV RBC AUTO: 95.2 FL — SIGNIFICANT CHANGE UP (ref 80–100)
NRBC # BLD: 0 /100 WBCS — SIGNIFICANT CHANGE UP (ref 0–0)
NRBC # FLD: 0 K/UL — SIGNIFICANT CHANGE UP (ref 0–0)
PHOSPHATE SERPL-MCNC: 3.6 MG/DL — SIGNIFICANT CHANGE UP (ref 2.5–4.5)
PLATELET # BLD AUTO: 218 K/UL — SIGNIFICANT CHANGE UP (ref 150–400)
POTASSIUM BLDV-SCNC: 3.8 MMOL/L — SIGNIFICANT CHANGE UP (ref 3.5–5.1)
POTASSIUM SERPL-MCNC: 3.3 MMOL/L — LOW (ref 3.5–5.3)
POTASSIUM SERPL-SCNC: 3.3 MMOL/L — LOW (ref 3.5–5.3)
RBC # BLD: 3.34 M/UL — LOW (ref 3.8–5.2)
RBC # FLD: 13.4 % — SIGNIFICANT CHANGE UP (ref 10.3–14.5)
RH IG SCN BLD-IMP: POSITIVE — SIGNIFICANT CHANGE UP
SODIUM SERPL-SCNC: 138 MMOL/L — SIGNIFICANT CHANGE UP (ref 135–145)
WBC # BLD: 12.22 K/UL — HIGH (ref 3.8–10.5)
WBC # FLD AUTO: 12.22 K/UL — HIGH (ref 3.8–10.5)

## 2023-05-16 PROCEDURE — S2900 ROBOTIC SURGICAL SYSTEM: CPT | Mod: NC

## 2023-05-16 PROCEDURE — 99233 SBSQ HOSP IP/OBS HIGH 50: CPT

## 2023-05-16 PROCEDURE — 74018 RADEX ABDOMEN 1 VIEW: CPT | Mod: 26

## 2023-05-16 PROCEDURE — 43287 ESPHG DSTL 2/3 W/LAPS MOBLJ: CPT | Mod: 62

## 2023-05-16 PROCEDURE — 88305 TISSUE EXAM BY PATHOLOGIST: CPT | Mod: 26

## 2023-05-16 PROCEDURE — 88309 TISSUE EXAM BY PATHOLOGIST: CPT | Mod: 26

## 2023-05-16 PROCEDURE — 44050 REDUCE BOWEL OBSTRUCTION: CPT

## 2023-05-16 PROCEDURE — 43235 EGD DIAGNOSTIC BRUSH WASH: CPT | Mod: 59

## 2023-05-16 PROCEDURE — 43287 ESPHG DSTL 2/3 W/LAPS MOBLJ: CPT | Mod: 22,62

## 2023-05-16 PROCEDURE — 71045 X-RAY EXAM CHEST 1 VIEW: CPT | Mod: 26

## 2023-05-16 DEVICE — STAPLER COVIDIEN TRI-STAPLE CURVED 45MM TAN RELOAD: Type: IMPLANTABLE DEVICE | Status: FUNCTIONAL

## 2023-05-16 DEVICE — SURGICEL 2 X 14": Type: IMPLANTABLE DEVICE | Status: FUNCTIONAL

## 2023-05-16 DEVICE — CHEST DRAIN THORACIC ARGYLE PVC 24FR STRAIGHT: Type: IMPLANTABLE DEVICE | Status: FUNCTIONAL

## 2023-05-16 DEVICE — STAPLER COVIDIEN TRI-STAPLE 45MM PURPLE RELOAD: Type: IMPLANTABLE DEVICE | Status: FUNCTIONAL

## 2023-05-16 DEVICE — LIGATING CLIPS WECK HEMOLOK POLYMER LARGE (PURPLE) 6: Type: IMPLANTABLE DEVICE | Status: FUNCTIONAL

## 2023-05-16 DEVICE — LIGATING CLIPS WECK HORIZON SMALL-WIDE (RED) 24: Type: IMPLANTABLE DEVICE | Status: FUNCTIONAL

## 2023-05-16 DEVICE — SURGICEL FIBRILLAR 2 X 4": Type: IMPLANTABLE DEVICE | Status: FUNCTIONAL

## 2023-05-16 DEVICE — STAPLER COVIDIEN TRI-STAPLE 60MM PURPLE RELOAD: Type: IMPLANTABLE DEVICE | Status: FUNCTIONAL

## 2023-05-16 DEVICE — STAPLER COVIDIEN TRI-STAPLE 45MM TAN RELOAD: Type: IMPLANTABLE DEVICE | Status: FUNCTIONAL

## 2023-05-16 DEVICE — XI STAPLER SUREFORM RELOAD 45 BLUE: Type: IMPLANTABLE DEVICE | Status: FUNCTIONAL

## 2023-05-16 RX ORDER — MAGNESIUM SULFATE 500 MG/ML
2 VIAL (ML) INJECTION ONCE
Refills: 0 | Status: COMPLETED | OUTPATIENT
Start: 2023-05-16 | End: 2023-05-16

## 2023-05-16 RX ORDER — METOCLOPRAMIDE HCL 10 MG
10 TABLET ORAL ONCE
Refills: 0 | Status: COMPLETED | OUTPATIENT
Start: 2023-05-16 | End: 2023-05-16

## 2023-05-16 RX ORDER — HYDROMORPHONE HYDROCHLORIDE 2 MG/ML
30 INJECTION INTRAMUSCULAR; INTRAVENOUS; SUBCUTANEOUS
Refills: 0 | Status: DISCONTINUED | OUTPATIENT
Start: 2023-05-16 | End: 2023-05-18

## 2023-05-16 RX ORDER — HEPARIN SODIUM 5000 [USP'U]/ML
5000 INJECTION INTRAVENOUS; SUBCUTANEOUS ONCE
Refills: 0 | Status: COMPLETED | OUTPATIENT
Start: 2023-05-16 | End: 2023-05-16

## 2023-05-16 RX ORDER — DORNASE ALFA 1 MG/ML
2.5 SOLUTION RESPIRATORY (INHALATION) DAILY
Refills: 0 | Status: DISCONTINUED | OUTPATIENT
Start: 2023-05-16 | End: 2023-05-25

## 2023-05-16 RX ORDER — NALOXONE HYDROCHLORIDE 4 MG/.1ML
0.1 SPRAY NASAL
Refills: 0 | Status: DISCONTINUED | OUTPATIENT
Start: 2023-05-16 | End: 2023-05-26

## 2023-05-16 RX ORDER — ALBUMIN HUMAN 25 %
250 VIAL (ML) INTRAVENOUS ONCE
Refills: 0 | Status: COMPLETED | OUTPATIENT
Start: 2023-05-16 | End: 2023-05-16

## 2023-05-16 RX ORDER — POTASSIUM CHLORIDE 20 MEQ
10 PACKET (EA) ORAL
Refills: 0 | Status: COMPLETED | OUTPATIENT
Start: 2023-05-16 | End: 2023-05-16

## 2023-05-16 RX ORDER — SODIUM CHLORIDE 9 MG/ML
1000 INJECTION, SOLUTION INTRAVENOUS
Refills: 0 | Status: DISCONTINUED | OUTPATIENT
Start: 2023-05-16 | End: 2023-05-16

## 2023-05-16 RX ORDER — PANTOPRAZOLE SODIUM 20 MG/1
40 TABLET, DELAYED RELEASE ORAL AT BEDTIME
Refills: 0 | Status: DISCONTINUED | OUTPATIENT
Start: 2023-05-16 | End: 2023-05-24

## 2023-05-16 RX ORDER — METOCLOPRAMIDE HCL 10 MG
10 TABLET ORAL EVERY 8 HOURS
Refills: 0 | Status: DISCONTINUED | OUTPATIENT
Start: 2023-05-16 | End: 2023-05-16

## 2023-05-16 RX ORDER — METOCLOPRAMIDE HCL 10 MG
10 TABLET ORAL EVERY 8 HOURS
Refills: 0 | Status: COMPLETED | OUTPATIENT
Start: 2023-05-17 | End: 2023-05-17

## 2023-05-16 RX ORDER — LIDOCAINE 4 G/100G
1 CREAM TOPICAL DAILY
Refills: 0 | Status: DISCONTINUED | OUTPATIENT
Start: 2023-05-16 | End: 2023-05-26

## 2023-05-16 RX ORDER — SODIUM CHLORIDE 9 MG/ML
4 INJECTION INTRAMUSCULAR; INTRAVENOUS; SUBCUTANEOUS EVERY 6 HOURS
Refills: 0 | Status: DISCONTINUED | OUTPATIENT
Start: 2023-05-16 | End: 2023-05-25

## 2023-05-16 RX ORDER — ONDANSETRON 8 MG/1
4 TABLET, FILM COATED ORAL EVERY 6 HOURS
Refills: 0 | Status: DISCONTINUED | OUTPATIENT
Start: 2023-05-16 | End: 2023-05-24

## 2023-05-16 RX ORDER — ACETAMINOPHEN 500 MG
750 TABLET ORAL EVERY 6 HOURS
Refills: 0 | Status: COMPLETED | OUTPATIENT
Start: 2023-05-16 | End: 2023-05-17

## 2023-05-16 RX ORDER — POTASSIUM CHLORIDE 20 MEQ
10 PACKET (EA) ORAL
Refills: 0 | Status: DISCONTINUED | OUTPATIENT
Start: 2023-05-16 | End: 2023-05-16

## 2023-05-16 RX ORDER — SODIUM CHLORIDE 9 MG/ML
1000 INJECTION, SOLUTION INTRAVENOUS
Refills: 0 | Status: DISCONTINUED | OUTPATIENT
Start: 2023-05-16 | End: 2023-05-19

## 2023-05-16 RX ORDER — ALBUTEROL 90 UG/1
2.5 AEROSOL, METERED ORAL EVERY 6 HOURS
Refills: 0 | Status: DISCONTINUED | OUTPATIENT
Start: 2023-05-16 | End: 2023-05-25

## 2023-05-16 RX ORDER — HEPARIN SODIUM 5000 [USP'U]/ML
5000 INJECTION INTRAVENOUS; SUBCUTANEOUS EVERY 8 HOURS
Refills: 0 | Status: DISCONTINUED | OUTPATIENT
Start: 2023-05-16 | End: 2023-05-26

## 2023-05-16 RX ORDER — LABETALOL HCL 100 MG
10 TABLET ORAL EVERY 4 HOURS
Refills: 0 | Status: DISCONTINUED | OUTPATIENT
Start: 2023-05-16 | End: 2023-05-18

## 2023-05-16 RX ADMIN — Medication 200 MILLIGRAM(S): at 19:19

## 2023-05-16 RX ADMIN — SODIUM CHLORIDE 30 MILLILITER(S): 9 INJECTION, SOLUTION INTRAVENOUS at 07:33

## 2023-05-16 RX ADMIN — Medication 100 MILLIEQUIVALENT(S): at 21:29

## 2023-05-16 RX ADMIN — ALBUTEROL 2.5 MILLIGRAM(S): 90 AEROSOL, METERED ORAL at 21:46

## 2023-05-16 RX ADMIN — HEPARIN SODIUM 5000 UNIT(S): 5000 INJECTION INTRAVENOUS; SUBCUTANEOUS at 07:33

## 2023-05-16 RX ADMIN — ONDANSETRON 4 MILLIGRAM(S): 8 TABLET, FILM COATED ORAL at 17:50

## 2023-05-16 RX ADMIN — HYDROMORPHONE HYDROCHLORIDE 30 MILLILITER(S): 2 INJECTION INTRAMUSCULAR; INTRAVENOUS; SUBCUTANEOUS at 19:18

## 2023-05-16 RX ADMIN — Medication 10 MILLIGRAM(S): at 17:52

## 2023-05-16 RX ADMIN — Medication 25 GRAM(S): at 21:30

## 2023-05-16 RX ADMIN — HEPARIN SODIUM 5000 UNIT(S): 5000 INJECTION INTRAVENOUS; SUBCUTANEOUS at 21:30

## 2023-05-16 RX ADMIN — SODIUM CHLORIDE 4 MILLILITER(S): 9 INJECTION INTRAMUSCULAR; INTRAVENOUS; SUBCUTANEOUS at 21:46

## 2023-05-16 RX ADMIN — Medication 125 MILLILITER(S): at 23:00

## 2023-05-16 RX ADMIN — Medication 100 MILLIEQUIVALENT(S): at 19:23

## 2023-05-16 RX ADMIN — Medication 750 MILLIGRAM(S): at 22:00

## 2023-05-16 RX ADMIN — PANTOPRAZOLE SODIUM 40 MILLIGRAM(S): 20 TABLET, DELAYED RELEASE ORAL at 21:31

## 2023-05-16 RX ADMIN — Medication 300 MILLIGRAM(S): at 21:29

## 2023-05-16 RX ADMIN — Medication 100 MILLIEQUIVALENT(S): at 22:59

## 2023-05-16 NOTE — BRIEF OPERATIVE NOTE - OPERATION/FINDINGS
Thoracic portion of case. FB, Robo RVATS, Blake Marcos esophagectomy. Thoracic portion of case. FB, Robotic R VATS, Blake Marcos esophagectomy.

## 2023-05-16 NOTE — BRIEF OPERATIVE NOTE - NSICDXBRIEFPREOP_GEN_ALL_CORE_FT
PRE-OP DIAGNOSIS:  Esophageal cancer 16-May-2023 15:48:37  Jasen Loza  
PRE-OP DIAGNOSIS:  Esophageal cancer 16-May-2023 15:48:37  Jasen Loza

## 2023-05-16 NOTE — BRIEF OPERATIVE NOTE - OPERATION/FINDINGS
Robotic creation of gastric conduit for esophagogastrectomy, pyloromyotomy    Replaced L hepatic artery coming off LGA preserved, ICG confirmed good perfusion of conduit and R gastroepiploic preserved.

## 2023-05-16 NOTE — PROGRESS NOTE ADULT - SUBJECTIVE AND OBJECTIVE BOX
CHIEF COMPLAINT: FOLLOW UP IN ICU FOR POSTOPERATIVE CARE OF PATIENT WHO IS S/P ESOPHAGECTOMY      PROCEDURES: Robotic R VATS, Blake Marcos esophagectomy  16-May-2023      ISSUES:   Esophageal cancer  Post op pain  Chest tube in place  Multiple sclerosis  HTN  GERD  Hx of Hiatal hernia s/p Nissen fundoplication repair  S/p Spinal cord stimulator placement  Bifascicular block    INTERVAL EVENTS:   OR today. Extubated in OR. Transferred to CTICU.      HISTORY:   Patient reports moderate pain at chest wall incision sites which is worse with coughing and deep breathing without associated fever or dyspnea. Pain is improved with use of pain meds.     PHYSICAL EXAM:   Gen: Comfortable, No acute distress  Eyes: Sclera white, Conjunctiva normal, Eyelids normal, Pupils symmetrical   ENT: Mucous membranes moist, NG tube in place,  ,    Neck: Trachea midline,  ,  ,  ,  ,  ,    CV: Rate regular, Rhythm regular,  ,  ,    Resp: Breath sounds clear, No accessory muscles use, Two R chest tubes,  ,    Abd: Soft, Non-distended, Non-tender,   ,  ,  ,    Skin: Warm, No peripheral edema of lower extremities,  ,    : Clayton in place  Neuro: Moving all 4 extremities,    Psych: A&Ox3      ASSESSMENT AND PLAN:     NEURO:  Post-operative Pain - Stable. Pain control with PCA and Tylenol IV PRN.     Multiple sclerosis - stable.     RESPIRATORY:  Hypoxia - Wean nasal cannula for goal O2sat above 92. Obtain CXR. Incentive spirometry. Chest PT and frequent suctioning. Continue bronchodilators. OOB to chair & ambulate w/ assistance. Continuous pulse oximetry for support & to prevent decompensation.       Chest tube – Pleurevac regulated suctioning. Monitor chest tube output.        CARDIOVASCULAR:  Hemodynamically stable - Not on pressors. Continue hemodynamic monitoring.  Telemetry (medical test) - Reviewed by me today independently. Normal sinus rhythm.  HTN - stable. Hold home antihypertensives for now.              RENAL:  Stable - Monitor IOs and electrolytes. Keep K above 4.0 and Mg above 2.0.           GASTROINTESTINAL:  GI prophylaxis not indicated  Zofran and Reglan IV PRN for nausea  NPO until swallow study     NG tube to low suction  GERD - stable. Continue pantoprazole           HEMATOLOGIC:  No signs of active bleeding. Monitor Hgb in CBC in AM  DVT prophylaxis with heparin subQ and SCDs.               INFECTIOUS DISEASE:  All surgical sites appear clean. No signs of active infection. Will monitor for fever and leukocytosis.       ENDOCRINE:  Stable – Monitor glucose fingersticks for goal 120-180.               ONCOLOGY:  Esophageal cancer - Improved. S/P resection. Follow up final pathology.           Pertinent clinical, laboratory, radiographic, hemodynamic, echocardiographic, respiratory data, microbiologic data and chart were reviewed by myself and analyzed frequently throughout the course of the day and night by myself.    Plan discussed at length with the CTICU staff and Attending CT Surgeon -   Dr Preston Altamirano.      Patient's status was discussed with patient at bedside.     	      ________________________________________________      _________________________  VITAL SIGNS:  Vital Signs Last 24 Hrs  T(C): 36.6 (16 May 2023 20:00), Max: 36.7 (16 May 2023 07:03)  T(F): 97.9 (16 May 2023 20:00), Max: 98.1 (16 May 2023 07:03)  HR: 69 (17 May 2023 00:00) (63 - 93)  BP: 138/77 (16 May 2023 07:03) (138/77 - 138/77)  BP(mean): --  RR: 22 (17 May 2023 00:00) (13 - 26)  SpO2: 97% (17 May 2023 00:00) (95% - 100%)    Parameters below as of 17 May 2023 00:00  Patient On (Oxygen Delivery Method): nasal cannula w/ humidification  O2 Flow (L/min): 2    I/Os:   I&O's Detail    16 May 2023 07:01  -  17 May 2023 00:32  --------------------------------------------------------  IN:    Albumin 5%  - 250 mL: 250 mL    dextrose 5% + lactated ringers: 450 mL    IV PiggyBack: 275 mL    Lactated Ringers: 30 mL  Total IN: 1005 mL    OUT:    Bulb (mL): 100 mL    Chest Tube (mL): 80 mL    Emesis (mL): 90 mL    Indwelling Catheter - Urethral (mL): 1150 mL    Nasogastric/Oral tube (mL): 200 mL  Total OUT: 1620 mL    Total NET: -615 mL              MEDICATIONS:  MEDICATIONS  (STANDING):  acetaminophen   IVPB .. 750 milliGRAM(s) IV Intermittent every 6 hours  albuterol    0.083% 2.5 milliGRAM(s) Nebulizer every 6 hours  dornase bridget Solution 2.5 milliGRAM(s) Inhalation daily  heparin   Injectable 5000 Unit(s) SubCutaneous every 8 hours  HYDROmorphone PCA (1 mG/mL) 30 milliLiter(s) PCA Continuous PCA Continuous  lactated ringers. 1000 milliLiter(s) (75 mL/Hr) IV Continuous <Continuous>  lidocaine   4% Patch 1 Patch Transdermal daily  metoclopramide Injectable 10 milliGRAM(s) IV Push every 8 hours  pantoprazole  Injectable 40 milliGRAM(s) IV Push at bedtime  sodium chloride 3%  Inhalation 4 milliLiter(s) Inhalation every 6 hours    MEDICATIONS  (PRN):  labetalol Injectable 10 milliGRAM(s) IV Push every 4 hours PRN Systolic blood pressure > 160  naloxone Injectable 0.1 milliGRAM(s) IV Push every 3 minutes PRN For ANY of the following changes in patient status:  A. RR LESS THAN 10 breaths per minute, B. Oxygen saturation LESS THAN 90%, C. Sedation score of 6  ondansetron Injectable 4 milliGRAM(s) IV Push every 6 hours PRN Nausea      LABS:  Laboratory data was independently reviewed by me today.                           10.5   12.22 )-----------( 218      ( 16 May 2023 18:18 )             31.8     05-16    138  |  101  |  14  ----------------------------<  206<H>  3.3<L>   |  21<L>  |  0.45<L>    Ca    9.3      16 May 2023 18:18  Phos  3.6     05-16  Mg     1.50     05-16          ABG - ( 16 May 2023 18:18 )  pH, Arterial: 7.31  pH, Blood: x     /  pCO2: 45    /  pO2: 94    / HCO3: 23    / Base Excess: -3.6  /  SaO2: 98.1          RADIOLOGY:   Radiology images were independently reviewed by me today. Reports were reviewed by me today.    Post op CXR 5/16/23 - Lungs clear. NG tube in place. Chest tube in place. No pneumothorax. Spinal stimulator in place.   Abd XR 5/16/23 - Non specific bowel gas pattern. No obstruction or free air.

## 2023-05-16 NOTE — CHART NOTE - NSCHARTNOTEFT_GEN_A_CORE
Post Operative Note  Patient: JUAN NOVAK 73y (1950) Female   MRN: [id number]  Location: [facility]  Visit: 05-16-23 Inpatient  Date: 05-16-23 @ 21:04    Procedure: S/P ***    Subjective:       Objective:  Vitals: T(F): 97.6 (05-16-23 @ 17:45), Max: 98.1 (05-16-23 @ 07:03)  HR: 81 (05-16-23 @ 19:00)  BP: 138/77 (05-16-23 @ 07:03) (138/77 - 138/77)  RR: 20 (05-16-23 @ 19:00)  SpO2: 97% (05-16-23 @ 19:00)  Vent Settings:     In:   05-16-23 @ 07:01  -  05-16-23 @ 21:04  --------------------------------------------------------  IN: 105 mL      IV Fluids: dextrose 5% + lactated ringers. 1000 milliLiter(s) (75 mL/Hr) IV Continuous <Continuous>  magnesium sulfate  IVPB 2 Gram(s) IV Intermittent once  potassium chloride  10 mEq/100 mL IVPB 10 milliEquivalent(s) IV Intermittent every 1 hour      Out:   05-16-23 @ 07:01  -  05-16-23 @ 21:04  --------------------------------------------------------  OUT: 545 mL      EBL:     Voided Urine:   05-16-23 @ 07:01  -  05-16-23 @ 21:04  --------------------------------------------------------  OUT: 545 mL      Clayton Catheter: yes no   Drains:   CARINE:   05-16-23 @ 07:01  -  05-16-23 @ 21:04  --------------------------------------------------------  OUT: 100 mL     ,   Chest Tube:   05-16-23 @ 07:01  -  05-16-23 @ 21:04  --------------------------------------------------------  OUT: 40 mL       NG Tube:   05-16-23 @ 07:01  -  05-16-23 @ 21:04  --------------------------------------------------------  OUT: 150 mL          Physical Examination:  General: NAD, resting comfortably in bed  HEENT: Normocephalic atraumatic  Respiratory: Nonlabored respirations, normal CW expansion.  Cardio: S1S2, regular rate and rhythm.  Abdomen: softly distended, appropriately tender, surgical incisions are c/d/i. NGT/OGT*  Vascular: extremities are warm and well perfused.     Imaging:  No post-op imaging studies    Assessment:  73yFemale patient S/P *** for ***    Plan:  - IV Abx:   - Pain control PRN  - Diet:  - Activity:  - DVT ppx:    Date/Time: 05-16-23 @ 21:04 Post Operative Note  Patient: JUAN NOVAK 73y (1950) Female   MRN: [id number]  Location: [facility] CT 11  Visit: 05-16-23 Inpatient  Date: 05-16-23 @ 21:04    Procedure: S/P robotic Blake flores    Subjective:   Patient seen and examined at bedside. Patient endorses "pain in right side," but is managed with pain medications. She states the PCA made her nauseous and had an episode of emesis but currently denies nausea, vomiting, chest pain, SOB.    Objective:  Vitals: T(F): 97.6 (05-16-23 @ 17:45), Max: 98.1 (05-16-23 @ 07:03)  HR: 81 (05-16-23 @ 19:00)  BP: 138/77 (05-16-23 @ 07:03) (138/77 - 138/77)  RR: 20 (05-16-23 @ 19:00)  SpO2: 97% (05-16-23 @ 19:00)  Vent Settings:     In:   05-16-23 @ 07:01  -  05-16-23 @ 21:04  --------------------------------------------------------  IN: 105 mL      IV Fluids: dextrose 5% + lactated ringers. 1000 milliLiter(s) (75 mL/Hr) IV Continuous <Continuous>  magnesium sulfate  IVPB 2 Gram(s) IV Intermittent once  potassium chloride  10 mEq/100 mL IVPB 10 milliEquivalent(s) IV Intermittent every 1 hour      Out:   05-16-23 @ 07:01  -  05-16-23 @ 21:04  --------------------------------------------------------  OUT: 545 mL      EBL:     Voided Urine:   05-16-23 @ 07:01  -  05-16-23 @ 21:04  --------------------------------------------------------  OUT: 545 mL      Clayton Catheter: yes no   Drains:   CARINE:   05-16-23 @ 07:01  -  05-16-23 @ 21:04  --------------------------------------------------------  OUT: 100 mL     ,   Chest Tube:   05-16-23 @ 07:01  -  05-16-23 @ 21:04  --------------------------------------------------------  OUT: 40 mL       NG Tube:   05-16-23 @ 07:01  -  05-16-23 @ 21:04  --------------------------------------------------------  OUT: 150 mL          Physical Examination:  General: NAD, resting comfortably in bed  Respiratory: Nonlabored respirations  Chest: chesttube in place   Cardio: pulse present   Abdomen: softly distended, appropriately tender, dressing c/d/i, CARINE drain with s/s output, NGT in place   Vascular: extremities are warm and well perfused.     Imaging:  No post-op imaging studies    Assessment:  73yFemale patient S/P robotic Blake mrak esophagogastrectomy, pyloromyotomy     Plan:  - Pain control PRN  - Diet: NPO/NGT/IVF  - Activity: as tolerated   - DVT ppx  - nausea control PRN  - appreciate care per CTICU    Date/Time: 05-16-23 @ 21:04    Surgery D Team  [id number]

## 2023-05-16 NOTE — BRIEF OPERATIVE NOTE - COMMENTS
INCOMPLETE NOTE I, Jony Balbuena PA-C provided direct first assist support to the surgeon during this surgical procedure. My involvement included positioning, prepping and draping the patient prior to surgery, robotic port placement, robotic docking, robotic instrument insertion and removal, ensuring clear visibility and exposure for the surgeon by using instruments such as retractors, suction and sponges, stapling tissues and vessels, retrieving specimens from the operative field, closing surgical incisions, undocking the robot and dressing wounds.  As well as other tasks as directed by the surgeon.

## 2023-05-16 NOTE — ASU PREOP CHECKLIST - COMMENTS
potassium, hydralazine, losartan, baclofen, omeprazole, dalfdamproidine, famotidine with small sip of water

## 2023-05-16 NOTE — BRIEF OPERATIVE NOTE - NSICDXBRIEFPROCEDURE_GEN_ALL_CORE_FT
PROCEDURES:  Blake Marcos esophagectomy 16-May-2023 15:48:24  Jasen Loza   [place] to call system/Call bell, personal items and telephone within reach/Physically safe environment: no spills, clutter or unnecessary equipment/Stretcher in lowest position, wheels locked, appropriate side rails in place

## 2023-05-17 LAB
ANION GAP SERPL CALC-SCNC: 12 MMOL/L — SIGNIFICANT CHANGE UP (ref 7–14)
BASOPHILS # BLD AUTO: 0.01 K/UL — SIGNIFICANT CHANGE UP (ref 0–0.2)
BASOPHILS NFR BLD AUTO: 0.1 % — SIGNIFICANT CHANGE UP (ref 0–2)
BLOOD GAS ARTERIAL - LYTES,HGB,ICA,LACT RESULT: SIGNIFICANT CHANGE UP
BUN SERPL-MCNC: 10 MG/DL — SIGNIFICANT CHANGE UP (ref 7–23)
CALCIUM SERPL-MCNC: 9 MG/DL — SIGNIFICANT CHANGE UP (ref 8.4–10.5)
CHLORIDE SERPL-SCNC: 101 MMOL/L — SIGNIFICANT CHANGE UP (ref 98–107)
CO2 SERPL-SCNC: 23 MMOL/L — SIGNIFICANT CHANGE UP (ref 22–31)
CREAT SERPL-MCNC: 0.43 MG/DL — LOW (ref 0.5–1.3)
EGFR: 102 ML/MIN/1.73M2 — SIGNIFICANT CHANGE UP
EOSINOPHIL # BLD AUTO: 0 K/UL — SIGNIFICANT CHANGE UP (ref 0–0.5)
EOSINOPHIL NFR BLD AUTO: 0 % — SIGNIFICANT CHANGE UP (ref 0–6)
GLUCOSE BLDC GLUCOMTR-MCNC: 144 MG/DL — HIGH (ref 70–99)
GLUCOSE SERPL-MCNC: 178 MG/DL — HIGH (ref 70–99)
HCT VFR BLD CALC: 30 % — LOW (ref 34.5–45)
HGB BLD-MCNC: 9.8 G/DL — LOW (ref 11.5–15.5)
IANC: 9.62 K/UL — HIGH (ref 1.8–7.4)
IMM GRANULOCYTES NFR BLD AUTO: 0.4 % — SIGNIFICANT CHANGE UP (ref 0–0.9)
LYMPHOCYTES # BLD AUTO: 0.45 K/UL — LOW (ref 1–3.3)
LYMPHOCYTES # BLD AUTO: 4.1 % — LOW (ref 13–44)
MAGNESIUM SERPL-MCNC: 2.2 MG/DL — SIGNIFICANT CHANGE UP (ref 1.6–2.6)
MCHC RBC-ENTMCNC: 30.6 PG — SIGNIFICANT CHANGE UP (ref 27–34)
MCHC RBC-ENTMCNC: 32.7 GM/DL — SIGNIFICANT CHANGE UP (ref 32–36)
MCV RBC AUTO: 93.8 FL — SIGNIFICANT CHANGE UP (ref 80–100)
MONOCYTES # BLD AUTO: 0.73 K/UL — SIGNIFICANT CHANGE UP (ref 0–0.9)
MONOCYTES NFR BLD AUTO: 6.7 % — SIGNIFICANT CHANGE UP (ref 2–14)
NEUTROPHILS # BLD AUTO: 9.62 K/UL — HIGH (ref 1.8–7.4)
NEUTROPHILS NFR BLD AUTO: 88.7 % — HIGH (ref 43–77)
NRBC # BLD: 0 /100 WBCS — SIGNIFICANT CHANGE UP (ref 0–0)
NRBC # FLD: 0 K/UL — SIGNIFICANT CHANGE UP (ref 0–0)
PHOSPHATE SERPL-MCNC: 2.6 MG/DL — SIGNIFICANT CHANGE UP (ref 2.5–4.5)
PLATELET # BLD AUTO: 199 K/UL — SIGNIFICANT CHANGE UP (ref 150–400)
POTASSIUM SERPL-MCNC: 3.9 MMOL/L — SIGNIFICANT CHANGE UP (ref 3.5–5.3)
POTASSIUM SERPL-SCNC: 3.9 MMOL/L — SIGNIFICANT CHANGE UP (ref 3.5–5.3)
RBC # BLD: 3.2 M/UL — LOW (ref 3.8–5.2)
RBC # FLD: 13.2 % — SIGNIFICANT CHANGE UP (ref 10.3–14.5)
SODIUM SERPL-SCNC: 136 MMOL/L — SIGNIFICANT CHANGE UP (ref 135–145)
WBC # BLD: 10.85 K/UL — HIGH (ref 3.8–10.5)
WBC # FLD AUTO: 10.85 K/UL — HIGH (ref 3.8–10.5)

## 2023-05-17 PROCEDURE — 71045 X-RAY EXAM CHEST 1 VIEW: CPT | Mod: 26

## 2023-05-17 PROCEDURE — 99233 SBSQ HOSP IP/OBS HIGH 50: CPT

## 2023-05-17 RX ORDER — POTASSIUM CHLORIDE 20 MEQ
10 PACKET (EA) ORAL ONCE
Refills: 0 | Status: COMPLETED | OUTPATIENT
Start: 2023-05-17 | End: 2023-05-17

## 2023-05-17 RX ORDER — LIDOCAINE 4 G/100G
1 CREAM TOPICAL ONCE
Refills: 0 | Status: COMPLETED | OUTPATIENT
Start: 2023-05-17 | End: 2023-05-17

## 2023-05-17 RX ADMIN — LIDOCAINE 1 PATCH: 4 CREAM TOPICAL at 23:14

## 2023-05-17 RX ADMIN — HEPARIN SODIUM 5000 UNIT(S): 5000 INJECTION INTRAVENOUS; SUBCUTANEOUS at 22:08

## 2023-05-17 RX ADMIN — SODIUM CHLORIDE 4 MILLILITER(S): 9 INJECTION INTRAMUSCULAR; INTRAVENOUS; SUBCUTANEOUS at 23:07

## 2023-05-17 RX ADMIN — Medication 750 MILLIGRAM(S): at 09:30

## 2023-05-17 RX ADMIN — SODIUM CHLORIDE 4 MILLILITER(S): 9 INJECTION INTRAMUSCULAR; INTRAVENOUS; SUBCUTANEOUS at 10:12

## 2023-05-17 RX ADMIN — ALBUTEROL 2.5 MILLIGRAM(S): 90 AEROSOL, METERED ORAL at 23:07

## 2023-05-17 RX ADMIN — Medication 300 MILLIGRAM(S): at 09:15

## 2023-05-17 RX ADMIN — ALBUTEROL 2.5 MILLIGRAM(S): 90 AEROSOL, METERED ORAL at 04:02

## 2023-05-17 RX ADMIN — Medication 10 MILLIGRAM(S): at 17:02

## 2023-05-17 RX ADMIN — Medication 750 MILLIGRAM(S): at 04:00

## 2023-05-17 RX ADMIN — ALBUTEROL 2.5 MILLIGRAM(S): 90 AEROSOL, METERED ORAL at 15:38

## 2023-05-17 RX ADMIN — HYDROMORPHONE HYDROCHLORIDE 30 MILLILITER(S): 2 INJECTION INTRAMUSCULAR; INTRAVENOUS; SUBCUTANEOUS at 07:22

## 2023-05-17 RX ADMIN — SODIUM CHLORIDE 75 MILLILITER(S): 9 INJECTION, SOLUTION INTRAVENOUS at 22:08

## 2023-05-17 RX ADMIN — Medication 300 MILLIGRAM(S): at 03:20

## 2023-05-17 RX ADMIN — Medication 300 MILLIGRAM(S): at 15:26

## 2023-05-17 RX ADMIN — HEPARIN SODIUM 5000 UNIT(S): 5000 INJECTION INTRAVENOUS; SUBCUTANEOUS at 14:53

## 2023-05-17 RX ADMIN — Medication 10 MILLIGRAM(S): at 10:11

## 2023-05-17 RX ADMIN — HYDROMORPHONE HYDROCHLORIDE 30 MILLILITER(S): 2 INJECTION INTRAMUSCULAR; INTRAVENOUS; SUBCUTANEOUS at 20:00

## 2023-05-17 RX ADMIN — LIDOCAINE 1 PATCH: 4 CREAM TOPICAL at 22:30

## 2023-05-17 RX ADMIN — HEPARIN SODIUM 5000 UNIT(S): 5000 INJECTION INTRAVENOUS; SUBCUTANEOUS at 05:25

## 2023-05-17 RX ADMIN — Medication 100 MILLIEQUIVALENT(S): at 03:34

## 2023-05-17 RX ADMIN — SODIUM CHLORIDE 75 MILLILITER(S): 9 INJECTION, SOLUTION INTRAVENOUS at 03:21

## 2023-05-17 RX ADMIN — ALBUTEROL 2.5 MILLIGRAM(S): 90 AEROSOL, METERED ORAL at 10:11

## 2023-05-17 RX ADMIN — Medication 10 MILLIGRAM(S): at 03:31

## 2023-05-17 RX ADMIN — LIDOCAINE 1 PATCH: 4 CREAM TOPICAL at 11:31

## 2023-05-17 RX ADMIN — PANTOPRAZOLE SODIUM 40 MILLIGRAM(S): 20 TABLET, DELAYED RELEASE ORAL at 23:00

## 2023-05-17 RX ADMIN — SODIUM CHLORIDE 4 MILLILITER(S): 9 INJECTION INTRAMUSCULAR; INTRAVENOUS; SUBCUTANEOUS at 04:02

## 2023-05-17 RX ADMIN — DORNASE ALFA 2.5 MILLIGRAM(S): 1 SOLUTION RESPIRATORY (INHALATION) at 10:11

## 2023-05-17 RX ADMIN — SODIUM CHLORIDE 4 MILLILITER(S): 9 INJECTION INTRAMUSCULAR; INTRAVENOUS; SUBCUTANEOUS at 15:38

## 2023-05-17 RX ADMIN — Medication 750 MILLIGRAM(S): at 15:40

## 2023-05-17 NOTE — PHYSICAL THERAPY INITIAL EVALUATION ADULT - MANUAL MUSCLE TESTING RESULTS, REHAB EVAL
bilateral upper extremities grossly at least 3/5. bilateral lower extremities grossly at least 3/5/grossly assessed due to

## 2023-05-17 NOTE — PROGRESS NOTE ADULT - SUBJECTIVE AND OBJECTIVE BOX
JUAN NOVAK                     [id number]    HPI:  76 y.o WD, WN female with h/o esophageal cancer had chemo and radiation completed March 3rd, 2023 presents for preop eval to have robotic esophagogastroduodenoscopy, laparoscopic esophagogastrectomy possible open on 5/16/23. (05 May 2023 13:04)    CHIEF COMPLAINT: FOLLOW UP IN ICU FOR POSTOPERATIVE CARE OF PATIENT WHO IS S/P ESOPHAGECTOMY      PROCEDURES: Robotic R VATS, Blake Marcos esophagectomy  16-May-2023      ISSUES:   Esophageal cancer  Post op pain  Chest tube in place  Multiple sclerosis  HTN  GERD  Hx of Hiatal hernia s/p Nissen fundoplication repair  S/p Spinal cord stimulator placement  Bifascicular block      PAST MEDICAL & SURGICAL HISTORY:  HTN (hypertension)    MS (multiple sclerosis)      GERD (gastroesophageal reflux disease)      Esophageal cancer      Sinus symptom      Bifascicular block      H/O hiatal hernia      Skin cancer      Spinal cord stimulator status      H/O: hysterectomy      History of cholecystectomy      History of Nissen fundoplication                VITAL SIGNS:  Vital Signs Last 24 Hrs  T(C): 36.7 (17 May 2023 08:00), Max: 36.9 (17 May 2023 00:00)  T(F): 98.1 (17 May 2023 08:00), Max: 98.4 (17 May 2023 00:00)  HR: 64 (17 May 2023 11:00) (55 - 93)  BP: --  BP(mean): --  RR: 14 (17 May 2023 11:00) (11 - 26)  SpO2: 97% (17 May 2023 11:00) (94% - 100%)    Parameters below as of 17 May 2023 11:00  Patient On (Oxygen Delivery Method): nasal cannula w/ humidification  O2 Flow (L/min): 3      I/Os:   I&O's Detail    16 May 2023 07:01  -  17 May 2023 07:00  --------------------------------------------------------  IN:    Albumin 5%  - 250 mL: 250 mL    dextrose 5% + lactated ringers: 450 mL    IV PiggyBack: 450 mL    Lactated Ringers: 30 mL    Lactated Ringers: 600 mL  Total IN: 1780 mL    OUT:    Bulb (mL): 115 mL    Chest Tube (mL): 155 mL    Emesis (mL): 90 mL    Indwelling Catheter - Urethral (mL): 2625 mL    Nasogastric/Oral tube (mL): 200 mL  Total OUT: 3185 mL    Total NET: -1405 mL      17 May 2023 07:01  -  17 May 2023 11:46  --------------------------------------------------------  IN:    Lactated Ringers: 300 mL  Total IN: 300 mL    OUT:    Chest Tube (mL): 20 mL    Indwelling Catheter - Urethral (mL): 350 mL  Total OUT: 370 mL    Total NET: -70 mL          CAPILLARY BLOOD GLUCOSE      POCT Blood Glucose.: 144 mg/dL (17 May 2023 05:27)  POCT Blood Glucose.: 206 mg/dL (16 May 2023 23:50)      =======================MEDICATIONS===================  MEDICATIONS  (STANDING):  acetaminophen   IVPB .. 750 milliGRAM(s) IV Intermittent every 6 hours  albuterol    0.083% 2.5 milliGRAM(s) Nebulizer every 6 hours  dornase bridget Solution 2.5 milliGRAM(s) Inhalation daily  heparin   Injectable 5000 Unit(s) SubCutaneous every 8 hours  HYDROmorphone PCA (1 mG/mL) 30 milliLiter(s) PCA Continuous PCA Continuous  lactated ringers. 1000 milliLiter(s) (75 mL/Hr) IV Continuous <Continuous>  lidocaine   4% Patch 1 Patch Transdermal daily  metoclopramide Injectable 10 milliGRAM(s) IV Push every 8 hours  pantoprazole  Injectable 40 milliGRAM(s) IV Push at bedtime  sodium chloride 3%  Inhalation 4 milliLiter(s) Inhalation every 6 hours    MEDICATIONS  (PRN):  labetalol Injectable 10 milliGRAM(s) IV Push every 4 hours PRN Systolic blood pressure > 160  naloxone Injectable 0.1 milliGRAM(s) IV Push every 3 minutes PRN For ANY of the following changes in patient status:  A. RR LESS THAN 10 breaths per minute, B. Oxygen saturation LESS THAN 90%, C. Sedation score of 6  ondansetron Injectable 4 milliGRAM(s) IV Push every 6 hours PRN Nausea      PHYSICAL EXAM============================  General:                         Awake, alert, not in any distress  Neuro:                            Moving all extremities to commands.   Respiratory:	Air entry fair and  bilateral conducted sounds                                           Effort even and unlabored.  CV:		Regular rate and rhythm. Normal S1/S2                                          Distal pulses present.  Abdomen:	                     Soft, non-distended. Bowel sounds present   Skin:		No rash.  Extremities:	Warm, no cyanosis or edema.  Palpable pulses    ============================LABS=========================                        9.8    10.85 )-----------( 199      ( 17 May 2023 02:42 )             30.0     05-17    136  |  101  |  10  ----------------------------<  178<H>  3.9   |  23  |  0.43<L>    Ca    9.0      17 May 2023 02:42  Phos  2.6     05-17  Mg     2.20     05-17          ABG - ( 17 May 2023 02:42 )  pH, Arterial: 7.41  pH, Blood: x     /  pCO2: 43    /  pO2: 80    / HCO3: 27    / Base Excess: 2.3   /  SaO2: 97.1          ASSESSMENT AND PLAN:     NEURO:  Post-operative Pain - Stable. Pain control with PCA and Tylenol IV PRN.     Multiple sclerosis - stable.     RESPIRATORY:  Hypoxia - Wean nasal cannula for goal O2sat above 92. Obtain CXR. Incentive spirometry. Chest PT and frequent suctioning. Continue bronchodilators. OOB to chair & ambulate w/ assistance. Continuous pulse oximetry for support & to prevent decompensation.       Chest tube – Pleurevac regulated suctioning. Monitor chest tube output.      CARDIOVASCULAR:  Hemodynamically stable - Not on pressors. Continue hemodynamic monitoring.  Telemetry (medical test) - Reviewed by me today independently. Normal sinus rhythm.  HTN - stable. Hold home antihypertensives for now.       RENAL:  Stable - Monitor IOs and electrolytes. Keep K above 4.0 and Mg above 2.0.       GASTROINTESTINAL:  GI prophylaxis not indicated  Zofran and Reglan IV PRN for nausea  NPO until swallow study   IVF  NG tube to low suction  GERD - stable. Continue pantoprazole       HEMATOLOGIC:  No signs of active bleeding. Monitor Hgb in CBC in AM  DVT prophylaxis with heparin subQ and SCDs.       INFECTIOUS DISEASE:  All surgical sites appear clean. No signs of active infection. Will monitor for fever and leukocytosis.       ENDOCRINE:  Stable – Monitor glucose fingersticks for goal 120-180.       ONCOLOGY:  Esophageal cancer - Improved. S/P resection. Follow up final pathology.     Pertinent clinical, laboratory, radiographic, hemodynamic, echocardiographic, respiratory data, microbiologic data and chart were reviewed by myself and analyzed frequently throughout the course of the day and night by myself.    Plan discussed at length with the CTICU staff and Attending CT Surgeon -   Dr Preston Altamirano.      Patient's status was discussed with patient at bedside.       Juan Alberto STEPHENSP

## 2023-05-17 NOTE — PHYSICAL THERAPY INITIAL EVALUATION ADULT - LEVEL OF INDEPENDENCE: SIT/STAND, REHAB EVAL
Pt initially moderate assistance to stand due to left lower extremity numbness. During ambulation, Pt took a seated rest (~2-3 minutes) and was minimum assistance to stand./minimum assist (75% patients effort)/moderate assist (50% patients effort) Pt initially required moderate assistance to stand. During ambulation, Pt required a seated rest (~2-3 minutes) and then required minimum assistance to stand./minimum assist (75% patients effort)/moderate assist (50% patients effort)

## 2023-05-17 NOTE — PHYSICAL THERAPY INITIAL EVALUATION ADULT - GAIT DISTANCE, PT EVAL
Pt ambulated 50 feet with a rolling walker with chair follow by AUBREE Tena and then took a seated rest for 2-3 minutes. Following the rest, Pt ambulated 25 feet./25 feet/50 feet

## 2023-05-17 NOTE — PATIENT PROFILE ADULT - FALL HARM RISK - RISK INTERVENTIONS
Assistance OOB with selected safe patient handling equipment/Assistance with ambulation/Communicate Fall Risk and Risk Factors to all staff, patient, and family/Discuss with provider need for PT consult/Monitor for mental status changes/Monitor gait and stability/Reinforce activity limits and safety measures with patient and family/Reorient to person, place and time as needed/Review medications for side effects contributing to fall risk/Sit up slowly, dangle for a short time, stand at bedside before walking/Toileting schedule using arm’s reach rule for commode and bathroom/Use of alarms - bed, chair and/or voice tab/Visual Cue: Yellow wristband/Bed in lowest position, wheels locked, appropriate side rails in place/Call bell, personal items and telephone in reach/Instruct patient to call for assistance before getting out of bed or chair/Non-slip footwear when patient is out of bed/[place] to call system/Physically safe environment - no spills, clutter or unnecessary equipment/Purposeful Proactive Rounding/Room/bathroom lighting operational, light cord in reach

## 2023-05-17 NOTE — PATIENT PROFILE ADULT - FUNCTIONAL ASSESSMENT - BASIC MOBILITY 6.
brachial/right
 1-calculated by average/Not able to assess (calculate score using [facility] averaging method)

## 2023-05-17 NOTE — PHYSICAL THERAPY INITIAL EVALUATION ADULT - DID THE PATIENT HAVE SURGERY?
yes status post flexible bronchoscopy, esophagogastroduodenoscopy, robotic laparoscopic assisted jada mark esophagectomy (5/16)/yes

## 2023-05-17 NOTE — PHYSICAL THERAPY INITIAL EVALUATION ADULT - PRECAUTIONS/LIMITATIONS, REHAB EVAL
fall precautions/surgical precautions fall precautions/oxygen therapy device and L/min/surgical precautions

## 2023-05-17 NOTE — PHYSICAL THERAPY INITIAL EVALUATION ADULT - ADDITIONAL COMMENTS
Pt lives with her mother and cousin in a home that has 6 steps to enter and 6 steps to reach the bedroom. Pt is the primary caretaker of her mother, but can receive help if needed from her cousin. Pt's  frequently visits. Prior to admission, Pt was independent with ambulation/ADLs with no assistive device. Pt reports no falls in the last 6 months. Pt reports history of multiple sclerosis from the waist down with occasional flare ups.     Pt left in bedside chair, all lines/tubes intact, AUBREE OBRIEN present and aware. Pt is staying with her mother and cousin in a home that has 6 steps to enter and 6 steps to reach the bedroom. Pt is the primary caretaker of her mother, but can receive help if needed from her cousin. Pt's  frequently visits. Prior to admission, Pt was independent with ambulation/ADLs with no assistive device, however occasionally reported use of straight cane. Pt reports no falls in the last 6 months.    Pt left in bedside chair, all lines/tubes intact, AUBREE OBRIEN present and aware.

## 2023-05-17 NOTE — PHYSICAL THERAPY INITIAL EVALUATION ADULT - GENERAL OBSERVATIONS, REHAB EVAL
Pt received in bedside chair, +IV, +[place], +Chest tube, +monitor lines, +A-line, +NG tube, +drains, all lines/tubes intact, NAD. Heart rate: 56 beats per minute, oxygen saturation: 98%, BP: 137/48 Pt received in bedside chair, +IV, +[place], +Chest tube, +monitor lines, +A-line, +NG tube, +drains, +3L oxygen via Nasal Canula, all lines/tubes intact, NAD. Heart rate: 56 beats per minute, oxygen saturation: 98%, BP: 137/48

## 2023-05-17 NOTE — PHYSICAL THERAPY INITIAL EVALUATION ADULT - PERTINENT HX OF CURRENT PROBLEM, REHAB EVAL
Pt is a 73 year old female presenting with malignant neoplasm of the esophagus, status post flexible bronchoscopy, esophagogastroduodenoscopy, robotic laparoscopic assisted jada mark esophagectomy (5/16). Pt is a 73 year old female presenting with malignant neoplasm of the esophagus, status post flexible bronchoscopy, esophagogastroduodenoscopy, robotic laparoscopic assisted jada mark esophagectomy (5/16). Pt has a history of multiple sclerosis, further history below.

## 2023-05-17 NOTE — CONSULT NOTE ADULT - SUBJECTIVE AND OBJECTIVE BOX
C A R D I O L O G Y  *********************    DATE OF SERVICE: 05-17-23    HISTORY OF PRESENT ILLNESS: HPI:  Pt is a 75 y/o female with a pmh of MS, HTN and  esophageal cancer s/p chemo and radiation completed March 3rd, 2023 presented for elective robotic esophagogastroduodenoscopy, laparoscopic esophagogastrectomy possible open on 5/16/23.     She is now s/p Robotic creation of gastric conduit for esophagogastrectomy, pyloromyotomy  and Robotic R VATS, Blake Marcos esophagectomy.      PAST MEDICAL & SURGICAL HISTORY:  HTN (hypertension)  MS (multiple sclerosis)  GERD (gastroesophageal reflux disease)  Esophageal cancer  Sinus symptom  Bifascicular block  H/O hiatal hernia  Skin cancer  Spinal cord stimulator status  H/O: hysterectomy  History of cholecystectomy  History of Nissen fundoplication      MEDICATIONS:  MEDICATIONS  (STANDING):  acetaminophen   IVPB .. 750 milliGRAM(s) IV Intermittent every 6 hours  albuterol    0.083% 2.5 milliGRAM(s) Nebulizer every 6 hours  dornase bridget Solution 2.5 milliGRAM(s) Inhalation daily  heparin   Injectable 5000 Unit(s) SubCutaneous every 8 hours  HYDROmorphone PCA (1 mG/mL) 30 milliLiter(s) PCA Continuous PCA Continuous  lactated ringers. 1000 milliLiter(s) (75 mL/Hr) IV Continuous <Continuous>  lidocaine   4% Patch 1 Patch Transdermal daily  metoclopramide Injectable 10 milliGRAM(s) IV Push every 8 hours  pantoprazole  Injectable 40 milliGRAM(s) IV Push at bedtime  sodium chloride 3%  Inhalation 4 milliLiter(s) Inhalation every 6 hours      Allergies:  lisinopril (Other)  penicillin (Rash)  Eben (Rash)  peanuts (Anaphylaxis)  Percocet (Other)      FAMILY HISTORY:  FH: HTN (hypertension) (Mother)    SOCIAL HISTORY:    [X ] Non-smoker  [ ] Smoker  [ ] Alcohol    FLU VACCINE THIS YEAR STARTS IN AUGUST:  [ ] Yes    [ ] No    IF OVER 65 HAVE YOU EVER HAD A PNA VACCINE:  [ ] Yes    [ ] No       [ ] N/A      REVIEW OF SYSTEMS:  [ ]chest pain  [  ]shortness of breath  [  ]palpitations  [  ]syncope  [ ]near syncope [ ]upper extremity weakness   [ ] lower extremity weakness  [  ]diplopia  [  ]altered mental status   [  ]fevers  [ ]chills [ ]nausea  [ ]vomiting  [  ]dysphagia    [ ]abdominal pain  [ ]melena  [ ]BRBPR    [  ]epistaxis  [  ]rash    [ ]lower extremity edema    [X] All others negative	  [ ] Unable to obtain    LABS:	 	    CARDIAC MARKERS:                        9.8    10.85 )-----------( 199      ( 17 May 2023 02:42 )             30.0     Hb Trend: 9.8<--, 10.5<--    05-17    136  |  101  |  10  ----------------------------<  178<H>  3.9   |  23  |  0.43<L>    Ca    9.0      17 May 2023 02:42  Phos  2.6     05-17  Mg     2.20     05-17      Creatinine Trend: 0.43<--, 0.45<--, 0.54<--    PHYSICAL EXAM:  T(C): 36.9 (05-17-23 @ 12:00), Max: 36.9 (05-17-23 @ 00:00)  HR: 68 (05-17-23 @ 12:00) (55 - 93)  BP: --  RR: 16 (05-17-23 @ 12:00) (11 - 26)  SpO2: 99% (05-17-23 @ 12:00) (94% - 100%)  Wt(kg): --   BMI (kg/m2): 31.4 (05-16-23 @ 07:03)  I&O's Summary    16 May 2023 07:01  -  17 May 2023 07:00  --------------------------------------------------------  IN: 1780 mL / OUT: 3185 mL / NET: -1405 mL    17 May 2023 07:01  -  17 May 2023 13:29  --------------------------------------------------------  IN: 450 mL / OUT: 650 mL / NET: -200 mL      General:  Alert and Oriented * 3.   Head: Normocephalic and atraumatic.   Neck: No JVD. No bruits. Supple. Does not appear to be enlarged.   Cardiovascular: + S1,S2 ; RRR Soft systolic murmur at the left lower sternal border. No rubs noted.    Lungs: CTA b/l. No rhonchi, rales or wheezes.   Abdomen: + BS, soft. Non tender. Non distended. No rebound. No guarding.   Extremities: No clubbing/cyanosis/edema.   Neurologic: Moves all four extremities. Full range of motion.   Skin: Warm and moist. The patient's skin has normal elasticity and good skin turgor.   Psychiatric: Appropriate mood and affect.  Musculoskeletal: Normal range of motion, normal strength       TELEMETRY: 	  NSR    ECG:  NSR Bifascular block	    TTE 03/2023: Preserved LVEF  Cath: Non obstructive CAD    ASSESSMENT/PLAN: 	Pt is a 75 y/o female with a pmh of MS, HTN and  esophageal cancer s/p chemo and radiation completed March 3rd, 2023 presented for elective robotic esophagogastroduodenoscopy, laparoscopic esophagogastrectomy possible open on 5/16/23.     1. HTN  - well controlled of meds  - can use IV hydralazine if SBP > 180 systolic  - previously taken of Chlorthalidone due to hypokalemia      Becky West MD  Pager: [phone]

## 2023-05-17 NOTE — PROGRESS NOTE ADULT - SUBJECTIVE AND OBJECTIVE BOX
Anesthesia Pain Management Service    SUBJECTIVE: Patient is doing well with IV PCA and no significant problems reported. Patient states yesterday after the surgery she had some nausea but since resolved.  As per patient, when she took Percocet in the past, it made her delirious     Pain Scale Score	At rest: __0/10_ 	With Activity: ___ 	[X ] Refer to charted pain scores    THERAPY:    [ ] IV PCA Morphine		[ ] 5 mg/mL	[ ] 1 mg/mL  [X ] IV PCA Hydromorphone	[ ] 5 mg/mL	[X ] 1 mg/mL  [ ] IV PCA Fentanyl		[ ] 50 micrograms/mL    Demand dose __0.2_ lockout __6_ (minutes) Continuous Rate _0__ Total: __1_  mg used (in past 24 hours)      MEDICATIONS  (STANDING):  acetaminophen   IVPB .. 750 milliGRAM(s) IV Intermittent every 6 hours  albuterol    0.083% 2.5 milliGRAM(s) Nebulizer every 6 hours  dornase bridget Solution 2.5 milliGRAM(s) Inhalation daily  heparin   Injectable 5000 Unit(s) SubCutaneous every 8 hours  HYDROmorphone PCA (1 mG/mL) 30 milliLiter(s) PCA Continuous PCA Continuous  lactated ringers. 1000 milliLiter(s) (75 mL/Hr) IV Continuous <Continuous>  lidocaine   4% Patch 1 Patch Transdermal daily  metoclopramide Injectable 10 milliGRAM(s) IV Push every 8 hours  pantoprazole  Injectable 40 milliGRAM(s) IV Push at bedtime  sodium chloride 3%  Inhalation 4 milliLiter(s) Inhalation every 6 hours    MEDICATIONS  (PRN):  labetalol Injectable 10 milliGRAM(s) IV Push every 4 hours PRN Systolic blood pressure > 160  naloxone Injectable 0.1 milliGRAM(s) IV Push every 3 minutes PRN For ANY of the following changes in patient status:  A. RR LESS THAN 10 breaths per minute, B. Oxygen saturation LESS THAN 90%, C. Sedation score of 6  ondansetron Injectable 4 milliGRAM(s) IV Push every 6 hours PRN Nausea      OBJECTIVE:  Patient is sitting up in the chair, NPO with NGT, CTx1, and ceils.    Sedation Score:	[ X] Alert	 [ ] Drowsy 	[ ] Arousable	[ ] Asleep	[ ] Unresponsive    Side Effects:	[X ] None	[ ] Nausea	[ ] Vomiting	[ ] Pruritus  		[ ] Other:    Vital Signs Last 24 Hrs  T(C): 36.7 (17 May 2023 08:00), Max: 36.9 (17 May 2023 00:00)  T(F): 98.1 (17 May 2023 08:00), Max: 98.4 (17 May 2023 00:00)  HR: 64 (17 May 2023 11:00) (55 - 93)  BP: --  BP(mean): --  RR: 14 (17 May 2023 11:00) (11 - 26)  SpO2: 97% (17 May 2023 11:00) (94% - 100%)    Parameters below as of 17 May 2023 11:00  Patient On (Oxygen Delivery Method): nasal cannula w/ humidification  O2 Flow (L/min): 3      ASSESSMENT/ PLAN    Therapy to  be:	[ X] Continue   [ ] Discontinued   [ ] Change to prn Analgesics    Documentation and Verification of current medications:   [X] Done	[ ] Not done, not elligible    Comments: Patient still NPO, will continue with IV Dilaudid PCA.  Recommend non-opioid adjuvant analgesics to be used when possible and when allowed by primary surgical team.    Progress Note written now but Patient was seen earlier.

## 2023-05-18 LAB
ANION GAP SERPL CALC-SCNC: 9 MMOL/L — SIGNIFICANT CHANGE UP (ref 7–14)
BUN SERPL-MCNC: 9 MG/DL — SIGNIFICANT CHANGE UP (ref 7–23)
CALCIUM SERPL-MCNC: 8.8 MG/DL — SIGNIFICANT CHANGE UP (ref 8.4–10.5)
CHLORIDE SERPL-SCNC: 105 MMOL/L — SIGNIFICANT CHANGE UP (ref 98–107)
CO2 SERPL-SCNC: 26 MMOL/L — SIGNIFICANT CHANGE UP (ref 22–31)
CREAT SERPL-MCNC: 0.4 MG/DL — LOW (ref 0.5–1.3)
EGFR: 104 ML/MIN/1.73M2 — SIGNIFICANT CHANGE UP
GLUCOSE SERPL-MCNC: 98 MG/DL — SIGNIFICANT CHANGE UP (ref 70–99)
HCT VFR BLD CALC: 27.6 % — LOW (ref 34.5–45)
HGB BLD-MCNC: 8.9 G/DL — LOW (ref 11.5–15.5)
MAGNESIUM SERPL-MCNC: 2.1 MG/DL — SIGNIFICANT CHANGE UP (ref 1.6–2.6)
MCHC RBC-ENTMCNC: 30.1 PG — SIGNIFICANT CHANGE UP (ref 27–34)
MCHC RBC-ENTMCNC: 32.2 GM/DL — SIGNIFICANT CHANGE UP (ref 32–36)
MCV RBC AUTO: 93.2 FL — SIGNIFICANT CHANGE UP (ref 80–100)
NRBC # BLD: 0 /100 WBCS — SIGNIFICANT CHANGE UP (ref 0–0)
NRBC # FLD: 0 K/UL — SIGNIFICANT CHANGE UP (ref 0–0)
PHOSPHATE SERPL-MCNC: 2.3 MG/DL — LOW (ref 2.5–4.5)
PLATELET # BLD AUTO: 208 K/UL — SIGNIFICANT CHANGE UP (ref 150–400)
POTASSIUM SERPL-MCNC: 3.7 MMOL/L — SIGNIFICANT CHANGE UP (ref 3.5–5.3)
POTASSIUM SERPL-SCNC: 3.7 MMOL/L — SIGNIFICANT CHANGE UP (ref 3.5–5.3)
RBC # BLD: 2.96 M/UL — LOW (ref 3.8–5.2)
RBC # FLD: 13.5 % — SIGNIFICANT CHANGE UP (ref 10.3–14.5)
SODIUM SERPL-SCNC: 140 MMOL/L — SIGNIFICANT CHANGE UP (ref 135–145)
WBC # BLD: 9.5 K/UL — SIGNIFICANT CHANGE UP (ref 3.8–10.5)
WBC # FLD AUTO: 9.5 K/UL — SIGNIFICANT CHANGE UP (ref 3.8–10.5)

## 2023-05-18 PROCEDURE — 71045 X-RAY EXAM CHEST 1 VIEW: CPT | Mod: 26

## 2023-05-18 PROCEDURE — 99233 SBSQ HOSP IP/OBS HIGH 50: CPT

## 2023-05-18 RX ORDER — HYDRALAZINE HCL 50 MG
5 TABLET ORAL EVERY 4 HOURS
Refills: 0 | Status: DISCONTINUED | OUTPATIENT
Start: 2023-05-18 | End: 2023-05-24

## 2023-05-18 RX ORDER — DIAZEPAM 5 MG
3 TABLET ORAL EVERY 8 HOURS
Refills: 0 | Status: DISCONTINUED | OUTPATIENT
Start: 2023-05-18 | End: 2023-05-21

## 2023-05-18 RX ORDER — ACETAMINOPHEN 500 MG
1000 TABLET ORAL ONCE
Refills: 0 | Status: COMPLETED | OUTPATIENT
Start: 2023-05-18 | End: 2023-05-18

## 2023-05-18 RX ORDER — HYDROMORPHONE HYDROCHLORIDE 2 MG/ML
30 INJECTION INTRAMUSCULAR; INTRAVENOUS; SUBCUTANEOUS
Refills: 0 | Status: DISCONTINUED | OUTPATIENT
Start: 2023-05-18 | End: 2023-05-20

## 2023-05-18 RX ORDER — HYDROMORPHONE HYDROCHLORIDE 2 MG/ML
0.5 INJECTION INTRAMUSCULAR; INTRAVENOUS; SUBCUTANEOUS
Refills: 0 | Status: DISCONTINUED | OUTPATIENT
Start: 2023-05-18 | End: 2023-05-20

## 2023-05-18 RX ORDER — POTASSIUM PHOSPHATE, MONOBASIC POTASSIUM PHOSPHATE, DIBASIC 236; 224 MG/ML; MG/ML
30 INJECTION, SOLUTION INTRAVENOUS ONCE
Refills: 0 | Status: COMPLETED | OUTPATIENT
Start: 2023-05-18 | End: 2023-05-18

## 2023-05-18 RX ADMIN — ALBUTEROL 2.5 MILLIGRAM(S): 90 AEROSOL, METERED ORAL at 09:40

## 2023-05-18 RX ADMIN — DORNASE ALFA 2.5 MILLIGRAM(S): 1 SOLUTION RESPIRATORY (INHALATION) at 09:40

## 2023-05-18 RX ADMIN — HEPARIN SODIUM 5000 UNIT(S): 5000 INJECTION INTRAVENOUS; SUBCUTANEOUS at 06:22

## 2023-05-18 RX ADMIN — ALBUTEROL 2.5 MILLIGRAM(S): 90 AEROSOL, METERED ORAL at 03:24

## 2023-05-18 RX ADMIN — POTASSIUM PHOSPHATE, MONOBASIC POTASSIUM PHOSPHATE, DIBASIC 83.33 MILLIMOLE(S): 236; 224 INJECTION, SOLUTION INTRAVENOUS at 06:22

## 2023-05-18 RX ADMIN — Medication 400 MILLIGRAM(S): at 20:30

## 2023-05-18 RX ADMIN — SODIUM CHLORIDE 4 MILLILITER(S): 9 INJECTION INTRAMUSCULAR; INTRAVENOUS; SUBCUTANEOUS at 03:24

## 2023-05-18 RX ADMIN — LIDOCAINE 1 PATCH: 4 CREAM TOPICAL at 11:06

## 2023-05-18 RX ADMIN — LIDOCAINE 1 PATCH: 4 CREAM TOPICAL at 13:09

## 2023-05-18 RX ADMIN — ALBUTEROL 2.5 MILLIGRAM(S): 90 AEROSOL, METERED ORAL at 22:13

## 2023-05-18 RX ADMIN — PANTOPRAZOLE SODIUM 40 MILLIGRAM(S): 20 TABLET, DELAYED RELEASE ORAL at 21:26

## 2023-05-18 RX ADMIN — HEPARIN SODIUM 5000 UNIT(S): 5000 INJECTION INTRAVENOUS; SUBCUTANEOUS at 13:14

## 2023-05-18 RX ADMIN — Medication 1000 MILLIGRAM(S): at 21:00

## 2023-05-18 RX ADMIN — HEPARIN SODIUM 5000 UNIT(S): 5000 INJECTION INTRAVENOUS; SUBCUTANEOUS at 21:26

## 2023-05-18 RX ADMIN — SODIUM CHLORIDE 4 MILLILITER(S): 9 INJECTION INTRAMUSCULAR; INTRAVENOUS; SUBCUTANEOUS at 09:40

## 2023-05-18 RX ADMIN — SODIUM CHLORIDE 4 MILLILITER(S): 9 INJECTION INTRAMUSCULAR; INTRAVENOUS; SUBCUTANEOUS at 15:59

## 2023-05-18 RX ADMIN — ALBUTEROL 2.5 MILLIGRAM(S): 90 AEROSOL, METERED ORAL at 15:59

## 2023-05-18 RX ADMIN — SODIUM CHLORIDE 4 MILLILITER(S): 9 INJECTION INTRAMUSCULAR; INTRAVENOUS; SUBCUTANEOUS at 22:13

## 2023-05-18 RX ADMIN — SODIUM CHLORIDE 75 MILLILITER(S): 9 INJECTION, SOLUTION INTRAVENOUS at 21:26

## 2023-05-18 RX ADMIN — LIDOCAINE 1 PATCH: 4 CREAM TOPICAL at 19:19

## 2023-05-18 RX ADMIN — LIDOCAINE 1 PATCH: 4 CREAM TOPICAL at 07:25

## 2023-05-18 NOTE — PROGRESS NOTE ADULT - SUBJECTIVE AND OBJECTIVE BOX
Date of service 5/18/23    chief complaint: surgery    extended hpi: 77 y/o female with a pmh of MS, HTN and  esophageal cancer s/p chemo and radiation completed March 3rd, 2023 presented for elective robotic esophagogastroduodenoscopy, laparoscopic esophagogastrectomy on 5/16/23.     reports mild incisional site pain, no sob    Review of Systems:   Constitutional: [ ] fevers, [ ] chills.   Skin: [ ] dry skin. [ ] rashes.  Psychiatric: [ ] depression, [ ] anxiety.   Gastrointestinal: [ ] BRBPR, [ ] melena.   Neurological: [ ] confusion. [ ] seizures. [ ] shuffling gait.   Ears,Nose,Mouth and Throat: [ ] ear pain [ ] sore throat.   Eyes: [ ] diplopia.   Respiratory: [ ] hemoptysis. [ ] shortness of breath  Cardiovascular: See HPI above  Hematologic/Lymphatic: [ ] anemia. [ ] painful nodes. [ ] prolonged bleeding.   Genitourinary: [ ] hematuria. [ ] flank pain.   Endocrine: [ ] significant change in weight. [ ] intolerance to heat and cold.     Review of systems [x ] otherwise negative, [ ] otherwise unable to obtain    FH: no family history of sudden cardiac death in first degree relatives    SH: [ ] tobacco, [ ] alcohol, [ ] drugs    albuterol    0.083% 2.5 milliGRAM(s) Nebulizer every 6 hours  diazepam  Injectable 3 milliGRAM(s) IV Push every 8 hours PRN  dornase bridget Solution 2.5 milliGRAM(s) Inhalation daily  heparin   Injectable 5000 Unit(s) SubCutaneous every 8 hours  hydrALAZINE Injectable 5 milliGRAM(s) IV Push every 4 hours PRN  HYDROmorphone PCA (1 mG/mL) 30 milliLiter(s) PCA Continuous PCA Continuous  HYDROmorphone PCA (1 mG/mL) Rescue Clinician Bolus 0.5 milliGRAM(s) IV Push every 15 minutes PRN  lactated ringers. 1000 milliLiter(s) IV Continuous <Continuous>  lidocaine   4% Patch 1 Patch Transdermal daily  naloxone Injectable 0.1 milliGRAM(s) IV Push every 3 minutes PRN  ondansetron Injectable 4 milliGRAM(s) IV Push every 6 hours PRN  pantoprazole  Injectable 40 milliGRAM(s) IV Push at bedtime  sodium chloride 3%  Inhalation 4 milliLiter(s) Inhalation every 6 hours                            8.9    9.50  )-----------( 208      ( 18 May 2023 03:15 )             27.6     140  |  105  |  9   ----------------------------<  98  3.7   |  26  |  0.40<L>    Ca    8.8      18 May 2023 03:15  Phos  2.3     05-18  Mg     2.10     05-18    T(C): 36.6 (05-18-23 @ 12:00), Max: 36.7 (05-17-23 @ 16:00)  HR: 58 (05-18-23 @ 13:00) (53 - 82)  BP: 117/59 (05-18-23 @ 13:00) (110/62 - 149/66)  RR: 12 (05-18-23 @ 13:00) (12 - 23)  SpO2: 100% (05-18-23 @ 13:00) (94% - 100%)    General: Well nourished in no acute distress. Alert and Oriented * 3.   Head: Normocephalic and atraumatic.   Neck: No JVD. No bruits. Supple. Does not appear to be enlarged.   Cardiovascular: + S1,S2 ; RRR Soft systolic murmur at the left lower sternal border. No rubs noted.    Lungs: CTA b/l. No rhonchi, rales or wheezes.   Abdomen: + BS, soft. Non tender. Non distended. No rebound. No guarding.   Extremities: No clubbing/cyanosis/edema.   Neurologic: Moves all four extremities. Full range of motion.   Skin: Warm and moist. The patient's skin has normal elasticity and good skin turgor.   Psychiatric: Appropriate mood and affect.  Musculoskeletal: Normal range of motion, normal strength    DATA    tele- SR  ECG:  NSR Bifascular block	  TTE 03/2023: Preserved LVEF  Cath: Non obstructive CAD    ASSESSMENT/PLAN: 	Pt is a 77 y/o female with a pmh of MS, HTN and  esophageal cancer s/p chemo and radiation completed March 3rd, 2023 presented for elective robotic esophagogastroduodenoscopy, laparoscopic esophagogastrectomy on 5/16/23.     1. HTN  - currently well controlled off meds  - can use IV hydralazine if SBP > 180 systolic  - previously taken of Chlorthalidone due to hypokalemia    2. Post OP  --pain management and PT

## 2023-05-18 NOTE — PROGRESS NOTE ADULT - SUBJECTIVE AND OBJECTIVE BOX
JUAN NOVAK            MRN-[id number]         lisinopril (Other)  penicillin (Rash)  [place] (Rash)  peanuts (Anaphylaxis)  Percocet (Other)               HPI: 76 y.o WD, WN female with h/o esophageal cancer had chemo and radiation completed March 3rd, 2023 presents for preop eval to have robotic esophagogastroduodenoscopy, laparoscopic esophagogastrectomy possible open on 5/16/23. (05 May 2023 13:04).     CHIEF COMPLAINT: Follow up in ICU  for postoperative care of patient who is s/p  Robotic R VATS, Blake Marcos esophagectomy  16-May-2023    Procedure: Robotic R VATS, Vernon Marcos esophagectomy  16-May-2023    Issues:               Esophageal cancer               Postop pain               Chest tube in place   HTN (hypertension)  MS (multiple sclerosis)  GERD (gastroesophageal reflux disease)  Bifascicular block  H/O hiatal hernia  Skin cancer  Spinal cord stimulator status        Postop course:     Patient reports moderate pain at chest wall incision sites which is worse with coughing and deep breathing without associated fever or dyspnea. Pain is improved with use of PCA and  IV Tylenol.         PAST MEDICAL & SURGICAL HISTORY:  HTN (hypertension)    MS (multiple sclerosis)    GERD (gastroesophageal reflux disease)    Esophageal cancer    Sinus symptom    Bifascicular block    H/O hiatal hernia    Skin cancer    Spinal cord stimulator status    H/O: hysterectomy    History of cholecystectomy    History of Nissen fundoplication        ICU Vital Signs Last 24 Hrs  T(C): 36.7 (18 May 2023 08:00), Max: 36.9 (17 May 2023 12:00)  T(F): 98.1 (18 May 2023 08:00), Max: 98.5 (17 May 2023 12:00)  HR: 73 (18 May 2023 09:43) (53 - 82)  BP: 144/66 (18 May 2023 09:00) (130/60 - 149/66)  BP(mean): 90 (18 May 2023 09:00) (80 - 95)  ABP: 130/60 (18 May 2023 07:00) (105/41 - 150/62)  ABP(mean): 80 (18 May 2023 07:00) (63 - 98)  RR: 20 (18 May 2023 09:00) (12 - 23)  SpO2: 100% (18 May 2023 09:43) (94% - 100%)    O2 Parameters below as of 18 May 2023 09:43  Patient On (Oxygen Delivery Method): nasal cannula w/ humidification          I&O's Detail    17 May 2023 07:01  -  18 May 2023 07:00  --------------------------------------------------------  IN:    Enteral Tube Flush: 210 mL    Lactated Ringers: 1800 mL  Total IN: 2010 mL    OUT:    Bulb (mL): 30 mL    Chest Tube (mL): 200 mL    Indwelling Catheter - Urethral (mL): 1570 mL    Nasogastric/Oral tube (mL): 125 mL  Total OUT: 1925 mL    Total NET: 85 mL        CAPILLARY BLOOD GLUCOSE      POCT Blood Glucose.: 144 mg/dL (17 May 2023 05:27)    Home Medications:  amLODIPine 10 mg oral tablet: 1 tab(s) orally once a day (16 May 2023 07:20)  atorvastatin 40 mg oral tablet: 1 tab(s) orally once a day (at bedtime) (16 May 2023 07:20)  azelastine 137 mcg/inh (0.1%) nasal spray: 2 spray(s) nasal 2 times a day, As Needed (16 May 2023 07:20)  baclofen 10 mg oral tablet: 2 tab(s) orally 2 times a day (16 May 2023 07:20)  chlorthalidone 15 mg oral tablet: 1 tab(s) orally once a day (16 May 2023 07:20)  dalfampridine 10 mg oral tablet, extended release: 1 tab(s) orally every 12 hours (16 May 2023 07:20)  Epi Pen 0.3mg PRN:  (16 May 2023 07:20)  estradiol: apply to vaginal area --Mon, Wed, Fri  (16 May 2023 07:20)  famotidine 20 mg oral tablet: 1 tab(s) orally once a day (at bedtime) (16 May 2023 07:20)  fluticasone 50 mcg/inh nasal spray: 1 spray(s) in each nostril once a day (16 May 2023 07:20)  hydrALAZINE 100 mg oral tablet: 1 tab(s) orally 3 times a day (16 May 2023 07:20)  losartan 100 mg oral tablet: 1 tab(s) orally once a day (16 May 2023 07:20)  montelukast 10 mg oral tablet: 1 tab(s) orally once a day (at bedtime) (16 May 2023 07:20)  omeprazole 40 mg oral delayed release capsule: 1 cap(s) orally once a day before breakfast (16 May 2023 07:20)  ondansetron 8 mg oral disintegrating strip: 1 tab(s) orally every 8 hours, As Needed (16 May 2023 07:20)  prochlorperazine 10 mg oral tablet: 1 tab(s) orally every 6 hours, As Needed (16 May 2023 07:20)  sucralfate 1 g/10 mL oral suspension: 10 milliliter(s) orally 4 times a day (16 May 2023 07:20)    MEDICATIONS  (STANDING):  albuterol    0.083% 2.5 milliGRAM(s) Nebulizer every 6 hours  dornase bridget Solution 2.5 milliGRAM(s) Inhalation daily  heparin   Injectable 5000 Unit(s) SubCutaneous every 8 hours  HYDROmorphone PCA (1 mG/mL) 30 milliLiter(s) PCA Continuous PCA Continuous  lactated ringers. 1000 milliLiter(s) (75 mL/Hr) IV Continuous <Continuous>  lidocaine   4% Patch 1 Patch Transdermal daily  pantoprazole  Injectable 40 milliGRAM(s) IV Push at bedtime  sodium chloride 3%  Inhalation 4 milliLiter(s) Inhalation every 6 hours    MEDICATIONS  (PRN):  hydrALAZINE Injectable 5 milliGRAM(s) IV Push every 4 hours PRN SBP>160  HYDROmorphone PCA (1 mG/mL) Rescue Clinician Bolus 0.5 milliGRAM(s) IV Push every 15 minutes PRN for Pain Scale GREATER THAN 6  naloxone Injectable 0.1 milliGRAM(s) IV Push every 3 minutes PRN For ANY of the following changes in patient status:  A. RR LESS THAN 10 breaths per minute, B. Oxygen saturation LESS THAN 90%, C. Sedation score of 6  ondansetron Injectable 4 milliGRAM(s) IV Push every 6 hours PRN Nausea        Physical exam:                             General:               Pt is awake, alert, not in any distress                                                  Neuro:                  Nonfocal                             Cardiovascular:   S1 & S2, regular                           Respiratory:         Air entry is fair and equal on both sides, has bilateral conducted sounds                           GI:                          Soft, nondistended and nontender, Bowel sounds absent                            Ext:                        No cyanosis or edema     Labs:                                                                           8.9    9.50  )-----------( 208      ( 18 May 2023 03:15 )             27.6             05-18    140  |  105  |  9   ----------------------------<  98  3.7   |  26  |  0.40<L>    Ca    8.8      18 May 2023 03:15  Phos  2.3     05-18  Mg     2.10     05-18                      CXR:    < from: Xray Chest 1 View- PORTABLE-Routine (Xray Chest 1 View- PORTABLE-Routine in AM.) (05.17.23 @ 05:47) >  There is an enteric tube, coursing below the diaphragm, with its   side-port overlying the stomach and tip out of the field of view.  2 right-sided chest tubes in place.  Spinal stimulator leads are partially visualized.  The heart size cannot be accurately assessed in this projection.  No focal consolidation.  Small left pleural effusion.  There is no pneumothorax or pneumomediastinum.  No acute bony abnormality.    May 17 at 1:24 AM:  Enteric tube with sidehole and tip in the stomach.  No significant change from the study of the day before.    IMPRESSION:  Lines, tubes, and devices, as above.  No significant pneumothorax or pneumomediastinum.  Trace left pleural effusion.      Plan:  General: 73yFemale s/p Robotic R VATS, Blake Marcos esophagectomy  16-May-2023,  appears fairly comfortable, complaining of chest pain with deep breathing.                             Neuro:                                         Pain control with PCA / IV Tylenol                            Cardiovascular:                                          Continue hemodynamic monitoring.                                        Continue IVF D5LR 75cc/hr    HTN: On Labetalol PRN. Has Hx of bifascicular block, will d/c Labetalol and start PRN Hydralazine.     HLD: Hold Lipitor                            Respiratory:                                         Postop hypoxemia requiring O2 via nasal cannula - Wean nasal cannula for goal O2sat above 92%.                                              Obtain CXR. Incentive spirometry.                                                   Chest PT and frequent suctioning. Continue bronchodilators, pulmozyme and inhaled 3% saline                                                      OOB to chair & ambulate w/ assistance.                                                           Continuous pulse oximetry for support & to prevent decompensation.                                         Monitor chest tube output                                         Chest tube to WS                             GI                                          NPO                                           Continue GI prophylaxis with  Protonix                                          Continue Zofran / Reglan for nausea - PRN                                          NGT to low continuous wall suction                                           Flush  NGT  with 30cc of sterile water Q 4hrs.  	                                                                 Renal:                                          Continue D5LR 75cc/hr                                          Monitor I/Os and electrolytes                                          Clayton D/CD today                                                 Hem/ Onc:                                                                                   Monitor chest tube output &  signs of bleeding.                                           Follow CBC in AM                           Infectious disease:                                             No signs of infection. Monitor for fever / leukocytosis.                                           All surgical incision / chest tube  sites look clean                            Endocrine                                              Continue Accu-Checks with coverage.     Pt is on SQ Heparin and Venodyne boots for DVT prophylaxis.     Pertinent clinical, laboratory, radiographic, hemodynamic, echocardiographic, respiratory data, microbiologic data and chart were reviewed and analyzed frequently throughout the course of the day and night  Patient seen, examined and plan discussed with CT Surgeon Dr. Altamirano / CTICU team during rounds.    Status discussed with patient  / family at bedside  and  updated plan of care.     I have spent 40 minutes with this patient including 20 minutes of time coordinating care in the ICU.            Anil Dillon MD

## 2023-05-18 NOTE — PROGRESS NOTE ADULT - SUBJECTIVE AND OBJECTIVE BOX
Anesthesia Pain Management Service    SUBJECTIVE: Patient reports IV PCA helps but does not last long. She had received a bolus and experienced better relief. Lidocaine patches are helping. Patient is opioid tolerant due to history of previous surgeries and MS. Patient states she has had Fentanyl, Morphine for post-op pain. She has had Methadone, Gabapentin, Cymbalta as well for MS. However, Gabapentin and Cymbalta were discontinued as she was experiencing pervasive thoughts while on them. A spinal cord stimulator is in place since 2007 which was very effective and lasted until 2015. There are plans for removal of spinal cord stimulator after recovery from this hospitalization. Patient denies current opioid use at home for MS but takes Baclofen 10mg BID for spacticity; has been okay while admitted. Has been using incentive spirometer frequently. Denies nausea/vomiting.    Pain Scale Score	At rest: _8/10__ 	With Activity: ___ 	[X ] Refer to charted pain scores    THERAPY:    [ ] IV PCA Morphine		[ ] 5 mg/mL	[ ] 1 mg/mL  [X ] IV PCA Hydromorphone	[ ] 5 mg/mL	[X ] 1 mg/mL  [ ] IV PCA Fentanyl		[ ] 50 micrograms/mL    Demand dose __0.2_ lockout __6_ (minutes) Continuous Rate _0__ Total: _2.6__  mg used (in past 24 hours)      MEDICATIONS  (STANDING):  albuterol    0.083% 2.5 milliGRAM(s) Nebulizer every 6 hours  dornase bridget Solution 2.5 milliGRAM(s) Inhalation daily  heparin   Injectable 5000 Unit(s) SubCutaneous every 8 hours  HYDROmorphone PCA (1 mG/mL) 30 milliLiter(s) PCA Continuous PCA Continuous  lactated ringers. 1000 milliLiter(s) (75 mL/Hr) IV Continuous <Continuous>  lidocaine   4% Patch 1 Patch Transdermal daily  pantoprazole  Injectable 40 milliGRAM(s) IV Push at bedtime  sodium chloride 3%  Inhalation 4 milliLiter(s) Inhalation every 6 hours    MEDICATIONS  (PRN):  hydrALAZINE Injectable 5 milliGRAM(s) IV Push every 4 hours PRN SBP>160  HYDROmorphone PCA (1 mG/mL) Rescue Clinician Bolus 0.5 milliGRAM(s) IV Push every 15 minutes PRN for Pain Scale GREATER THAN 6  naloxone Injectable 0.1 milliGRAM(s) IV Push every 3 minutes PRN For ANY of the following changes in patient status:  A. RR LESS THAN 10 breaths per minute, B. Oxygen saturation LESS THAN 90%, C. Sedation score of 6  ondansetron Injectable 4 milliGRAM(s) IV Push every 6 hours PRN Nausea       OBJECTIVE: Patient sitting up in chair, CTx1, NGT in place.    Sedation Score:	[ X] Alert	[ ] Drowsy 	[ ] Arousable	[ ] Asleep	[ ] Unresponsive    Side Effects:	[X ] None	[ ] Nausea	[ ] Vomiting	[ ] Pruritus  		[ ] Other:    Vital Signs Last 24 Hrs  T(C): 36.7 (18 May 2023 08:00), Max: 36.9 (17 May 2023 12:00)  T(F): 98.1 (18 May 2023 08:00), Max: 98.5 (17 May 2023 12:00)  HR: 73 (18 May 2023 08:00) (53 - 82)  BP: 149/66 (18 May 2023 08:00) (130/60 - 149/66)  BP(mean): 89 (18 May 2023 08:00) (80 - 95)  RR: 18 (18 May 2023 08:00) (11 - 23)  SpO2: 100% (18 May 2023 08:00) (94% - 100%)    Parameters below as of 18 May 2023 08:00  Patient On (Oxygen Delivery Method): nasal cannula w/ humidification  O2 Flow (L/min): 2      ASSESSMENT/ PLAN    Therapy to  be:	[ X] Continue   [ ] Discontinued   [ ] Change to prn Analgesics    Documentation and Verification of current medications:   [X] Done	[ ] Not done, not elligible    Comments: Continue IV PCA. Clinician rescue bolus ordered. Demand dose increased to 0.25mg. Recommend non-opioid adjuvant analgesics to be used when possible and when allowed by primary surgical team.    Progress Note written now but Patient was seen earlier. Anesthesia Pain Management Service    SUBJECTIVE: Patient reports IV PCA helps but does not last long. She had received a bolus and experienced better relief. Lidocaine patches are helping. Patient is opioid tolerant due to history of previous surgeries and MS. Patient states she has had Fentanyl, Morphine for post-op pain. She has had Methadone, Gabapentin, Cymbalta as well for MS. However, Gabapentin and Cymbalta were discontinued as she was experiencing pervasive thoughts while on them. A spinal cord stimulator is in place since 2007 which was very effective and lasted until 2015. There are plans for removal of spinal cord stimulator after recovery from this hospitalization. Patient denies current opioid use at home for MS but takes Baclofen 10mg BID for spacticity; has been okay while admitted. Has been using incentive spirometer frequently. Denies nausea/vomiting.    Pain Scale Score	At rest: _8/10__ 	With Activity: ___ 	[X ] Refer to charted pain scores    THERAPY:    [ ] IV PCA Morphine		[ ] 5 mg/mL	[ ] 1 mg/mL  [X ] IV PCA Hydromorphone	[ ] 5 mg/mL	[X ] 1 mg/mL  [ ] IV PCA Fentanyl		[ ] 50 micrograms/mL    Demand dose __0.2_ lockout __6_ (minutes) Continuous Rate _0__ Total: _2.6__  mg used (in past 24 hours)      MEDICATIONS  (STANDING):  albuterol    0.083% 2.5 milliGRAM(s) Nebulizer every 6 hours  dornase bridget Solution 2.5 milliGRAM(s) Inhalation daily  heparin   Injectable 5000 Unit(s) SubCutaneous every 8 hours  HYDROmorphone PCA (1 mG/mL) 30 milliLiter(s) PCA Continuous PCA Continuous  lactated ringers. 1000 milliLiter(s) (75 mL/Hr) IV Continuous <Continuous>  lidocaine   4% Patch 1 Patch Transdermal daily  pantoprazole  Injectable 40 milliGRAM(s) IV Push at bedtime  sodium chloride 3%  Inhalation 4 milliLiter(s) Inhalation every 6 hours    MEDICATIONS  (PRN):  hydrALAZINE Injectable 5 milliGRAM(s) IV Push every 4 hours PRN SBP>160  HYDROmorphone PCA (1 mG/mL) Rescue Clinician Bolus 0.5 milliGRAM(s) IV Push every 15 minutes PRN for Pain Scale GREATER THAN 6  naloxone Injectable 0.1 milliGRAM(s) IV Push every 3 minutes PRN For ANY of the following changes in patient status:  A. RR LESS THAN 10 breaths per minute, B. Oxygen saturation LESS THAN 90%, C. Sedation score of 6  ondansetron Injectable 4 milliGRAM(s) IV Push every 6 hours PRN Nausea       OBJECTIVE: Patient sitting up in chair, CTx1, NGT in place.    Sedation Score:	[ X] Alert	[ ] Drowsy 	[ ] Arousable	[ ] Asleep	[ ] Unresponsive    Side Effects:	[X ] None	[ ] Nausea	[ ] Vomiting	[ ] Pruritus  		[ ] Other:    Vital Signs Last 24 Hrs  T(C): 36.7 (18 May 2023 08:00), Max: 36.9 (17 May 2023 12:00)  T(F): 98.1 (18 May 2023 08:00), Max: 98.5 (17 May 2023 12:00)  HR: 73 (18 May 2023 08:00) (53 - 82)  BP: 149/66 (18 May 2023 08:00) (130/60 - 149/66)  BP(mean): 89 (18 May 2023 08:00) (80 - 95)  RR: 18 (18 May 2023 08:00) (11 - 23)  SpO2: 100% (18 May 2023 08:00) (94% - 100%)    Parameters below as of 18 May 2023 08:00  Patient On (Oxygen Delivery Method): nasal cannula w/ humidification  O2 Flow (L/min): 2      ASSESSMENT/ PLAN    Therapy to  be:	[ X] Continue   [ ] Discontinued   [ ] Change to prn Analgesics    Documentation and Verification of current medications:   [X] Done	[ ] Not done, not elligible    Comments: Continue IV PCA. Clinician rescue bolus ordered. Demand dose increased to 0.25mg. Recommend non-opioid adjuvant analgesics to be used when possible and when allowed by primary surgical team.   [facility]  Reviewed and no medications found. Reference #[id number]    Progress Note written now but Patient was seen earlier.

## 2023-05-19 LAB
ANION GAP SERPL CALC-SCNC: 13 MMOL/L — SIGNIFICANT CHANGE UP (ref 7–14)
BUN SERPL-MCNC: 11 MG/DL — SIGNIFICANT CHANGE UP (ref 7–23)
CALCIUM SERPL-MCNC: 9.2 MG/DL — SIGNIFICANT CHANGE UP (ref 8.4–10.5)
CHLORIDE SERPL-SCNC: 104 MMOL/L — SIGNIFICANT CHANGE UP (ref 98–107)
CO2 SERPL-SCNC: 25 MMOL/L — SIGNIFICANT CHANGE UP (ref 22–31)
CREAT SERPL-MCNC: 0.42 MG/DL — LOW (ref 0.5–1.3)
EGFR: 103 ML/MIN/1.73M2 — SIGNIFICANT CHANGE UP
GLUCOSE BLDC GLUCOMTR-MCNC: 107 MG/DL — HIGH (ref 70–99)
GLUCOSE BLDC GLUCOMTR-MCNC: 72 MG/DL — SIGNIFICANT CHANGE UP (ref 70–99)
GLUCOSE BLDC GLUCOMTR-MCNC: 79 MG/DL — SIGNIFICANT CHANGE UP (ref 70–99)
GLUCOSE BLDC GLUCOMTR-MCNC: 94 MG/DL — SIGNIFICANT CHANGE UP (ref 70–99)
GLUCOSE SERPL-MCNC: 77 MG/DL — SIGNIFICANT CHANGE UP (ref 70–99)
HCT VFR BLD CALC: 31.7 % — LOW (ref 34.5–45)
HGB BLD-MCNC: 10.1 G/DL — LOW (ref 11.5–15.5)
MAGNESIUM SERPL-MCNC: 2.2 MG/DL — SIGNIFICANT CHANGE UP (ref 1.6–2.6)
MCHC RBC-ENTMCNC: 30.6 PG — SIGNIFICANT CHANGE UP (ref 27–34)
MCHC RBC-ENTMCNC: 31.9 GM/DL — LOW (ref 32–36)
MCV RBC AUTO: 96.1 FL — SIGNIFICANT CHANGE UP (ref 80–100)
NRBC # BLD: 0 /100 WBCS — SIGNIFICANT CHANGE UP (ref 0–0)
NRBC # FLD: 0 K/UL — SIGNIFICANT CHANGE UP (ref 0–0)
PHOSPHATE SERPL-MCNC: 2.7 MG/DL — SIGNIFICANT CHANGE UP (ref 2.5–4.5)
PLATELET # BLD AUTO: 213 K/UL — SIGNIFICANT CHANGE UP (ref 150–400)
POTASSIUM SERPL-MCNC: 3.8 MMOL/L — SIGNIFICANT CHANGE UP (ref 3.5–5.3)
POTASSIUM SERPL-SCNC: 3.8 MMOL/L — SIGNIFICANT CHANGE UP (ref 3.5–5.3)
RBC # BLD: 3.3 M/UL — LOW (ref 3.8–5.2)
RBC # FLD: 13.5 % — SIGNIFICANT CHANGE UP (ref 10.3–14.5)
SODIUM SERPL-SCNC: 142 MMOL/L — SIGNIFICANT CHANGE UP (ref 135–145)
WBC # BLD: 9.25 K/UL — SIGNIFICANT CHANGE UP (ref 3.8–10.5)
WBC # FLD AUTO: 9.25 K/UL — SIGNIFICANT CHANGE UP (ref 3.8–10.5)

## 2023-05-19 PROCEDURE — 71045 X-RAY EXAM CHEST 1 VIEW: CPT | Mod: 26

## 2023-05-19 PROCEDURE — 99233 SBSQ HOSP IP/OBS HIGH 50: CPT

## 2023-05-19 RX ORDER — ACETAMINOPHEN 500 MG
1000 TABLET ORAL ONCE
Refills: 0 | Status: DISCONTINUED | OUTPATIENT
Start: 2023-05-19 | End: 2023-05-23

## 2023-05-19 RX ORDER — SODIUM CHLORIDE 9 MG/ML
1000 INJECTION, SOLUTION INTRAVENOUS
Refills: 0 | Status: DISCONTINUED | OUTPATIENT
Start: 2023-05-19 | End: 2023-05-20

## 2023-05-19 RX ORDER — POTASSIUM CHLORIDE 20 MEQ
10 PACKET (EA) ORAL ONCE
Refills: 0 | Status: COMPLETED | OUTPATIENT
Start: 2023-05-19 | End: 2023-05-19

## 2023-05-19 RX ADMIN — HEPARIN SODIUM 5000 UNIT(S): 5000 INJECTION INTRAVENOUS; SUBCUTANEOUS at 05:17

## 2023-05-19 RX ADMIN — LIDOCAINE 1 PATCH: 4 CREAM TOPICAL at 01:30

## 2023-05-19 RX ADMIN — PANTOPRAZOLE SODIUM 40 MILLIGRAM(S): 20 TABLET, DELAYED RELEASE ORAL at 21:32

## 2023-05-19 RX ADMIN — SODIUM CHLORIDE 4 MILLILITER(S): 9 INJECTION INTRAMUSCULAR; INTRAVENOUS; SUBCUTANEOUS at 23:10

## 2023-05-19 RX ADMIN — SODIUM CHLORIDE 4 MILLILITER(S): 9 INJECTION INTRAMUSCULAR; INTRAVENOUS; SUBCUTANEOUS at 16:38

## 2023-05-19 RX ADMIN — SODIUM CHLORIDE 4 MILLILITER(S): 9 INJECTION INTRAMUSCULAR; INTRAVENOUS; SUBCUTANEOUS at 04:16

## 2023-05-19 RX ADMIN — Medication 100 MILLIEQUIVALENT(S): at 05:17

## 2023-05-19 RX ADMIN — ALBUTEROL 2.5 MILLIGRAM(S): 90 AEROSOL, METERED ORAL at 10:30

## 2023-05-19 RX ADMIN — HYDROMORPHONE HYDROCHLORIDE 30 MILLILITER(S): 2 INJECTION INTRAMUSCULAR; INTRAVENOUS; SUBCUTANEOUS at 19:17

## 2023-05-19 RX ADMIN — ALBUTEROL 2.5 MILLIGRAM(S): 90 AEROSOL, METERED ORAL at 04:16

## 2023-05-19 RX ADMIN — SODIUM CHLORIDE 4 MILLILITER(S): 9 INJECTION INTRAMUSCULAR; INTRAVENOUS; SUBCUTANEOUS at 10:57

## 2023-05-19 RX ADMIN — ALBUTEROL 2.5 MILLIGRAM(S): 90 AEROSOL, METERED ORAL at 16:38

## 2023-05-19 RX ADMIN — HEPARIN SODIUM 5000 UNIT(S): 5000 INJECTION INTRAVENOUS; SUBCUTANEOUS at 13:52

## 2023-05-19 RX ADMIN — DORNASE ALFA 2.5 MILLIGRAM(S): 1 SOLUTION RESPIRATORY (INHALATION) at 10:38

## 2023-05-19 RX ADMIN — SODIUM CHLORIDE 75 MILLILITER(S): 9 INJECTION, SOLUTION INTRAVENOUS at 19:16

## 2023-05-19 RX ADMIN — HEPARIN SODIUM 5000 UNIT(S): 5000 INJECTION INTRAVENOUS; SUBCUTANEOUS at 21:32

## 2023-05-19 RX ADMIN — Medication 5 MILLIGRAM(S): at 21:14

## 2023-05-19 RX ADMIN — ALBUTEROL 2.5 MILLIGRAM(S): 90 AEROSOL, METERED ORAL at 23:14

## 2023-05-19 NOTE — PROGRESS NOTE ADULT - SUBJECTIVE AND OBJECTIVE BOX
Surgery Progress Note      SUBJECTIVE: Patient seen and examined at bedside with surgical team. No acute events overnight. Reports no nausea overnight. Pain controlled, better today. Not passing gas or stool yet.     OBJECTIVE:    Vital Signs Last 24 Hrs  T(C): 36.7 (19 May 2023 08:00), Max: 37 (19 May 2023 04:00)  T(F): 98.1 (19 May 2023 08:00), Max: 98.6 (19 May 2023 04:00)  HR: 85 (19 May 2023 10:00) (58 - 94)  BP: 126/93 (19 May 2023 10:00) (103/51 - 161/74)  BP(mean): 98 (19 May 2023 10:00) (68 - 113)  RR: 17 (19 May 2023 10:00) (12 - 30)  SpO2: 92% (19 May 2023 10:00) (91% - 100%)    Parameters below as of 19 May 2023 09:00  Patient On (Oxygen Delivery Method): room air    I&O's Detail    18 May 2023 07:01  -  19 May 2023 07:00  --------------------------------------------------------  IN:    Enteral Tube Flush: 210 mL    IV PiggyBack: 100 mL    Lactated Ringers: 1725 mL  Total IN: 2035 mL    OUT:    Bulb (mL): 50 mL    Chest Tube (mL): 200 mL    Nasogastric/Oral tube (mL): 500 mL    Voided (mL): 1800 mL  Total OUT: 2550 mL    Total NET: -515 mL      19 May 2023 07:01  -  19 May 2023 10:48  --------------------------------------------------------  IN:    Enteral Tube Flush: 40 mL    Lactated Ringers: 150 mL  Total IN: 190 mL    OUT:    Chest Tube (mL): 10 mL    Voided (mL): 500 mL  Total OUT: 510 mL    Total NET: -320 mL      MEDICATIONS  (STANDING):  albuterol    0.083% 2.5 milliGRAM(s) Nebulizer every 6 hours  dextrose 5% + lactated ringers. 1000 milliLiter(s) (75 mL/Hr) IV Continuous <Continuous>  dornase bridget Solution 2.5 milliGRAM(s) Inhalation daily  heparin   Injectable 5000 Unit(s) SubCutaneous every 8 hours  HYDROmorphone PCA (1 mG/mL) 30 milliLiter(s) PCA Continuous PCA Continuous  lidocaine   4% Patch 1 Patch Transdermal daily  pantoprazole  Injectable 40 milliGRAM(s) IV Push at bedtime  sodium chloride 3%  Inhalation 4 milliLiter(s) Inhalation every 6 hours    MEDICATIONS  (PRN):  diazepam  Injectable 3 milliGRAM(s) IV Push every 8 hours PRN muscle spasms  hydrALAZINE Injectable 5 milliGRAM(s) IV Push every 4 hours PRN SBP>160  HYDROmorphone PCA (1 mG/mL) Rescue Clinician Bolus 0.5 milliGRAM(s) IV Push every 15 minutes PRN for Pain Scale GREATER THAN 6  naloxone Injectable 0.1 milliGRAM(s) IV Push every 3 minutes PRN For ANY of the following changes in patient status:  A. RR LESS THAN 10 breaths per minute, B. Oxygen saturation LESS THAN 90%, C. Sedation score of 6  ondansetron Injectable 4 milliGRAM(s) IV Push every 6 hours PRN Nausea      PHYSICAL EXAM:  Constitutional:  NAD  Respiratory: Unlabored breathing  Abdomen: Soft, mildly distended, minimally tender to palpation. No rebound or guarding.  Extremities: WWP, LARA spontaneously    LABS:                        10.1   9.25  )-----------( 213      ( 19 May 2023 03:45 )             31.7     05-19    142  |  104  |  11  ----------------------------<  77  3.8   |  25  |  0.42<L>    Ca    9.2      19 May 2023 03:45  Phos  2.7     05-19  Mg     2.20     05-19

## 2023-05-19 NOTE — DIETITIAN INITIAL EVALUATION ADULT - OTHER INFO
72 y/o female with hx hiatal hernia, MS, HTN and esophageal ca now s/p esophagectomy. Visited with pt to obtain nutrition hx. Pt said she was eating well with good appetite and intake PTA. Her esophageal ca was causing her to cough a lot, however her PO intake was unaffected. She said that she had been intentionally trying to lose weight over the past year or so and lost approx 50 lbs. She confirmed food allergies to peanuts and mangoes - Food and Nutrition Dept aware. Pt presently NPO with IVF and NGT to LWCS with 500 mL output over the past 24 hrs. Spoke with CTICU PA who said that pt is to remain NPO until POD 6 when a swallow study is to be performed. Nutrition plan of care deferred to team at this time. Please consult this service once nutrition plan of care has been established so that appropriate nutrition intervention can be provided in a timely manner. Education not warranted at this time. RDN services to remain available as needed.

## 2023-05-19 NOTE — DIETITIAN INITIAL EVALUATION ADULT - NSFNSGIIOFT_GEN_A_CORE
05-18-23 @ 07:01  -  05-19-23 @ 07:00  --------------------------------------------------------  OUT:    Chest Tube (mL): 200 mL    Nasogastric/Oral tube (mL): 500 mL  Total OUT: 700 mL    Total NET: -700 mL      05-19-23 @ 07:01 - 05-19-23 @ 12:11  --------------------------------------------------------  OUT:    Chest Tube (mL): 20 mL  Total OUT: 20 mL    Total NET: -20 mL

## 2023-05-19 NOTE — PROGRESS NOTE ADULT - SUBJECTIVE AND OBJECTIVE BOX
CHIEF COMPLAINT: FOLLOW UP IN ICU FOR POSTOPERATIVE CARE OF PATIENT WHO IS S/P ESOPHAGECTOMY      PROCEDURES: Robotic R VATS, Noel Marcos esophagectomy  16-May-2023      ISSUES:   Esophageal cancer  Post op pain  Chest tube in place  Multiple sclerosis  HTN  GERD  Hx of Hiatal hernia s/p Nissen fundoplication repair  S/p Spinal cord stimulator placement  Bifascicular block    INTERVAL EVENTS:   Chest tubes with no airleak    HISTORY:   Patient reports moderate pain at chest wall incision sites which is worse with coughing and deep breathing without associated fever or dyspnea. Pain is improved with use of pain meds.     PHYSICAL EXAM:   Gen: Comfortable, No acute distress  Eyes: Sclera white, Conjunctiva normal, Eyelids normal, Pupils symmetrical   ENT: Mucous membranes moist, NG tube in place,  ,    Neck: Trachea midline,  ,  ,  ,  ,  ,    CV: Rate regular, Rhythm regular,  ,  ,    Resp: Breath sounds clear, No accessory muscles use, Two R chest tubes,  ,    Abd: Soft, Non-distended, Non-tender,   ,  ,  ,    Skin: Warm, No peripheral edema of lower extremities,  ,    : [place] in place  Neuro: Moving all 4 extremities,    Psych: A&Ox3      ASSESSMENT AND PLAN:     NEURO:  Post-operative Pain - Stable. Pain control with PCA and Tylenol IV PRN.     Multiple sclerosis - stable.  Valium IV PRN muscle spasm.     RESPIRATORY:  Stable on room air - Incentive spirometry. Chest PT and suctioning of secretions. Out of bed to chair and ambulate with assistance. Continuous pulse oximetry for support & to prevent decompensation.    Chest tube – Pleurevac regulated waterseal. Monitor chest tube output.        CARDIOVASCULAR:  Hemodynamically stable - Not on pressors. Continue hemodynamic monitoring.  Telemetry (medical test) - Reviewed by me today independently. Normal sinus rhythm.  HTN - stable. Hydralazine IV PRN.               RENAL:  Stable - Monitor IOs and electrolytes. Keep K above 4.0 and Mg above 2.0.           GASTROINTESTINAL:  GI prophylaxis not indicated  Zofran and Reglan IV PRN for nausea  NPO until swallow study     NG tube to low suction  GERD - stable. Continue pantoprazole           HEMATOLOGIC:  No signs of active bleeding. Monitor Hgb in CBC in AM  DVT prophylaxis with heparin subQ and SCDs.               INFECTIOUS DISEASE:  All surgical sites appear clean. No signs of active infection. Will monitor for fever and leukocytosis.       ENDOCRINE:  Stable – Monitor glucose fingersticks for goal 120-180.               ONCOLOGY:  Esophageal cancer - Improved. S/P resection. Follow up final pathology.           Pertinent clinical, laboratory, radiographic, hemodynamic, echocardiographic, respiratory data, microbiologic data and chart were reviewed by myself and analyzed frequently throughout the course of the day and night by myself.    Plan discussed at length with the CTICU staff and Attending CT Surgeon -   Dr Preston Altamirano.      Patient's status was discussed with patient at bedside.     	    _________________________  VITAL SIGNS:  Vital Signs Last 24 Hrs  T(C): 36.6 (19 May 2023 12:00), Max: 37 (19 May 2023 04:00)  T(F): 97.9 (19 May 2023 12:00), Max: 98.6 (19 May 2023 04:00)  HR: 62 (19 May 2023 13:00) (62 - 94)  BP: 135/55 (19 May 2023 13:00) (103/51 - 161/74)  BP(mean): 80 (19 May 2023 13:00) (68 - 113)  RR: 14 (19 May 2023 13:00) (14 - 30)  SpO2: 100% (19 May 2023 13:00) (91% - 100%)    Parameters below as of 19 May 2023 13:00  Patient On (Oxygen Delivery Method): room air      I/Os:   I&O's Detail    18 May 2023 07:01  -  19 May 2023 07:00  --------------------------------------------------------  IN:    Enteral Tube Flush: 210 mL    IV PiggyBack: 100 mL    Lactated Ringers: 1725 mL  Total IN: 2035 mL    OUT:    Bulb (mL): 50 mL    Chest Tube (mL): 200 mL    Nasogastric/Oral tube (mL): 500 mL    Voided (mL): 1800 mL  Total OUT: 2550 mL    Total NET: -515 mL      19 May 2023 07:01  -  19 May 2023 13:31  --------------------------------------------------------  IN:    dextrose 5% + lactated ringers: 450 mL    Enteral Tube Flush: 80 mL    Lactated Ringers: 150 mL  Total IN: 680 mL    OUT:    Chest Tube (mL): 30 mL    Voided (mL): 1000 mL  Total OUT: 1030 mL    Total NET: -350 mL              MEDICATIONS:  MEDICATIONS  (STANDING):  albuterol    0.083% 2.5 milliGRAM(s) Nebulizer every 6 hours  dextrose 5% + lactated ringers. 1000 milliLiter(s) (75 mL/Hr) IV Continuous <Continuous>  dornase bridget Solution 2.5 milliGRAM(s) Inhalation daily  heparin   Injectable 5000 Unit(s) SubCutaneous every 8 hours  HYDROmorphone PCA (1 mG/mL) 30 milliLiter(s) PCA Continuous PCA Continuous  lidocaine   4% Patch 1 Patch Transdermal daily  pantoprazole  Injectable 40 milliGRAM(s) IV Push at bedtime  sodium chloride 3%  Inhalation 4 milliLiter(s) Inhalation every 6 hours    MEDICATIONS  (PRN):  diazepam  Injectable 3 milliGRAM(s) IV Push every 8 hours PRN muscle spasms  hydrALAZINE Injectable 5 milliGRAM(s) IV Push every 4 hours PRN SBP>160  HYDROmorphone PCA (1 mG/mL) Rescue Clinician Bolus 0.5 milliGRAM(s) IV Push every 15 minutes PRN for Pain Scale GREATER THAN 6  naloxone Injectable 0.1 milliGRAM(s) IV Push every 3 minutes PRN For ANY of the following changes in patient status:  A. RR LESS THAN 10 breaths per minute, B. Oxygen saturation LESS THAN 90%, C. Sedation score of 6  ondansetron Injectable 4 milliGRAM(s) IV Push every 6 hours PRN Nausea      LABS:  Laboratory data was independently reviewed by me today.                           10.1   9.25  )-----------( 213      ( 19 May 2023 03:45 )             31.7     05-19    142  |  104  |  11  ----------------------------<  77  3.8   |  25  |  0.42<L>    Ca    9.2      19 May 2023 03:45  Phos  2.7     05-19  Mg     2.20     05-19                RADIOLOGY:   Radiology images were independently reviewed by me today. Reports were reviewed by me today.    Xray Chest 1 View- PORTABLE-Routine:   ACC: [id number] EXAM:  XR CHEST PORTABLE ROUTINE 1V   ORDERED BY: AUGUST ANGUIANO     PROCEDURE DATE:  05/18/2023          INTERPRETATION:  CLINICAL INFORMATION: Follow-up post esophagectomy    TIME OF EXAMINATION: May 18 at 5:55 AM    EXAM: Portablechest    FINDINGS:  The enteric and right-sided chest tubes in addition to the nerve   stimulator are unchanged.    Small left effusion with underlying atelectasis mildly improved. Both   upper lobes are clear. The heart is not enlarged.    No pneumothorax.        COMPARISON: May 17        IMPRESSION: Follow-up post esophagectomy with tubes and lines in place   and residual small left effusion with underlying atelectasis.    --- End of Report ---            LESLI HENDRICKS MD; Attending Radiologist  This document has been electronically signed. May 18 2023 11:50AM (05-18-23 @ 07:07)  Xray Chest 1 View- PORTABLE-Routine:   ACC: [id number] EXAM:  XR CHEST PORTABLE ROUTINE 1V   ORDERED BY: EVELINE KATZ     ACC: [id number] EXAM:  XR CHEST PORTABLE URGENT 1V   ORDERED BY: LOC COTTON     PROCEDURE DATE:  05/16/2023          INTERPRETATION:  EXAMINATION: XR CHEST URGENT, XR CHEST    CLINICAL INDICATION: s/p esophagectomy    TECHNIQUE: Single frontal, portable view of the chest was obtained.    COMPARISON: Chest x-ray 5/24/2022.    FINDINGS:  May 16 at 6:51 PM:  There is an enteric tube, coursing below the diaphragm, with its   side-port overlying the stomach and tip out of the field of view.  2 right-sided chest tubes in place.  Spinal stimulator leads are partially visualized.  The heart size cannot be accurately assessed in this projection.  No focal consolidation.  Small left pleural effusion.  There is no pneumothorax or pneumomediastinum.  No acute bony abnormality.    May 17 at 1:24 AM:  Enteric tube with sidehole and tip in the stomach.  No significant change from the study of the day before.    IMPRESSION:  Lines, tubes, and devices, as above.  No significant pneumothorax or pneumomediastinum.  Trace left pleural effusion.    --- End of Report ---          MERLE ENCARNACION MD; Resident Radiologist  This document has been electronically signed.  LESLI HENDRICKS MD; Attending Radiologist  This document has been electronically signed. May 17 2023 11:13AM (05-17-23 @ 05:47)  Xray Chest 1 View- PORTABLE-Urgent:   ACC: [id number] EXAM:  XR CHEST PORTABLE ROUTINE 1V   ORDERED BY: EVELINE KATZ     ACC: [id number] EXAM:  XR CHEST PORTABLE URGENT 1V   ORDERED BY: LOC COTTON     PROCEDURE DATE:  05/16/2023          INTERPRETATION:  EXAMINATION: XR CHEST URGENT, XR CHEST    CLINICAL INDICATION: s/p esophagectomy    TECHNIQUE: Single frontal, portable view of the chest was obtained.    COMPARISON: Chest x-ray 5/24/2022.    FINDINGS:  May 16 at 6:51 PM:  There is an enteric tube, coursing below the diaphragm, with its   side-port overlying the stomach and tip out of the field of view.  2 right-sided chest tubes in place.  Spinal stimulator leads are partially visualized.  The heart size cannot be accurately assessed in this projection.  No focal consolidation.  Small left pleural effusion.  There is no pneumothorax or pneumomediastinum.  No acute bony abnormality.    May 17 at 1:24 AM:  Enteric tube with sidehole and tip in the stomach.  No significant change from the study of the day before.    IMPRESSION:  Lines, tubes, and devices, as above.  No significant pneumothorax or pneumomediastinum.  Trace left pleural effusion.    --- End of Report ---          MERLE ENCARNACION MD; Resident Radiologist  This document has been electronically signed.  LESLI HENDRICKS MD; Attending Radiologist  This document has been electronically signed. May 17 2023 11:13AM (05-16-23 @ 19:35)

## 2023-05-19 NOTE — DIETITIAN INITIAL EVALUATION ADULT - PERTINENT LABORATORY DATA
05-19    142  |  104  |  11  ----------------------------<  77  3.8   |  25  |  0.42<L>    Ca    9.2      19 May 2023 03:45  Phos  2.7     05-19  Mg     2.20     05-19    POCT Blood Glucose.: 94 mg/dL (05-19-23 @ 08:00)  A1C with Estimated Average Glucose Result: 5.7 % (05-05-23 @ 13:10)

## 2023-05-19 NOTE — PROGRESS NOTE ADULT - SUBJECTIVE AND OBJECTIVE BOX
Anesthesia Pain Management Service    SUBJECTIVE: Pt doing well with IV PCA without problems reported.    Therapy:	  [ X] IV PCA	   [ ] Epidural           [ ] s/p Spinal Opoid              [ ] Postpartum infusion	  [ ] Patient controlled regional anesthesia (PCRA)    [ ] prn Analgesics    Allergies    lisinopril (Other)  penicillin (Rash)  Eben (Rash)  peanuts (Anaphylaxis)  Percocet (Other)    Intolerances      MEDICATIONS  (STANDING):  albuterol    0.083% 2.5 milliGRAM(s) Nebulizer every 6 hours  dextrose 5% + lactated ringers. 1000 milliLiter(s) (75 mL/Hr) IV Continuous <Continuous>  dornase bridget Solution 2.5 milliGRAM(s) Inhalation daily  heparin   Injectable 5000 Unit(s) SubCutaneous every 8 hours  HYDROmorphone PCA (1 mG/mL) 30 milliLiter(s) PCA Continuous PCA Continuous  lidocaine   4% Patch 1 Patch Transdermal daily  pantoprazole  Injectable 40 milliGRAM(s) IV Push at bedtime  sodium chloride 3%  Inhalation 4 milliLiter(s) Inhalation every 6 hours    MEDICATIONS  (PRN):  diazepam  Injectable 3 milliGRAM(s) IV Push every 8 hours PRN muscle spasms  hydrALAZINE Injectable 5 milliGRAM(s) IV Push every 4 hours PRN SBP>160  HYDROmorphone PCA (1 mG/mL) Rescue Clinician Bolus 0.5 milliGRAM(s) IV Push every 15 minutes PRN for Pain Scale GREATER THAN 6  naloxone Injectable 0.1 milliGRAM(s) IV Push every 3 minutes PRN For ANY of the following changes in patient status:  A. RR LESS THAN 10 breaths per minute, B. Oxygen saturation LESS THAN 90%, C. Sedation score of 6  ondansetron Injectable 4 milliGRAM(s) IV Push every 6 hours PRN Nausea      OBJECTIVE:   [X] No new signs     [ ] Other:    Side Effects:  [X ] None			[ ] Other:    Assessment of Catheter Site:		[ ] Intact		[ ] Other:    ASSESSMENT/PLAN  [ X] Continue current therapy    [ ] Therapy changed to:    [ ] IV PCA       [ ] Epidural     [ ] prn Analgesics     Comments:

## 2023-05-19 NOTE — DIETITIAN INITIAL EVALUATION ADULT - ADD RECOMMEND
1. Nutrition plan of care as per CTICU Team. 2. Monitor weights, labs, BM's, skin integrity and edema. 3. Follow pt as per protocol.

## 2023-05-19 NOTE — PROGRESS NOTE ADULT - SUBJECTIVE AND OBJECTIVE BOX
Anesthesia Pain Management Service    SUBJECTIVE: Patient reports improved pain relief after increase in demand dose.     Pain Scale Score	At rest: _6/10__ 	With Activity: ___ 	[X ] Refer to charted pain scores    THERAPY:    [ ] IV PCA Morphine		[ ] 5 mg/mL	[ ] 1 mg/mL  [X ] IV PCA Hydromorphone	[ ] 5 mg/mL	[X ] 1 mg/mL  [ ] IV PCA Fentanyl		[ ] 50 micrograms/mL    Demand dose __0.2_ lockout __6_ (minutes) Continuous Rate _0__ Total: __1_  mg used (in past 24 hours)      MEDICATIONS  (STANDING):  albuterol    0.083% 2.5 milliGRAM(s) Nebulizer every 6 hours  dextrose 5% + lactated ringers. 1000 milliLiter(s) (75 mL/Hr) IV Continuous <Continuous>  dornase bridget Solution 2.5 milliGRAM(s) Inhalation daily  heparin   Injectable 5000 Unit(s) SubCutaneous every 8 hours  HYDROmorphone PCA (1 mG/mL) 30 milliLiter(s) PCA Continuous PCA Continuous  lidocaine   4% Patch 1 Patch Transdermal daily  pantoprazole  Injectable 40 milliGRAM(s) IV Push at bedtime  sodium chloride 3%  Inhalation 4 milliLiter(s) Inhalation every 6 hours    MEDICATIONS  (PRN):  diazepam  Injectable 3 milliGRAM(s) IV Push every 8 hours PRN muscle spasms  hydrALAZINE Injectable 5 milliGRAM(s) IV Push every 4 hours PRN SBP>160  HYDROmorphone PCA (1 mG/mL) Rescue Clinician Bolus 0.5 milliGRAM(s) IV Push every 15 minutes PRN for Pain Scale GREATER THAN 6  naloxone Injectable 0.1 milliGRAM(s) IV Push every 3 minutes PRN For ANY of the following changes in patient status:  A. RR LESS THAN 10 breaths per minute, B. Oxygen saturation LESS THAN 90%, C. Sedation score of 6  ondansetron Injectable 4 milliGRAM(s) IV Push every 6 hours PRN Nausea      OBJECTIVE: Patient sitting up in chair, ctx1, ngt in place.    Sedation Score:	[ X] Alert	[ ] Drowsy 	[ ] Arousable	[ ] Asleep	[ ] Unresponsive    Side Effects:	[X ] None	[ ] Nausea	[ ] Vomiting	[ ] Pruritus  		[ ] Other:    Vital Signs Last 24 Hrs  T(C): 36.7 (19 May 2023 08:00), Max: 37 (19 May 2023 04:00)  T(F): 98.1 (19 May 2023 08:00), Max: 98.6 (19 May 2023 04:00)  HR: 85 (19 May 2023 10:00) (58 - 94)  BP: 126/93 (19 May 2023 10:00) (103/51 - 161/74)  BP(mean): 98 (19 May 2023 10:00) (68 - 113)  RR: 17 (19 May 2023 10:00) (12 - 30)  SpO2: 92% (19 May 2023 10:00) (91% - 100%)    Parameters below as of 19 May 2023 09:00  Patient On (Oxygen Delivery Method): room air        ASSESSMENT/ PLAN    Therapy to  be:	[ X] Continue   [ ] Discontinued   [ ] Change to prn Analgesics    Documentation and Verification of current medications:   [X] Done	[ ] Not done, not elligible    Comments: Continue IV PCA. Recommend non-opioid adjuvant analgesics to be used when possible and when allowed by primary surgical team.    Progress Note written now but Patient was seen earlier.

## 2023-05-19 NOTE — PROGRESS NOTE ADULT - SUBJECTIVE AND OBJECTIVE BOX
Date of service 5/19/23    chief complaint: surgery    extended hpi: 77 y/o female with a pmh of MS, HTN and  esophageal cancer s/p chemo and radiation completed March 3rd, 2023 presented for elective robotic esophagogastroduodenoscopy, laparoscopic esophagogastrectomy on 5/16/23.       Patient denies chest pain or shortness of breath ambulated without difficulty  Review of symptoms otherwise negative.    MEDICATIONS:  albuterol    0.083% 2.5 milliGRAM(s) Nebulizer every 6 hours  dextrose 5% + lactated ringers. 1000 milliLiter(s) IV Continuous <Continuous>  diazepam  Injectable 3 milliGRAM(s) IV Push every 8 hours PRN  dornase bridget Solution 2.5 milliGRAM(s) Inhalation daily  heparin   Injectable 5000 Unit(s) SubCutaneous every 8 hours  hydrALAZINE Injectable 5 milliGRAM(s) IV Push every 4 hours PRN  HYDROmorphone PCA (1 mG/mL) 30 milliLiter(s) PCA Continuous PCA Continuous  HYDROmorphone PCA (1 mG/mL) Rescue Clinician Bolus 0.5 milliGRAM(s) IV Push every 15 minutes PRN  lidocaine   4% Patch 1 Patch Transdermal daily  naloxone Injectable 0.1 milliGRAM(s) IV Push every 3 minutes PRN  ondansetron Injectable 4 milliGRAM(s) IV Push every 6 hours PRN  pantoprazole  Injectable 40 milliGRAM(s) IV Push at bedtime  sodium chloride 3%  Inhalation 4 milliLiter(s) Inhalation every 6 hours      LABS:                        10.1   9.25  )-----------( 213      ( 19 May 2023 03:45 )             31.7       Hemoglobin: 10.1 g/dL (05-19 @ 03:45)  Hemoglobin: 8.9 g/dL (05-18 @ 03:15)  Hemoglobin: 9.8 g/dL (05-17 @ 02:42)  Hemoglobin: 10.5 g/dL (05-16 @ 18:18)      05-19    142  |  104  |  11  ----------------------------<  77  3.8   |  25  |  0.42<L>    Ca    9.2      19 May 2023 03:45  Phos  2.7     05-19  Mg     2.20     05-19      Creatinine Trend: 0.42<--, 0.40<--, 0.43<--, 0.45<--, 0.54<--    COAGS:           PHYSICAL EXAM:  T(C): 36.7 (05-19-23 @ 08:00), Max: 37 (05-19-23 @ 04:00)  HR: 74 (05-19-23 @ 10:30) (58 - 94)  BP: 126/93 (05-19-23 @ 10:00) (103/51 - 161/74)  RR: 17 (05-19-23 @ 10:00) (12 - 30)  SpO2: 100% (05-19-23 @ 10:30) (91% - 100%)  Wt(kg): --    I&O's Summary    18 May 2023 07:01  -  19 May 2023 07:00  --------------------------------------------------------  IN: 2035 mL / OUT: 2550 mL / NET: -515 mL    19 May 2023 07:01  -  19 May 2023 11:33  --------------------------------------------------------  IN: 415 mL / OUT: 1020 mL / NET: -605 mL        General: Alert and Oriented * 3.   Head: Normocephalic and atraumatic.   Neck: No JVD. No bruits. Supple. Does not appear to be enlarged.   Cardiovascular: + S1,S2 ; RRR Soft systolic murmur at the left lower sternal border. No rubs noted.    Lungs: CTA b/l. No rhonchi, rales or wheezes.   Abdomen: + BS, soft. Non tender. Non distended. No rebound. No guarding.   Extremities: No clubbing/cyanosis/edema.   Neurologic: Moves all four extremities. Full range of motion.   Skin: Warm and moist. The patient's skin has normal elasticity and good skin turgor.   Psychiatric: Appropriate mood and affect.  Musculoskeletal: Normal range of motion, normal strength    DATA    tele- SR  ECG:  NSR Bifascular block	  TTE 03/2023: Preserved LVEF  Cath: Non obstructive CAD    ASSESSMENT/PLAN: 	Pt is a 77 y/o female with a pmh of MS, HTN and  esophageal cancer s/p chemo and radiation completed March 3rd, 2023 presented for elective robotic esophagogastroduodenoscopy, laparoscopic esophagogastrectomy on 5/16/23.     1. HTN  - currently well controlled off meds  - can use IV hydralazine if SBP > 180 systolic  - previously taken of Chlorthalidone due to hypokalemia  - tele stable    2. Post OP  --pain management and PT    Becky West MD  Pager: [phone]

## 2023-05-19 NOTE — DIETITIAN INITIAL EVALUATION ADULT - PERTINENT MEDS FT
MEDICATIONS  (STANDING):  albuterol    0.083% 2.5 milliGRAM(s) Nebulizer every 6 hours  dextrose 5% + lactated ringers. 1000 milliLiter(s) (75 mL/Hr) IV Continuous <Continuous>  dornase bridget Solution 2.5 milliGRAM(s) Inhalation daily  heparin   Injectable 5000 Unit(s) SubCutaneous every 8 hours  HYDROmorphone PCA (1 mG/mL) 30 milliLiter(s) PCA Continuous PCA Continuous  lidocaine   4% Patch 1 Patch Transdermal daily  pantoprazole  Injectable 40 milliGRAM(s) IV Push at bedtime  sodium chloride 3%  Inhalation 4 milliLiter(s) Inhalation every 6 hours    MEDICATIONS  (PRN):  diazepam  Injectable 3 milliGRAM(s) IV Push every 8 hours PRN muscle spasms  hydrALAZINE Injectable 5 milliGRAM(s) IV Push every 4 hours PRN SBP>160  HYDROmorphone PCA (1 mG/mL) Rescue Clinician Bolus 0.5 milliGRAM(s) IV Push every 15 minutes PRN for Pain Scale GREATER THAN 6  naloxone Injectable 0.1 milliGRAM(s) IV Push every 3 minutes PRN For ANY of the following changes in patient status:  A. RR LESS THAN 10 breaths per minute, B. Oxygen saturation LESS THAN 90%, C. Sedation score of 6  ondansetron Injectable 4 milliGRAM(s) IV Push every 6 hours PRN Nausea

## 2023-05-20 LAB
ANION GAP SERPL CALC-SCNC: 11 MMOL/L — SIGNIFICANT CHANGE UP (ref 7–14)
BUN SERPL-MCNC: 10 MG/DL — SIGNIFICANT CHANGE UP (ref 7–23)
CALCIUM SERPL-MCNC: 8.9 MG/DL — SIGNIFICANT CHANGE UP (ref 8.4–10.5)
CHLORIDE SERPL-SCNC: 105 MMOL/L — SIGNIFICANT CHANGE UP (ref 98–107)
CO2 SERPL-SCNC: 24 MMOL/L — SIGNIFICANT CHANGE UP (ref 22–31)
CREAT SERPL-MCNC: 0.35 MG/DL — LOW (ref 0.5–1.3)
EGFR: 107 ML/MIN/1.73M2 — SIGNIFICANT CHANGE UP
GLUCOSE BLDC GLUCOMTR-MCNC: 117 MG/DL — HIGH (ref 70–99)
GLUCOSE SERPL-MCNC: 122 MG/DL — HIGH (ref 70–99)
HCT VFR BLD CALC: 28.8 % — LOW (ref 34.5–45)
HGB BLD-MCNC: 9.4 G/DL — LOW (ref 11.5–15.5)
MAGNESIUM SERPL-MCNC: 2 MG/DL — SIGNIFICANT CHANGE UP (ref 1.6–2.6)
MCHC RBC-ENTMCNC: 29.8 PG — SIGNIFICANT CHANGE UP (ref 27–34)
MCHC RBC-ENTMCNC: 32.6 GM/DL — SIGNIFICANT CHANGE UP (ref 32–36)
MCV RBC AUTO: 91.4 FL — SIGNIFICANT CHANGE UP (ref 80–100)
NRBC # BLD: 0 /100 WBCS — SIGNIFICANT CHANGE UP (ref 0–0)
NRBC # FLD: 0 K/UL — SIGNIFICANT CHANGE UP (ref 0–0)
PHOSPHATE SERPL-MCNC: 2.2 MG/DL — LOW (ref 2.5–4.5)
PLATELET # BLD AUTO: 229 K/UL — SIGNIFICANT CHANGE UP (ref 150–400)
POTASSIUM SERPL-MCNC: 3 MMOL/L — LOW (ref 3.5–5.3)
POTASSIUM SERPL-SCNC: 3 MMOL/L — LOW (ref 3.5–5.3)
RBC # BLD: 3.15 M/UL — LOW (ref 3.8–5.2)
RBC # FLD: 13.7 % — SIGNIFICANT CHANGE UP (ref 10.3–14.5)
SODIUM SERPL-SCNC: 140 MMOL/L — SIGNIFICANT CHANGE UP (ref 135–145)
WBC # BLD: 7.79 K/UL — SIGNIFICANT CHANGE UP (ref 3.8–10.5)
WBC # FLD AUTO: 7.79 K/UL — SIGNIFICANT CHANGE UP (ref 3.8–10.5)

## 2023-05-20 PROCEDURE — 99233 SBSQ HOSP IP/OBS HIGH 50: CPT

## 2023-05-20 PROCEDURE — 71045 X-RAY EXAM CHEST 1 VIEW: CPT | Mod: 26,76

## 2023-05-20 RX ORDER — LIDOCAINE 4 G/100G
1 CREAM TOPICAL DAILY
Refills: 0 | Status: DISCONTINUED | OUTPATIENT
Start: 2023-05-20 | End: 2023-05-26

## 2023-05-20 RX ORDER — HYDROMORPHONE HYDROCHLORIDE 2 MG/ML
0.3 INJECTION INTRAMUSCULAR; INTRAVENOUS; SUBCUTANEOUS
Refills: 0 | Status: DISCONTINUED | OUTPATIENT
Start: 2023-05-20 | End: 2023-05-24

## 2023-05-20 RX ORDER — ACETAMINOPHEN 500 MG
1000 TABLET ORAL ONCE
Refills: 0 | Status: COMPLETED | OUTPATIENT
Start: 2023-05-20 | End: 2023-05-20

## 2023-05-20 RX ORDER — SODIUM CHLORIDE 9 MG/ML
1000 INJECTION, SOLUTION INTRAVENOUS
Refills: 0 | Status: DISCONTINUED | OUTPATIENT
Start: 2023-05-20 | End: 2023-05-20

## 2023-05-20 RX ORDER — POTASSIUM PHOSPHATE, MONOBASIC POTASSIUM PHOSPHATE, DIBASIC 236; 224 MG/ML; MG/ML
30 INJECTION, SOLUTION INTRAVENOUS ONCE
Refills: 0 | Status: COMPLETED | OUTPATIENT
Start: 2023-05-20 | End: 2023-05-20

## 2023-05-20 RX ORDER — HYDROMORPHONE HYDROCHLORIDE 2 MG/ML
0.25 INJECTION INTRAMUSCULAR; INTRAVENOUS; SUBCUTANEOUS
Refills: 0 | Status: DISCONTINUED | OUTPATIENT
Start: 2023-05-20 | End: 2023-05-24

## 2023-05-20 RX ORDER — SODIUM CHLORIDE 9 MG/ML
1000 INJECTION, SOLUTION INTRAVENOUS
Refills: 0 | Status: DISCONTINUED | OUTPATIENT
Start: 2023-05-20 | End: 2023-05-25

## 2023-05-20 RX ORDER — POTASSIUM CHLORIDE 20 MEQ
10 PACKET (EA) ORAL
Refills: 0 | Status: COMPLETED | OUTPATIENT
Start: 2023-05-20 | End: 2023-05-20

## 2023-05-20 RX ADMIN — DORNASE ALFA 2.5 MILLIGRAM(S): 1 SOLUTION RESPIRATORY (INHALATION) at 08:43

## 2023-05-20 RX ADMIN — Medication 100 MILLIEQUIVALENT(S): at 06:11

## 2023-05-20 RX ADMIN — Medication 1000 MILLIGRAM(S): at 00:32

## 2023-05-20 RX ADMIN — ALBUTEROL 2.5 MILLIGRAM(S): 90 AEROSOL, METERED ORAL at 16:35

## 2023-05-20 RX ADMIN — SODIUM CHLORIDE 4 MILLILITER(S): 9 INJECTION INTRAMUSCULAR; INTRAVENOUS; SUBCUTANEOUS at 22:43

## 2023-05-20 RX ADMIN — HEPARIN SODIUM 5000 UNIT(S): 5000 INJECTION INTRAVENOUS; SUBCUTANEOUS at 06:12

## 2023-05-20 RX ADMIN — Medication 100 MILLIEQUIVALENT(S): at 10:09

## 2023-05-20 RX ADMIN — SODIUM CHLORIDE 4 MILLILITER(S): 9 INJECTION INTRAMUSCULAR; INTRAVENOUS; SUBCUTANEOUS at 04:05

## 2023-05-20 RX ADMIN — Medication 100 MILLIEQUIVALENT(S): at 08:15

## 2023-05-20 RX ADMIN — HYDROMORPHONE HYDROCHLORIDE 30 MILLILITER(S): 2 INJECTION INTRAMUSCULAR; INTRAVENOUS; SUBCUTANEOUS at 07:30

## 2023-05-20 RX ADMIN — SODIUM CHLORIDE 4 MILLILITER(S): 9 INJECTION INTRAMUSCULAR; INTRAVENOUS; SUBCUTANEOUS at 08:43

## 2023-05-20 RX ADMIN — ALBUTEROL 2.5 MILLIGRAM(S): 90 AEROSOL, METERED ORAL at 08:43

## 2023-05-20 RX ADMIN — HEPARIN SODIUM 5000 UNIT(S): 5000 INJECTION INTRAVENOUS; SUBCUTANEOUS at 13:24

## 2023-05-20 RX ADMIN — POTASSIUM PHOSPHATE, MONOBASIC POTASSIUM PHOSPHATE, DIBASIC 83.33 MILLIMOLE(S): 236; 224 INJECTION, SOLUTION INTRAVENOUS at 11:06

## 2023-05-20 RX ADMIN — Medication 400 MILLIGRAM(S): at 00:18

## 2023-05-20 RX ADMIN — Medication 400 MILLIGRAM(S): at 20:10

## 2023-05-20 RX ADMIN — ALBUTEROL 2.5 MILLIGRAM(S): 90 AEROSOL, METERED ORAL at 22:43

## 2023-05-20 RX ADMIN — PANTOPRAZOLE SODIUM 40 MILLIGRAM(S): 20 TABLET, DELAYED RELEASE ORAL at 21:24

## 2023-05-20 RX ADMIN — SODIUM CHLORIDE 4 MILLILITER(S): 9 INJECTION INTRAMUSCULAR; INTRAVENOUS; SUBCUTANEOUS at 16:35

## 2023-05-20 RX ADMIN — Medication 1000 MILLIGRAM(S): at 20:25

## 2023-05-20 RX ADMIN — HEPARIN SODIUM 5000 UNIT(S): 5000 INJECTION INTRAVENOUS; SUBCUTANEOUS at 21:23

## 2023-05-20 RX ADMIN — SODIUM CHLORIDE 60 MILLILITER(S): 9 INJECTION, SOLUTION INTRAVENOUS at 19:40

## 2023-05-20 RX ADMIN — ALBUTEROL 2.5 MILLIGRAM(S): 90 AEROSOL, METERED ORAL at 04:05

## 2023-05-20 RX ADMIN — LIDOCAINE 1 PATCH: 4 CREAM TOPICAL at 21:24

## 2023-05-20 NOTE — CONSULT NOTE ADULT - ASSESSMENT
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 She is now complaining of bilateral lower extremity spasms and weakness. Symptoms started occurring shortly after her surgery and have persisted. She feels that her spasms are getting worse. No other complaints. Currently follows up with Dr. Travis Palencia at [facility] for MS care. On baclofen 20mg BID and Dalfampridine 10mg q12. Patient has been NPO since her surgery and has not taken any medications orally. Cannot get swallowing study until POD7. Neurology consulted for concerns of MS flare.     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 Note finalized upon attending attestation.  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 She is now complaining of bilateral lower extremity spasms and weakness. Symptoms started occurring shortly after her surgery and have persisted. She feels that her spasms are getting worse. No other complaints. Currently follows up with Dr. Travis Palencia at [facility] for MS care. On baclofen 20mg BID and Dalfampridine 10mg q12. Patient has been NPO since her surgery and has not taken any medications orally. Cannot get swallowing study until POD7. Neurology consulted for concerns of MS flare.     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 Note finalized upon attending attestation.

## 2023-05-20 NOTE — CONSULT NOTE ADULT - SUBJECTIVE AND OBJECTIVE BOX
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 She is now complaining of bilateral lower extremity spasms and weakness. Symptoms started occurring shortly after her surgery and have persisted. She feels that her spasms are getting worse. No other complaints. Currently follows up with Dr. Travis Palencia at [facility] for MS care. On baclofen 20mg BID and Dalfampridine 10mg q12. Patient has been NPO since her surgery and has not taken any medications orally. Cannot get swallowing study until POD7. Neurology consulted for concerns of MS flare.     Of note, the patient was previously seen by neurology in 04 and 05/2022 for concerns of MS flare. Found to have UTI and was treated conservatively with Abx. She doesn't endorse a true Lhermitte sign but she says that sometimes when she would reach out her hand she would get an "electric-shock" sensation from her right lower abdominal area to her right flank. Does not endorse this sensation currently. She received a neurostimulator in 07/2005 and has used it until its expiration in 2014. Continued to have the neurostimulator in her body which prevents her from getting an MRI of her spine. She says that she can get an MRI of her head but currently do not see any MRIs in her charts. Patient endorsed flares when she was gettingher chemo and radiation. both between January and February of this year.     Review of Systems:    NEUROLOGICAL: +As stated in HPI above  All other review of systems is negative unless indicated above.    Allergies:  lisinopril (Other)  penicillin (Rash)  [place] (Rash)  Percocet (Drowsiness)  peanuts (Anaphylaxis)      PMHx/PSHx/Family Hx: As above, otherwise see below   HTN (hypertension)    MS (multiple sclerosis)    GERD (gastroesophageal reflux disease)    Esophageal cancer    Sinus symptom    Bifascicular block    H/O hiatal hernia    Skin cancer        Social Hx:  No current use of tobacco, alcohol, or illicit drugs  Lives with ***    Medications:  MEDICATIONS  (STANDING):  acetaminophen   IVPB .. 1000 milliGRAM(s) IV Intermittent once  albuterol    0.083% 2.5 milliGRAM(s) Nebulizer every 6 hours  dextrose 5% + lactated ringers 1000 milliLiter(s) (75 mL/Hr) IV Continuous <Continuous>  dornase bridget Solution 2.5 milliGRAM(s) Inhalation daily  heparin   Injectable 5000 Unit(s) SubCutaneous every 8 hours  HYDROmorphone PCA (1 mG/mL) 30 milliLiter(s) PCA Continuous PCA Continuous  lidocaine   4% Patch 1 Patch Transdermal daily  pantoprazole  Injectable 40 milliGRAM(s) IV Push at bedtime  potassium chloride  10 mEq/100 mL IVPB 10 milliEquivalent(s) IV Intermittent every 1 hour  potassium phosphate IVPB 30 milliMole(s) IV Intermittent once  sodium chloride 3%  Inhalation 4 milliLiter(s) Inhalation every 6 hours    MEDICATIONS  (PRN):  diazepam  Injectable 3 milliGRAM(s) IV Push every 8 hours PRN muscle spasms  hydrALAZINE Injectable 5 milliGRAM(s) IV Push every 4 hours PRN SBP>160  HYDROmorphone PCA (1 mG/mL) Rescue Clinician Bolus 0.5 milliGRAM(s) IV Push every 15 minutes PRN for Pain Scale GREATER THAN 6  naloxone Injectable 0.1 milliGRAM(s) IV Push every 3 minutes PRN For ANY of the following changes in patient status:  A. RR LESS THAN 10 breaths per minute, B. Oxygen saturation LESS THAN 90%, C. Sedation score of 6  ondansetron Injectable 4 milliGRAM(s) IV Push every 6 hours PRN Nausea      Vitals:  T(C): 36.9 (05-20-23 @ 04:00), Max: 37.2 (05-19-23 @ 20:00)  HR: 60 (05-20-23 @ 08:00) (59 - 96)  BP: 145/66 (05-20-23 @ 08:00) (126/93 - 166/74)  RR: 17 (05-20-23 @ 08:00) (14 - 28)  SpO2: 98% (05-20-23 @ 08:00) (92% - 100%)    Physical Examination:   General - NAD, pleasant, cooperative   Neurologic Exam:  Mental status - Awake, Alert, Oriented to person, place, and time. Speech fluent Follows simple and complex commands. Attention/concentration, recent and remote memory (including registration and recall), and fund of knowledge intact    Cranial nerves:  CN II: Visual fields are full to confrontation. Pupils are 3 mm and briskly reactive to light.  CN III, IV, VI: EOMI, no nystagmus, no ptosis  CN V: Facial sensation is intact to pinprick in all 3 divisions bilaterally.  CN VII: Face is symmetric with normal eye closure and smile.  CN VII: Hearing is normal to rubbing fingers  CN IX, X: Palate elevates symmetrically. Phonation is normal.  CN XI: Head turning and shoulder shrug are intact  CN XII: Tongue is midline with normal movements and no atrophy.    Motor - Normal bulk and tone throughout. No pronator drift of out-stretched arms.  Strength testing            Deltoid      Biceps      Triceps             R            5                 5               5                              5  L             5                 5               5                           5              Hip Flexion       Knee Flexion    Knee Extension    Dorsiflexion    Plantar Flexion  R              2                                              3                           4+                            5                          5  L              2                                      3                           4+                            5                          5    Sensation - Light touch intact in fingers and toes, though describes less sensation in right toes compared with left     DTR's -             Biceps      Triceps     Brachioradialis      Patellar    Ankle    Toes/plantar response  R             3+             3+                  3+                       3+            3+                 Down  L              3+             3+                 3+                        3+           3+                 up    Coordination -  There is slight dysmetria on finger-to-nose  Gait and station - Not assessed due to weakness     Labs:                        9.4    7.79  )-----------( 229      ( 20 May 2023 05:00 )             28.8     05-20    140  |  105  |  10  ----------------------------<  122<H>  3.0<L>   |  24  |  0.35<L>    Ca    8.9      20 May 2023 05:00  Phos  2.2     05-20  Mg     2.00     05-20      CAPILLARY BLOOD GLUCOSE      POCT Blood Glucose.: 107 mg/dL (19 May 2023 21:38)          CSF:                  Radiology:     Neurology - Consult Note    -  Spectra: [phone] ([facility]), [phone] ([facility])  -    HPI: Patient JUAN NOVAK is a 73y (1950) woman with a PMHx significant for malignant neoplasm of esophagus, multiple sclerosis (diagnosed as demyelinating disease in 1988 and 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 She is now complaining of bilateral lower extremity spasms and weakness. Symptoms started occurring shortly after her surgery and have persisted. She feels that her spasms are getting worse. No other complaints. Currently follows up with Dr. Travis Palencia at [facility] for MS care. On baclofen 20mg BID and Dalfampridine 10mg q12. Patient has been NPO since her surgery and has not taken any medications orally. Cannot get swallowing study until POD7. Neurology consulted for concerns of MS flare.     Of note, the patient was previously seen by neurology in 04 and 05/2022 for concerns of MS flare. Found to have UTI and was treated conservatively with Abx. She doesn't endorse a true Lhermitte sign but she says that sometimes when she would reach out her hand she would get an "electric-shock" sensation from her right lower abdominal area to her right flank. Does not endorse this sensation currently. She received a neurostimulator in 07/2005 and has used it until its expiration in 2014. Continued to have the neurostimulator in her body which prevents her from getting an MRI of her spine. She says that she can get an MRI of her head but currently do not see any MRIs in her charts. Patient endorsed flares when she was gettingher chemo and radiation. both between January and February of this year.     Review of Systems:    NEUROLOGICAL: +As stated in HPI above  All other review of systems is negative unless indicated above.    Allergies:  lisinopril (Other)  penicillin (Rash)  [place] (Rash)  Percocet (Drowsiness)  peanuts (Anaphylaxis)      PMHx/PSHx/Family Hx: As above, otherwise see below   HTN (hypertension)    MS (multiple sclerosis)    GERD (gastroesophageal reflux disease)    Esophageal cancer    Sinus symptom    Bifascicular block    H/O hiatal hernia    Skin cancer        Social Hx:  No current use of tobacco, alcohol, or illicit drugs  Lives with ***    Medications:  MEDICATIONS  (STANDING):  acetaminophen   IVPB .. 1000 milliGRAM(s) IV Intermittent once  albuterol    0.083% 2.5 milliGRAM(s) Nebulizer every 6 hours  dextrose 5% + lactated ringers 1000 milliLiter(s) (75 mL/Hr) IV Continuous <Continuous>  dornase bridget Solution 2.5 milliGRAM(s) Inhalation daily  heparin   Injectable 5000 Unit(s) SubCutaneous every 8 hours  HYDROmorphone PCA (1 mG/mL) 30 milliLiter(s) PCA Continuous PCA Continuous  lidocaine   4% Patch 1 Patch Transdermal daily  pantoprazole  Injectable 40 milliGRAM(s) IV Push at bedtime  potassium chloride  10 mEq/100 mL IVPB 10 milliEquivalent(s) IV Intermittent every 1 hour  potassium phosphate IVPB 30 milliMole(s) IV Intermittent once  sodium chloride 3%  Inhalation 4 milliLiter(s) Inhalation every 6 hours    MEDICATIONS  (PRN):  diazepam  Injectable 3 milliGRAM(s) IV Push every 8 hours PRN muscle spasms  hydrALAZINE Injectable 5 milliGRAM(s) IV Push every 4 hours PRN SBP>160  HYDROmorphone PCA (1 mG/mL) Rescue Clinician Bolus 0.5 milliGRAM(s) IV Push every 15 minutes PRN for Pain Scale GREATER THAN 6  naloxone Injectable 0.1 milliGRAM(s) IV Push every 3 minutes PRN For ANY of the following changes in patient status:  A. RR LESS THAN 10 breaths per minute, B. Oxygen saturation LESS THAN 90%, C. Sedation score of 6  ondansetron Injectable 4 milliGRAM(s) IV Push every 6 hours PRN Nausea      Vitals:  T(C): 36.9 (05-20-23 @ 04:00), Max: 37.2 (05-19-23 @ 20:00)  HR: 60 (05-20-23 @ 08:00) (59 - 96)  BP: 145/66 (05-20-23 @ 08:00) (126/93 - 166/74)  RR: 17 (05-20-23 @ 08:00) (14 - 28)  SpO2: 98% (05-20-23 @ 08:00) (92% - 100%)    Physical Examination:   General - NAD, pleasant, cooperative   Neurologic Exam:  Mental status - Awake, Alert, Oriented to person, place, and time. Speech fluent Follows simple and complex commands. Attention/concentration, recent and remote memory (including registration and recall), and fund of knowledge intact    Cranial nerves:  CN II: Visual fields are full to confrontation. Pupils are 3 mm and briskly reactive to light.  CN III, IV, VI: EOMI, no nystagmus, no ptosis  CN V: Facial sensation is intact to pinprick in all 3 divisions bilaterally.  CN VII: Face is symmetric with normal eye closure and smile.  CN VII: Hearing is normal to rubbing fingers  CN IX, X: Palate elevates symmetrically. Phonation is normal.  CN XI: Head turning and shoulder shrug are intact  CN XII: Tongue is midline with normal movements and no atrophy.    Motor - Normal bulk and tone throughout. No pronator drift of out-stretched arms.  Strength testing            Deltoid      Biceps      Triceps             R            5                 5               5                              5  L             5                 5               5                           5              Hip Flexion       Knee Flexion    Knee Extension    Dorsiflexion    Plantar Flexion  R              2                                              3                           4+                            5                          5  L              2                                      3                           4+                            5                          5    Sensation - Light touch intact in fingers and toes, though describes less sensation in right toes compared with left     DTR's -             Biceps      Triceps     Brachioradialis      Patellar    Ankle    Toes/plantar response  R             3+             3+                  3+                       3+            3+                 Down  L              3+             3+                 3+                        3+           3+                 up    Coordination -  There is slight dysmetria on finger-to-nose. Spasticity noted in bilateral lower extremities   Gait and station - Not assessed due to weakness     Labs:                        9.4    7.79  )-----------( 229      ( 20 May 2023 05:00 )             28.8     05-20    140  |  105  |  10  ----------------------------<  122<H>  3.0<L>   |  24  |  0.35<L>    Ca    8.9      20 May 2023 05:00  Phos  2.2     05-20  Mg     2.00     05-20      CAPILLARY BLOOD GLUCOSE      POCT Blood Glucose.: 107 mg/dL (19 May 2023 21:38)          CSF:                  Radiology:

## 2023-05-20 NOTE — PROGRESS NOTE ADULT - SUBJECTIVE AND OBJECTIVE BOX
JUAN NOVAK                     [id number]    HPI:  76 y.o WD, WN female with h/o esophageal cancer had chemo and radiation completed March 3rd, 2023 presents for preop eval to have robotic esophagogastroduodenoscopy, laparoscopic esophagogastrectomy possible open on 5/16/23. (05 May 2023 13:04)      CHIEF COMPLAINT: Follow up in ICU  for postoperative care of patient who is s/p  Robotic R VATS, Blake Marcos esophagectomy  16-May-2023    Procedure: Robotic R VATS, Blake Marcos esophagectomy  16-May-2023    Issues:               Esophageal cancer               Postop pain               Chest tube in place   HTN (hypertension)  MS (multiple sclerosis)  GERD (gastroesophageal reflux disease)  Bifascicular block  H/O hiatal hernia  Skin cancer  Spinal cord stimulator status    PAST MEDICAL & SURGICAL HISTORY:  HTN (hypertension)      MS (multiple sclerosis)      GERD (gastroesophageal reflux disease)      Esophageal cancer      Sinus symptom      Bifascicular block      H/O hiatal hernia      Skin cancer      Spinal cord stimulator status      H/O: hysterectomy      History of cholecystectomy      History of Nissen fundoplication                VITAL SIGNS:  Vital Signs Last 24 Hrs  T(C): 37 (20 May 2023 08:00), Max: 37.2 (19 May 2023 20:00)  T(F): 98.6 (20 May 2023 08:00), Max: 99 (19 May 2023 20:00)  HR: 84 (20 May 2023 10:00) (59 - 96)  BP: 152/77 (20 May 2023 10:00) (129/70 - 166/74)  BP(mean): 96 (20 May 2023 10:00) (80 - 114)  RR: 22 (20 May 2023 10:00) (14 - 28)  SpO2: 96% (20 May 2023 10:00) (93% - 100%)    Parameters below as of 20 May 2023 10:00  Patient On (Oxygen Delivery Method): room air        I/Os:   I&O's Detail    19 May 2023 07:01  -  20 May 2023 07:00  --------------------------------------------------------  IN:    dextrose 5% + lactated ringers: 1725 mL    Enteral Tube Flush: 240 mL    IV PiggyBack: 200 mL    Lactated Ringers: 150 mL  Total IN: 2315 mL    OUT:    Bulb (mL): 25 mL    Chest Tube (mL): 70 mL    Emesis (mL): 0 mL    Nasogastric/Oral tube (mL): 200 mL    Voided (mL): 2250 mL  Total OUT: 2545 mL    Total NET: -230 mL      20 May 2023 07:01  -  20 May 2023 10:24  --------------------------------------------------------  IN:    dextrose 5% + lactated ringers w/ Additives: 225 mL    Enteral Tube Flush: 40 mL  Total IN: 265 mL    OUT:    Bulb (mL): 0 mL    Chest Tube (mL): 10 mL    Nasogastric/Oral tube (mL): 150 mL  Total OUT: 160 mL    Total NET: 105 mL          CAPILLARY BLOOD GLUCOSE      POCT Blood Glucose.: 107 mg/dL (19 May 2023 21:38)      =======================MEDICATIONS===================  MEDICATIONS  (STANDING):  acetaminophen   IVPB .. 1000 milliGRAM(s) IV Intermittent once  albuterol    0.083% 2.5 milliGRAM(s) Nebulizer every 6 hours  dextrose 5% + lactated ringers 1000 milliLiter(s) (75 mL/Hr) IV Continuous <Continuous>  dornase bridget Solution 2.5 milliGRAM(s) Inhalation daily  heparin   Injectable 5000 Unit(s) SubCutaneous every 8 hours  HYDROmorphone PCA (1 mG/mL) 30 milliLiter(s) PCA Continuous PCA Continuous  lidocaine   4% Patch 1 Patch Transdermal daily  pantoprazole  Injectable 40 milliGRAM(s) IV Push at bedtime  potassium phosphate IVPB 30 milliMole(s) IV Intermittent once  sodium chloride 3%  Inhalation 4 milliLiter(s) Inhalation every 6 hours    MEDICATIONS  (PRN):  diazepam  Injectable 3 milliGRAM(s) IV Push every 8 hours PRN muscle spasms  hydrALAZINE Injectable 5 milliGRAM(s) IV Push every 4 hours PRN SBP>160  HYDROmorphone PCA (1 mG/mL) Rescue Clinician Bolus 0.5 milliGRAM(s) IV Push every 15 minutes PRN for Pain Scale GREATER THAN 6  naloxone Injectable 0.1 milliGRAM(s) IV Push every 3 minutes PRN For ANY of the following changes in patient status:  A. RR LESS THAN 10 breaths per minute, B. Oxygen saturation LESS THAN 90%, C. Sedation score of 6  ondansetron Injectable 4 milliGRAM(s) IV Push every 6 hours PRN Nausea      PHYSICAL EXAM============================  General:                         Awake, alert, not in any distress  Neuro:                            Moving all extremities to commands.   Respiratory:	Air entry fair and  bilateral conducted sounds                                           Effort even and unlabored.  CV:		Regular rate and rhythm. Normal S1/S2                                          Distal pulses present.  Abdomen:	                     Soft, non-distended. Bowel sounds present   Skin:		No rash.  Extremities:	Warm, no cyanosis or edema.  Palpable pulses    ============================LABS=========================                        9.4    7.79  )-----------( 229      ( 20 May 2023 05:00 )             28.8     05-20    140  |  105  |  10  ----------------------------<  122<H>  3.0<L>   |  24  |  0.35<L>    Ca    8.9      20 May 2023 05:00  Phos  2.2     05-20  Mg     2.00     05-20    A/P:  73yFemale s/p Robotic R VATS, Buckner Marcos esophagectomy  16-May-2023,  appears fairly comfortable, complaining of chest pain with deep breathing.                             Neuro:    Nonfocal                                        Pain control with PCA / IV Tylenol                            Cardiovascular:                                          Continue hemodynamic monitoring.                                        Continue IVF     HTN: On Labetalol PRN. Has Hx of bifascicular block, will d/c Labetalol and start PRN Hydralazine.     HLD: Hold Lipitor                            Respiratory:                                         Postop hypoxemia requiring O2 via nasal cannula - Wean nasal cannula for goal O2sat above 92%.                                              Obtain CXR. Incentive spirometry.                                                   Chest PT and frequent suctioning. Continue bronchodilators, pulmozyme and inhaled 3% saline                                                      OOB to chair & ambulate w/ assistance.                                                           Continuous pulse oximetry for support & to prevent decompensation.                                         Monitor chest tube output                                         Chest tube to WS                             GI                                          NPO , IVF                                          Continue GI prophylaxis with  Protonix                                          Continue Zofran / Reglan for nausea - PRN                                          NGT to low continuous wall suction                                           Flush  NGT  with 30cc of sterile water Q 4hrs.  	                                                                 Renal:                                          Continue IVF                                          Monitor I/Os and electrolytes                                          Clayton D/CD today                                                 Hem/ Onc:                                                                                   Monitor chest tube output &  signs of bleeding.                                           Follow CBC in AM                           Infectious disease:                                             No signs of infection. Monitor for fever / leukocytosis.                                           All surgical incision / chest tube  sites look clean                            Endocrine                                              Continue Accu-Checks with coverage.     Pt is on SQ Heparin and Venodyne boots for DVT prophylaxis.     Pertinent clinical, laboratory, radiographic, hemodynamic, echocardiographic, respiratory data, microbiologic data and chart were reviewed and analyzed frequently throughout the course of the day and night  Patient seen, examined and plan discussed with CT Surgeon Dr. Altamirano / CTICU team during rounds.    Status discussed with patient  / family at bedside  and  updated plan of care.     I have spent 45 minutes with this patient including 20 minutes of time coordinating care in the ICU.      Juan Alberto Salazar DO, FACEP     JUAN NOVAK                     [id number]    HPI:  76 y.o WD, WN female with h/o esophageal cancer had chemo and radiation completed March 3rd, 2023 presents for preop eval to have robotic esophagogastroduodenoscopy, laparoscopic esophagogastrectomy possible open on 5/16/23. (05 May 2023 13:04)      CHIEF COMPLAINT: Follow up in ICU  for postoperative care of patient who is s/p  Robotic R VATS, Blake Marcos esophagectomy  16-May-2023    Procedure: Robotic R VATS, Blake Marcos esophagectomy  16-May-2023    Issues:               Esophageal cancer               Postop pain               Chest tube in place   HTN (hypertension)  MS (multiple sclerosis)  GERD (gastroesophageal reflux disease)  Bifascicular block  H/O hiatal hernia  Skin cancer  Spinal cord stimulator status    PAST MEDICAL & SURGICAL HISTORY:  HTN (hypertension)      MS (multiple sclerosis)      GERD (gastroesophageal reflux disease)      Esophageal cancer      Sinus symptom      Bifascicular block      H/O hiatal hernia      Skin cancer      Spinal cord stimulator status      H/O: hysterectomy      History of cholecystectomy      History of Nissen fundoplication                VITAL SIGNS:  Vital Signs Last 24 Hrs  T(C): 37 (20 May 2023 08:00), Max: 37.2 (19 May 2023 20:00)  T(F): 98.6 (20 May 2023 08:00), Max: 99 (19 May 2023 20:00)  HR: 84 (20 May 2023 10:00) (59 - 96)  BP: 152/77 (20 May 2023 10:00) (129/70 - 166/74)  BP(mean): 96 (20 May 2023 10:00) (80 - 114)  RR: 22 (20 May 2023 10:00) (14 - 28)  SpO2: 96% (20 May 2023 10:00) (93% - 100%)    Parameters below as of 20 May 2023 10:00  Patient On (Oxygen Delivery Method): room air        I/Os:   I&O's Detail    19 May 2023 07:01  -  20 May 2023 07:00  --------------------------------------------------------  IN:    dextrose 5% + lactated ringers: 1725 mL    Enteral Tube Flush: 240 mL    IV PiggyBack: 200 mL    Lactated Ringers: 150 mL  Total IN: 2315 mL    OUT:    Bulb (mL): 25 mL    Chest Tube (mL): 70 mL    Emesis (mL): 0 mL    Nasogastric/Oral tube (mL): 200 mL    Voided (mL): 2250 mL  Total OUT: 2545 mL    Total NET: -230 mL      20 May 2023 07:01  -  20 May 2023 10:24  --------------------------------------------------------  IN:    dextrose 5% + lactated ringers w/ Additives: 225 mL    Enteral Tube Flush: 40 mL  Total IN: 265 mL    OUT:    Bulb (mL): 0 mL    Chest Tube (mL): 10 mL    Nasogastric/Oral tube (mL): 150 mL  Total OUT: 160 mL    Total NET: 105 mL          CAPILLARY BLOOD GLUCOSE      POCT Blood Glucose.: 107 mg/dL (19 May 2023 21:38)      =======================MEDICATIONS===================  MEDICATIONS  (STANDING):  acetaminophen   IVPB .. 1000 milliGRAM(s) IV Intermittent once  albuterol    0.083% 2.5 milliGRAM(s) Nebulizer every 6 hours  dextrose 5% + lactated ringers 1000 milliLiter(s) (75 mL/Hr) IV Continuous <Continuous>  dornase bridget Solution 2.5 milliGRAM(s) Inhalation daily  heparin   Injectable 5000 Unit(s) SubCutaneous every 8 hours  HYDROmorphone PCA (1 mG/mL) 30 milliLiter(s) PCA Continuous PCA Continuous  lidocaine   4% Patch 1 Patch Transdermal daily  pantoprazole  Injectable 40 milliGRAM(s) IV Push at bedtime  potassium phosphate IVPB 30 milliMole(s) IV Intermittent once  sodium chloride 3%  Inhalation 4 milliLiter(s) Inhalation every 6 hours    MEDICATIONS  (PRN):  diazepam  Injectable 3 milliGRAM(s) IV Push every 8 hours PRN muscle spasms  hydrALAZINE Injectable 5 milliGRAM(s) IV Push every 4 hours PRN SBP>160  HYDROmorphone PCA (1 mG/mL) Rescue Clinician Bolus 0.5 milliGRAM(s) IV Push every 15 minutes PRN for Pain Scale GREATER THAN 6  naloxone Injectable 0.1 milliGRAM(s) IV Push every 3 minutes PRN For ANY of the following changes in patient status:  A. RR LESS THAN 10 breaths per minute, B. Oxygen saturation LESS THAN 90%, C. Sedation score of 6  ondansetron Injectable 4 milliGRAM(s) IV Push every 6 hours PRN Nausea      PHYSICAL EXAM============================  General:                         Awake, alert, not in any distress  Neuro:                       Generalized B/L LE weakness,    Nonfocal, Motor /sensory intact, Moving all extremities to commands.   Respiratory:	Air entry fair and  bilateral conducted sounds                                           Effort even and unlabored.  CV:		Regular rate and rhythm. Normal S1/S2                                          Distal pulses present.  Abdomen:	                     Soft, non-distended. Bowel sounds present   Skin:		No rash.  Extremities:	Warm, no cyanosis or edema.  Palpable pulses    ============================LABS=========================                        9.4    7.79  )-----------( 229      ( 20 May 2023 05:00 )             28.8     05-20    140  |  105  |  10  ----------------------------<  122<H>  3.0<L>   |  24  |  0.35<L>    Ca    8.9      20 May 2023 05:00  Phos  2.2     05-20  Mg     2.00     05-20    A/P:  73yFemale s/p Robotic R VATS, Blake Marcos esophagectomy  16-May-2023,  appears fairly comfortable, complaining of chest pain with deep breathing.                             Neuro:    Nonfocal on exam. but mild B/L LE weakness                                         Neuro consulted for ? MS exacerbation will f/u Neuro recs                                         Pain control with PCA / IV Tylenol                            Cardiovascular:                                          Continue hemodynamic monitoring.                                        Continue IVF     HTN: On Labetalol PRN. Has Hx of bifascicular block, will d/c Labetalol and start PRN Hydralazine.     HLD: Hold Lipitor                            Respiratory:   No resp distress/dysfunction                                          Postop hypoxemia requiring O2 via nasal cannula - Wean nasal cannula for goal O2sat above 92%.                                              Obtain CXR. Incentive spirometry.                                                   Chest PT and frequent suctioning. Continue bronchodilators, pulmozyme and inhaled 3% saline                                                      OOB to chair & ambulate w/ assistance.                                                           Continuous pulse oximetry for support & to prevent decompensation.                                         Monitor chest tube output                                         Chest tube to WS                             GI                                          NPO , IVF                                          Continue GI prophylaxis with  Protonix                                          Continue Zofran / Reglan for nausea - PRN                                          NGT to low continuous wall suction                                           Flush  NGT  with 30cc of sterile water Q 4hrs.  	                                                                 Renal:                                          Continue IVF                                          Monitor I/Os and electrolytes                                          Clayton D/CD today                                                 Hem/ Onc:                                                                                   Monitor chest tube output &  signs of bleeding.                                           Follow CBC in AM                           Infectious disease:                                             No signs of infection. Monitor for fever / leukocytosis.                                           All surgical incision / chest tube  sites look clean                            Endocrine                                              Continue Accu-Checks with coverage.     Pt is on SQ Heparin and Venodyne boots for DVT prophylaxis.     Pertinent clinical, laboratory, radiographic, hemodynamic, echocardiographic, respiratory data, microbiologic data and chart were reviewed and analyzed frequently throughout the course of the day and night  Patient seen, examined and plan discussed with CT Surgeon Dr. Altamirano / CTICU team during rounds.    Status discussed with patient  / family at bedside  and  updated plan of care.     I have spent 45 minutes with this patient including 20 minutes of time coordinating care in the ICU.      Juan Alberto Salazar DO, FACEP

## 2023-05-20 NOTE — CONSULT NOTE ADULT - CRITICAL CARE ATTENDING COMMENT
seen in ICU 5/21  diazepam seems to be helping   agree with above until swallow peteral  Adán Sanchez MD  Vascular Neurology

## 2023-05-20 NOTE — PROGRESS NOTE ADULT - SUBJECTIVE AND OBJECTIVE BOX
Anesthesia Pain Management Service    SUBJECTIVE: Patient is doing well with IV PCA and no significant problems reported. States the Valium helps. She is currently experiencing acute exacerbation of MS and awaiting Neuro consult.    Pain Scale Score	At rest: ___ 	With Activity: ___ 	[X ] Refer to charted pain scores    THERAPY:    [ ] IV PCA Morphine		[ ] 5 mg/mL	[ ] 1 mg/mL  [X ] IV PCA Hydromorphone	[ ] 5 mg/mL	[X ] 1 mg/mL  [ ] IV PCA Fentanyl		[ ] 50 micrograms/mL    Demand dose __0.25_ lockout __6_ (minutes) Continuous Rate _0__ Total: _1.25__   mg used (in past 24 hrs)      MEDICATIONS  (STANDING):  acetaminophen   IVPB .. 1000 milliGRAM(s) IV Intermittent once  albuterol    0.083% 2.5 milliGRAM(s) Nebulizer every 6 hours  dextrose 5% + lactated ringers 1000 milliLiter(s) (75 mL/Hr) IV Continuous <Continuous>  dornase bridget Solution 2.5 milliGRAM(s) Inhalation daily  heparin   Injectable 5000 Unit(s) SubCutaneous every 8 hours  lidocaine   4% Patch 1 Patch Transdermal daily  pantoprazole  Injectable 40 milliGRAM(s) IV Push at bedtime  sodium chloride 3%  Inhalation 4 milliLiter(s) Inhalation every 6 hours    MEDICATIONS  (PRN):  diazepam  Injectable 3 milliGRAM(s) IV Push every 8 hours PRN muscle spasms  hydrALAZINE Injectable 5 milliGRAM(s) IV Push every 4 hours PRN SBP>160  HYDROmorphone  Injectable 0.25 milliGRAM(s) IV Push every 3 hours PRN Severe Breakthrough Pain (7 - 10)  HYDROmorphone  Injectable 0.3 milliGRAM(s) IV Push every 3 hours PRN Moderate - Severe Pain (4 - 10)  naloxone Injectable 0.1 milliGRAM(s) IV Push every 3 minutes PRN For ANY of the following changes in patient status:  A. RR LESS THAN 10 breaths per minute, B. Oxygen saturation LESS THAN 90%, C. Sedation score of 6  ondansetron Injectable 4 milliGRAM(s) IV Push every 6 hours PRN Nausea      OBJECTIVE: Patient sitting up in chair, NGT and CTx1 in place.    Sedation Score:	[ X] Alert	[ ] Drowsy 	[ ] Arousable	[ ] Asleep	[ ] Unresponsive    Side Effects:	[X ] None	[ ] Nausea	[ ] Vomiting	[ ] Pruritus  		[ ] Other:    Vital Signs Last 24 Hrs  T(C): 37 (20 May 2023 08:00), Max: 37.2 (19 May 2023 20:00)  T(F): 98.6 (20 May 2023 08:00), Max: 99 (19 May 2023 20:00)  HR: 71 (20 May 2023 11:00) (59 - 96)  BP: 147/63 (20 May 2023 11:00) (129/70 - 166/74)  BP(mean): 88 (20 May 2023 11:00) (80 - 114)  RR: 13 (20 May 2023 11:00) (13 - 28)  SpO2: 96% (20 May 2023 11:00) (93% - 100%)    Parameters below as of 20 May 2023 11:00  Patient On (Oxygen Delivery Method): room air        ASSESSMENT/ PLAN    Therapy to  be:	[ ] Continue   [ X] Discontinued   [X ] Change to prn Analgesics    Documentation and Verification of current medications:   [X] Done	[ ] Not done, not elligible    Comments: Discussed patient with primary team, IV PCA discontinued. PRN Oral/IV opioids and/or Adjuvant non-opioid medication to be ordered at this point.    Progress Note written now but Patient was seen earlier.

## 2023-05-20 NOTE — PROGRESS NOTE ADULT - SUBJECTIVE AND OBJECTIVE BOX
Date of service 5/19/23    chief complaint: surgery    extended hpi: 77 y/o female with a pmh of MS, HTN and  esophageal cancer s/p chemo and radiation completed March 3rd, 2023 presented for elective robotic esophagogastroduodenoscopy, laparoscopic esophagogastrectomy on 5/16/23.       no complaints      acetaminophen   IVPB .. 1000 milliGRAM(s) IV Intermittent once  albuterol    0.083% 2.5 milliGRAM(s) Nebulizer every 6 hours  dextrose 5% + lactated ringers 1000 milliLiter(s) IV Continuous <Continuous>  diazepam  Injectable 3 milliGRAM(s) IV Push every 8 hours PRN  dornase bridget Solution 2.5 milliGRAM(s) Inhalation daily  heparin   Injectable 5000 Unit(s) SubCutaneous every 8 hours  hydrALAZINE Injectable 5 milliGRAM(s) IV Push every 4 hours PRN  HYDROmorphone PCA (1 mG/mL) 30 milliLiter(s) PCA Continuous PCA Continuous  HYDROmorphone PCA (1 mG/mL) Rescue Clinician Bolus 0.5 milliGRAM(s) IV Push every 15 minutes PRN  lidocaine   4% Patch 1 Patch Transdermal daily  naloxone Injectable 0.1 milliGRAM(s) IV Push every 3 minutes PRN  ondansetron Injectable 4 milliGRAM(s) IV Push every 6 hours PRN  pantoprazole  Injectable 40 milliGRAM(s) IV Push at bedtime  potassium chloride  10 mEq/100 mL IVPB 10 milliEquivalent(s) IV Intermittent every 1 hour  potassium phosphate IVPB 30 milliMole(s) IV Intermittent once  sodium chloride 3%  Inhalation 4 milliLiter(s) Inhalation every 6 hours                            9.4    7.79  )-----------( 229      ( 20 May 2023 05:00 )             28.8       Hemoglobin: 9.4 g/dL (05-20 @ 05:00)  Hemoglobin: 10.1 g/dL (05-19 @ 03:45)  Hemoglobin: 8.9 g/dL (05-18 @ 03:15)  Hemoglobin: 9.8 g/dL (05-17 @ 02:42)  Hemoglobin: 10.5 g/dL (05-16 @ 18:18)      05-20    140  |  105  |  10  ----------------------------<  122<H>  3.0<L>   |  24  |  0.35<L>    Ca    8.9      20 May 2023 05:00  Phos  2.2     05-20  Mg     2.00     05-20      Creatinine Trend: 0.35<--, 0.42<--, 0.40<--, 0.43<--, 0.45<--, 0.54<--    COAGS:           T(C): 36.9 (05-20-23 @ 04:00), Max: 37.2 (05-19-23 @ 20:00)  HR: 60 (05-20-23 @ 08:00) (59 - 96)  BP: 145/66 (05-20-23 @ 08:00) (126/93 - 166/74)  RR: 17 (05-20-23 @ 08:00) (14 - 28)  SpO2: 98% (05-20-23 @ 08:00) (92% - 100%)  Wt(kg): --    I&O's Summary    19 May 2023 07:01  -  20 May 2023 07:00  --------------------------------------------------------  IN: 2315 mL / OUT: 2545 mL / NET: -230 mL    20 May 2023 07:01  -  20 May 2023 09:34  --------------------------------------------------------  IN: 190 mL / OUT: 10 mL / NET: 180 mL      General: Alert and Oriented * 3.   Head: Normocephalic and atraumatic.   Neck: No JVD. No bruits. Supple. Does not appear to be enlarged.   Cardiovascular: + S1,S2 ; RRR Soft systolic murmur at the left lower sternal border. No rubs noted.    Lungs: CTA b/l. No rhonchi, rales or wheezes.   Abdomen: + BS, soft. Non tender. Non distended. No rebound. No guarding.   Extremities: No clubbing/cyanosis/edema.   Neurologic: Moves all four extremities. Full range of motion.   Skin: Warm and moist. The patient's skin has normal elasticity and good skin turgor.   Psychiatric: Appropriate mood and affect.  Musculoskeletal: Normal range of motion, normal strength    DATA    tele- SR  ECG:  NSR Bifascular block	  TTE 03/2023: Preserved LVEF  Cath: Non obstructive CAD    ASSESSMENT/PLAN: 	Pt is a 77 y/o female with a pmh of MS, HTN and  esophageal cancer s/p chemo and radiation completed March 3rd, 2023 presented for elective robotic esophagogastroduodenoscopy, laparoscopic esophagogastrectomy on 5/16/23.     1. HTN  - currently well controlled off meds  - can use IV hydralazine if SBP > 180 systolic  - previously taken of Chlorthalidone due to hypokalemia  - tele stable    2. Post OP  --pain management and PT

## 2023-05-21 LAB
ANION GAP SERPL CALC-SCNC: 12 MMOL/L — SIGNIFICANT CHANGE UP (ref 7–14)
BUN SERPL-MCNC: 9 MG/DL — SIGNIFICANT CHANGE UP (ref 7–23)
CALCIUM SERPL-MCNC: 8.8 MG/DL — SIGNIFICANT CHANGE UP (ref 8.4–10.5)
CHLORIDE SERPL-SCNC: 106 MMOL/L — SIGNIFICANT CHANGE UP (ref 98–107)
CO2 SERPL-SCNC: 24 MMOL/L — SIGNIFICANT CHANGE UP (ref 22–31)
CREAT SERPL-MCNC: 0.34 MG/DL — LOW (ref 0.5–1.3)
EGFR: 108 ML/MIN/1.73M2 — SIGNIFICANT CHANGE UP
GLUCOSE BLDC GLUCOMTR-MCNC: 103 MG/DL — HIGH (ref 70–99)
GLUCOSE SERPL-MCNC: 112 MG/DL — HIGH (ref 70–99)
HCT VFR BLD CALC: 28.7 % — LOW (ref 34.5–45)
HGB BLD-MCNC: 9.3 G/DL — LOW (ref 11.5–15.5)
MAGNESIUM SERPL-MCNC: 1.9 MG/DL — SIGNIFICANT CHANGE UP (ref 1.6–2.6)
MCHC RBC-ENTMCNC: 30.7 PG — SIGNIFICANT CHANGE UP (ref 27–34)
MCHC RBC-ENTMCNC: 32.4 GM/DL — SIGNIFICANT CHANGE UP (ref 32–36)
MCV RBC AUTO: 94.7 FL — SIGNIFICANT CHANGE UP (ref 80–100)
NRBC # BLD: 0 /100 WBCS — SIGNIFICANT CHANGE UP (ref 0–0)
NRBC # FLD: 0 K/UL — SIGNIFICANT CHANGE UP (ref 0–0)
PHOSPHATE SERPL-MCNC: 2.8 MG/DL — SIGNIFICANT CHANGE UP (ref 2.5–4.5)
PLATELET # BLD AUTO: 229 K/UL — SIGNIFICANT CHANGE UP (ref 150–400)
POTASSIUM SERPL-MCNC: 4 MMOL/L — SIGNIFICANT CHANGE UP (ref 3.5–5.3)
POTASSIUM SERPL-SCNC: 4 MMOL/L — SIGNIFICANT CHANGE UP (ref 3.5–5.3)
RBC # BLD: 3.03 M/UL — LOW (ref 3.8–5.2)
RBC # FLD: 13.9 % — SIGNIFICANT CHANGE UP (ref 10.3–14.5)
SODIUM SERPL-SCNC: 142 MMOL/L — SIGNIFICANT CHANGE UP (ref 135–145)
WBC # BLD: 7.02 K/UL — SIGNIFICANT CHANGE UP (ref 3.8–10.5)
WBC # FLD AUTO: 7.02 K/UL — SIGNIFICANT CHANGE UP (ref 3.8–10.5)

## 2023-05-21 PROCEDURE — 99233 SBSQ HOSP IP/OBS HIGH 50: CPT

## 2023-05-21 PROCEDURE — 74018 RADEX ABDOMEN 1 VIEW: CPT | Mod: 26

## 2023-05-21 RX ORDER — ACETAMINOPHEN 500 MG
1000 TABLET ORAL ONCE
Refills: 0 | Status: COMPLETED | OUTPATIENT
Start: 2023-05-21 | End: 2023-05-21

## 2023-05-21 RX ORDER — ACETAMINOPHEN 500 MG
1000 TABLET ORAL ONCE
Refills: 0 | Status: COMPLETED | OUTPATIENT
Start: 2023-05-21 | End: 2023-05-22

## 2023-05-21 RX ADMIN — Medication 1000 MILLIGRAM(S): at 18:40

## 2023-05-21 RX ADMIN — HEPARIN SODIUM 5000 UNIT(S): 5000 INJECTION INTRAVENOUS; SUBCUTANEOUS at 05:27

## 2023-05-21 RX ADMIN — SODIUM CHLORIDE 60 MILLILITER(S): 9 INJECTION, SOLUTION INTRAVENOUS at 20:11

## 2023-05-21 RX ADMIN — LIDOCAINE 1 PATCH: 4 CREAM TOPICAL at 07:49

## 2023-05-21 RX ADMIN — Medication 5 MILLIGRAM(S): at 17:08

## 2023-05-21 RX ADMIN — Medication 1000 MILLIGRAM(S): at 04:03

## 2023-05-21 RX ADMIN — Medication 400 MILLIGRAM(S): at 03:48

## 2023-05-21 RX ADMIN — LIDOCAINE 1 PATCH: 4 CREAM TOPICAL at 12:19

## 2023-05-21 RX ADMIN — LIDOCAINE 1 PATCH: 4 CREAM TOPICAL at 08:15

## 2023-05-21 RX ADMIN — Medication 5 MILLIGRAM(S): at 04:10

## 2023-05-21 RX ADMIN — ALBUTEROL 2.5 MILLIGRAM(S): 90 AEROSOL, METERED ORAL at 03:53

## 2023-05-21 RX ADMIN — Medication 3 MILLIGRAM(S): at 03:19

## 2023-05-21 RX ADMIN — HEPARIN SODIUM 5000 UNIT(S): 5000 INJECTION INTRAVENOUS; SUBCUTANEOUS at 14:10

## 2023-05-21 RX ADMIN — PANTOPRAZOLE SODIUM 40 MILLIGRAM(S): 20 TABLET, DELAYED RELEASE ORAL at 21:55

## 2023-05-21 RX ADMIN — Medication 3 MILLIGRAM(S): at 11:52

## 2023-05-21 RX ADMIN — HEPARIN SODIUM 5000 UNIT(S): 5000 INJECTION INTRAVENOUS; SUBCUTANEOUS at 21:54

## 2023-05-21 RX ADMIN — SODIUM CHLORIDE 4 MILLILITER(S): 9 INJECTION INTRAMUSCULAR; INTRAVENOUS; SUBCUTANEOUS at 03:53

## 2023-05-21 RX ADMIN — SODIUM CHLORIDE 4 MILLILITER(S): 9 INJECTION INTRAMUSCULAR; INTRAVENOUS; SUBCUTANEOUS at 22:29

## 2023-05-21 RX ADMIN — LIDOCAINE 1 PATCH: 4 CREAM TOPICAL at 19:37

## 2023-05-21 RX ADMIN — Medication 10 MILLIGRAM(S): at 14:10

## 2023-05-21 RX ADMIN — ALBUTEROL 2.5 MILLIGRAM(S): 90 AEROSOL, METERED ORAL at 22:29

## 2023-05-21 RX ADMIN — Medication 400 MILLIGRAM(S): at 18:10

## 2023-05-21 NOTE — PROGRESS NOTE ADULT - SUBJECTIVE AND OBJECTIVE BOX
Date of service 5/21/23    chief complaint: surgery    extended hpi: 75 y/o female with a pmh of MS, HTN and  esophageal cancer s/p chemo and radiation completed March 3rd, 2023 presented for elective robotic esophagogastroduodenoscopy, laparoscopic esophagogastrectomy on 5/16/23.       S: no chest pain or sob; ros otherwise negative.      acetaminophen   IVPB .. 1000 milliGRAM(s) IV Intermittent once  albuterol    0.083% 2.5 milliGRAM(s) Nebulizer every 6 hours  dextrose 5% + lactated ringers 1000 milliLiter(s) IV Continuous <Continuous>  diazepam  Injectable 3 milliGRAM(s) IV Push every 8 hours PRN  dornase bridget Solution 2.5 milliGRAM(s) Inhalation daily  heparin   Injectable 5000 Unit(s) SubCutaneous every 8 hours  hydrALAZINE Injectable 5 milliGRAM(s) IV Push every 4 hours PRN  HYDROmorphone  Injectable 0.3 milliGRAM(s) IV Push every 3 hours PRN  HYDROmorphone  Injectable 0.25 milliGRAM(s) IV Push every 3 hours PRN  lidocaine   4% Patch 1 Patch Transdermal daily  lidocaine   4% Patch 1 Patch Transdermal daily  naloxone Injectable 0.1 milliGRAM(s) IV Push every 3 minutes PRN  ondansetron Injectable 4 milliGRAM(s) IV Push every 6 hours PRN  pantoprazole  Injectable 40 milliGRAM(s) IV Push at bedtime  sodium chloride 3%  Inhalation 4 milliLiter(s) Inhalation every 6 hours                            9.3    7.02  )-----------( 229      ( 21 May 2023 03:53 )             28.7       05-21    142  |  106  |  9   ----------------------------<  112<H>  4.0   |  24  |  0.34<L>    Ca    8.8      21 May 2023 03:53  Phos  2.8     05-21  Mg     1.90     05-21              T(C): 36.7 (05-21-23 @ 12:00), Max: 37.3 (05-20-23 @ 20:00)  HR: 65 (05-21-23 @ 17:00) (58 - 85)  BP: 171/75 (05-21-23 @ 17:00) (123/99 - 176/78)  RR: 14 (05-21-23 @ 17:00) (13 - 25)  SpO2: 95% (05-21-23 @ 17:00) (95% - 100%)  Wt(kg): --    I&O's Summary    20 May 2023 07:01  -  21 May 2023 07:00  --------------------------------------------------------  IN: 2699.8 mL / OUT: 2600 mL / NET: 99.8 mL    21 May 2023 07:01  -  21 May 2023 17:32  --------------------------------------------------------  IN: 440 mL / OUT: 690 mL / NET: -250 mL          General: Alert and Oriented * 3.   Head: Normocephalic and atraumatic.   Neck: No JVD. No bruits. Supple. Does not appear to be enlarged.   Cardiovascular: + S1,S2 ; RRR Soft systolic murmur at the left lower sternal border. No rubs noted.    Lungs: CTA b/l. No rhonchi, rales or wheezes.   Abdomen: + BS, soft. Non tender. Non distended. No rebound. No guarding.   Extremities: No clubbing/cyanosis/edema.   Neurologic: Moves all four extremities. Full range of motion.   Skin: Warm and moist. The patient's skin has normal elasticity and good skin turgor.   Psychiatric: Appropriate mood and affect.  Musculoskeletal: Normal range of motion, normal strength    DATA    tele- SR  ECG:  NSR Bifascular block	  TTE 03/2023: Preserved LVEF  Cath: Non obstructive CAD    ASSESSMENT/PLAN: 	Pt is a 75 y/o female with a pmh of MS, HTN and  esophageal cancer s/p chemo and radiation completed March 3rd, 2023 presented for elective robotic esophagogastroduodenoscopy, laparoscopic esophagogastrectomy on 5/16/23.     1. HTN  - currently well controlled off meds  - can use IV hydralazine if SBP > 180 systolic  - previously taken of Chlorthalidone due to hypokalemia  - tele stable    2. Post OP  --tolerated procedure well in terms of cv perspective   --pain management and PT       Makenzie Murillo MD

## 2023-05-21 NOTE — PROGRESS NOTE ADULT - SUBJECTIVE AND OBJECTIVE BOX
CHIEF COMPLAINT: FOLLOW UP IN ICU FOR POSTOPERATIVE CARE OF PATIENT WHO IS S/P ESOPHAGECTOMY      PROCEDURES: Robotic R VATS, [place] [facility] esophagectomy  16-May-2023      ISSUES:   Esophageal cancer  Post op pain  Chest tube in place  Multiple sclerosis  HTN  GERD  Hx of Hiatal hernia s/p Nissen fundoplication repair  S/p Spinal cord stimulator placement  Bifascicular block    INTERVAL EVENTS:   High output from NG tube       HISTORY:   Patient reports moderate pain at chest wall incision sites which is worse with coughing and deep breathing without associated fever or dyspnea. Pain is improved with use of pain meds.     PHYSICAL EXAM:   Gen: Comfortable, No acute distress  Eyes: Sclera white, Conjunctiva normal, Eyelids normal, Pupils symmetrical   ENT: Mucous membranes moist, NG tube in place,  ,    Neck: Trachea midline,  ,  ,  ,  ,  ,    CV: Rate regular, Rhythm regular,  ,  ,    Resp: Breath sounds clear, No accessory muscles use, R chest tube  Abd: Soft, Non-distended, Non-tender,   ,  ,  ,    Skin: Warm, No peripheral edema of lower extremities,  ,    : No celis  Neuro: Moving all 4 extremities,    Psych: A&Ox3      ASSESSMENT AND PLAN:     NEURO:  Post-operative Pain - Stable. Pain control with PCA and Tylenol IV PRN.     Multiple sclerosis - stable.  Valium IV PRN muscle spasm.   - Pseudoflare per Neurology consult  - Check TSH, B12, Lyme AB, RPR, JOSE RAUL, SLE, Sjogren, ACE, HIV, HTLV -- requested by Neurology    RESPIRATORY:  Stable on room air - Incentive spirometry. Chest PT and suctioning of secretions. Out of bed to chair and ambulate with assistance. Continuous pulse oximetry for support & to prevent decompensation.    Chest tube – Continue Bulb suction        CARDIOVASCULAR:  Hemodynamically stable - Not on pressors. Continue hemodynamic monitoring.  Telemetry (medical test) - Reviewed by me today independently. Normal sinus rhythm.  HTN - stable. Hydralazine IV PRN.               RENAL:  Stable - Monitor IOs and electrolytes. Keep K above 4.0 and Mg above 2.0.           GASTROINTESTINAL:  GI prophylaxis not indicated  Zofran and Reglan IV PRN for nausea  NPO until swallow study     D/C NG TUBE despite high output -- discussed with Thoracic Surgeon  GERD - stable. Continue pantoprazole           HEMATOLOGIC:  No signs of active bleeding. Monitor Hgb in CBC in AM  DVT prophylaxis with heparin subQ and SCDs.      Screening DVT study of bilateral legs and RUE.         INFECTIOUS DISEASE:  All surgical sites appear clean. No signs of active infection. Will monitor for fever and leukocytosis.       ENDOCRINE:  Stable – Monitor glucose fingersticks for goal 120-180.               ONCOLOGY:  Esophageal cancer - Improved. S/P resection. Follow up final pathology.           Pertinent clinical, laboratory, radiographic, hemodynamic, echocardiographic, respiratory data, microbiologic data and chart were reviewed by myself and analyzed frequently throughout the course of the day and night by myself.    Plan discussed at length with the CTICU staff and Attending CT Surgeon -   Dr Preston Altamirano.      Patient's status was discussed with patient at bedside.      _________________________  VITAL SIGNS:  Vital Signs Last 24 Hrs  T(C): 37 (21 May 2023 08:00), Max: 37.3 (20 May 2023 20:00)  T(F): 98.6 (21 May 2023 08:00), Max: 99.2 (20 May 2023 20:00)  HR: 69 (21 May 2023 10:00) (58 - 85)  BP: 150/70 (21 May 2023 10:00) (123/99 - 176/78)  BP(mean): 94 (21 May 2023 10:00) (86 - 115)  RR: 17 (21 May 2023 10:00) (13 - 22)  SpO2: 96% (21 May 2023 10:00) (95% - 100%)    Parameters below as of 21 May 2023 09:00  Patient On (Oxygen Delivery Method): room air      I/Os:   I&O's Detail    20 May 2023 07:01  -  21 May 2023 07:00  --------------------------------------------------------  IN:    dextrose 5% + lactated ringers w/ Additives: 600 mL    dextrose 5% + lactated ringers w/ Additives: 960 mL    Enteral Tube Flush: 240 mL    IV PiggyBack: 899.8 mL  Total IN: 2699.8 mL    OUT:    Bulb (mL): 120 mL    Chest Tube (mL): 30 mL    Nasogastric/Oral tube (mL): 850 mL    Voided (mL): 1600 mL  Total OUT: 2600 mL    Total NET: 99.8 mL      21 May 2023 07:01  -  21 May 2023 10:50  --------------------------------------------------------  IN:    dextrose 5% + lactated ringers w/ Additives: 180 mL  Total IN: 180 mL    OUT:    Nasogastric/Oral tube (mL): 0 mL    Voided (mL): 300 mL  Total OUT: 300 mL    Total NET: -120 mL              MEDICATIONS:  MEDICATIONS  (STANDING):  acetaminophen   IVPB .. 1000 milliGRAM(s) IV Intermittent once  albuterol    0.083% 2.5 milliGRAM(s) Nebulizer every 6 hours  dextrose 5% + lactated ringers 1000 milliLiter(s) (60 mL/Hr) IV Continuous <Continuous>  dornase bridget Solution 2.5 milliGRAM(s) Inhalation daily  heparin   Injectable 5000 Unit(s) SubCutaneous every 8 hours  lidocaine   4% Patch 1 Patch Transdermal daily  lidocaine   4% Patch 1 Patch Transdermal daily  pantoprazole  Injectable 40 milliGRAM(s) IV Push at bedtime  sodium chloride 3%  Inhalation 4 milliLiter(s) Inhalation every 6 hours    MEDICATIONS  (PRN):  diazepam  Injectable 3 milliGRAM(s) IV Push every 8 hours PRN muscle spasms  hydrALAZINE Injectable 5 milliGRAM(s) IV Push every 4 hours PRN SBP>160  HYDROmorphone  Injectable 0.3 milliGRAM(s) IV Push every 3 hours PRN Moderate - Severe Pain (4 - 10)  HYDROmorphone  Injectable 0.25 milliGRAM(s) IV Push every 3 hours PRN Severe Breakthrough Pain (7 - 10)  naloxone Injectable 0.1 milliGRAM(s) IV Push every 3 minutes PRN For ANY of the following changes in patient status:  A. RR LESS THAN 10 breaths per minute, B. Oxygen saturation LESS THAN 90%, C. Sedation score of 6  ondansetron Injectable 4 milliGRAM(s) IV Push every 6 hours PRN Nausea      LABS:  Laboratory data was independently reviewed by me today.                           9.3    7.02  )-----------( 229      ( 21 May 2023 03:53 )             28.7     05-21    142  |  106  |  9   ----------------------------<  112<H>  4.0   |  24  |  0.34<L>    Ca    8.8      21 May 2023 03:53  Phos  2.8     05-21  Mg     1.90     05-21                RADIOLOGY:   Radiology images were independently reviewed by me today. Reports were reviewed by me today.    Xray Chest 1 View- PORTABLE-Routine:   ACC: [id number] EXAM:  XR CHEST PORTABLE ROUTINE 1V   ORDERED BY: BECCA GILL     PROCEDURE DATE:  05/19/2023          INTERPRETATION:  CLINICAL INFORMATION: Follow-up post esophagectomy.    TIME OF EXAMINATION: May 19 at 5:24 AM    EXAM: AP chest    FINDINGS:  An enteric tube, 2 right-sided chest tubes and a stimulating device are   present. Improving aeration at the right lung base. Upper lobes are clear.    No effusions or pneumothorax.        COMPARISON: May 18        IMPRESSION: Status post esophagectomy.    --- End of Report ---            LESLI HENDRICKS MD; Attending Radiologist  This document has been electronically signed. May 19 2023  8:27PM (05-19-23 @ 06:54)  Xray Chest 1 View- PORTABLE-Routine:   ACC: [id number] EXAM:  XR CHEST PORTABLE ROUTINE 1V   ORDERED BY: AUGUST ANGUIANO     PROCEDURE DATE:  05/18/2023          INTERPRETATION:  CLINICAL INFORMATION: Follow-up post esophagectomy    TIME OF EXAMINATION: May 18 at 5:55 AM    EXAM: Portablechest    FINDINGS:  The enteric and right-sided chest tubes in addition to the nerve   stimulator are unchanged.    Small left effusion with underlying atelectasis mildly improved. Both   upper lobes are clear. The heart is not enlarged.    No pneumothorax.        COMPARISON: May 17        IMPRESSION: Follow-up post esophagectomy with tubes and lines in place   and residual small left effusion with underlying atelectasis.    --- End of Report ---            LESLI HENDRICKS MD; Attending Radiologist  This document has been electronically signed. May 18 2023 11:50AM (05-18-23 @ 07:07)  Xray Chest 1 View- PORTABLE-Routine:   ACC: [id number] EXAM:  XR CHEST PORTABLE ROUTINE 1V   ORDERED BY: EVELINE KATZ     ACC: [id number] EXAM:  XR CHEST PORTABLE URGENT 1V   ORDERED BY: LOC COTTON     PROCEDURE DATE:  05/16/2023          INTERPRETATION:  EXAMINATION: XR CHEST URGENT, XR CHEST    CLINICAL INDICATION: s/p esophagectomy    TECHNIQUE: Single frontal, portable view of the chest was obtained.    COMPARISON: Chest x-ray 5/24/2022.    FINDINGS:  May 16 at 6:51 PM:  There is an enteric tube, coursing below the diaphragm, with its   side-port overlying the stomach and tip out of the field of view.  2 right-sided chest tubes in place.  Spinal stimulator leads are partially visualized.  The heart size cannot be accurately assessed in this projection.  No focal consolidation.  Small left pleural effusion.  There is no pneumothorax or pneumomediastinum.  No acute bony abnormality.    May 17 at 1:24 AM:  Enteric tube with sidehole and tip in the stomach.  No significant change from the study of the day before.    IMPRESSION:  Lines, tubes, and devices, as above.  No significant pneumothorax or pneumomediastinum.  Trace left pleural effusion.    --- End of Report ---          MERLE ENCARNACION MD; Resident Radiologist  This document has been electronically signed.  LESLI HENDRICKS MD; Attending Radiologist  This document has been electronically signed. May 17 2023 11:13AM (05-17-23 @ 05:47)       CHIEF COMPLAINT: FOLLOW UP IN ICU FOR POSTOPERATIVE CARE OF PATIENT WHO IS S/P ESOPHAGECTOMY      PROCEDURES: Robotic R VATS, [place] [facility] esophagectomy  16-May-2023      ISSUES:   Esophageal cancer  Post op pain  Chest tube in place  Multiple sclerosis  HTN  GERD  Hx of Hiatal hernia s/p Nissen fundoplication repair  S/p Spinal cord stimulator placement  Bifascicular block    INTERVAL EVENTS:   High output from NG tube   NG tube pulled back per Thoracic Surgeon  Abd XR without obstruction  Pt had small BM in afternoon      HISTORY:   Patient reports moderate pain at chest wall incision sites which is worse with coughing and deep breathing without associated fever or dyspnea. Pain is improved with use of pain meds.     PHYSICAL EXAM:   Gen: Comfortable, No acute distress  Eyes: Sclera white, Conjunctiva normal, Eyelids normal, Pupils symmetrical   ENT: Mucous membranes moist, NG tube in place,  ,    Neck: Trachea midline,  ,  ,  ,  ,  ,    CV: Rate regular, Rhythm regular,  ,  ,    Resp: Breath sounds clear, No accessory muscles use, R chest tube  Abd: Soft, Non-distended, Non-tender,   ,  ,  ,    Skin: Warm, No peripheral edema of lower extremities,  ,    : No celis  Neuro: Moving all 4 extremities,    Psych: A&Ox3      ASSESSMENT AND PLAN:     NEURO:  Post-operative Pain - Stable. Pain control with PCA and Tylenol IV PRN.     Multiple sclerosis - stable.  Valium IV PRN muscle spasm.   - Pseudoflare per Neurology consult  - Check TSH, B12, Lyme AB, RPR, JOSE RAUL, SLE, Sjogren, ACE, HIV, HTLV -- requested by Neurology    RESPIRATORY:  Stable on room air - Incentive spirometry. Chest PT and suctioning of secretions. Out of bed to chair and ambulate with assistance. Continuous pulse oximetry for support & to prevent decompensation.    Chest tube – Continue Bulb suction        CARDIOVASCULAR:  Hemodynamically stable - Not on pressors. Continue hemodynamic monitoring.  Telemetry (medical test) - Reviewed by me today independently. Normal sinus rhythm.  HTN - stable. Hydralazine IV PRN.               RENAL:  Stable - Monitor IOs and electrolytes. Keep K above 4.0 and Mg above 2.0.           GASTROINTESTINAL:  GI prophylaxis not indicated  Zofran and Reglan IV PRN for nausea  NPO until swallow study     NG tube to low suction  GERD - stable. Continue pantoprazole           HEMATOLOGIC:  No signs of active bleeding. Monitor Hgb in CBC in AM  DVT prophylaxis with heparin subQ and SCDs.      Screening DVT study of bilateral legs and RUE.         INFECTIOUS DISEASE:  All surgical sites appear clean. No signs of active infection. Will monitor for fever and leukocytosis.       ENDOCRINE:  Stable – Monitor glucose fingersticks for goal 120-180.               ONCOLOGY:  Esophageal cancer - Improved. S/P resection. Follow up final pathology.           Pertinent clinical, laboratory, radiographic, hemodynamic, echocardiographic, respiratory data, microbiologic data and chart were reviewed by myself and analyzed frequently throughout the course of the day and night by myself.    Plan discussed at length with the CTICU staff and Attending CT Surgeon -   Dr Preston Altamirano.      Patient's status was discussed with patient at bedside.      _________________________  VITAL SIGNS:  Vital Signs Last 24 Hrs  T(C): 37 (21 May 2023 08:00), Max: 37.3 (20 May 2023 20:00)  T(F): 98.6 (21 May 2023 08:00), Max: 99.2 (20 May 2023 20:00)  HR: 69 (21 May 2023 10:00) (58 - 85)  BP: 150/70 (21 May 2023 10:00) (123/99 - 176/78)  BP(mean): 94 (21 May 2023 10:00) (86 - 115)  RR: 17 (21 May 2023 10:00) (13 - 22)  SpO2: 96% (21 May 2023 10:00) (95% - 100%)    Parameters below as of 21 May 2023 09:00  Patient On (Oxygen Delivery Method): room air      I/Os:   I&O's Detail    20 May 2023 07:01  -  21 May 2023 07:00  --------------------------------------------------------  IN:    dextrose 5% + lactated ringers w/ Additives: 600 mL    dextrose 5% + lactated ringers w/ Additives: 960 mL    Enteral Tube Flush: 240 mL    IV PiggyBack: 899.8 mL  Total IN: 2699.8 mL    OUT:    Bulb (mL): 120 mL    Chest Tube (mL): 30 mL    Nasogastric/Oral tube (mL): 850 mL    Voided (mL): 1600 mL  Total OUT: 2600 mL    Total NET: 99.8 mL      21 May 2023 07:01  -  21 May 2023 10:50  --------------------------------------------------------  IN:    dextrose 5% + lactated ringers w/ Additives: 180 mL  Total IN: 180 mL    OUT:    Nasogastric/Oral tube (mL): 0 mL    Voided (mL): 300 mL  Total OUT: 300 mL    Total NET: -120 mL              MEDICATIONS:  MEDICATIONS  (STANDING):  acetaminophen   IVPB .. 1000 milliGRAM(s) IV Intermittent once  albuterol    0.083% 2.5 milliGRAM(s) Nebulizer every 6 hours  dextrose 5% + lactated ringers 1000 milliLiter(s) (60 mL/Hr) IV Continuous <Continuous>  dornase bridget Solution 2.5 milliGRAM(s) Inhalation daily  heparin   Injectable 5000 Unit(s) SubCutaneous every 8 hours  lidocaine   4% Patch 1 Patch Transdermal daily  lidocaine   4% Patch 1 Patch Transdermal daily  pantoprazole  Injectable 40 milliGRAM(s) IV Push at bedtime  sodium chloride 3%  Inhalation 4 milliLiter(s) Inhalation every 6 hours    MEDICATIONS  (PRN):  diazepam  Injectable 3 milliGRAM(s) IV Push every 8 hours PRN muscle spasms  hydrALAZINE Injectable 5 milliGRAM(s) IV Push every 4 hours PRN SBP>160  HYDROmorphone  Injectable 0.3 milliGRAM(s) IV Push every 3 hours PRN Moderate - Severe Pain (4 - 10)  HYDROmorphone  Injectable 0.25 milliGRAM(s) IV Push every 3 hours PRN Severe Breakthrough Pain (7 - 10)  naloxone Injectable 0.1 milliGRAM(s) IV Push every 3 minutes PRN For ANY of the following changes in patient status:  A. RR LESS THAN 10 breaths per minute, B. Oxygen saturation LESS THAN 90%, C. Sedation score of 6  ondansetron Injectable 4 milliGRAM(s) IV Push every 6 hours PRN Nausea      LABS:  Laboratory data was independently reviewed by me today.                           9.3    7.02  )-----------( 229      ( 21 May 2023 03:53 )             28.7     05-21    142  |  106  |  9   ----------------------------<  112<H>  4.0   |  24  |  0.34<L>    Ca    8.8      21 May 2023 03:53  Phos  2.8     05-21  Mg     1.90     05-21                RADIOLOGY:   Radiology images were independently reviewed by me today. Reports were reviewed by me today.    Xray Chest 1 View- PORTABLE-Routine:   ACC: [id number] EXAM:  XR CHEST PORTABLE ROUTINE 1V   ORDERED BY: BECCA GILL     PROCEDURE DATE:  05/19/2023          INTERPRETATION:  CLINICAL INFORMATION: Follow-up post esophagectomy.    TIME OF EXAMINATION: May 19 at 5:24 AM    EXAM: AP chest    FINDINGS:  An enteric tube, 2 right-sided chest tubes and a stimulating device are   present. Improving aeration at the right lung base. Upper lobes are clear.    No effusions or pneumothorax.        COMPARISON: May 18        IMPRESSION: Status post esophagectomy.    --- End of Report ---            LESLI HENDRICKS MD; Attending Radiologist  This document has been electronically signed. May 19 2023  8:27PM (05-19-23 @ 06:54)  Xray Chest 1 View- PORTABLE-Routine:   ACC: [id number] EXAM:  XR CHEST PORTABLE ROUTINE 1V   ORDERED BY: AUGUST ANGUIANO     PROCEDURE DATE:  05/18/2023          INTERPRETATION:  CLINICAL INFORMATION: Follow-up post esophagectomy    TIME OF EXAMINATION: May 18 at 5:55 AM    EXAM: Portablechest    FINDINGS:  The enteric and right-sided chest tubes in addition to the nerve   stimulator are unchanged.    Small left effusion with underlying atelectasis mildly improved. Both   upper lobes are clear. The heart is not enlarged.    No pneumothorax.        COMPARISON: May 17        IMPRESSION: Follow-up post esophagectomy with tubes and lines in place   and residual small left effusion with underlying atelectasis.    --- End of Report ---            LESLI HENDRICKS MD; Attending Radiologist  This document has been electronically signed. May 18 2023 11:50AM (05-18-23 @ 07:07)  Xray Chest 1 View- PORTABLE-Routine:   ACC: [id number] EXAM:  XR CHEST PORTABLE ROUTINE 1V   ORDERED BY: EVELINE KATZ     ACC: [id number] EXAM:  XR CHEST PORTABLE URGENT 1V   ORDERED BY: LOC COTTON     PROCEDURE DATE:  05/16/2023          INTERPRETATION:  EXAMINATION: XR CHEST URGENT, XR CHEST    CLINICAL INDICATION: s/p esophagectomy    TECHNIQUE: Single frontal, portable view of the chest was obtained.    COMPARISON: Chest x-ray 5/24/2022.    FINDINGS:  May 16 at 6:51 PM:  There is an enteric tube, coursing below the diaphragm, with its   side-port overlying the stomach and tip out of the field of view.  2 right-sided chest tubes in place.  Spinal stimulator leads are partially visualized.  The heart size cannot be accurately assessed in this projection.  No focal consolidation.  Small left pleural effusion.  There is no pneumothorax or pneumomediastinum.  No acute bony abnormality.    May 17 at 1:24 AM:  Enteric tube with sidehole and tip in the stomach.  No significant change from the study of the day before.    IMPRESSION:  Lines, tubes, and devices, as above.  No significant pneumothorax or pneumomediastinum.  Trace left pleural effusion.    --- End of Report ---          MERLE ENCARNACION MD; Resident Radiologist  This document has been electronically signed.  LESLI HENDRICKS MD; Attending Radiologist  This document has been electronically signed. May 17 2023 11:13AM (05-17-23 @ 05:47)

## 2023-05-22 LAB
ANION GAP SERPL CALC-SCNC: 10 MMOL/L — SIGNIFICANT CHANGE UP (ref 7–14)
BUN SERPL-MCNC: 10 MG/DL — SIGNIFICANT CHANGE UP (ref 7–23)
CALCIUM SERPL-MCNC: 9.3 MG/DL — SIGNIFICANT CHANGE UP (ref 8.4–10.5)
CHLORIDE SERPL-SCNC: 103 MMOL/L — SIGNIFICANT CHANGE UP (ref 98–107)
CO2 SERPL-SCNC: 22 MMOL/L — SIGNIFICANT CHANGE UP (ref 22–31)
CREAT SERPL-MCNC: 0.34 MG/DL — LOW (ref 0.5–1.3)
EGFR: 108 ML/MIN/1.73M2 — SIGNIFICANT CHANGE UP
GLUCOSE BLDC GLUCOMTR-MCNC: 120 MG/DL — HIGH (ref 70–99)
GLUCOSE SERPL-MCNC: 104 MG/DL — HIGH (ref 70–99)
HCT VFR BLD CALC: 31.3 % — LOW (ref 34.5–45)
HGB BLD-MCNC: 10.3 G/DL — LOW (ref 11.5–15.5)
HIV 1+2 AB+HIV1 P24 AG SERPL QL IA: SIGNIFICANT CHANGE UP
MAGNESIUM SERPL-MCNC: 1.8 MG/DL — SIGNIFICANT CHANGE UP (ref 1.6–2.6)
MCHC RBC-ENTMCNC: 30.8 PG — SIGNIFICANT CHANGE UP (ref 27–34)
MCHC RBC-ENTMCNC: 32.9 GM/DL — SIGNIFICANT CHANGE UP (ref 32–36)
MCV RBC AUTO: 93.7 FL — SIGNIFICANT CHANGE UP (ref 80–100)
NRBC # BLD: 0 /100 WBCS — SIGNIFICANT CHANGE UP (ref 0–0)
NRBC # FLD: 0.02 K/UL — HIGH (ref 0–0)
PHOSPHATE SERPL-MCNC: 2.9 MG/DL — SIGNIFICANT CHANGE UP (ref 2.5–4.5)
PLATELET # BLD AUTO: 254 K/UL — SIGNIFICANT CHANGE UP (ref 150–400)
POTASSIUM SERPL-MCNC: 3.7 MMOL/L — SIGNIFICANT CHANGE UP (ref 3.5–5.3)
POTASSIUM SERPL-SCNC: 3.7 MMOL/L — SIGNIFICANT CHANGE UP (ref 3.5–5.3)
PTH-INTACT FLD-MCNC: 45 PG/ML — SIGNIFICANT CHANGE UP (ref 15–65)
RBC # BLD: 3.34 M/UL — LOW (ref 3.8–5.2)
RBC # FLD: 13.9 % — SIGNIFICANT CHANGE UP (ref 10.3–14.5)
SODIUM SERPL-SCNC: 135 MMOL/L — SIGNIFICANT CHANGE UP (ref 135–145)
TSH SERPL-MCNC: 2.13 UIU/ML — SIGNIFICANT CHANGE UP (ref 0.27–4.2)
VIT B12 SERPL-MCNC: 1418 PG/ML — HIGH (ref 200–900)
WBC # BLD: 8.77 K/UL — SIGNIFICANT CHANGE UP (ref 3.8–10.5)
WBC # FLD AUTO: 8.77 K/UL — SIGNIFICANT CHANGE UP (ref 3.8–10.5)

## 2023-05-22 PROCEDURE — 71045 X-RAY EXAM CHEST 1 VIEW: CPT | Mod: 26

## 2023-05-22 PROCEDURE — 99233 SBSQ HOSP IP/OBS HIGH 50: CPT

## 2023-05-22 PROCEDURE — 93970 EXTREMITY STUDY: CPT | Mod: 26

## 2023-05-22 PROCEDURE — 93971 EXTREMITY STUDY: CPT | Mod: 26

## 2023-05-22 RX ORDER — ACETAMINOPHEN 500 MG
1000 TABLET ORAL ONCE
Refills: 0 | Status: COMPLETED | OUTPATIENT
Start: 2023-05-22 | End: 2023-05-22

## 2023-05-22 RX ORDER — POTASSIUM CHLORIDE 20 MEQ
10 PACKET (EA) ORAL
Refills: 0 | Status: COMPLETED | OUTPATIENT
Start: 2023-05-22 | End: 2023-05-22

## 2023-05-22 RX ADMIN — HYDROMORPHONE HYDROCHLORIDE 0.3 MILLIGRAM(S): 2 INJECTION INTRAMUSCULAR; INTRAVENOUS; SUBCUTANEOUS at 03:55

## 2023-05-22 RX ADMIN — HYDROMORPHONE HYDROCHLORIDE 0.3 MILLIGRAM(S): 2 INJECTION INTRAMUSCULAR; INTRAVENOUS; SUBCUTANEOUS at 08:39

## 2023-05-22 RX ADMIN — HYDROMORPHONE HYDROCHLORIDE 0.3 MILLIGRAM(S): 2 INJECTION INTRAMUSCULAR; INTRAVENOUS; SUBCUTANEOUS at 09:00

## 2023-05-22 RX ADMIN — Medication 1000 MILLIGRAM(S): at 00:15

## 2023-05-22 RX ADMIN — HYDROMORPHONE HYDROCHLORIDE 0.3 MILLIGRAM(S): 2 INJECTION INTRAMUSCULAR; INTRAVENOUS; SUBCUTANEOUS at 19:00

## 2023-05-22 RX ADMIN — Medication 100 MILLIEQUIVALENT(S): at 12:30

## 2023-05-22 RX ADMIN — Medication 400 MILLIGRAM(S): at 00:00

## 2023-05-22 RX ADMIN — LIDOCAINE 1 PATCH: 4 CREAM TOPICAL at 11:47

## 2023-05-22 RX ADMIN — SODIUM CHLORIDE 4 MILLILITER(S): 9 INJECTION INTRAMUSCULAR; INTRAVENOUS; SUBCUTANEOUS at 16:37

## 2023-05-22 RX ADMIN — Medication 100 MILLIEQUIVALENT(S): at 14:01

## 2023-05-22 RX ADMIN — HEPARIN SODIUM 5000 UNIT(S): 5000 INJECTION INTRAVENOUS; SUBCUTANEOUS at 21:38

## 2023-05-22 RX ADMIN — Medication 400 MILLIGRAM(S): at 21:30

## 2023-05-22 RX ADMIN — HEPARIN SODIUM 5000 UNIT(S): 5000 INJECTION INTRAVENOUS; SUBCUTANEOUS at 14:02

## 2023-05-22 RX ADMIN — ALBUTEROL 2.5 MILLIGRAM(S): 90 AEROSOL, METERED ORAL at 04:15

## 2023-05-22 RX ADMIN — Medication 5 MILLIGRAM(S): at 14:02

## 2023-05-22 RX ADMIN — HEPARIN SODIUM 5000 UNIT(S): 5000 INJECTION INTRAVENOUS; SUBCUTANEOUS at 06:39

## 2023-05-22 RX ADMIN — DORNASE ALFA 2.5 MILLIGRAM(S): 1 SOLUTION RESPIRATORY (INHALATION) at 11:02

## 2023-05-22 RX ADMIN — LIDOCAINE 1 PATCH: 4 CREAM TOPICAL at 20:00

## 2023-05-22 RX ADMIN — HYDROMORPHONE HYDROCHLORIDE 0.3 MILLIGRAM(S): 2 INJECTION INTRAMUSCULAR; INTRAVENOUS; SUBCUTANEOUS at 23:00

## 2023-05-22 RX ADMIN — HYDROMORPHONE HYDROCHLORIDE 0.3 MILLIGRAM(S): 2 INJECTION INTRAMUSCULAR; INTRAVENOUS; SUBCUTANEOUS at 03:40

## 2023-05-22 RX ADMIN — HYDROMORPHONE HYDROCHLORIDE 0.3 MILLIGRAM(S): 2 INJECTION INTRAMUSCULAR; INTRAVENOUS; SUBCUTANEOUS at 18:45

## 2023-05-22 RX ADMIN — PANTOPRAZOLE SODIUM 40 MILLIGRAM(S): 20 TABLET, DELAYED RELEASE ORAL at 21:38

## 2023-05-22 RX ADMIN — ALBUTEROL 2.5 MILLIGRAM(S): 90 AEROSOL, METERED ORAL at 22:22

## 2023-05-22 RX ADMIN — HYDROMORPHONE HYDROCHLORIDE 0.3 MILLIGRAM(S): 2 INJECTION INTRAMUSCULAR; INTRAVENOUS; SUBCUTANEOUS at 22:45

## 2023-05-22 RX ADMIN — Medication 100 MILLIEQUIVALENT(S): at 11:47

## 2023-05-22 RX ADMIN — ALBUTEROL 2.5 MILLIGRAM(S): 90 AEROSOL, METERED ORAL at 11:02

## 2023-05-22 RX ADMIN — SODIUM CHLORIDE 60 MILLILITER(S): 9 INJECTION, SOLUTION INTRAVENOUS at 21:38

## 2023-05-22 RX ADMIN — Medication 5 MILLIGRAM(S): at 03:04

## 2023-05-22 RX ADMIN — SODIUM CHLORIDE 4 MILLILITER(S): 9 INJECTION INTRAMUSCULAR; INTRAVENOUS; SUBCUTANEOUS at 11:03

## 2023-05-22 RX ADMIN — Medication 1000 MILLIGRAM(S): at 21:45

## 2023-05-22 RX ADMIN — ALBUTEROL 2.5 MILLIGRAM(S): 90 AEROSOL, METERED ORAL at 16:36

## 2023-05-22 RX ADMIN — HYDROMORPHONE HYDROCHLORIDE 0.3 MILLIGRAM(S): 2 INJECTION INTRAMUSCULAR; INTRAVENOUS; SUBCUTANEOUS at 12:15

## 2023-05-22 RX ADMIN — LIDOCAINE 1 PATCH: 4 CREAM TOPICAL at 00:00

## 2023-05-22 RX ADMIN — HYDROMORPHONE HYDROCHLORIDE 0.3 MILLIGRAM(S): 2 INJECTION INTRAMUSCULAR; INTRAVENOUS; SUBCUTANEOUS at 11:53

## 2023-05-22 RX ADMIN — SODIUM CHLORIDE 4 MILLILITER(S): 9 INJECTION INTRAMUSCULAR; INTRAVENOUS; SUBCUTANEOUS at 04:16

## 2023-05-22 RX ADMIN — LIDOCAINE 1 PATCH: 4 CREAM TOPICAL at 23:20

## 2023-05-22 RX ADMIN — SODIUM CHLORIDE 4 MILLILITER(S): 9 INJECTION INTRAMUSCULAR; INTRAVENOUS; SUBCUTANEOUS at 22:23

## 2023-05-22 NOTE — PROGRESS NOTE ADULT - SUBJECTIVE AND OBJECTIVE BOX
Date of service 5/22/23    chief complaint: surgery    extended hpi: 77 y/o female with a pmh of MS, HTN and  esophageal cancer s/p chemo and radiation completed March 3rd, 2023 presented for elective robotic esophagogastroduodenoscopy, laparoscopic esophagogastrectomy on 5/16/23.       S: no chest pain or sob; ros otherwise negative.       Review of Systems:   Constitutional: [ ] fevers, [ ] chills.   Skin: [ ] dry skin. [ ] rashes.  Psychiatric: [ ] depression, [ ] anxiety.   Gastrointestinal: [ ] BRBPR, [ ] melena.   Neurological: [ ] confusion. [ ] seizures. [ ] shuffling gait.   Ears,Nose,Mouth and Throat: [ ] ear pain [ ] sore throat.   Eyes: [ ] diplopia.   Respiratory: [ ] hemoptysis. [ ] shortness of breath  Cardiovascular: See HPI above  Hematologic/Lymphatic: [ ] anemia. [ ] painful nodes. [ ] prolonged bleeding.   Genitourinary: [ ] hematuria. [ ] flank pain.   Endocrine: [ ] significant change in weight. [ ] intolerance to heat and cold.     Review of systems [x ] otherwise negative, [ ] otherwise unable to obtain    FH: no family history of sudden cardiac death in first degree relatives    SH: [ ] tobacco, [ ] alcohol, [ ] drugs    acetaminophen   IVPB .. 1000 milliGRAM(s) IV Intermittent once  albuterol    0.083% 2.5 milliGRAM(s) Nebulizer every 6 hours  dextrose 5% + lactated ringers 1000 milliLiter(s) IV Continuous <Continuous>  diazepam  Injectable 3 milliGRAM(s) IV Push every 8 hours PRN  dornase bridget Solution 2.5 milliGRAM(s) Inhalation daily  heparin   Injectable 5000 Unit(s) SubCutaneous every 8 hours  hydrALAZINE Injectable 5 milliGRAM(s) IV Push every 4 hours PRN  HYDROmorphone  Injectable 0.3 milliGRAM(s) IV Push every 3 hours PRN  HYDROmorphone  Injectable 0.25 milliGRAM(s) IV Push every 3 hours PRN  lidocaine   4% Patch 1 Patch Transdermal daily  lidocaine   4% Patch 1 Patch Transdermal daily  naloxone Injectable 0.1 milliGRAM(s) IV Push every 3 minutes PRN  ondansetron Injectable 4 milliGRAM(s) IV Push every 6 hours PRN  pantoprazole  Injectable 40 milliGRAM(s) IV Push at bedtime  potassium chloride  10 mEq/100 mL IVPB 10 milliEquivalent(s) IV Intermittent every 1 hour  sodium chloride 3%  Inhalation 4 milliLiter(s) Inhalation every 6 hours                            10.3   8.77  )-----------( 254      ( 22 May 2023 05:00 )             31.3       135  |  103  |  10  ----------------------------<  104<H>  3.7   |  22  |  0.34<L>    Ca    9.3      22 May 2023 05:00  Phos  2.9     05-22  Mg     1.80     05-22      T(C): 36.9 (05-22-23 @ 12:00), Max: 37.3 (05-21-23 @ 16:00)  HR: 88 (05-22-23 @ 13:00) (59 - 98)  BP: 166/91 (05-22-23 @ 13:00) (126/62 - 175/89)  RR: 18 (05-22-23 @ 13:00) (13 - 25)  SpO2: 93% (05-22-23 @ 13:00) (93% - 100%)  Wt(kg): --    I&O's Summary    21 May 2023 07:01  -  22 May 2023 07:00  --------------------------------------------------------  IN: 1880 mL / OUT: 2210 mL / NET: -330 mL    22 May 2023 07:01  -  22 May 2023 13:49  --------------------------------------------------------  IN: 440 mL / OUT: 200 mL / NET: 240 mL      General: Alert and Oriented * 3.   Head: Normocephalic and atraumatic.   Neck: No JVD. No bruits. Supple. Does not appear to be enlarged.   Cardiovascular: + S1,S2 ; RRR Soft systolic murmur at the left lower sternal border. No rubs noted.    Lungs: CTA b/l. No rhonchi, rales or wheezes.   Abdomen: + BS, soft. Non tender. Non distended. No rebound. No guarding.   Extremities: No clubbing/cyanosis/edema.   Neurologic: Moves all four extremities. Full range of motion.   Skin: Warm and moist. The patient's skin has normal elasticity and good skin turgor.   Psychiatric: Appropriate mood and affect.  Musculoskeletal: Normal range of motion, normal strength    DATA    tele- SR  ECG:  NSR Bifascular block	  TTE 03/2023: Preserved LVEF  Cath: Non obstructive CAD    ASSESSMENT/PLAN: 	Pt is a 77 y/o female with a pmh of MS, HTN and  esophageal cancer s/p chemo and radiation completed March 3rd, 2023 presented for elective robotic esophagogastroduodenoscopy, laparoscopic esophagogastrectomy on 5/16/23.     1. HTN  - can use IV hydralazine if SBP > 180 systolic and NPO  - previously taken of Chlorthalidone due to hypokalemia    2. Post OP  --tolerated procedure well in terms of cv perspective   --pain management and PT  --NSVT on tele, Keep K >4-4.5 and Mag 2-2.5, TTE ordered by primary team, recent TTE as noted above with Normal LV function

## 2023-05-22 NOTE — PROGRESS NOTE ADULT - SUBJECTIVE AND OBJECTIVE BOX
Surgery Progress Note      SUBJECTIVE: Patient seen and examined at bedside with surgical team. No acute events overnight. Feels well, has been sitting in chair for most of the day.     OBJECTIVE:    Vital Signs Last 24 Hrs  T(C): 36.9 (22 May 2023 12:00), Max: 37.3 (21 May 2023 16:00)  T(F): 98.4 (22 May 2023 12:00), Max: 99.1 (21 May 2023 16:00)  HR: 88 (22 May 2023 13:00) (59 - 98)  BP: 145/84 (22 May 2023 13:00) (126/62 - 175/89)  BP(mean): 104 (22 May 2023 13:00) (80 - 125)  RR: 18 (22 May 2023 13:00) (13 - 25)  SpO2: 93% (22 May 2023 13:00) (93% - 100%)    Parameters below as of 22 May 2023 13:00  Patient On (Oxygen Delivery Method): room air    I&O's Detail    21 May 2023 07:01  -  22 May 2023 07:00  --------------------------------------------------------  IN:    dextrose 5% + lactated ringers w/ Additives: 1440 mL    Enteral Tube Flush: 240 mL    IV PiggyBack: 200 mL  Total IN: 1880 mL    OUT:    Bulb (mL): 210 mL    Nasogastric/Oral tube (mL): 1000 mL    Voided (mL): 1000 mL  Total OUT: 2210 mL    Total NET: -330 mL      22 May 2023 07:01  -  22 May 2023 13:23  --------------------------------------------------------  IN:    dextrose 5% + lactated ringers w/ Additives: 360 mL    Enteral Tube Flush: 80 mL  Total IN: 440 mL    OUT:    Voided (mL): 200 mL  Total OUT: 200 mL    Total NET: 240 mL      MEDICATIONS  (STANDING):  acetaminophen   IVPB .. 1000 milliGRAM(s) IV Intermittent once  albuterol    0.083% 2.5 milliGRAM(s) Nebulizer every 6 hours  dextrose 5% + lactated ringers 1000 milliLiter(s) (60 mL/Hr) IV Continuous <Continuous>  dornase bridget Solution 2.5 milliGRAM(s) Inhalation daily  heparin   Injectable 5000 Unit(s) SubCutaneous every 8 hours  lidocaine   4% Patch 1 Patch Transdermal daily  lidocaine   4% Patch 1 Patch Transdermal daily  pantoprazole  Injectable 40 milliGRAM(s) IV Push at bedtime  potassium chloride  10 mEq/100 mL IVPB 10 milliEquivalent(s) IV Intermittent every 1 hour  sodium chloride 3%  Inhalation 4 milliLiter(s) Inhalation every 6 hours    MEDICATIONS  (PRN):  diazepam  Injectable 3 milliGRAM(s) IV Push every 8 hours PRN muscle spasms  hydrALAZINE Injectable 5 milliGRAM(s) IV Push every 4 hours PRN SBP>160  HYDROmorphone  Injectable 0.3 milliGRAM(s) IV Push every 3 hours PRN Moderate - Severe Pain (4 - 10)  HYDROmorphone  Injectable 0.25 milliGRAM(s) IV Push every 3 hours PRN Severe Breakthrough Pain (7 - 10)  naloxone Injectable 0.1 milliGRAM(s) IV Push every 3 minutes PRN For ANY of the following changes in patient status:  A. RR LESS THAN 10 breaths per minute, B. Oxygen saturation LESS THAN 90%, C. Sedation score of 6  ondansetron Injectable 4 milliGRAM(s) IV Push every 6 hours PRN Nausea      PHYSICAL EXAM:  Constitutional:  NAD  Respiratory: Unlabored breathing  Abdomen: Soft, nondistended, NTTP. No rebound or guarding.  Extremities: WWP, LARA spontaneously    LABS:                        10.3   8.77  )-----------( 254      ( 22 May 2023 05:00 )             31.3     05-22    135  |  103  |  10  ----------------------------<  104<H>  3.7   |  22  |  0.34<L>    Ca    9.3      22 May 2023 05:00  Phos  2.9     05-22  Mg     1.80     05-22

## 2023-05-22 NOTE — PROGRESS NOTE ADULT - SUBJECTIVE AND OBJECTIVE BOX
JUAN NOVAK            MRN-[id number]         lisinopril (Other)  penicillin (Rash)  Reese (Rash)  Percocet (Drowsiness)  peanuts (Anaphylaxis)                  76 y.o WD, WN female with h/o esophageal cancer had chemo and radiation completed March 3rd, 2023 presents for preop eval to have robotic esophagogastroduodenoscopy, laparoscopic esophagogastrectomy possible open on 5/16/23. (05 May 2023 13:04).     CHIEF COMPLAINT: Follow up in ICU  for postoperative care of patient who is s/p  Robotic R VATS, Blake Marcos esophagectomy  16-May-2023    Procedure: Robotic R VATS, Augusta Marcos esophagectomy  16-May-2023    Issues:               Esophageal cancer               Postop pain               Chest tube in place   HTN (hypertension)  MS (multiple sclerosis)  GERD (gastroesophageal reflux disease)  Bifascicular block  H/O hiatal hernia  Skin cancer  Spinal cord stimulator status    Postop course:     Patient reports moderate pain at chest wall incision sites which is worse with coughing and deep breathing without associated fever or dyspnea. Pain is improved with use of PCA and  IV Tylenol.         PAST MEDICAL & SURGICAL HISTORY:  HTN (hypertension)      MS (multiple sclerosis)      GERD (gastroesophageal reflux disease)      Esophageal cancer      Sinus symptom      Bifascicular block      H/O hiatal hernia      Skin cancer      Spinal cord stimulator status      H/O: hysterectomy      History of cholecystectomy      History of Nissen fundoplication          ICU Vital Signs Last 24 Hrs  T(C): 37 (22 May 2023 08:00), Max: 37.3 (21 May 2023 16:00)  T(F): 98.6 (22 May 2023 08:00), Max: 99.1 (21 May 2023 16:00)  HR: 98 (22 May 2023 09:00) (59 - 98)  BP: 153/80 (22 May 2023 09:00) (126/62 - 175/81)  BP(mean): 100 (22 May 2023 09:00) (80 - 125)  ABP: --  ABP(mean): --  RR: 15 (22 May 2023 09:00) (13 - 25)  SpO2: 93% (22 May 2023 09:00) (93% - 100%)    O2 Parameters below as of 22 May 2023 09:00  Patient On (Oxygen Delivery Method): room air          I&O's Detail    21 May 2023 07:01  -  22 May 2023 07:00  --------------------------------------------------------  IN:    dextrose 5% + lactated ringers w/ Additives: 1440 mL    Enteral Tube Flush: 240 mL    IV PiggyBack: 200 mL  Total IN: 1880 mL    OUT:    Bulb (mL): 210 mL    Nasogastric/Oral tube (mL): 1000 mL    Voided (mL): 1000 mL  Total OUT: 2210 mL    Total NET: -330 mL      22 May 2023 07:01  -  22 May 2023 10:44  --------------------------------------------------------  IN:    dextrose 5% + lactated ringers w/ Additives: 120 mL    Enteral Tube Flush: 40 mL  Total IN: 160 mL    OUT:    Voided (mL): 200 mL  Total OUT: 200 mL    Total NET: -40 mL        CAPILLARY BLOOD GLUCOSE      POCT Blood Glucose.: 103 mg/dL (21 May 2023 20:18)    Home Medications:  amLODIPine 10 mg oral tablet: 1 tab(s) orally once a day (16 May 2023 07:20)  atorvastatin 40 mg oral tablet: 1 tab(s) orally once a day (at bedtime) (16 May 2023 07:20)  azelastine 137 mcg/inh (0.1%) nasal spray: 2 spray(s) nasal 2 times a day, As Needed (16 May 2023 07:20)  baclofen 10 mg oral tablet: 2 tab(s) orally 2 times a day (16 May 2023 07:20)  chlorthalidone 15 mg oral tablet: 1 tab(s) orally once a day (16 May 2023 07:20)  dalfampridine 10 mg oral tablet, extended release: 1 tab(s) orally every 12 hours (16 May 2023 07:20)  Epi Pen 0.3mg PRN:  (16 May 2023 07:20)  estradiol: apply to vaginal area --Mon, Wed, Fri  (16 May 2023 07:20)  famotidine 20 mg oral tablet: 1 tab(s) orally once a day (at bedtime) (16 May 2023 07:20)  fluticasone 50 mcg/inh nasal spray: 1 spray(s) in each nostril once a day (16 May 2023 07:20)  hydrALAZINE 100 mg oral tablet: 1 tab(s) orally 3 times a day (16 May 2023 07:20)  losartan 100 mg oral tablet: 1 tab(s) orally once a day (16 May 2023 07:20)  montelukast 10 mg oral tablet: 1 tab(s) orally once a day (at bedtime) (16 May 2023 07:20)  omeprazole 40 mg oral delayed release capsule: 1 cap(s) orally once a day before breakfast (16 May 2023 07:20)  ondansetron 8 mg oral disintegrating strip: 1 tab(s) orally every 8 hours, As Needed (16 May 2023 07:20)  prochlorperazine 10 mg oral tablet: 1 tab(s) orally every 6 hours, As Needed (16 May 2023 07:20)  sucralfate 1 g/10 mL oral suspension: 10 milliliter(s) orally 4 times a day (16 May 2023 07:20)    MEDICATIONS  (STANDING):  acetaminophen   IVPB .. 1000 milliGRAM(s) IV Intermittent once  albuterol    0.083% 2.5 milliGRAM(s) Nebulizer every 6 hours  dextrose 5% + lactated ringers 1000 milliLiter(s) (60 mL/Hr) IV Continuous <Continuous>  dornase bridget Solution 2.5 milliGRAM(s) Inhalation daily  heparin   Injectable 5000 Unit(s) SubCutaneous every 8 hours  lidocaine   4% Patch 1 Patch Transdermal daily  lidocaine   4% Patch 1 Patch Transdermal daily  pantoprazole  Injectable 40 milliGRAM(s) IV Push at bedtime  potassium chloride  10 mEq/100 mL IVPB 10 milliEquivalent(s) IV Intermittent every 1 hour  sodium chloride 3%  Inhalation 4 milliLiter(s) Inhalation every 6 hours    MEDICATIONS  (PRN):  diazepam  Injectable 3 milliGRAM(s) IV Push every 8 hours PRN muscle spasms  hydrALAZINE Injectable 5 milliGRAM(s) IV Push every 4 hours PRN SBP>160  HYDROmorphone  Injectable 0.3 milliGRAM(s) IV Push every 3 hours PRN Moderate - Severe Pain (4 - 10)  HYDROmorphone  Injectable 0.25 milliGRAM(s) IV Push every 3 hours PRN Severe Breakthrough Pain (7 - 10)  naloxone Injectable 0.1 milliGRAM(s) IV Push every 3 minutes PRN For ANY of the following changes in patient status:  A. RR LESS THAN 10 breaths per minute, B. Oxygen saturation LESS THAN 90%, C. Sedation score of 6  ondansetron Injectable 4 milliGRAM(s) IV Push every 6 hours PRN Nausea        Physical exam:    General:               Pt is awake, alert, not in any distress                                                  Neuro:                  Nonfocal                             Cardiovascular:   S1 & S2, regular                           Respiratory:         Air entry is fair and equal on both sides, has bilateral conducted sounds                           GI:                          Soft, nondistended and nontender, Bowel sounds active                           Ext:                        No cyanosis or edema                               Labs:                                                                           10.3   8.77  )-----------( 254      ( 22 May 2023 05:00 )             31.3             05-22    135  |  103  |  10  ----------------------------<  104<H>  3.7   |  22  |  0.34<L>    Ca    9.3      22 May 2023 05:00  Phos  2.9     05-22  Mg     1.80     05-22                     CXR:    < from: Xray Chest 1 View- PORTABLE-Urgent (Xray Chest 1 View- PORTABLE-Urgent .) (05.22.23 @ 09:48) >  Impression:    NG tube pulled back and projects over the right heart border. Correlate   clinically.  Right Pleurx catheter unchanged in position. No evidence of pneumothorax.  Small left effusion/consolidation unchanged.      Plan:  General:   73yFemale s/p Robotic R VATS, Blake Marcos esophagectomy  16-May-2023,  appears fairly comfortable, complaining of chest pain with deep breathing.                             Neuro:                                         Pain control with Dilaudid IVP / IV Tylenol                            Cardiovascular:                                          Continue hemodynamic monitoring.                                        Continue IVF D5LR 75cc/hr    HTN: On  Hydralazine 5mg IVP Q4hrs  PRN    HLD: Hold Lipitor                            Respiratory:                                         Postop hypoxemia - Resolved         Small left pleural effusion on US - Not tappable                                              Obtain CXR. Incentive spirometry.                                                   Chest PT and frequent suctioning. Continue bronchodilators, pulmozyme and inhaled 3% saline                                                      OOB to chair & ambulate w/ assistance.                                                           Continuous pulse oximetry for support & to prevent decompensation.                                         Monitor CARINE  output                                                                   GI                                          NPO                                           Continue GI prophylaxis with  Protonix                                          Continue Zofran / Reglan for nausea - PRN      Increased output from the NGT, Pulled out 6cm, appears in good position on CXR                                          NGT to low continuous wall suction                                           Flush  NGT  with 30cc of sterile water Q 4hrs.  	      Barium swallow in AM if NGT output is low                                                                 Renal:                                          Continue D5LR 75cc/hr                                          Monitor I/Os and electrolytes                                          Clayton D/CD t                                                 Hem/ Onc:                                                                                   Monitor chest tube output &  signs of bleeding.                                           Follow CBC in AM                           Infectious disease:                                             No signs of infection. Monitor for fever / leukocytosis.                                           All surgical incision / chest tube  sites look clean                            Endocrine                                              Continue Accu-Checks with coverage.     Pt is on SQ Heparin and Venodyne boots for DVT prophylaxis.     Pertinent clinical, laboratory, radiographic, hemodynamic, echocardiographic, respiratory data, microbiologic data and chart were reviewed and analyzed frequently throughout the course of the day and night  Patient seen, examined and plan discussed with CT Surgeon Dr. Altamirano / CTICU team during rounds.    Status discussed with patient  / family at bedside  and  updated plan of care.     I have spent 40 minutes with this patient including 20 minutes of time coordinating care in the ICU.                Anil Dillon MD

## 2023-05-23 ENCOUNTER — TRANSCRIPTION ENCOUNTER (OUTPATIENT)
Age: 73
End: 2023-05-23

## 2023-05-23 LAB
ACE SERPL-CCNC: 21 U/L — SIGNIFICANT CHANGE UP (ref 14–82)
ANA TITR SER: NEGATIVE — SIGNIFICANT CHANGE UP
ANION GAP SERPL CALC-SCNC: 10 MMOL/L — SIGNIFICANT CHANGE UP (ref 7–14)
B BURGDOR C6 AB SER-ACNC: NEGATIVE — SIGNIFICANT CHANGE UP
B BURGDOR IGG+IGM SER-ACNC: 0.11 INDEX — SIGNIFICANT CHANGE UP (ref 0.01–0.89)
BUN SERPL-MCNC: 12 MG/DL — SIGNIFICANT CHANGE UP (ref 7–23)
CALCIUM SERPL-MCNC: 9.2 MG/DL — SIGNIFICANT CHANGE UP (ref 8.4–10.5)
CHLORIDE SERPL-SCNC: 102 MMOL/L — SIGNIFICANT CHANGE UP (ref 98–107)
CO2 SERPL-SCNC: 24 MMOL/L — SIGNIFICANT CHANGE UP (ref 22–31)
CREAT SERPL-MCNC: 0.43 MG/DL — LOW (ref 0.5–1.3)
DSDNA AB FLD-ACNC: <0.2 AI — SIGNIFICANT CHANGE UP
EBV EA AB SER IA-ACNC: 67.2 U/ML — HIGH
EBV EA AB TITR SER IF: POSITIVE
EBV EA IGG SER-ACNC: POSITIVE
EBV NA IGG SER IA-ACNC: 163 U/ML — HIGH
EBV PATRN SPEC IB-IMP: SIGNIFICANT CHANGE UP
EBV VCA IGG AVIDITY SER QL IA: POSITIVE
EBV VCA IGM SER IA-ACNC: 203 U/ML — HIGH
EBV VCA IGM SER IA-ACNC: 35.6 U/ML — SIGNIFICANT CHANGE UP
EBV VCA IGM TITR FLD: NEGATIVE — SIGNIFICANT CHANGE UP
EGFR: 102 ML/MIN/1.73M2 — SIGNIFICANT CHANGE UP
ENA SS-A AB FLD IA-ACNC: <0.2 AI — SIGNIFICANT CHANGE UP
GLUCOSE SERPL-MCNC: 98 MG/DL — SIGNIFICANT CHANGE UP (ref 70–99)
HCT VFR BLD CALC: 28.3 % — LOW (ref 34.5–45)
HGB BLD-MCNC: 9.3 G/DL — LOW (ref 11.5–15.5)
MAGNESIUM SERPL-MCNC: 1.8 MG/DL — SIGNIFICANT CHANGE UP (ref 1.6–2.6)
MCHC RBC-ENTMCNC: 30.1 PG — SIGNIFICANT CHANGE UP (ref 27–34)
MCHC RBC-ENTMCNC: 32.9 GM/DL — SIGNIFICANT CHANGE UP (ref 32–36)
MCV RBC AUTO: 91.6 FL — SIGNIFICANT CHANGE UP (ref 80–100)
NRBC # BLD: 0 /100 WBCS — SIGNIFICANT CHANGE UP (ref 0–0)
NRBC # FLD: 0 K/UL — SIGNIFICANT CHANGE UP (ref 0–0)
PHOSPHATE SERPL-MCNC: 3.8 MG/DL — SIGNIFICANT CHANGE UP (ref 2.5–4.5)
PLATELET # BLD AUTO: 246 K/UL — SIGNIFICANT CHANGE UP (ref 150–400)
POTASSIUM SERPL-MCNC: 4.3 MMOL/L — SIGNIFICANT CHANGE UP (ref 3.5–5.3)
POTASSIUM SERPL-SCNC: 4.3 MMOL/L — SIGNIFICANT CHANGE UP (ref 3.5–5.3)
RBC # BLD: 3.09 M/UL — LOW (ref 3.8–5.2)
RBC # FLD: 14 % — SIGNIFICANT CHANGE UP (ref 10.3–14.5)
SODIUM SERPL-SCNC: 136 MMOL/L — SIGNIFICANT CHANGE UP (ref 135–145)
T PALLIDUM AB TITR SER: NEGATIVE — SIGNIFICANT CHANGE UP
VIT D25+D1,25 OH+D1,25 PNL SERPL-MCNC: 60 PG/ML — SIGNIFICANT CHANGE UP (ref 19.9–79.3)
WBC # BLD: 9.65 K/UL — SIGNIFICANT CHANGE UP (ref 3.8–10.5)
WBC # FLD AUTO: 9.65 K/UL — SIGNIFICANT CHANGE UP (ref 3.8–10.5)

## 2023-05-23 PROCEDURE — 99233 SBSQ HOSP IP/OBS HIGH 50: CPT

## 2023-05-23 PROCEDURE — 71045 X-RAY EXAM CHEST 1 VIEW: CPT | Mod: 26

## 2023-05-23 PROCEDURE — 74220 X-RAY XM ESOPHAGUS 1CNTRST: CPT | Mod: 26

## 2023-05-23 RX ORDER — MAGNESIUM SULFATE 500 MG/ML
2 VIAL (ML) INJECTION ONCE
Refills: 0 | Status: COMPLETED | OUTPATIENT
Start: 2023-05-23 | End: 2023-05-23

## 2023-05-23 RX ORDER — ACETAMINOPHEN 500 MG
1000 TABLET ORAL EVERY 6 HOURS
Refills: 0 | Status: DISCONTINUED | OUTPATIENT
Start: 2023-05-23 | End: 2023-05-24

## 2023-05-23 RX ORDER — ACETAMINOPHEN 500 MG
1000 TABLET ORAL ONCE
Refills: 0 | Status: COMPLETED | OUTPATIENT
Start: 2023-05-23 | End: 2023-05-23

## 2023-05-23 RX ADMIN — HYDROMORPHONE HYDROCHLORIDE 0.25 MILLIGRAM(S): 2 INJECTION INTRAMUSCULAR; INTRAVENOUS; SUBCUTANEOUS at 08:45

## 2023-05-23 RX ADMIN — HEPARIN SODIUM 5000 UNIT(S): 5000 INJECTION INTRAVENOUS; SUBCUTANEOUS at 13:06

## 2023-05-23 RX ADMIN — Medication 5 MILLIGRAM(S): at 05:27

## 2023-05-23 RX ADMIN — SODIUM CHLORIDE 4 MILLILITER(S): 9 INJECTION INTRAMUSCULAR; INTRAVENOUS; SUBCUTANEOUS at 21:56

## 2023-05-23 RX ADMIN — ALBUTEROL 2.5 MILLIGRAM(S): 90 AEROSOL, METERED ORAL at 15:48

## 2023-05-23 RX ADMIN — ALBUTEROL 2.5 MILLIGRAM(S): 90 AEROSOL, METERED ORAL at 10:21

## 2023-05-23 RX ADMIN — Medication 400 MILLIGRAM(S): at 21:49

## 2023-05-23 RX ADMIN — HEPARIN SODIUM 5000 UNIT(S): 5000 INJECTION INTRAVENOUS; SUBCUTANEOUS at 21:50

## 2023-05-23 RX ADMIN — ALBUTEROL 2.5 MILLIGRAM(S): 90 AEROSOL, METERED ORAL at 03:57

## 2023-05-23 RX ADMIN — HEPARIN SODIUM 5000 UNIT(S): 5000 INJECTION INTRAVENOUS; SUBCUTANEOUS at 05:27

## 2023-05-23 RX ADMIN — LIDOCAINE 1 PATCH: 4 CREAM TOPICAL at 19:37

## 2023-05-23 RX ADMIN — HYDROMORPHONE HYDROCHLORIDE 0.3 MILLIGRAM(S): 2 INJECTION INTRAMUSCULAR; INTRAVENOUS; SUBCUTANEOUS at 03:30

## 2023-05-23 RX ADMIN — HYDROMORPHONE HYDROCHLORIDE 0.25 MILLIGRAM(S): 2 INJECTION INTRAMUSCULAR; INTRAVENOUS; SUBCUTANEOUS at 08:30

## 2023-05-23 RX ADMIN — LIDOCAINE 1 PATCH: 4 CREAM TOPICAL at 22:52

## 2023-05-23 RX ADMIN — Medication 1000 MILLIGRAM(S): at 22:03

## 2023-05-23 RX ADMIN — DORNASE ALFA 2.5 MILLIGRAM(S): 1 SOLUTION RESPIRATORY (INHALATION) at 10:22

## 2023-05-23 RX ADMIN — HYDROMORPHONE HYDROCHLORIDE 0.3 MILLIGRAM(S): 2 INJECTION INTRAMUSCULAR; INTRAVENOUS; SUBCUTANEOUS at 19:00

## 2023-05-23 RX ADMIN — PANTOPRAZOLE SODIUM 40 MILLIGRAM(S): 20 TABLET, DELAYED RELEASE ORAL at 21:50

## 2023-05-23 RX ADMIN — Medication 1000 MILLIGRAM(S): at 12:00

## 2023-05-23 RX ADMIN — SODIUM CHLORIDE 4 MILLILITER(S): 9 INJECTION INTRAMUSCULAR; INTRAVENOUS; SUBCUTANEOUS at 03:57

## 2023-05-23 RX ADMIN — HYDROMORPHONE HYDROCHLORIDE 0.3 MILLIGRAM(S): 2 INJECTION INTRAMUSCULAR; INTRAVENOUS; SUBCUTANEOUS at 18:25

## 2023-05-23 RX ADMIN — SODIUM CHLORIDE 4 MILLILITER(S): 9 INJECTION INTRAMUSCULAR; INTRAVENOUS; SUBCUTANEOUS at 10:22

## 2023-05-23 RX ADMIN — ALBUTEROL 2.5 MILLIGRAM(S): 90 AEROSOL, METERED ORAL at 21:55

## 2023-05-23 RX ADMIN — Medication 400 MILLIGRAM(S): at 11:36

## 2023-05-23 RX ADMIN — LIDOCAINE 1 PATCH: 4 CREAM TOPICAL at 11:36

## 2023-05-23 RX ADMIN — SODIUM CHLORIDE 4 MILLILITER(S): 9 INJECTION INTRAMUSCULAR; INTRAVENOUS; SUBCUTANEOUS at 15:48

## 2023-05-23 RX ADMIN — HYDROMORPHONE HYDROCHLORIDE 0.3 MILLIGRAM(S): 2 INJECTION INTRAMUSCULAR; INTRAVENOUS; SUBCUTANEOUS at 03:45

## 2023-05-23 RX ADMIN — Medication 25 GRAM(S): at 05:26

## 2023-05-23 NOTE — DISCHARGE NOTE PROVIDER - PROVIDER TOKENS
PROVIDER:[TOKEN:[[id number]],FOLLOWUP:[1 week]],PROVIDER:[TOKEN:[[id number]],FOLLOWUP:[1 week]] PROVIDER:[TOKEN:[[id number]],FOLLOWUP:[1 week]],PROVIDER:[TOKEN:[[id number]],SCHEDULEDAPPT:[06/01/2023]]

## 2023-05-23 NOTE — PROGRESS NOTE ADULT - SUBJECTIVE AND OBJECTIVE BOX
CHIEF COMPLAINT: FOLLOW UP IN ICU FOR POSTOPERATIVE CARE OF PATIENT WHO IS S/P ESOPHAGECTOMY      PROCEDURES: Robotic R VATS, [place]cos esophagectomy  16-May-2023      ISSUES:   Esophageal cancer  Post op pain  Chest tube in place  Multiple sclerosis  HTN  GERD  Hx of Hiatal hernia s/p Nissen fundoplication repair  S/p Spinal cord stimulator placement  Bifascicular block    INTERVAL EVENTS:   Swallow study without leak  Started on clear liquid diet      HISTORY:   Patient reports moderate pain at chest wall incision sites which is worse with coughing and deep breathing without associated fever or dyspnea. Pain is improved with use of pain meds.     PHYSICAL EXAM:   Gen: Comfortable, No acute distress  Eyes: Sclera white, Conjunctiva normal, Eyelids normal, Pupils symmetrical   ENT: Mucous membranes moist  Neck: Trachea midline,  ,  ,  ,  ,  ,    CV: Rate regular, Rhythm regular,  ,  ,    Resp: Breath sounds clear, No accessory muscles use, R chest tube  Abd: Soft, Non-distended, Non-tender,   ,  ,  ,    Skin: Warm, No peripheral edema of lower extremities,  ,    : No celis  Neuro: Moving all 4 extremities,    Psych: A&Ox3      ASSESSMENT AND PLAN:     NEURO:  Post-operative Pain - Stable. Pain control with PCA and Tylenol IV PRN.     Multiple sclerosis - stable.  Valium IV PRN muscle spasm.   - Pseudoflare per Neurology consult  - Check TSH, B12, Lyme AB, RPR, JOSE RAUL, SLE, Sjogren, ACE, HIV, HTLV -- requested by Neurology  - Resume PO MS meds on Wed 5/24 if tolerating clear liquid diet    RESPIRATORY:  Stable on room air - Incentive spirometry. Chest PT and suctioning of secretions. Out of bed to chair and ambulate with assistance. Continuous pulse oximetry for support & to prevent decompensation.    Chest tube – Continue Bulb suction        CARDIOVASCULAR:  Hemodynamically stable - Not on pressors. Continue hemodynamic monitoring.  Telemetry (medical test) - Reviewed by me today independently. Normal sinus rhythm.  HTN - stable. Hydralazine IV PRN.               RENAL:  Stable - Monitor IOs and electrolytes. Keep K above 4.0 and Mg above 2.0.           GASTROINTESTINAL:  GI prophylaxis not indicated  Zofran and Reglan IV PRN for nausea    Clear liquid diet  GERD - stable. Continue pantoprazole           HEMATOLOGIC:  No signs of active bleeding. Monitor Hgb in CBC in AM  DVT prophylaxis with heparin subQ and SCDs.      Screening DVT study of bilateral legs and RUE negative for DVT         INFECTIOUS DISEASE:  All surgical sites appear clean. No signs of active infection. Will monitor for fever and leukocytosis.       ENDOCRINE:  Stable – Monitor glucose fingersticks for goal 120-180.               ONCOLOGY:  Esophageal cancer - Improved. S/P resection. Follow up final pathology.           Pertinent clinical, laboratory, radiographic, hemodynamic, echocardiographic, respiratory data, microbiologic data and chart were reviewed by myself and analyzed frequently throughout the course of the day and night by myself.    Plan discussed at length with the CTICU staff and Attending CT Surgeon -   Dr Preston Altamirano.      Patient's status was discussed with patient at bedside.      _________________________  VITAL SIGNS:  Vital Signs Last 24 Hrs  T(C): 37.1 (23 May 2023 08:00), Max: 37.5 (22 May 2023 20:00)  T(F): 98.7 (23 May 2023 08:00), Max: 99.5 (22 May 2023 20:00)  HR: 71 (23 May 2023 10:21) (62 - 102)  BP: 137/72 (23 May 2023 09:22) (103/59 - 175/89)  BP(mean): 91 (23 May 2023 09:22) (69 - 115)  RR: 18 (23 May 2023 09:22) (12 - 20)  SpO2: 95% (23 May 2023 10:21) (92% - 100%)    Parameters below as of 23 May 2023 10:21  Patient On (Oxygen Delivery Method): room air      I/Os:   I&O's Detail    22 May 2023 07:01  -  23 May 2023 07:00  --------------------------------------------------------  IN:    dextrose 5% + lactated ringers w/ Additives: 1440 mL    Enteral Tube Flush: 240 mL    IV PiggyBack: 450 mL  Total IN: 2130 mL    OUT:    Bulb (mL): 105 mL    Nasogastric/Oral tube (mL): 650 mL    Voided (mL): 700 mL  Total OUT: 1455 mL    Total NET: 675 mL      23 May 2023 07:01  -  23 May 2023 10:50  --------------------------------------------------------  IN:    dextrose 5% + lactated ringers w/ Additives: 120 mL    Enteral Tube Flush: 40 mL  Total IN: 160 mL    OUT:  Total OUT: 0 mL    Total NET: 160 mL              MEDICATIONS:  MEDICATIONS  (STANDING):  acetaminophen   IVPB .. 1000 milliGRAM(s) IV Intermittent once  acetaminophen   IVPB .. 1000 milliGRAM(s) IV Intermittent once  albuterol    0.083% 2.5 milliGRAM(s) Nebulizer every 6 hours  dextrose 5% + lactated ringers 1000 milliLiter(s) (60 mL/Hr) IV Continuous <Continuous>  dornase bridget Solution 2.5 milliGRAM(s) Inhalation daily  heparin   Injectable 5000 Unit(s) SubCutaneous every 8 hours  lidocaine   4% Patch 1 Patch Transdermal daily  lidocaine   4% Patch 1 Patch Transdermal daily  pantoprazole  Injectable 40 milliGRAM(s) IV Push at bedtime  sodium chloride 3%  Inhalation 4 milliLiter(s) Inhalation every 6 hours    MEDICATIONS  (PRN):  diazepam  Injectable 3 milliGRAM(s) IV Push every 8 hours PRN muscle spasms  hydrALAZINE Injectable 5 milliGRAM(s) IV Push every 4 hours PRN SBP>160  HYDROmorphone  Injectable 0.3 milliGRAM(s) IV Push every 3 hours PRN Moderate - Severe Pain (4 - 10)  HYDROmorphone  Injectable 0.25 milliGRAM(s) IV Push every 3 hours PRN Severe Breakthrough Pain (7 - 10)  naloxone Injectable 0.1 milliGRAM(s) IV Push every 3 minutes PRN For ANY of the following changes in patient status:  A. RR LESS THAN 10 breaths per minute, B. Oxygen saturation LESS THAN 90%, C. Sedation score of 6  ondansetron Injectable 4 milliGRAM(s) IV Push every 6 hours PRN Nausea      LABS:  Laboratory data was independently reviewed by me today.                           9.3    9.65  )-----------( 246      ( 23 May 2023 03:27 )             28.3     05-23    136  |  102  |  12  ----------------------------<  98  4.3   |  24  |  0.43<L>    Ca    9.2      23 May 2023 03:27  Phos  3.8     05-23  Mg     1.80     05-23                RADIOLOGY:   Radiology images were independently reviewed by me today. Reports were reviewed by me today.    Xray Chest 1 View- PORTABLE-Urgent:   ACC: [id number] EXAM:  XR CHEST PORTABLE ROUTINE 1V   ORDERED BY: EVELINE KATZ     ACC: [id number] EXAM:  XR CHEST PORTABLE ROUTINE 1V   ORDERED BY: LOC COTTON     ACC: [id number] EXAM:  XR CHEST PORTABLE URGENT 1V   ORDERED BY: DAVID REYNOLDS     ACC: [id number] EXAM:  XR CHEST PORTABLE URGENT 1V   ORDERED BY: LOC COTTON     PROCEDURE DATE:  05/20/2023          INTERPRETATION:  INDICATION: Postop    Portable chest 5/20/2023 at 1:31 AM    COMPARISON: 5/19/2023    FINDINGS:  Heart/Vascular: The heart size, mediastinum, hilum and aorta are stable.  Pulmonary: Midline trachea. There is persistent consolidation/opacity in   the left lower lobe. There is mild pulmonary venous congestion. There is   no pneumothorax noted bilaterally.  Bones: There is no fracture.  Lines and catheter: Right chest tube and right Pleurx catheter unchanged   in position. NG tube with tip and side-port below diaphragm. Spinal   stimulator noted.    Portable chest 5/20/2023 at 11:31 AM    FINDINGS: The right apically orientated chest tube was removed. The right   Pleurx catheters unchanged in position. There is no pneumothorax. There   is partial clearing at the left lung base. Mild residual opacity seen at   the right lung base. There is no pneumothorax.    Portable chest 5/22/2023 at 5:39 AM    FINDINGS: Right Pleurx catheter unchanged in position. No pneumothorax.   The right lung is nearly clear. There is further decrease in left   effusion/consolidation. There is no pneumothorax. The heart size is   stable. NG tube unchanged in position.    Portable chest 5/22/2023 at 9:40 AM    FINDINGS: NG tube pulled back and projects over the right heart border.   Correlate clinically. Right Pleurx catheter unchanged in position. No   evidence of pneumothorax. Small left effusion/consolidation unchanged.   The heart size stable.    Impression:    NG tube pulled back and projects over the right heart border. Correlate   clinically.  Right Pleurx catheter unchanged in position. No evidence of pneumothorax.  Small left effusion/consolidation unchanged.    --- End of Report ---            VICTORIA OTT DO; Attending Radiologist  This document has been electronically signed. May 22 2023 10:22AM (05-22-23 @ 09:48)  Xray Chest 1 View- PORTABLE-Routine:   ACC: [id number] EXAM:  XR CHEST PORTABLE ROUTINE 1V   ORDERED BY: EVELINE KATZ     ACC: [id number] EXAM:  XR CHEST PORTABLE ROUTINE 1V   ORDERED BY: LOC COTTON     ACC: [id number] EXAM:  XR CHEST PORTABLE URGENT 1V   ORDERED BY: DAVID REYNOLDS     ACC: [id number] EXAM:  XR CHEST PORTABLE URGENT 1V   ORDERED BY: LOC COTTON     PROCEDURE DATE:  05/20/2023          INTERPRETATION:  INDICATION: Postop    Portable chest 5/20/2023 at 1:31 AM    COMPARISON: 5/19/2023    FINDINGS:  Heart/Vascular: The heart size, mediastinum, hilum and aorta are stable.  Pulmonary: Midline trachea. There is persistent consolidation/opacity in   the left lower lobe. There is mild pulmonary venous congestion. There is   no pneumothorax noted bilaterally.  Bones: There is no fracture.  Lines and catheter: Right chest tube and right Pleurx catheter unchanged   in position. NG tube with tip and side-port below diaphragm. Spinal   stimulator noted.    Portable chest 5/20/2023 at 11:31 AM    FINDINGS: The right apically orientated chest tube was removed. The right   Pleurx catheters unchanged in position. There is no pneumothorax. There   is partial clearing at the left lung base. Mild residual opacity seen at   the right lung base. There is no pneumothorax.    Portable chest 5/22/2023 at 5:39 AM    FINDINGS: Right Pleurx catheter unchanged in position. No pneumothorax.   The right lung is nearly clear. There is further decrease in left   effusion/consolidation. There is no pneumothorax. The heart size is   stable. NG tube unchanged in position.    Portable chest 5/22/2023 at 9:40 AM    FINDINGS: NG tube pulled back and projects over the right heart border.   Correlate clinically. Right Pleurx catheter unchanged in position. No   evidence of pneumothorax. Small left effusion/consolidation unchanged.   The heart size stable.    Impression:    NG tube pulled back and projects over the right heart border. Correlate   clinically.  Right Pleurx catheter unchanged in position. No evidence of pneumothorax.  Small left effusion/consolidation unchanged.    --- End of Report ---            VICTORIA OTT DO; Attending Radiologist  This document has been electronically signed. May 22 2023 10:22AM (05-22-23 @ 05:58)  Xray Chest 1 View- PORTABLE-Urgent:   ACC: [id number] EXAM:  XR CHEST PORTABLE ROUTINE 1V   ORDERED BY: EVELINE KATZ     ACC: [id number] EXAM:  XR CHEST PORTABLE ROUTINE 1V   ORDERED BY: LOC COTTON     ACC: [id number] EXAM:  XR CHEST PORTABLE URGENT 1V   ORDERED BY: DAVID REYNOLDS     ACC: [id number] EXAM:  XR CHEST PORTABLE URGENT 1V   ORDERED BY: LOC COTTON     PROCEDURE DATE:  05/20/2023          INTERPRETATION:  INDICATION: Postop    Portable chest 5/20/2023 at 1:31 AM    COMPARISON: 5/19/2023    FINDINGS:  Heart/Vascular: The heart size, mediastinum, hilum and aorta are stable.  Pulmonary: Midline trachea. There is persistent consolidation/opacity in   the left lower lobe. There is mild pulmonary venous congestion. There is   no pneumothorax noted bilaterally.  Bones: There is no fracture.  Lines and catheter: Right chest tube and right Pleurx catheter unchanged   in position. NG tube with tip and side-port below diaphragm. Spinal   stimulator noted.    Portable chest 5/20/2023 at 11:31 AM    FINDINGS: The right apically orientated chest tube was removed. The right   Pleurx catheters unchanged in position. There is no pneumothorax. There   is partial clearing at the left lung base. Mild residual opacity seen at   the right lung base. There is no pneumothorax.    Portable chest 5/22/2023 at 5:39 AM    FINDINGS: Right Pleurx catheter unchanged in position. No pneumothorax.   The right lung is nearly clear. There is further decrease in left   effusion/consolidation. There is no pneumothorax. The heart size is   stable. NG tube unchanged in position.    Portable chest 5/22/2023 at 9:40 AM    FINDINGS: NG tube pulled back and projects over the right heart border.   Correlate clinically. Right Pleurx catheter unchanged in position. No   evidence of pneumothorax. Small left effusion/consolidation unchanged.   The heart size stable.    Impression:    NG tube pulled back and projects over the right heart border. Correlate   clinically.  Right Pleurx catheter unchanged in position. No evidence of pneumothorax.  Small left effusion/consolidation unchanged.    --- End of Report ---            VICTORIA OTT DO; Attending Radiologist  This document has been electronically signed. May 22 2023 10:22AM (05-20-23 @ 15:47)

## 2023-05-23 NOTE — DISCHARGE NOTE PROVIDER - CARE PROVIDERS DIRECT ADDRESSES
,[email or web address]_Unknown

## 2023-05-23 NOTE — PROGRESS NOTE ADULT - SUBJECTIVE AND OBJECTIVE BOX
Date of service 5/23/23    chief complaint: surgery    extended hpi: 77 y/o female with a pmh of MS, HTN and  esophageal cancer s/p chemo and radiation completed March 3rd, 2023 presented for elective robotic esophagogastroduodenoscopy, laparoscopic esophagogastrectomy on 5/16/23.       S: no chest pain or sob; ros otherwise negative.     Review of Systems:   Constitutional: [ ] fevers, [ ] chills.   Skin: [ ] dry skin. [ ] rashes.  Psychiatric: [ ] depression, [ ] anxiety.   Gastrointestinal: [ ] BRBPR, [ ] melena.   Neurological: [ ] confusion. [ ] seizures. [ ] shuffling gait.   Ears,Nose,Mouth and Throat: [ ] ear pain [ ] sore throat.   Eyes: [ ] diplopia.   Respiratory: [ ] hemoptysis. [ ] shortness of breath  Cardiovascular: See HPI above  Hematologic/Lymphatic: [ ] anemia. [ ] painful nodes. [ ] prolonged bleeding.   Genitourinary: [ ] hematuria. [ ] flank pain.   Endocrine: [ ] significant change in weight. [ ] intolerance to heat and cold.     Review of systems [x ] otherwise negative, [ ] otherwise unable to obtain    FH: no family history of sudden cardiac death in first degree relatives    SH: [ ] tobacco, [ ] alcohol, [ ] drugs    acetaminophen   IVPB .. 1000 milliGRAM(s) IV Intermittent once  albuterol    0.083% 2.5 milliGRAM(s) Nebulizer every 6 hours  dextrose 5% + lactated ringers 1000 milliLiter(s) IV Continuous <Continuous>  diazepam  Injectable 3 milliGRAM(s) IV Push every 8 hours PRN  dornase bridget Solution 2.5 milliGRAM(s) Inhalation daily  heparin   Injectable 5000 Unit(s) SubCutaneous every 8 hours  hydrALAZINE Injectable 5 milliGRAM(s) IV Push every 4 hours PRN  HYDROmorphone  Injectable 0.3 milliGRAM(s) IV Push every 3 hours PRN  HYDROmorphone  Injectable 0.25 milliGRAM(s) IV Push every 3 hours PRN  lidocaine   4% Patch 1 Patch Transdermal daily  lidocaine   4% Patch 1 Patch Transdermal daily  naloxone Injectable 0.1 milliGRAM(s) IV Push every 3 minutes PRN  ondansetron Injectable 4 milliGRAM(s) IV Push every 6 hours PRN  pantoprazole  Injectable 40 milliGRAM(s) IV Push at bedtime  sodium chloride 3%  Inhalation 4 milliLiter(s) Inhalation every 6 hours                            9.3    9.65  )-----------( 246      ( 23 May 2023 03:27 )             28.3       136  |  102  |  12  ----------------------------<  98  4.3   |  24  |  0.43<L>    Ca    9.2      23 May 2023 03:27  Phos  3.8     05-23  Mg     1.80     05-23        T(C): 37.1 (05-23-23 @ 08:00), Max: 37.5 (05-22-23 @ 20:00)  HR: 76 (05-23-23 @ 11:00) (62 - 102)  BP: 113/54 (05-23-23 @ 11:00) (103/59 - 174/71)  RR: 17 (05-23-23 @ 11:00) (12 - 20)  SpO2: 100% (05-23-23 @ 11:00) (92% - 100%)  Wt(kg): --    General: Alert and Oriented  Head: Normocephalic and atraumatic.   Neck: No JVD. No bruits. Supple. Does not appear to be enlarged.   Cardiovascular: + S1,S2 ; RRR Soft systolic murmur at the left lower sternal border. No rubs noted.    Lungs: CTA b/l. No rhonchi, rales or wheezes.   Abdomen: + BS, soft. Non tender. Non distended. No rebound. No guarding.   Extremities: No clubbing/cyanosis/edema.   Neurologic: Moves all four extremities  Skin: Warm and moist. The patient's skin has normal elasticity and good skin turgor.   Psychiatric: Appropriate mood and affect.  Musculoskeletal: Normal range of motion    DATA    tele- SR  ECG:  NSR Bifascular block	  TTE 03/2023: Preserved LVEF  Cath: Non obstructive CAD    ASSESSMENT/PLAN: 	Pt is a 77 y/o female with a pmh of MS, HTN and  esophageal cancer s/p chemo and radiation completed March 3rd, 2023 presented for elective robotic esophagogastroduodenoscopy, laparoscopic esophagogastrectomy on 5/16/23.     1. HTN  - can use IV hydralazine if SBP > 180 systolic and NPO  - previously taken of Chlorthalidone due to hypokalemia    2. Post OP  --tolerated procedure well in terms of cv perspective   --pain management and PT  --NSVT on tele, Keep K >4-4.5 and Mag 2-2.5, TTE ordered by primary team, recent TTE as noted above with Normal LV function

## 2023-05-23 NOTE — DISCHARGE NOTE PROVIDER - NSDCMRMEDTOKEN_GEN_ALL_CORE_FT
amLODIPine 10 mg oral tablet: 1 tab(s) orally once a day  atorvastatin 40 mg oral tablet: 1 tab(s) orally once a day (at bedtime)  azelastine 137 mcg/inh (0.1%) nasal spray: 2 spray(s) nasal 2 times a day, As Needed  baclofen 10 mg oral tablet: 2 tab(s) orally 2 times a day  chlorthalidone 15 mg oral tablet: 1 tab(s) orally once a day  dalfampridine 10 mg oral tablet, extended release: 1 tab(s) orally every 12 hours  Epi Pen 0.3mg PRN:   estradiol: apply to vaginal area --Mon, Wed, Fri   famotidine 20 mg oral tablet: 1 tab(s) orally once a day (at bedtime)  fluticasone 50 mcg/inh nasal spray: 1 spray(s) in each nostril once a day  hydrALAZINE 100 mg oral tablet: 1 tab(s) orally 3 times a day  losartan 100 mg oral tablet: 1 tab(s) orally once a day  montelukast 10 mg oral tablet: 1 tab(s) orally once a day (at bedtime)  omeprazole 40 mg oral delayed release capsule: 1 cap(s) orally once a day before breakfast  ondansetron 8 mg oral disintegrating strip: 1 tab(s) orally every 8 hours, As Needed  prochlorperazine 10 mg oral tablet: 1 tab(s) orally every 6 hours, As Needed  sucralfate 1 g/10 mL oral suspension: 10 milliliter(s) orally 4 times a day   acetaminophen 325 mg oral tablet: 3 tab(s) orally every 6 hours  amLODIPine 10 mg oral tablet: 1 tab(s) orally once a day  atorvastatin 40 mg oral tablet: 1 tab(s) orally once a day (at bedtime)  azelastine 137 mcg/inh (0.1%) nasal spray: 2 spray(s) nasal 2 times a day, As Needed  baclofen 10 mg oral tablet: 2 tab(s) orally 2 times a day  chlorthalidone 15 mg oral tablet: 1 tab(s) orally once a day  dalfampridine 10 mg oral tablet, extended release: 1 tab(s) orally every 12 hours  Epi Pen 0.3mg PRN:   estradiol: apply to vaginal area --Mon, Wed, Fri   famotidine 20 mg oral tablet: 1 tab(s) orally once a day (at bedtime)  fluticasone 50 mcg/inh nasal spray: 1 spray(s) in each nostril once a day  hydrALAZINE 100 mg oral tablet: 1 tab(s) orally 3 times a day  losartan 100 mg oral tablet: 1 tab(s) orally once a day  montelukast 10 mg oral tablet: 1 tab(s) orally once a day (at bedtime)  omeprazole 40 mg oral delayed release capsule: 1 cap(s) orally once a day before breakfast  ondansetron 8 mg oral disintegrating strip: 1 tab(s) orally every 8 hours, As Needed  oxyCODONE 5 mg oral tablet: 1 tab(s) orally every 6 hours MDD: 4  prochlorperazine 10 mg oral tablet: 1 tab(s) orally every 6 hours, As Needed  Rolling walker: for use with ambulation MDD: 1  sucralfate 1 g/10 mL oral suspension: 10 milliliter(s) orally 4 times a day

## 2023-05-23 NOTE — DISCHARGE NOTE PROVIDER - NSDCCPCAREPLAN_GEN_ALL_CORE_FT
PRINCIPAL DISCHARGE DIAGNOSIS  Diagnosis: Esophageal cancer  Assessment and Plan of Treatment: WOUND CARE:  Please keep incisions clean and dry. Please do not Scrub or rub incisions. Do not use lotion or powder on incisions.   BATHING: You may shower and/or sponge bathe. You may use warm soapy water in the shower and rinse, pat dry.  ACTIVITY: No heavy lifting or straining. Otherwise, you may return to your usual level of physical activity. If you are taking narcotic pain medication DO NOT drive a car, operate machinery or make important decisions.  DIET: Return to your usual diet.  NOTIFY YOUR SURGEON IF YOU HAVE: any bleeding that does not stop, any pus draining from your wound(s), any fever (over 100.4 F) persistent nausea/vomiting, or if your pain is not controlled on your discharge pain medications, unable to urinate.  FOLLOW UP:  1. Please follow up with your primary care physician in one week regarding your hospitalization, bring copies of your discharge paperwork.  2. Please follow up with your surgical oncologist, Dr. Alva. Please call  [phone] to make an appointment.   3. Please follow up with your thoracic surgeon, Dr. Altamirano. Call to make an appointment.     PRINCIPAL DISCHARGE DIAGNOSIS  Diagnosis: Esophageal cancer  Assessment and Plan of Treatment: WOUND CARE:  Please keep incisions clean and dry. Please do not Scrub or rub incisions. Do not use lotion or powder on incisions.   BATHING: You may shower and/or sponge bathe. You may use warm soapy water in the shower and rinse, pat dry.  ACTIVITY: No heavy lifting or straining. Otherwise, you may return to your usual level of physical activity. If you are taking narcotic pain medication DO NOT drive a car, operate machinery or make important decisions.  DIET: FULL LIQUID DIET; see additional instructions for more information  NOTIFY YOUR SURGEON IF YOU HAVE: any bleeding that does not stop, any pus draining from your wound(s), any fever (over 100.4 F) persistent nausea/vomiting, or if your pain is not controlled on your discharge pain medications, unable to urinate.  FOLLOW UP:  1. Please follow up with your primary care physician in one week regarding your hospitalization, bring copies of your discharge paperwork.  2. Please follow up with your surgical oncologist, Dr. Alva. Please call  [phone] to make an appointment.   3. Please follow up with your thoracic surgeon, Dr. Altamirano. Call to make an appointment.

## 2023-05-23 NOTE — DISCHARGE NOTE PROVIDER - CARE PROVIDER_API CALL
Alan Alva)  Complex General Surgical Oncology; Surgery; Surgical Oncology  [address] [place] [address]  Phone: [phone]  Fax: [phone]  Follow Up Time: 1 week    Preston Altamirano)  Thoracic Surgery  [address] [place] [address]  Phone: [phone]  Fax: [phone]  Follow Up Time: 1 week   Alan Alva)  Complex General Surgical Oncology; Surgery; Surgical Oncology  [address] [place] [address]  Phone: [phone]  Fax: [phone]  Follow Up Time: 1 week    Preston Altamirano  Thoracic Surgery  [address] [place] [address]  Phone: [phone]  Fax: [phone]  Scheduled Appointment: 06/01/2023

## 2023-05-23 NOTE — PROGRESS NOTE ADULT - SUBJECTIVE AND OBJECTIVE BOX
Neurology Progress Note    S: Patient seen and examined in ICU. No new events overnight. MBS today     Medication:  \MEDICATIONS  (STANDING):  acetaminophen   IVPB .. 1000 milliGRAM(s) IV Intermittent once  acetaminophen   IVPB .. 1000 milliGRAM(s) IV Intermittent once  albuterol    0.083% 2.5 milliGRAM(s) Nebulizer every 6 hours  dextrose 5% + lactated ringers 1000 milliLiter(s) (60 mL/Hr) IV Continuous <Continuous>  dornase bridget Solution 2.5 milliGRAM(s) Inhalation daily  heparin   Injectable 5000 Unit(s) SubCutaneous every 8 hours  lidocaine   4% Patch 1 Patch Transdermal daily  lidocaine   4% Patch 1 Patch Transdermal daily  pantoprazole  Injectable 40 milliGRAM(s) IV Push at bedtime  sodium chloride 3%  Inhalation 4 milliLiter(s) Inhalation every 6 hours    MEDICATIONS  (PRN):  diazepam  Injectable 3 milliGRAM(s) IV Push every 8 hours PRN muscle spasms  hydrALAZINE Injectable 5 milliGRAM(s) IV Push every 4 hours PRN SBP>160  HYDROmorphone  Injectable 0.3 milliGRAM(s) IV Push every 3 hours PRN Moderate - Severe Pain (4 - 10)  HYDROmorphone  Injectable 0.25 milliGRAM(s) IV Push every 3 hours PRN Severe Breakthrough Pain (7 - 10)  naloxone Injectable 0.1 milliGRAM(s) IV Push every 3 minutes PRN For ANY of the following changes in patient status:  A. RR LESS THAN 10 breaths per minute, B. Oxygen saturation LESS THAN 90%, C. Sedation score of 6  ondansetron Injectable 4 milliGRAM(s) IV Push every 6 hours PRN Nausea        Vitals:  ICU Vital Signs Last 24 Hrs  T(C): 37.1 (23 May 2023 08:00), Max: 37.5 (22 May 2023 20:00)  T(F): 98.7 (23 May 2023 08:00), Max: 99.5 (22 May 2023 20:00)  HR: 76 (23 May 2023 11:00) (62 - 102)  BP: 113/54 (23 May 2023 11:00) (103/59 - 174/71)  BP(mean): 69 (23 May 2023 11:00) (69 - 111)  ABP: --  ABP(mean): --  RR: 17 (23 May 2023 11:00) (12 - 20)  SpO2: 100% (23 May 2023 11:00) (92% - 100%)    O2 Parameters below as of 23 May 2023 11:00  Patient On (Oxygen Delivery Method): room air                General Exam:   General Appearance: Appropriately dressed and in no acute distress       Head: Normocephalic, atraumatic and no dysmorphic features  Ear, Nose, and Throat: Moist mucous membranes  CVS: S1S2+  Resp: No SOB, no wheeze or rhonchi  Abd: soft NTND  Extremities: No edema, no cyanosis  Skin: No bruises, no rashes     Neurological Exam:  Mental status - Awake, Alert, Oriented to person, place, and time. Speech fluent Follows simple and complex commands. Attention/concentration, recent and remote memory (including registration and recall), and fund of knowledge intact    Cranial nerves:  CN II: Visual fields are full to confrontation. Pupils are 3 mm and briskly reactive to light.  CN III, IV, VI: EOMI, no nystagmus, no ptosis  CN V: Facial sensation is intact to pinprick in all 3 divisions bilaterally.  CN VII: Face is symmetric with normal eye closure and smile.  CN VII: Hearing is normal to rubbing fingers  CN IX, X: Palate elevates symmetrically. Phonation is normal.  CN XI: Head turning and shoulder shrug are intact  CN XII: Tongue is midline with normal movements and no atrophy.    Motor - Normal bulk and tone throughout. No pronator drift of out-stretched arms.  Strength testing            Deltoid      Biceps      Triceps             R            5                 5               5                              5  L             5                 5               5                           5              Hip Flexion       Knee Flexion    Knee Extension    Dorsiflexion    Plantar Flexion  R              2                                              3                           4+                            5                          5  L              2                                      3                           4+                            5                          5    Sensation - Light touch intact in fingers and toes, though describes less sensation in right toes compared with left     DTR's -             Biceps      Triceps     Brachioradialis      Patellar    Ankle    Toes/plantar response  R             3+             3+                  3+                       3+            3+                 Down  L              3+             3+                 3+                        3+           3+                 up    Coordination -  There is slight dysmetria on finger-to-nose. Spasticity noted in bilateral lower extremities   Gait and station - Not assessed due to weakness       I personally reviewed the below data/images/labs:      CBC Full  -  ( 23 May 2023 03:27 )  WBC Count : 9.65 K/uL  RBC Count : 3.09 M/uL  Hemoglobin : 9.3 g/dL  Hematocrit : 28.3 %  Platelet Count - Automated : 246 K/uL  Mean Cell Volume : 91.6 fL  Mean Cell Hemoglobin : 30.1 pg  Mean Cell Hemoglobin Concentration : 32.9 gm/dL  Auto Neutrophil # : x  Auto Lymphocyte # : x  Auto Monocyte # : x  Auto Eosinophil # : x  Auto Basophil # : x  Auto Neutrophil % : x  Auto Lymphocyte % : x  Auto Monocyte % : x  Auto Eosinophil % : x  Auto Basophil % : x    05-23    136  |  102  |  12  ----------------------------<  98  4.3   |  24  |  0.43<L>    Ca    9.2      23 May 2023 03:27  Phos  3.8     05-23  Mg     1.80     05-23

## 2023-05-23 NOTE — DISCHARGE NOTE PROVIDER - HOSPITAL COURSE
This patient is a 76 year old female with h/o esophageal cancer had chemo and radiation completed March 3rd, 2023 presented to [facility] on 5/16/23 for scheduled surgery. Patient taken to the  OR by Dr. Alva and Dr. Altamirano and underwent robotic R VATS, Blake Marcos esophagectomy. Patient tolerated operation well and there were no post-operative complications identified. Patient remained hemodynamically stable in the PACU and transferred to the surgical floor. Diet was restarted and advanced as tolerated. Pain control was transitioned from IV to PO pain meds.    5/17 POD1: Patient remained NPO with NGT with plan for swallow study POD7. Chest tube to suction.   5/18 POD2: Chest tube to water seal.   5/20 POD4: Neurology consulted for concerns of MS flare. Recommended to resume home baclofen 20mg BID and dalfampridine 10mg q12 once able to swallow medication, start Diazepam 2.5mg IV every 8 hours PRN for spasticity, infectious workup, and check for MS mimicks.   5/22 POD6: VA duplex of right upper extremity obtained and showed: No evidence of deep vein thrombosis in the right upper extremity. VA duplex b/l lower extremities obtained and showed: 1.  No evidence of deep vein thrombosis in the right and left lower extremities.  2.  No evidence of deep and superficial venous insufficiency noted in the right and left lower extremities.  5/23 POD7: Swallow study performed and showed: Status post esophagectomy with no evidence of anastomotic leak. Nasogastric tube removed. Diet advanced to clear liquids.     Patient seen by physical therapy throughout hospital stay who recommended patient be discharged home with home PT.   At this time, patient is currently ambulating, voiding, tolerating a regular diet. Pain well controlled on PO pain meds. Patient has been deemed stable for discharge home with follow up as an outpatient.    This patient is a 76 year old female with h/o esophageal cancer had chemo and radiation completed March 3rd, 2023 presented to [facility] on 5/16/23 for scheduled surgery. Patient taken to the  OR by Dr. Alva and Dr. Altamirano and underwent robotic R VATS, Blake Marcos esophagectomy. Patient tolerated operation well and there were no post-operative complications identified. Patient remained hemodynamically stable in the PACU and transferred to the surgical floor. Diet was restarted and advanced as tolerated. Pain control was transitioned from IV to PO pain meds.    5/17 POD1: Patient remained NPO with NGT with plan for swallow study POD7. Chest tube to suction.   5/18 POD2: Chest tube to water seal.   5/20 POD4: Neurology consulted for concerns of MS flare. Recommended to resume home baclofen 20mg BID and dalfampridine 10mg q12 once able to swallow medication, start Diazepam 2.5mg IV every 8 hours PRN for spasticity, infectious workup, and check for MS mimicks.   5/22 POD6: VA duplex of right upper extremity obtained and showed: No evidence of deep vein thrombosis in the right upper extremity. VA duplex b/l lower extremities obtained and showed: 1.  No evidence of deep vein thrombosis in the right and left lower extremities.  2.  No evidence of deep and superficial venous insufficiency noted in the right and left lower extremities.  5/23 POD7: Swallow study performed and showed: Status post esophagectomy with no evidence of anastomotic leak. Nasogastric tube removed. Diet advanced to clear liquids.   5/24 POD8: Advanced to FLD. Ambulating with PT. Having bowel function.    Patient seen by physical therapy throughout hospital stay who recommended patient be discharged home with home PT.   At this time, patient is currently ambulating, voiding, tolerating a regular diet. Pain well controlled on PO pain meds. Patient has been deemed stable for discharge home with follow up as an outpatient.    This patient is a 76 year old female with h/o esophageal cancer had chemo and radiation completed March 3rd, 2023 presented to [facility] on 5/16/23 for scheduled surgery. Patient taken to the  OR by Dr. Alva and Dr. Altamirano and underwent robotic R VATS, Blake Marcos esophagectomy. Patient tolerated operation well and there were no post-operative complications identified. Patient remained hemodynamically stable in the PACU and transferred to the surgical floor. Diet was restarted and advanced as tolerated. Pain control was transitioned from IV to PO pain meds.    5/17 POD1: Patient remained NPO with NGT with plan for swallow study POD7. Chest tube to suction.   5/18 POD2: Chest tube to water seal.   5/20 POD4: Neurology consulted for concerns of MS flare. Recommended to resume home baclofen 20mg BID and dalfampridine 10mg q12 once able to swallow medication, start Diazepam 2.5mg IV every 8 hours PRN for spasticity, infectious workup, and check for MS mimicks.   5/22 POD6: VA duplex of right upper extremity obtained and showed: No evidence of deep vein thrombosis in the right upper extremity. VA duplex b/l lower extremities obtained and showed: 1.  No evidence of deep vein thrombosis in the right and left lower extremities.  2.  No evidence of deep and superficial venous insufficiency noted in the right and left lower extremities.  5/23 POD7: Swallow study performed and showed: Status post esophagectomy with no evidence of anastomotic leak. Nasogastric tube removed. Diet advanced to clear liquids.   5/24 POD8: Advanced to FLD. Ambulating with PT. Having bowel function.  5/26 POD10: Beny drain removed by thoracic surgery.  Patient seen by physical therapy throughout hospital stay who recommended patient be discharged home with home PT and rolling walker.   At this time, patient is currently ambulating, voiding, tolerating a regular diet. Pain well controlled on PO pain meds. Patient has been deemed stable for discharge home with follow up as an outpatient.

## 2023-05-24 LAB
ANION GAP SERPL CALC-SCNC: 11 MMOL/L — SIGNIFICANT CHANGE UP (ref 7–14)
BUN SERPL-MCNC: 10 MG/DL — SIGNIFICANT CHANGE UP (ref 7–23)
CALCIUM SERPL-MCNC: 9.2 MG/DL — SIGNIFICANT CHANGE UP (ref 8.4–10.5)
CHLORIDE SERPL-SCNC: 103 MMOL/L — SIGNIFICANT CHANGE UP (ref 98–107)
CO2 SERPL-SCNC: 24 MMOL/L — SIGNIFICANT CHANGE UP (ref 22–31)
CREAT SERPL-MCNC: 0.41 MG/DL — LOW (ref 0.5–1.3)
EGFR: 103 ML/MIN/1.73M2 — SIGNIFICANT CHANGE UP
GLUCOSE BLDC GLUCOMTR-MCNC: 108 MG/DL — HIGH (ref 70–99)
GLUCOSE BLDC GLUCOMTR-MCNC: 108 MG/DL — HIGH (ref 70–99)
GLUCOSE BLDC GLUCOMTR-MCNC: 115 MG/DL — HIGH (ref 70–99)
GLUCOSE BLDC GLUCOMTR-MCNC: 126 MG/DL — HIGH (ref 70–99)
GLUCOSE BLDC GLUCOMTR-MCNC: 94 MG/DL — SIGNIFICANT CHANGE UP (ref 70–99)
GLUCOSE SERPL-MCNC: 97 MG/DL — SIGNIFICANT CHANGE UP (ref 70–99)
HCT VFR BLD CALC: 28.2 % — LOW (ref 34.5–45)
HGB BLD-MCNC: 9.2 G/DL — LOW (ref 11.5–15.5)
MAGNESIUM SERPL-MCNC: 2 MG/DL — SIGNIFICANT CHANGE UP (ref 1.6–2.6)
MCHC RBC-ENTMCNC: 31.2 PG — SIGNIFICANT CHANGE UP (ref 27–34)
MCHC RBC-ENTMCNC: 32.6 GM/DL — SIGNIFICANT CHANGE UP (ref 32–36)
MCV RBC AUTO: 95.6 FL — SIGNIFICANT CHANGE UP (ref 80–100)
NRBC # BLD: 0 /100 WBCS — SIGNIFICANT CHANGE UP (ref 0–0)
NRBC # FLD: 0 K/UL — SIGNIFICANT CHANGE UP (ref 0–0)
PHOSPHATE SERPL-MCNC: 3 MG/DL — SIGNIFICANT CHANGE UP (ref 2.5–4.5)
PLATELET # BLD AUTO: 255 K/UL — SIGNIFICANT CHANGE UP (ref 150–400)
POTASSIUM SERPL-MCNC: 3.8 MMOL/L — SIGNIFICANT CHANGE UP (ref 3.5–5.3)
POTASSIUM SERPL-SCNC: 3.8 MMOL/L — SIGNIFICANT CHANGE UP (ref 3.5–5.3)
RBC # BLD: 2.95 M/UL — LOW (ref 3.8–5.2)
RBC # FLD: 14.1 % — SIGNIFICANT CHANGE UP (ref 10.3–14.5)
SODIUM SERPL-SCNC: 138 MMOL/L — SIGNIFICANT CHANGE UP (ref 135–145)
WBC # BLD: 5.95 K/UL — SIGNIFICANT CHANGE UP (ref 3.8–10.5)
WBC # FLD AUTO: 5.95 K/UL — SIGNIFICANT CHANGE UP (ref 3.8–10.5)

## 2023-05-24 PROCEDURE — 71045 X-RAY EXAM CHEST 1 VIEW: CPT | Mod: 26

## 2023-05-24 RX ORDER — OXYCODONE HYDROCHLORIDE 5 MG/1
2.5 TABLET ORAL EVERY 4 HOURS
Refills: 0 | Status: DISCONTINUED | OUTPATIENT
Start: 2023-05-24 | End: 2023-05-26

## 2023-05-24 RX ORDER — ACETAMINOPHEN 500 MG
975 TABLET ORAL EVERY 6 HOURS
Refills: 0 | Status: DISCONTINUED | OUTPATIENT
Start: 2023-05-24 | End: 2023-05-26

## 2023-05-24 RX ORDER — ONDANSETRON 8 MG/1
4 TABLET, FILM COATED ORAL EVERY 8 HOURS
Refills: 0 | Status: DISCONTINUED | OUTPATIENT
Start: 2023-05-24 | End: 2023-05-26

## 2023-05-24 RX ORDER — PANTOPRAZOLE SODIUM 20 MG/1
40 TABLET, DELAYED RELEASE ORAL
Refills: 0 | Status: DISCONTINUED | OUTPATIENT
Start: 2023-05-25 | End: 2023-05-26

## 2023-05-24 RX ORDER — DALFAMPRIDINE 10 MG/1
10 TABLET, FILM COATED, EXTENDED RELEASE ORAL EVERY 12 HOURS
Refills: 0 | Status: DISCONTINUED | OUTPATIENT
Start: 2023-05-24 | End: 2023-05-26

## 2023-05-24 RX ORDER — HYDRALAZINE HCL 50 MG
10 TABLET ORAL EVERY 6 HOURS
Refills: 0 | Status: DISCONTINUED | OUTPATIENT
Start: 2023-05-24 | End: 2023-05-25

## 2023-05-24 RX ORDER — BACLOFEN 100 %
20 POWDER (GRAM) MISCELLANEOUS EVERY 12 HOURS
Refills: 0 | Status: DISCONTINUED | OUTPATIENT
Start: 2023-05-24 | End: 2023-05-26

## 2023-05-24 RX ORDER — OXYCODONE HYDROCHLORIDE 5 MG/1
5 TABLET ORAL EVERY 4 HOURS
Refills: 0 | Status: DISCONTINUED | OUTPATIENT
Start: 2023-05-24 | End: 2023-05-26

## 2023-05-24 RX ADMIN — DORNASE ALFA 2.5 MILLIGRAM(S): 1 SOLUTION RESPIRATORY (INHALATION) at 10:39

## 2023-05-24 RX ADMIN — Medication 975 MILLIGRAM(S): at 17:18

## 2023-05-24 RX ADMIN — ALBUTEROL 2.5 MILLIGRAM(S): 90 AEROSOL, METERED ORAL at 03:54

## 2023-05-24 RX ADMIN — DALFAMPRIDINE 10 MILLIGRAM(S): 10 TABLET, FILM COATED, EXTENDED RELEASE ORAL at 21:46

## 2023-05-24 RX ADMIN — ALBUTEROL 2.5 MILLIGRAM(S): 90 AEROSOL, METERED ORAL at 10:38

## 2023-05-24 RX ADMIN — HEPARIN SODIUM 5000 UNIT(S): 5000 INJECTION INTRAVENOUS; SUBCUTANEOUS at 21:47

## 2023-05-24 RX ADMIN — Medication 975 MILLIGRAM(S): at 19:20

## 2023-05-24 RX ADMIN — SODIUM CHLORIDE 4 MILLILITER(S): 9 INJECTION INTRAMUSCULAR; INTRAVENOUS; SUBCUTANEOUS at 16:16

## 2023-05-24 RX ADMIN — ALBUTEROL 2.5 MILLIGRAM(S): 90 AEROSOL, METERED ORAL at 21:37

## 2023-05-24 RX ADMIN — SODIUM CHLORIDE 4 MILLILITER(S): 9 INJECTION INTRAMUSCULAR; INTRAVENOUS; SUBCUTANEOUS at 10:38

## 2023-05-24 RX ADMIN — HEPARIN SODIUM 5000 UNIT(S): 5000 INJECTION INTRAVENOUS; SUBCUTANEOUS at 05:18

## 2023-05-24 RX ADMIN — SODIUM CHLORIDE 4 MILLILITER(S): 9 INJECTION INTRAMUSCULAR; INTRAVENOUS; SUBCUTANEOUS at 21:37

## 2023-05-24 RX ADMIN — HYDROMORPHONE HYDROCHLORIDE 0.3 MILLIGRAM(S): 2 INJECTION INTRAMUSCULAR; INTRAVENOUS; SUBCUTANEOUS at 05:45

## 2023-05-24 RX ADMIN — HYDROMORPHONE HYDROCHLORIDE 0.3 MILLIGRAM(S): 2 INJECTION INTRAMUSCULAR; INTRAVENOUS; SUBCUTANEOUS at 05:15

## 2023-05-24 RX ADMIN — SODIUM CHLORIDE 4 MILLILITER(S): 9 INJECTION INTRAMUSCULAR; INTRAVENOUS; SUBCUTANEOUS at 03:54

## 2023-05-24 RX ADMIN — HEPARIN SODIUM 5000 UNIT(S): 5000 INJECTION INTRAVENOUS; SUBCUTANEOUS at 14:38

## 2023-05-24 RX ADMIN — ALBUTEROL 2.5 MILLIGRAM(S): 90 AEROSOL, METERED ORAL at 16:16

## 2023-05-24 RX ADMIN — Medication 20 MILLIGRAM(S): at 19:20

## 2023-05-24 NOTE — PROGRESS NOTE ADULT - SUBJECTIVE AND OBJECTIVE BOX
Subjective: patient seen and examined with thoracic surgery team  pt without acute complaints  tolerating clear liquid diet   pain controlled  pt denies chest pain or shortness of breath  using incentive spirometer  on bowel regimen, +flatus, +BM  ambulatory with assistance  pt requesting to be DC home on Friday due to caregiver concerns for her 97y mother  reached out to Dr Altamirano to see if son can be removed      Vital Signs:  Vital Signs Last 24 Hrs  T(C): 36.9 (05-24-23 @ 04:03), Max: 37.2 (05-23-23 @ 12:58)  T(F): 98.5 (05-24-23 @ 04:03), Max: 99 (05-23-23 @ 12:58)  HR: 66 (05-24-23 @ 04:03) (62 - 81)  BP: 139/61 (05-24-23 @ 04:03) (113/54 - 142/74)  RR: 18 (05-24-23 @ 04:03) (17 - 20)  SpO2: 95% (05-24-23 @ 04:03) (95% - 100%) on (O2)      PE  Telemetry/Alarms: sr  General: WN/WD NAD  Neurology: Awake, nonfocal, LARA x 4  Eyes: Scleras clear, PERRLA/ EOMI, Gross vision intact  ENT:Gross hearing intact, grossly patent pharynx, no stridor  Neck: Neck supple, trachea midline, No JVD,   Respiratory: CTA B/L, No wheezing, rales, rhonchi  CV: RRR, S1S2, no murmurs, rubs or gallops  Abdominal: Soft, NT, ND +BS,   Extremities: No edema, + peripheral pulses  Skin: No Rashes, Hematoma, Ecchymosis  Lymphatic: No Neck, axilla, groin LAD  Psych: Oriented x 3, normal affect  Incisions: c,d,i  Tubes: son to bulb drained 130cc/24h  Relevant labs, radiology and Medications reviewed                            9.2    5.95  )-----------( 255      ( 24 May 2023 05:20 )             28.2     05-24    138  |  103  |  10  ----------------------------<  97  3.8   |  24  |  0.41<L>    Ca    9.2      24 May 2023 05:20  Phos  3.0     05-24  Mg     2.00     05-24        MEDICATIONS  (STANDING):  albuterol    0.083% 2.5 milliGRAM(s) Nebulizer every 6 hours  dextrose 5% + lactated ringers 1000 milliLiter(s) (60 mL/Hr) IV Continuous <Continuous>  dornase bridget Solution 2.5 milliGRAM(s) Inhalation daily  heparin   Injectable 5000 Unit(s) SubCutaneous every 8 hours  lidocaine   4% Patch 1 Patch Transdermal daily  lidocaine   4% Patch 1 Patch Transdermal daily  pantoprazole  Injectable 40 milliGRAM(s) IV Push at bedtime  sodium chloride 3%  Inhalation 4 milliLiter(s) Inhalation every 6 hours    MEDICATIONS  (PRN):  acetaminophen   IVPB .. 1000 milliGRAM(s) IV Intermittent every 6 hours PRN Mild Pain (1 - 3)  diazepam  Injectable 3 milliGRAM(s) IV Push every 8 hours PRN muscle spasms  hydrALAZINE Injectable 5 milliGRAM(s) IV Push every 4 hours PRN SBP>160  HYDROmorphone  Injectable 0.3 milliGRAM(s) IV Push every 3 hours PRN Moderate - Severe Pain (4 - 10)  HYDROmorphone  Injectable 0.25 milliGRAM(s) IV Push every 3 hours PRN Severe Breakthrough Pain (7 - 10)  naloxone Injectable 0.1 milliGRAM(s) IV Push every 3 minutes PRN For ANY of the following changes in patient status:  A. RR LESS THAN 10 breaths per minute, B. Oxygen saturation LESS THAN 90%, C. Sedation score of 6  ondansetron Injectable 4 milliGRAM(s) IV Push every 6 hours PRN Nausea    Pertinent Physical Exam  I&O's Summary    23 May 2023 07:01  -  24 May 2023 07:00  --------------------------------------------------------  IN: 1040 mL / OUT: 980 mL / NET: 60 mL        Assessment  This patient is a 76 year old female with h/o esophageal cancer had chemo and radiation completed March 3rd, 2023 presented to [facility] on 5/16/23 for scheduled surgery. Patient taken to the  OR by Dr. Alva and Dr. Altamirano and underwent robotic R VATS, Blake Marcos esophagectomy. Patient tolerated operation well and there were no post-operative complications identified. Patient remained hemodynamically stable in the CTICU and transferred to the surgical floor. Barium Swallow completed and pt started on clear liquid diet.         PLAN  continue clear liquid diet  will d/w Dr Altamirano if son can be removed  pt requesting to be DC home on Friday due to caregiver concerns for her 97y mother  continue care per primary surgery D team    Fallon ORELLANA [id number]

## 2023-05-24 NOTE — PROGRESS NOTE ADULT - SUBJECTIVE AND OBJECTIVE BOX
Date of service 5/24/23    chief complaint: surgery    extended hpi: 77 y/o female with a pmh of MS, HTN and  esophageal cancer s/p chemo and radiation completed March 3rd, 2023 presented for elective robotic esophagogastroduodenoscopy, laparoscopic esophagogastrectomy on 5/16/23.       S: no chest pain or sob; ros otherwise negative.     Review of Systems:   Constitutional: [ ] fevers, [ ] chills.   Skin: [ ] dry skin. [ ] rashes.  Psychiatric: [ ] depression, [ ] anxiety.   Gastrointestinal: [ ] BRBPR, [ ] melena.   Neurological: [ ] confusion. [ ] seizures. [ ] shuffling gait.   Ears,Nose,Mouth and Throat: [ ] ear pain [ ] sore throat.   Eyes: [ ] diplopia.   Respiratory: [ ] hemoptysis. [ ] shortness of breath  Cardiovascular: See HPI above  Hematologic/Lymphatic: [ ] anemia. [ ] painful nodes. [ ] prolonged bleeding.   Genitourinary: [ ] hematuria. [ ] flank pain.   Endocrine: [ ] significant change in weight. [ ] intolerance to heat and cold.     Review of systems [x ] otherwise negative, [ ] otherwise unable to obtain    FH: no family history of sudden cardiac death in first degree relatives    SH: [ ] tobacco, [ ] alcohol, [ ] drugs    acetaminophen   IVPB .. 1000 milliGRAM(s) IV Intermittent every 6 hours PRN  albuterol    0.083% 2.5 milliGRAM(s) Nebulizer every 6 hours  dextrose 5% + lactated ringers 1000 milliLiter(s) IV Continuous <Continuous>  diazepam  Injectable 3 milliGRAM(s) IV Push every 8 hours PRN  dornase bridget Solution 2.5 milliGRAM(s) Inhalation daily  heparin   Injectable 5000 Unit(s) SubCutaneous every 8 hours  hydrALAZINE Injectable 5 milliGRAM(s) IV Push every 4 hours PRN  HYDROmorphone  Injectable 0.3 milliGRAM(s) IV Push every 3 hours PRN  HYDROmorphone  Injectable 0.25 milliGRAM(s) IV Push every 3 hours PRN  lidocaine   4% Patch 1 Patch Transdermal daily  lidocaine   4% Patch 1 Patch Transdermal daily  naloxone Injectable 0.1 milliGRAM(s) IV Push every 3 minutes PRN  ondansetron Injectable 4 milliGRAM(s) IV Push every 6 hours PRN  pantoprazole  Injectable 40 milliGRAM(s) IV Push at bedtime  sodium chloride 3%  Inhalation 4 milliLiter(s) Inhalation every 6 hours                            9.2    5.95  )-----------( 255      ( 24 May 2023 05:20 )             28.2     138  |  103  |  10  ----------------------------<  97  3.8   |  24  |  0.41<L>    Ca    9.2      24 May 2023 05:20  Phos  3.0     05-24  Mg     2.00     05-24      T(C): 36.9 (05-24-23 @ 04:03), Max: 37.2 (05-23-23 @ 12:58)  HR: 66 (05-24-23 @ 04:03) (62 - 81)  BP: 139/61 (05-24-23 @ 04:03) (113/54 - 139/61)  RR: 18 (05-24-23 @ 04:03) (17 - 19)  SpO2: 95% (05-24-23 @ 04:03) (95% - 100%)  Wt(kg): --    I&O's Summary    23 May 2023 07:01  -  24 May 2023 07:00  --------------------------------------------------------  IN: 1040 mL / OUT: 980 mL / NET: 60 mL      General: Alert and Oriented  Head: Normocephalic and atraumatic.   Neck: No JVD. No bruits. Supple. Does not appear to be enlarged.   Cardiovascular: + S1,S2 ; RRR Soft systolic murmur at the left lower sternal border. No rubs noted.    Lungs: CTA b/l. No rhonchi, rales or wheezes.   Abdomen: + BS, soft. Non tender. Non distended. No rebound. No guarding.   Extremities: No clubbing/cyanosis/edema.   Neurologic: Moves all four extremities  Skin: Warm and moist. The patient's skin has normal elasticity and good skin turgor.   Psychiatric: Appropriate mood and affect.  Musculoskeletal: Normal range of motion    DATA    tele- SR  ECG:  NSR Bifascular block	  TTE 03/2023: Preserved LVEF  Cath: Non obstructive CAD    ASSESSMENT/PLAN: 	Pt is a 77 y/o female with a pmh of MS, HTN and  esophageal cancer s/p chemo and radiation completed March 3rd, 2023 presented for elective robotic esophagogastroduodenoscopy, laparoscopic esophagogastrectomy on 5/16/23.     1. HTN  - home meds on hold, given normotension would cont to hold as inpatient and resume as OP upon F/U with OP PMD/Cardio    2. Post OP  --tolerated procedure well in terms of cv perspective   --pain management and PT  --NSVT on tele, Keep K >4-4.5 and Mag 2-2.5, recent TTE as noted above with Normal LV function

## 2023-05-24 NOTE — CHART NOTE - NSCHARTNOTEFT_GEN_A_CORE
Pt discussed w Dr Altamirano  no objection to progressing diet to full liquids  continue son for one more day  d/w surgery D team    Fallon ORELLANA [id number]

## 2023-05-24 NOTE — PROGRESS NOTE ADULT - SUBJECTIVE AND OBJECTIVE BOX
TEAM SURGERY PROGRESS NOTE    POST OPERATIVE DAY #: 8    Interval events: UGI with no evidence of anastomotic leak.    SUBJECTIVE: Patient seen and examined at bedside on AM rounds, patient reports tingling and pain from her MS, but tolerable. Tolerating CLD. Denies fever, chills, n/v, sob, chest pain.    Vital Signs Last 24 Hrs  T(C): 36.9 (24 May 2023 04:03), Max: 37.2 (23 May 2023 12:58)  T(F): 98.5 (24 May 2023 04:03), Max: 99 (23 May 2023 12:58)  HR: 66 (24 May 2023 04:03) (62 - 81)  BP: 139/61 (24 May 2023 04:03) (113/54 - 139/61)  BP(mean): 69 (23 May 2023 11:00) (69 - 69)  RR: 18 (24 May 2023 04:03) (17 - 19)  SpO2: 95% (24 May 2023 04:03) (95% - 100%)    Parameters below as of 24 May 2023 04:03  Patient On (Oxygen Delivery Method): room air      I&O's Detail    23 May 2023 07:01  -  24 May 2023 07:00  --------------------------------------------------------  IN:    dextrose 5% + lactated ringers w/ Additives: 900 mL    Enteral Tube Flush: 40 mL    IV PiggyBack: 100 mL  Total IN: 1040 mL    OUT:    Bulb (mL): 180 mL    Voided (mL): 800 mL  Total OUT: 980 mL    Total NET: 60 mL        MEDICATIONS  (STANDING):  albuterol    0.083% 2.5 milliGRAM(s) Nebulizer every 6 hours  dextrose 5% + lactated ringers 1000 milliLiter(s) (60 mL/Hr) IV Continuous <Continuous>  dornase bridget Solution 2.5 milliGRAM(s) Inhalation daily  heparin   Injectable 5000 Unit(s) SubCutaneous every 8 hours  lidocaine   4% Patch 1 Patch Transdermal daily  lidocaine   4% Patch 1 Patch Transdermal daily  pantoprazole  Injectable 40 milliGRAM(s) IV Push at bedtime  sodium chloride 3%  Inhalation 4 milliLiter(s) Inhalation every 6 hours    MEDICATIONS  (PRN):  acetaminophen   IVPB .. 1000 milliGRAM(s) IV Intermittent every 6 hours PRN Mild Pain (1 - 3)  diazepam  Injectable 3 milliGRAM(s) IV Push every 8 hours PRN muscle spasms  hydrALAZINE Injectable 5 milliGRAM(s) IV Push every 4 hours PRN SBP>160  HYDROmorphone  Injectable 0.3 milliGRAM(s) IV Push every 3 hours PRN Moderate - Severe Pain (4 - 10)  HYDROmorphone  Injectable 0.25 milliGRAM(s) IV Push every 3 hours PRN Severe Breakthrough Pain (7 - 10)  naloxone Injectable 0.1 milliGRAM(s) IV Push every 3 minutes PRN For ANY of the following changes in patient status:  A. RR LESS THAN 10 breaths per minute, B. Oxygen saturation LESS THAN 90%, C. Sedation score of 6  ondansetron Injectable 4 milliGRAM(s) IV Push every 6 hours PRN Nausea      Physical Exam  General: A&Ox3, NAD  Respiratory: Equal bilateral expansion  Cardiovascular: Regular rate & rhythm  Abdominal: Soft. NTND. Port sites c/d/i. Beny ss.     LABS:                        9.2    5.95  )-----------( 255      ( 24 May 2023 05:20 )             28.2     05-24    138  |  103  |  10  ----------------------------<  97  3.8   |  24  |  0.41<L>    Ca    9.2      24 May 2023 05:20  Phos  3.0     05-24  Mg     2.00     05-24              Patient is a 73y Female

## 2023-05-25 LAB
ANION GAP SERPL CALC-SCNC: 10 MMOL/L — SIGNIFICANT CHANGE UP (ref 7–14)
BUN SERPL-MCNC: 7 MG/DL — SIGNIFICANT CHANGE UP (ref 7–23)
CALCIUM SERPL-MCNC: 8.9 MG/DL — SIGNIFICANT CHANGE UP (ref 8.4–10.5)
CHLORIDE SERPL-SCNC: 104 MMOL/L — SIGNIFICANT CHANGE UP (ref 98–107)
CO2 SERPL-SCNC: 22 MMOL/L — SIGNIFICANT CHANGE UP (ref 22–31)
CREAT SERPL-MCNC: 0.4 MG/DL — LOW (ref 0.5–1.3)
EGFR: 104 ML/MIN/1.73M2 — SIGNIFICANT CHANGE UP
GLUCOSE BLDC GLUCOMTR-MCNC: 112 MG/DL — HIGH (ref 70–99)
GLUCOSE BLDC GLUCOMTR-MCNC: 121 MG/DL — HIGH (ref 70–99)
GLUCOSE BLDC GLUCOMTR-MCNC: 140 MG/DL — HIGH (ref 70–99)
GLUCOSE BLDC GLUCOMTR-MCNC: 94 MG/DL — SIGNIFICANT CHANGE UP (ref 70–99)
GLUCOSE SERPL-MCNC: 111 MG/DL — HIGH (ref 70–99)
HCT VFR BLD CALC: 27.3 % — LOW (ref 34.5–45)
HGB BLD-MCNC: 8.7 G/DL — LOW (ref 11.5–15.5)
HTLV I+II AB PATRN SER RIPA-IMP: SIGNIFICANT CHANGE UP
MAGNESIUM SERPL-MCNC: 1.9 MG/DL — SIGNIFICANT CHANGE UP (ref 1.6–2.6)
MCHC RBC-ENTMCNC: 30.5 PG — SIGNIFICANT CHANGE UP (ref 27–34)
MCHC RBC-ENTMCNC: 31.9 GM/DL — LOW (ref 32–36)
MCV RBC AUTO: 95.8 FL — SIGNIFICANT CHANGE UP (ref 80–100)
NRBC # BLD: 0 /100 WBCS — SIGNIFICANT CHANGE UP (ref 0–0)
NRBC # FLD: 0 K/UL — SIGNIFICANT CHANGE UP (ref 0–0)
PHOSPHATE SERPL-MCNC: 3.3 MG/DL — SIGNIFICANT CHANGE UP (ref 2.5–4.5)
PLATELET # BLD AUTO: 271 K/UL — SIGNIFICANT CHANGE UP (ref 150–400)
POTASSIUM SERPL-MCNC: 3.8 MMOL/L — SIGNIFICANT CHANGE UP (ref 3.5–5.3)
POTASSIUM SERPL-SCNC: 3.8 MMOL/L — SIGNIFICANT CHANGE UP (ref 3.5–5.3)
RBC # BLD: 2.85 M/UL — LOW (ref 3.8–5.2)
RBC # FLD: 14.1 % — SIGNIFICANT CHANGE UP (ref 10.3–14.5)
SODIUM SERPL-SCNC: 136 MMOL/L — SIGNIFICANT CHANGE UP (ref 135–145)
WBC # BLD: 5.78 K/UL — SIGNIFICANT CHANGE UP (ref 3.8–10.5)
WBC # FLD AUTO: 5.78 K/UL — SIGNIFICANT CHANGE UP (ref 3.8–10.5)

## 2023-05-25 RX ADMIN — Medication 975 MILLIGRAM(S): at 05:59

## 2023-05-25 RX ADMIN — Medication 975 MILLIGRAM(S): at 18:46

## 2023-05-25 RX ADMIN — OXYCODONE HYDROCHLORIDE 5 MILLIGRAM(S): 5 TABLET ORAL at 22:30

## 2023-05-25 RX ADMIN — OXYCODONE HYDROCHLORIDE 5 MILLIGRAM(S): 5 TABLET ORAL at 03:57

## 2023-05-25 RX ADMIN — DALFAMPRIDINE 10 MILLIGRAM(S): 10 TABLET, FILM COATED, EXTENDED RELEASE ORAL at 21:28

## 2023-05-25 RX ADMIN — DORNASE ALFA 2.5 MILLIGRAM(S): 1 SOLUTION RESPIRATORY (INHALATION) at 09:40

## 2023-05-25 RX ADMIN — ALBUTEROL 2.5 MILLIGRAM(S): 90 AEROSOL, METERED ORAL at 09:39

## 2023-05-25 RX ADMIN — Medication 975 MILLIGRAM(S): at 05:29

## 2023-05-25 RX ADMIN — Medication 20 MILLIGRAM(S): at 05:29

## 2023-05-25 RX ADMIN — SODIUM CHLORIDE 4 MILLILITER(S): 9 INJECTION INTRAMUSCULAR; INTRAVENOUS; SUBCUTANEOUS at 09:40

## 2023-05-25 RX ADMIN — ALBUTEROL 2.5 MILLIGRAM(S): 90 AEROSOL, METERED ORAL at 03:31

## 2023-05-25 RX ADMIN — Medication 20 MILLIGRAM(S): at 17:55

## 2023-05-25 RX ADMIN — PANTOPRAZOLE SODIUM 40 MILLIGRAM(S): 20 TABLET, DELAYED RELEASE ORAL at 05:29

## 2023-05-25 RX ADMIN — HEPARIN SODIUM 5000 UNIT(S): 5000 INJECTION INTRAVENOUS; SUBCUTANEOUS at 21:28

## 2023-05-25 RX ADMIN — SODIUM CHLORIDE 4 MILLILITER(S): 9 INJECTION INTRAMUSCULAR; INTRAVENOUS; SUBCUTANEOUS at 03:31

## 2023-05-25 RX ADMIN — HEPARIN SODIUM 5000 UNIT(S): 5000 INJECTION INTRAVENOUS; SUBCUTANEOUS at 13:28

## 2023-05-25 RX ADMIN — OXYCODONE HYDROCHLORIDE 5 MILLIGRAM(S): 5 TABLET ORAL at 04:28

## 2023-05-25 RX ADMIN — HEPARIN SODIUM 5000 UNIT(S): 5000 INJECTION INTRAVENOUS; SUBCUTANEOUS at 05:29

## 2023-05-25 RX ADMIN — Medication 975 MILLIGRAM(S): at 17:55

## 2023-05-25 RX ADMIN — DALFAMPRIDINE 10 MILLIGRAM(S): 10 TABLET, FILM COATED, EXTENDED RELEASE ORAL at 10:00

## 2023-05-25 RX ADMIN — OXYCODONE HYDROCHLORIDE 5 MILLIGRAM(S): 5 TABLET ORAL at 21:27

## 2023-05-25 NOTE — PROGRESS NOTE ADULT - SUBJECTIVE AND OBJECTIVE BOX
D Team Surgery Daily Progress Note    Interval Events:  - No acute events overnight  - tolerating full liquid diet    SUBJECTIVE: Patient seen and examined by team on morning rounds. Patient lying comfortably in bed. Tolerating diet, ambulating, voiding, passing flatus, had BM. Complaining of discomfort at R chest CARINE site. Denies SOB, dizziness, palpitations, fever, chills, N/V/D.    Vital Signs Last 24 Hrs  T(C): 37.1 (25 May 2023 04:09), Max: 37.1 (24 May 2023 12:00)  T(F): 98.7 (25 May 2023 04:09), Max: 98.8 (24 May 2023 12:00)  HR: 62 (25 May 2023 04:09) (62 - 83)  BP: 138/60 (25 May 2023 04:09) (114/54 - 143/57)  RR: 18 (25 May 2023 04:09) (17 - 18)  SpO2: 95% (25 May 2023 04:09) (95% - 100%)  Parameters below as of 25 May 2023 04:09  Patient On (Oxygen Delivery Method): room air      General Appearance: Appears well, NAD  Neck: Supple  Chest: Equal expansion bilaterally, equal breath sounds, R CARINE with SS OP  CV: Pulse regular presently  Abdomen: Soft, nontender, nondistended  Extremities: Grossly symmetric, SCD's in place     I&O's Summary    24 May 2023 07:01  -  25 May 2023 07:00  --------------------------------------------------------  IN: 1360 mL / OUT: 1555 mL / NET: -195 mL      I&O's Detail    24 May 2023 07:01  -  25 May 2023 07:00  --------------------------------------------------------  IN:    dextrose 5% + lactated ringers w/ Additives: 1260 mL    IV PiggyBack: 100 mL  Total IN: 1360 mL    OUT:    Bulb (mL): 155 mL    Voided (mL): 1400 mL  Total OUT: 1555 mL    Total NET: -195 mL          MEDICATIONS  (STANDING):  acetaminophen     Tablet .. 975 milliGRAM(s) Oral every 6 hours  albuterol    0.083% 2.5 milliGRAM(s) Nebulizer every 6 hours  baclofen 20 milliGRAM(s) Oral every 12 hours  dalfampridine ER 10 milliGRAM(s) Oral every 12 hours  dornase bridget Solution 2.5 milliGRAM(s) Inhalation daily  heparin   Injectable 5000 Unit(s) SubCutaneous every 8 hours  lidocaine   4% Patch 1 Patch Transdermal daily  lidocaine   4% Patch 1 Patch Transdermal daily  pantoprazole    Tablet 40 milliGRAM(s) Oral before breakfast  sodium chloride 3%  Inhalation 4 milliLiter(s) Inhalation every 6 hours    MEDICATIONS  (PRN):  diazepam  Injectable 3 milliGRAM(s) IV Push every 8 hours PRN muscle spasms  hydrALAZINE 10 milliGRAM(s) Oral every 6 hours PRN Systolic blood pressure >  naloxone Injectable 0.1 milliGRAM(s) IV Push every 3 minutes PRN For ANY of the following changes in patient status:  A. RR LESS THAN 10 breaths per minute, B. Oxygen saturation LESS THAN 90%, C. Sedation score of 6  ondansetron    Tablet 4 milliGRAM(s) Oral every 8 hours PRN Nausea and/or Vomiting  oxyCODONE    IR 2.5 milliGRAM(s) Oral every 4 hours PRN Moderate Pain (4 - 6)  oxyCODONE    IR 5 milliGRAM(s) Oral every 4 hours PRN Severe Pain (7 - 10)      LABS:                        8.7    5.78  )-----------( 271      ( 25 May 2023 05:56 )             27.3     05-25    136  |  104  |  7   ----------------------------<  111<H>  3.8   |  22  |  0.40<L>    Ca    8.9      25 May 2023 05:56  Phos  3.3     05-25  Mg     1.90     05-25            RADIOLOGY & ADDITIONAL STUDIES:

## 2023-05-25 NOTE — PROGRESS NOTE ADULT - SUBJECTIVE AND OBJECTIVE BOX
Date of service 5/25/23    chief complaint: surgery    extended hpi: 75 y/o female with a pmh of MS, HTN and  esophageal cancer s/p chemo and radiation completed March 3rd, 2023 presented for elective robotic esophagogastroduodenoscopy, laparoscopic esophagogastrectomy on 5/16/23.       S: no chest pain or sob; ros otherwise negative.     Review of Systems:   Constitutional: [ ] fevers, [ ] chills.   Skin: [ ] dry skin. [ ] rashes.  Psychiatric: [ ] depression, [ ] anxiety.   Gastrointestinal: [ ] BRBPR, [ ] melena.   Neurological: [ ] confusion. [ ] seizures. [ ] shuffling gait.   Ears,Nose,Mouth and Throat: [ ] ear pain [ ] sore throat.   Eyes: [ ] diplopia.   Respiratory: [ ] hemoptysis. [ ] shortness of breath  Cardiovascular: See HPI above  Hematologic/Lymphatic: [ ] anemia. [ ] painful nodes. [ ] prolonged bleeding.   Genitourinary: [ ] hematuria. [ ] flank pain.   Endocrine: [ ] significant change in weight. [ ] intolerance to heat and cold.     Review of systems [x ] otherwise negative, [ ] otherwise unable to obtain    FH: no family history of sudden cardiac death in first degree relatives    SH: [ ] tobacco, [ ] alcohol, [ ] drugs    acetaminophen     Tablet .. 975 milliGRAM(s) Oral every 6 hours  baclofen 20 milliGRAM(s) Oral every 12 hours  dalfampridine ER 10 milliGRAM(s) Oral every 12 hours  diazepam  Injectable 3 milliGRAM(s) IV Push every 8 hours PRN  heparin   Injectable 5000 Unit(s) SubCutaneous every 8 hours  lidocaine   4% Patch 1 Patch Transdermal daily  lidocaine   4% Patch 1 Patch Transdermal daily  naloxone Injectable 0.1 milliGRAM(s) IV Push every 3 minutes PRN  ondansetron    Tablet 4 milliGRAM(s) Oral every 8 hours PRN  oxyCODONE    IR 2.5 milliGRAM(s) Oral every 4 hours PRN  oxyCODONE    IR 5 milliGRAM(s) Oral every 4 hours PRN  pantoprazole    Tablet 40 milliGRAM(s) Oral before breakfast                            8.7    5.78  )-----------( 271      ( 25 May 2023 05:56 )             27.3     136  |  104  |  7   ----------------------------<  111<H>  3.8   |  22  |  0.40<L>    Ca    8.9      25 May 2023 05:56  Phos  3.3     05-25  Mg     1.90     05-25      T(C): 36.3 (05-25-23 @ 12:01), Max: 37.1 (05-24-23 @ 20:11)  HR: 65 (05-25-23 @ 12:01) (62 - 78)  BP: 112/49 (05-25-23 @ 12:01) (112/49 - 143/57)  RR: 18 (05-25-23 @ 12:01) (18 - 18)  SpO2: 93% (05-25-23 @ 12:01) (93% - 100%)  Wt(kg): --    I&O's Summary    24 May 2023 07:01  -  25 May 2023 07:00  --------------------------------------------------------  IN: 1360 mL / OUT: 1555 mL / NET: -195 mL    25 May 2023 07:01  -  25 May 2023 13:39  --------------------------------------------------------  IN: 0 mL / OUT: 85 mL / NET: -85 mL      General: Alert and Oriented  Head: Normocephalic and atraumatic.   Neck: No JVD. No bruits. Supple. Does not appear to be enlarged.   Cardiovascular: + S1,S2 ; RRR Soft systolic murmur at the left lower sternal border. No rubs noted.    Lungs: CTA b/l. No rhonchi, rales or wheezes.   Abdomen: + BS, soft. Non tender. Non distended. No rebound. No guarding.   Extremities: No clubbing/cyanosis/edema.   Neurologic: Moves all four extremities  Skin: Warm and moist. The patient's skin has normal elasticity and good skin turgor.   Psychiatric: Appropriate mood and affect.  Musculoskeletal: Normal range of motion    DATA    tele- SR  ECG:  NSR Bifascular block	  TTE 03/2023: Preserved LVEF  Cath: Non obstructive CAD    ASSESSMENT/PLAN: 	Pt is a 75 y/o female with a pmh of MS, HTN and  esophageal cancer s/p chemo and radiation completed March 3rd, 2023 presented for elective robotic esophagogastroduodenoscopy, laparoscopic esophagogastrectomy on 5/16/23.     1. HTN  - home meds on hold, given normotension would cont to hold as inpatient and resume as OP upon F/U with OP PMD/Cardio    2. Post OP  --tolerated procedure well in terms of cv perspective   --pain management and PT  --NSVT on tele, Keep K >4-4.5 and Mag 2-2.5, recent TTE as noted above with Normal LV function

## 2023-05-25 NOTE — PROGRESS NOTE ADULT - NS ATTEND AMEND GEN_ALL_CORE FT
Patient seen and examined. Agree with plan as detailed in PA/NP Note.       home meds on hold, given normotension would cont to hold as inpatient and resume as OP      Becky West MD  Pager: [phone]
Patient seen and examined. Agree with plan as detailed in PA/NP Note.    can use IV hydralazine if SBP > 180 systolic and NPO      Becky West MD  Pager: [phone]
Patient seen and examined. Agree with plan as detailed in PA/NP Note.     Would continue to hold BP meds, can be restarted as outpatient when she follows with PMD/Cardiolgist    Becky West MD  Pager: [phone]
Patient seen and examined. Agree with plan as detailed in PA/NP Note.       Restart Anti hypertensives in step wise fashion    Becky West MD  Pager: [phone]
Patient seen and examined. Agree with plan as detailed in PA/NP Note.     Can use IV meds if BP persistently above 180 systolic    Becky West MD  Pager: [phone]

## 2023-05-25 NOTE — CHART NOTE - NSCHARTNOTEFT_GEN_A_CORE
Pt seen by Thoracic surgery team early this am  Tolerating liquid diet.   Right chest incisions c/d/i  Rt son to bulb, output 155cc/24hrs  Plan per surgery team, to discharge pt to home tomorrow  Above discussed with DR. Altamirano.   REcommend continue Son until tomorrow  Will remove prior to discharge.   Cont Full liquid diet  Pt can see Dr. Altamirano in office next Thursday for follow up.   Will cont to follow.

## 2023-05-26 ENCOUNTER — TRANSCRIPTION ENCOUNTER (OUTPATIENT)
Age: 73
End: 2023-05-26

## 2023-05-26 VITALS
DIASTOLIC BLOOD PRESSURE: 70 MMHG | RESPIRATION RATE: 18 BRPM | TEMPERATURE: 99 F | HEART RATE: 58 BPM | OXYGEN SATURATION: 95 % | SYSTOLIC BLOOD PRESSURE: 148 MMHG

## 2023-05-26 LAB
GLUCOSE BLDC GLUCOMTR-MCNC: 85 MG/DL — SIGNIFICANT CHANGE UP (ref 70–99)
GLUCOSE BLDC GLUCOMTR-MCNC: 89 MG/DL — SIGNIFICANT CHANGE UP (ref 70–99)
SURGICAL PATHOLOGY STUDY: SIGNIFICANT CHANGE UP

## 2023-05-26 PROCEDURE — 71045 X-RAY EXAM CHEST 1 VIEW: CPT | Mod: 26

## 2023-05-26 RX ORDER — ACETAMINOPHEN 500 MG
3 TABLET ORAL
Qty: 0 | Refills: 0 | DISCHARGE
Start: 2023-05-26

## 2023-05-26 RX ORDER — OXYCODONE HYDROCHLORIDE 5 MG/1
1 TABLET ORAL
Qty: 20 | Refills: 0
Start: 2023-05-26 | End: 2023-05-30

## 2023-05-26 RX ADMIN — Medication 975 MILLIGRAM(S): at 06:19

## 2023-05-26 RX ADMIN — PANTOPRAZOLE SODIUM 40 MILLIGRAM(S): 20 TABLET, DELAYED RELEASE ORAL at 05:27

## 2023-05-26 RX ADMIN — Medication 975 MILLIGRAM(S): at 05:27

## 2023-05-26 RX ADMIN — Medication 975 MILLIGRAM(S): at 12:31

## 2023-05-26 RX ADMIN — HEPARIN SODIUM 5000 UNIT(S): 5000 INJECTION INTRAVENOUS; SUBCUTANEOUS at 05:30

## 2023-05-26 RX ADMIN — Medication 975 MILLIGRAM(S): at 13:30

## 2023-05-26 RX ADMIN — DALFAMPRIDINE 10 MILLIGRAM(S): 10 TABLET, FILM COATED, EXTENDED RELEASE ORAL at 09:40

## 2023-05-26 RX ADMIN — Medication 20 MILLIGRAM(S): at 05:29

## 2023-05-26 NOTE — CHART NOTE - NSCHARTNOTEFT_GEN_A_CORE
Source: Patient [x]   Family [ ]     RN [x ]    Chart [x ]    Reason for Follow-Up Assessment:     Pt seen for nutrition follow up.     This patient is a 76 year old female with h/o esophageal cancer had chemo and radiation completed March 3rd, 2023 presented to [facility] on 5/16/23 for scheduled surgery. Patient taken to the  OR by Dr. Alva and Dr. Altamirano and underwent robotic R VATS, McCarr Marcos esophagectomy.    Pt's diet has been advanced to full liquids. She has been tolerating it well. Pt has no complaints of GI distress  (nausea/vomiting/diarrhea/constipation). Pt has no difficulty chewing or swallowing,   Discussed food preferences and tolerances. Instructed Pt on a full liquid diet. Written information was given to Pt. Pt appears to have a good understanding using teach back.     Diet, Full Liquid (05-24-23 @ 10:26)      GI: WDL.     PO intake:  < 50% [x ] 50-75% [ ]   % [ ]  other :    Enteral /Parenteral Nutrition: n/a    Anthropometrics: Height (cm): 154.9 (05-16), 154.94 (03-03)  Weight (kg): 75.3 (05-16), 75.3553 (03-03)  BMI (kg/m2): 31.4 (05-16), 31.4 (03-03)    Edema: No edema noted    Skin: Surgical Incision right VATS    __________________ Pertinent Medications__________________   MEDICATIONS  (STANDING):  acetaminophen     Tablet .. 975 milliGRAM(s) Oral every 6 hours  baclofen 20 milliGRAM(s) Oral every 12 hours  dalfampridine ER 10 milliGRAM(s) Oral every 12 hours  heparin   Injectable 5000 Unit(s) SubCutaneous every 8 hours  lidocaine   4% Patch 1 Patch Transdermal daily  lidocaine   4% Patch 1 Patch Transdermal daily  pantoprazole    Tablet 40 milliGRAM(s) Oral before breakfast    MEDICATIONS  (PRN):  naloxone Injectable 0.1 milliGRAM(s) IV Push every 3 minutes PRN For ANY of the following changes in patient status:  A. RR LESS THAN 10 breaths per minute, B. Oxygen saturation LESS THAN 90%, C. Sedation score of 6  ondansetron    Tablet 4 milliGRAM(s) Oral every 8 hours PRN Nausea and/or Vomiting  oxyCODONE    IR 2.5 milliGRAM(s) Oral every 4 hours PRN Moderate Pain (4 - 6)  oxyCODONE    IR 5 milliGRAM(s) Oral every 4 hours PRN Severe Pain (7 - 10)      __________________ Pertinent Labs__________________   05-25 Na136 mmol/L Glu 111 mg/dL<H> K+ 3.8 mmol/L Cr  0.40 mg/dL<L> BUN 7 mg/dL 05-25 Phos 3.3 mg/dL        POCT Blood Glucose.: 85 mg/dL (05-26-23 @ 11:47)  POCT Blood Glucose.: 89 mg/dL (05-26-23 @ 04:24)  POCT Blood Glucose.: 94 mg/dL (05-25-23 @ 22:44)  POCT Blood Glucose.: 140 mg/dL (05-25-23 @ 17:55)      Estimated Needs:     jami/d: 1444 [id number]  gm pro/d: 72.24 to 82.56    [x ] no change since previous assessment  [ ] recalculated:       Previous Nutrition Diagnosis:  Inadequate Protein Energy Intake    Nutrition Diagnosis is [ x] ongoing  [ ] resolved [ ] not applicable     New Nutrition Diagnosis: n/a      Recommendations:  1. Continue current nutrition care plan       Monitoring and Evaluation:     [ x] Tolerance to diet prescription [x ] weights [x ] follow up per protocol  [ ] other:

## 2023-05-26 NOTE — PROGRESS NOTE ADULT - ASSESSMENT
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 Recovering appropriately in CTICU     Plan:  - UGI on POD6  - Chest tube and CARINE management per CTSx   - NPO/IVF   - Upon transfer to floor, patient will be transferred to Dr. Alva's service   - Appreciate excellent care per CTICU     d/w surg onc fellow Dr. Reed on behalf of Dr. Alva     D Team Surgery  [id number] 
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 Recovering appropriately in CTICU     Plan:  - UGI on POD6  - NPO/IVF   - Upon transfer to floor, patient will be transferred to Dr. Alva's service   - Appreciate excellent care per CTICU     d/w surg onc fellow Dr. Reed on behalf of Dr. Alva     D Team Surgery  [id number] 
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 Recovering appropriately in CTICU     Plan:  - Advance to FLD.  - d/c IVF once tolerating fld.   - pain control  - DVT ppx: sqh  - OOB/PT    D Team Surgery  [id number] 
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 Recovering appropriately on surgical floor.    Plan:  - Advance to FLD.  - IVL  - pain control  - DVT ppx: sqh  - OOB/PT  - d/c home tm    D Team Surgery  [id number] 
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 Recovering appropriately on surgical floor.    Plan:  - Cont FLD.  - IVL  - pain control  - DVT ppx: sqh  - OOB/PT  - d/c home today    D Team Surgery  [id number] 
73y woman with a PMHx significant for malignant neoplasm of esophagus, multiple sclerosis (diagnosed as demyelinating disease in 1988 and 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 She is now complaining of bilateral lower extremity spasms and weakness. Symptoms started occurring shortly after her surgery and have persisted. She feels that her spasms are getting worse. No other complaints. Currently follows up with Dr. Travis Palencia at [facility] for MS [facility]. On baclofen 20mg BID and Dalfampridine 10mg q12. Patient has been NPO since her surgery and has not taken any medications orally. Cannot get swallowing study until POD7. Neurology consulted for concerns of MS flare.     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     
Attending addendum  Agree with above   Tolerated procedure well in terms of cv perspective   Continue with supportive care per CTU     Makenzie Murillo MD

## 2023-05-26 NOTE — PROGRESS NOTE ADULT - PROVIDER SPECIALTY LIST ADULT
Cardiology
Cardiology
Critical Care
Pain Medicine
Pain Medicine
Surgery
Cardiology
Critical Care
Pain Medicine
Surgery
Thoracic Surgery
Neurology
Surgery
no

## 2023-05-26 NOTE — DISCHARGE NOTE NURSING/CASE MANAGEMENT/SOCIAL WORK - PATIENT PORTAL LINK FT
You can access the FollowMyHealth Patient Portal offered by [facility] by registering at the following website: [email or web address]. By joining [facility]’s FollowMyHealth portal, you will also be able to view your health information using other applications (apps) compatible with our system.

## 2023-05-26 NOTE — PROGRESS NOTE ADULT - SUBJECTIVE AND OBJECTIVE BOX
Date of service 5/26/23    chief complaint: surgery    extended hpi: 75 y/o female with a pmh of MS, HTN and  esophageal cancer s/p chemo and radiation completed March 3rd, 2023 presented for elective robotic esophagogastroduodenoscopy, laparoscopic esophagogastrectomy on 5/16/23.        Patient denies chest pain or shortness of breath.   Review of symptoms otherwise negative.    MEDICATIONS:  acetaminophen     Tablet .. 975 milliGRAM(s) Oral every 6 hours  baclofen 20 milliGRAM(s) Oral every 12 hours  dalfampridine ER 10 milliGRAM(s) Oral every 12 hours  heparin   Injectable 5000 Unit(s) SubCutaneous every 8 hours  lidocaine   4% Patch 1 Patch Transdermal daily  lidocaine   4% Patch 1 Patch Transdermal daily  naloxone Injectable 0.1 milliGRAM(s) IV Push every 3 minutes PRN  ondansetron    Tablet 4 milliGRAM(s) Oral every 8 hours PRN  oxyCODONE    IR 2.5 milliGRAM(s) Oral every 4 hours PRN  oxyCODONE    IR 5 milliGRAM(s) Oral every 4 hours PRN  pantoprazole    Tablet 40 milliGRAM(s) Oral before breakfast    LABS:                        8.7    5.78  )-----------( 271      ( 25 May 2023 05:56 )             27.3     Hemoglobin: 8.7 g/dL (05-25 @ 05:56)  Hemoglobin: 9.2 g/dL (05-24 @ 05:20)  Hemoglobin: 9.3 g/dL (05-23 @ 03:27)  Hemoglobin: 10.3 g/dL (05-22 @ 05:00)    05-25    136  |  104  |  7   ----------------------------<  111<H>  3.8   |  22  |  0.40<L>    Ca    8.9      25 May 2023 05:56  Phos  3.3     05-25  Mg     1.90     05-25    Creatinine Trend: 0.40<--, 0.41<--, 0.43<--, 0.34<--, 0.34<--, 0.35<--      PHYSICAL EXAM:  T(C): 36.6 (05-26-23 @ 04:05), Max: 37 (05-25-23 @ 16:00)  HR: 64 (05-26-23 @ 04:05) (60 - 85)  BP: 146/97 (05-26-23 @ 04:05) (112/49 - 146/97)  RR: 18 (05-26-23 @ 04:05) (18 - 18)  SpO2: 97% (05-26-23 @ 04:05) (90% - 97%)  Wt(kg): --    I&O's Summary    25 May 2023 07:01  -  26 May 2023 07:00  --------------------------------------------------------  IN: 0 mL / OUT: 1645 mL / NET: -1645 mL          General: Alert and Oriented  Head: Normocephalic and atraumatic.   Neck: No JVD. No bruits. Supple. Does not appear to be enlarged.   Cardiovascular: + S1,S2 ; RRR Soft systolic murmur at the left lower sternal border. No rubs noted.    Lungs: CTA b/l. No rhonchi, rales or wheezes.   Abdomen: + BS, soft. Non tender. Non distended. No rebound. No guarding.   Extremities: No clubbing/cyanosis/edema.   Neurologic: Moves all four extremities  Skin: Warm and moist. The patient's skin has normal elasticity and good skin turgor.   Psychiatric: Appropriate mood and affect.  Musculoskeletal: Normal range of motion    DATA    tele- SR  ECG:  NSR Bifascular block	  TTE 03/2023: Preserved LVEF  Cath: Non obstructive CAD    ASSESSMENT/PLAN: 	Pt is a 75 y/o female with a pmh of MS, HTN and  esophageal cancer s/p chemo and radiation completed March 3rd, 2023 presented for elective robotic esophagogastroduodenoscopy, laparoscopic esophagogastrectomy on 5/16/23.     1. HTN  - home meds on hold, given normotension would cont to hold  resume as OP when she F/U with OP PMD/Cardio    2. Post OP  --tolerated procedure well in terms of cv perspective   --pain management and PT  --NSVT on tele, Keep K >4-4.5 and Mag 2-2.5, recent TTE as noted above with Normal LV function    Becky West MD  Pager: [phone]

## 2023-05-26 NOTE — CHART NOTE - NSCHARTNOTESELECT_GEN_ALL_CORE
Chart Note/Nutrition Services
Post op check
Thoracic Surgery/Event Note
Thoracic surgery
thoracic surgery/Event Note

## 2023-05-26 NOTE — PROGRESS NOTE ADULT - SUBJECTIVE AND OBJECTIVE BOX
D Team Surgery Daily Progress Note    Interval Events:  -No acute events overnight.    SUBJECTIVE: Patient seen and examined by team on morning rounds. Patient lying comfortably in bed. Tolerating diet, ambulating, voiding, passing flatus, had BM. Complaining of discomfort at R chest CAIRNE site. Denies SOB, dizziness, palpitations, fever, chills, N/V/D.    Vital Signs Last 24 Hrs  T(C): 36.6 (26 May 2023 04:05), Max: 37 (25 May 2023 16:00)  T(F): 97.9 (26 May 2023 04:05), Max: 98.6 (25 May 2023 16:00)  HR: 64 (26 May 2023 04:05) (60 - 85)  BP: 146/97 (26 May 2023 04:05) (112/49 - 146/97)  RR: 18 (26 May 2023 04:05) (18 - 18)  SpO2: 97% (26 May 2023 04:05) (90% - 97%)  Parameters below as of 26 May 2023 04:05  Patient On (Oxygen Delivery Method): room air      General Appearance: Appears well, NAD  Neck: Supple  Chest: Equal expansion bilaterally, equal breath sounds R CARINE with SS OP  CV: Pulse regular presently  Abdomen: Soft, nontender, nondistended  Extremities: Grossly symmetric, SCD's in place     I&O's Summary    25 May 2023 07:01  -  26 May 2023 07:00  --------------------------------------------------------  IN: 0 mL / OUT: 1645 mL / NET: -1645 mL      I&O's Detail    25 May 2023 07:01  -  26 May 2023 07:00  --------------------------------------------------------  IN:  Total IN: 0 mL    OUT:    Bulb (mL): 195 mL    Voided (mL): 1450 mL  Total OUT: 1645 mL    Total NET: -1645 mL          MEDICATIONS  (STANDING):  acetaminophen     Tablet .. 975 milliGRAM(s) Oral every 6 hours  baclofen 20 milliGRAM(s) Oral every 12 hours  dalfampridine ER 10 milliGRAM(s) Oral every 12 hours  heparin   Injectable 5000 Unit(s) SubCutaneous every 8 hours  lidocaine   4% Patch 1 Patch Transdermal daily  lidocaine   4% Patch 1 Patch Transdermal daily  pantoprazole    Tablet 40 milliGRAM(s) Oral before breakfast    MEDICATIONS  (PRN):  naloxone Injectable 0.1 milliGRAM(s) IV Push every 3 minutes PRN For ANY of the following changes in patient status:  A. RR LESS THAN 10 breaths per minute, B. Oxygen saturation LESS THAN 90%, C. Sedation score of 6  ondansetron    Tablet 4 milliGRAM(s) Oral every 8 hours PRN Nausea and/or Vomiting  oxyCODONE    IR 2.5 milliGRAM(s) Oral every 4 hours PRN Moderate Pain (4 - 6)  oxyCODONE    IR 5 milliGRAM(s) Oral every 4 hours PRN Severe Pain (7 - 10)      LABS:                        8.7    5.78  )-----------( 271      ( 25 May 2023 05:56 )             27.3     05-25    136  |  104  |  7   ----------------------------<  111<H>  3.8   |  22  |  0.40<L>    Ca    8.9      25 May 2023 05:56  Phos  3.3     05-25  Mg     1.90     05-25            RADIOLOGY & ADDITIONAL STUDIES:

## 2023-05-26 NOTE — CHART NOTE - NSCHARTNOTEFT_GEN_A_CORE
pt s/p esophagectomy   tolerating full liquid diet  son removed today  f/u CXR reviewed  no objection to discharge home to follow up with both Dr Mary and Dr Altamirano as an outpatient.    Fallon ORELLANA [id number]

## 2023-06-01 ENCOUNTER — APPOINTMENT (OUTPATIENT)
Dept: THORACIC SURGERY | Facility: CLINIC | Age: 73
End: 2023-06-01
Payer: MEDICARE

## 2023-06-01 ENCOUNTER — APPOINTMENT (OUTPATIENT)
Dept: SURGICAL ONCOLOGY | Facility: CLINIC | Age: 73
End: 2023-06-01
Payer: MEDICARE

## 2023-06-01 VITALS
HEART RATE: 87 BPM | SYSTOLIC BLOOD PRESSURE: 186 MMHG | BODY MASS INDEX: 29.85 KG/M2 | DIASTOLIC BLOOD PRESSURE: 81 MMHG | OXYGEN SATURATION: 96 % | HEIGHT: 61 IN

## 2023-06-01 VITALS
RESPIRATION RATE: 18 BRPM | TEMPERATURE: 98.5 F | DIASTOLIC BLOOD PRESSURE: 76 MMHG | BODY MASS INDEX: 29.83 KG/M2 | SYSTOLIC BLOOD PRESSURE: 168 MMHG | HEIGHT: 61 IN | HEART RATE: 94 BPM | WEIGHT: 158 LBS | OXYGEN SATURATION: 87 %

## 2023-06-01 PROCEDURE — 99024 POSTOP FOLLOW-UP VISIT: CPT

## 2023-06-01 NOTE — DISCUSSION/SUMMARY
[FreeTextEntry1] : Accession:                             [id number]\par \par Collected Date/Time:                   5/16/2023 15:12 EDT\par Received Date/Time:                    5/17/2023 11:18 EDT\par \par Surgical Pathology Report - Auth (Verified)\par \par Specimen(s) Submitted\par 1-Esophagogastrectomy\par 2-Final Esophageal, margin\par \par Final Diagnosis\par 1. Portion of esophagus and stomach, esophagogastrectomy:\par -Negative for residual malignancy, status post-treatment.\par -Complete score: score 0.\par \par -Negative for vascular or perineural invasion.\par -Seventeen lymph nodes negative for malignancy (0/17).\par -Resection margins negative for dysplasia or malignancy.\par -See synoptic summary section for further details.\par \par 2. Final esophageal margin, excision:\par -Squamous lined esophageal margin negative for dysplasia or\par malignancy.\par Verified by: Kishor Gudino M.D.\par (Electronic Signature)

## 2023-06-01 NOTE — REASON FOR VISIT
[Spouse] : spouse [de-identified] : Robotic R VATS, Blake Marcos esophagectomy [de-identified] : 5/16/23 [de-identified] : \par This patient is a 76 year old female with h/o esophageal cancer had chemo and radiation completed March 3rd, 2023 presented to [facility] on 5/16/23 for scheduled surgery. Patient taken to the  OR by Dr. Alva and Dr. Altamirano and underwent the above surgery. Patient tolerated operation well and there were no post-operative complications identified. Patient remained hemodynamically stable in the PACU and transferred to the surgical floor. Diet was restarted and advanced as tolerated. Pain control was transitioned from IV to PO pain meds.\par \par Today she reports feeling like she is having a bad day. She stated over the weekend she felt pretty good but today is rough after having done so much. Yesterday she had physical therapy. She feels fatigued. She was incontinent of stool in the middle of the night for the first time. She did see Dr. Alva this morning, who felt that she was dehydrated and she did go home and drink gatorade which did help her feel better fairly quickly. We reviewed her typical diet.

## 2023-06-01 NOTE — PHYSICAL EXAM
[Respiration, Rhythm And Depth] : normal respiratory rhythm and effort [] : no respiratory distress [Auscultation Breath Sounds / Voice Sounds] : lungs were clear to auscultation bilaterally [Apical Impulse] : the apical impulse was normal [Heart Rate And Rhythm] : heart rate was normal and rhythm regular [Heart Sounds] : normal S1 and S2 [Clean] : clean [Dry] : dry [Healing Well] : healing well [Pitting Edema] : pitting edema present [FreeTextEntry1] : systolic murmur [FreeTextEntry3] : mild edema bilateral lower extremity

## 2023-06-01 NOTE — ASSESSMENT
[FreeTextEntry1] : I had the pleasure of evaluating Ms. NOVAK following her operative procedure.\par \par Briefly, this is a 73 year old female status post Inro-Marcos esophagectomy on 5/16/2023 for esophageal cancer. Initial postoperative course was unremarkable. Currently, she primarily complains of fatigue. The pathology revealed clear margins.\par \par We discussed normal course of healing and that there is some variability to the length of recovery. Overall I am happy with Ms. NOVAK's progress. I would like to monitor her recovery with a clinical check up in 2 weeks. We also discussed advancing diet but should be cut up well, chewed well and eaten slowly. I encouraged her to call right away if any concerns or issues arise.\par \par Plan:\par - May advance to whole food, as tolerated\par - Follow up in two weeks for clinical check.\par \par I, Dr. Jorgito Altamirano, personally performed the evaluation and management (E/M) services for this established patient who presents today with an existing condition.  That E/M includes conducting the examination, assessing all conditions, and establishing a new plan of care.  Today, Jose Maria Petty PA-C, was here to observe my evaluation and management services for this/these condition(s) to be followed going forward.\par \par

## 2023-06-08 NOTE — CONSULT LETTER
[Dear  ___] : Dear  [unfilled], [Consult Letter:] : I had the pleasure of evaluating your patient, [unfilled]. [Please see my note below.] : Please see my note below. [Consult Closing:] : Thank you very much for allowing me to participate in the care of this patient.  If you have any questions, please do not hesitate to contact me. [Sincerely,] : Sincerely, [DrKevin  ___] : Dr. LAST [DrKevin ___] : Dr. LAST [FreeTextEntry3] : Alan Alva MD, MPH, FACS, FSSO\par , [facility]\par [facility]\par Associate Professor of Surgery\par [employer] at [facility]

## 2023-06-08 NOTE — ASSESSMENT
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  \par \par She is s/p 3 doses induction carbo/taxol completed 1/20/23, followed by 4 cycles of chemotherapy weekly concurrent with RT.  Due to neutropenia, 2 cycles were held.\par \par ****SURGERY: s/p Robotic Blake Marcos esophagectomy with flexible bronchoscopy and upper  endoscopy on 5/16/23\par ****PATHOLOGY: Negative for residual malignancy, s/p treatment, negative LN (0/17), negative margins.\par \par PLAN:\par 1) Continue to increase PO intake\par 2) Thoracic follow-up\par 3) Medical oncology follow-up\par 4) RTO in 2-3 weeks

## 2023-06-08 NOTE — REASON FOR VISIT
[Post-Op] : a post-op for [FreeTextEntry2] : s/p Robotic Blake Marcos esophagectomy with flexible bronchoscopy and upper  endoscopy on 5/16/23

## 2023-06-08 NOTE — HISTORY OF PRESENT ILLNESS
[de-identified] : 73 year female presents for a post op visit.\par \par She presented to Dr. Worthington in September 2022 with complaints of coughing, clearing throat, and increased mucus. On 12/5/22, she underwent an EGD with Dr. Bender which showed grade A esophagitis in the GEJ, compatible with reflux esophagitis.  Multiple biopsies taken.  Pathology of the gastroesophageal junction biopsy showed infiltrating adenocarcinoma, HER2 negative with no loss of nuclear expression of MMR proteins (MLH1, MSH2, MSH6, and PMS2). \par \par EUS was performed on 12/9/22 by Dr. Ocampo and showed a small ulcerated mass in the lower third of the esophagus, 35 cm from the incisors. The mass was non-obstructing and not circumferential. A 3 cm hiatal hernia was present. Staging uT3N0. \par \par PET/CT performed 12/28/22- Increased uptake at distal esophageal lesion consistent with malignant involvement.  No gross evidence of metastatic disease.\par \par She consulted with Dr. Tiffanie Tripp from medical oncology and Dr. Mara Wolfe from radiation oncology.  She is s/p 3 doses induction carbo/taxol completed 1/20/23, followed by 4 cycles of chemotherapy weekly concurrent with RT. Last radiation treatment on 2/28/23.  Due to neutropenia, 2 cycles were held.\par \par She is scheduled for restaging PET and CT scans on 4/4/23.\par \par PMH: multiple sclerosis diagnosed in 1988 with periodic flares over the years; GERD and dyspepsia; HTN; bifascicular block (signal block); HL; atypical basal cell carcinoma [id number])\par Surgical Hx: hiatal hernia repair 1983; cholecystectomy; many D &C's leading up to hysterectomy 1983; She has a neurostimulator in the left lower flank which is no longer functioning, wires trace up to her upper thoracic spine. \par Family Hx: mother colon ca; father multiple myeloma \par Social Hx: was a social smoker, quit smoking 25 years ago; ETOH social 1 drink 2 times weekly;  ( in [place]); no children; family close by.  She does not drive and is living currently with her 97-year-old mother and her cousin Luisa in [place]. Her permanent residence is [place] with her . \par \par INTERVAL HISTORY:\par \par ****SURGERY: s/p Robotic [place] Marcos esophagectomy with flexible bronchoscopy and upper  endoscopy on 5/16/23\par ****PATHOLOGY: Negative for residual malignancy, s/p treatment, negative LN (0/17), negative margins.\par \par 6/1/23- Reports 3 days after D/C from the hospital she started feeling unwell, last 3 days and today especially she feels overwhelming exhaustion, reports 2 episodes of incontinence this morning, she is nauseous. She has dry mouth, pedal edema, cardiologist did not want to reintroduce a diuretic.

## 2023-06-10 ENCOUNTER — RX RENEWAL (OUTPATIENT)
Age: 73
End: 2023-06-10

## 2023-06-14 NOTE — ASU PATIENT PROFILE, ADULT - NUMBER OF YRS
Over Night Events: events noted, vent dependant, renal/ palliative noted, low grade fever    PHYSICAL EXAM    ICU Vital Signs Last 24 Hrs  T(C): 37.1 (14 Jun 2023 05:00), Max: 38.3 (13 Jun 2023 18:21)  T(F): 98.7 (14 Jun 2023 05:00), Max: 101 (13 Jun 2023 18:21)  HR: 77 (14 Jun 2023 05:00) (77 - 83)  BP: 107/59 (14 Jun 2023 05:00) (93/50 - 115/59)  RR: 18 (14 Jun 2023 05:00) (18 - 18)  SpO2: 98% (14 Jun 2023 05:00) (98% - 100%)    O2 Parameters below as of 13 Jun 2023 20:40  Patient On (Oxygen Delivery Method): ventilator            General: ill looking  HEENT: trach           Lungs: dec l base  Cardiovascular: Regular   Abdomen: Soft, Positive BS  not folowing commands      06-13-23 @ 07:01  -  06-14-23 @ 07:00  --------------------------------------------------------  IN:    Peptamen A.F.: 750 mL  Total IN: 750 mL    OUT:  Total OUT: 0 mL    Total NET: 750 mL          LABS:                          7.2    9.69  )-----------( 272      ( 13 Jun 2023 19:14 )             23.3                                               06-13    134<L>  |  98  |  65<HH>  ----------------------------<  152<H>  4.8   |  26  |  2.4<H>    Ca    8.3<L>      13 Jun 2023 19:14  Mg     2.4     06-12    TPro  5.2<L>  /  Alb  1.9<L>  /  TBili  0.4  /  DBili  x   /  AST  58<H>  /  ALT  15  /  AlkPhos  208<H>  06-13                                                                                           LIVER FUNCTIONS - ( 13 Jun 2023 19:14 )  Alb: 1.9 g/dL / Pro: 5.2 g/dL / ALK PHOS: 208 U/L / ALT: 15 U/L / AST: 58 U/L / GGT: x                                                  Culture - Blood (collected 11 Jun 2023 19:18)  Source: .Blood Blood  Preliminary Report (12 Jun 2023 23:02):    No growth to date.    Culture - Sputum (collected 11 Jun 2023 18:28)  Source: Trach Asp Tracheal Aspirate  Gram Stain (12 Jun 2023 07:16):    Moderate polymorphonuclear leukocytes per low power field    Rare Squamous epithelial cells per low power field    Numerous Gram Negative Rods per oil power field    Moderate Gram Positive Rods per oil power field    Few Gram positive cocci in pairs per oil power field  Final Report (13 Jun 2023 18:59):    Three or more mixed gram negative rods including    Moderate Pseudomonas aeruginosa (Carbapenem Resistant)    Normal Respiratory Susan present  Organism: Pseudomonas aeruginosa (Carbapenem Resistant)  Pseudomonas aeruginosa (Carbapenem Resistant) (13 Jun 2023 18:59)  Organism: Pseudomonas aeruginosa (Carbapenem Resistant) (13 Jun 2023 18:59)  Organism: Pseudomonas aeruginosa (Carbapenem Resistant) (13 Jun 2023 18:59)    Culture - Urine (collected 11 Jun 2023 18:25)  Source: Catheterized Catheterized  Preliminary Report (13 Jun 2023 18:35):    >100,000 CFU/ml Pseudomonas aeruginosa                                                   Mode: AC/ CMV (Assist Control/ Continuous Mandatory Ventilation)  RR (machine): 14  TV (machine): 450  FiO2: 40  PEEP: 8  ITime: 0.8  MAP: 10  PIP: 24                                          MEDICATIONS  (STANDING):  acetylcysteine 20%  Inhalation 4 milliLiter(s) Inhalation every 6 hours  albuterol/ipratropium for Nebulization 3 milliLiter(s) Nebulizer every 6 hours  aMIOdarone    Tablet 200 milliGRAM(s) Oral daily  aMIOdarone    Tablet   Oral   aspirin  chewable 81 milliGRAM(s) Oral daily  atorvastatin 80 milliGRAM(s) Oral at bedtime  buMETAnide Injectable 2 milliGRAM(s) IV Push every 12 hours  chlorhexidine 0.12% Liquid 15 milliLiter(s) Oral Mucosa two times a day  chlorhexidine 2% Cloths 1 Application(s) Topical <User Schedule>  dextrose 5%. 1000 milliLiter(s) (100 mL/Hr) IV Continuous <Continuous>  dextrose 5%. 1000 milliLiter(s) (50 mL/Hr) IV Continuous <Continuous>  dextrose 50% Injectable 25 Gram(s) IV Push once  dextrose 50% Injectable 25 Gram(s) IV Push once  dextrose 50% Injectable 12.5 Gram(s) IV Push once  diltiazem    milliGRAM(s) Oral daily  fluconAZOLE   40 mG/mL Suspension 200 milliGRAM(s) Enteral Tube every 24 hours  gentamicin   IVPB 60 milliGRAM(s) IV Intermittent every 24 hours  gentamicin   IVPB      glucagon  Injectable 1 milliGRAM(s) IntraMuscular once  heparin   Injectable 5000 Unit(s) SubCutaneous every 12 hours  insulin glargine Injectable (LANTUS) 10 Unit(s) SubCutaneous at bedtime  insulin lispro (ADMELOG) corrective regimen sliding scale   SubCutaneous three times a day before meals  levETIRAcetam  Solution 500 milliGRAM(s) Oral two times a day  pantoprazole  Injectable 40 milliGRAM(s) IV Push every 12 hours  polyethylene glycol 3350 17 Gram(s) Oral daily  senna 2 Tablet(s) Oral at bedtime  silver sulfADIAZINE 1% Cream 1 Application(s) Topical every 12 hours    MEDICATIONS  (PRN):  acetaminophen     Tablet .. 650 milliGRAM(s) Oral every 6 hours PRN Temp greater or equal to 38C (100.4F), Mild Pain (1 - 3)  dextrose Oral Gel 15 Gram(s) Oral once PRN Blood Glucose LESS THAN 70 milliGRAM(s)/deciliter  midodrine. 2.5 milliGRAM(s) Oral three times a day PRN SBP < 110  sodium chloride 0.9% Bolus. 100 milliLiter(s) IV Bolus every 5 minutes PRN SBP LESS THAN or EQUAL to 80 mmHg      Xrays:                                                                                     ECHO     30

## 2023-06-15 ENCOUNTER — NON-APPOINTMENT (OUTPATIENT)
Age: 73
End: 2023-06-15

## 2023-06-15 ENCOUNTER — APPOINTMENT (OUTPATIENT)
Dept: THORACIC SURGERY | Facility: CLINIC | Age: 73
End: 2023-06-15
Payer: MEDICARE

## 2023-06-15 VITALS
SYSTOLIC BLOOD PRESSURE: 189 MMHG | OXYGEN SATURATION: 96 % | BODY MASS INDEX: 29.27 KG/M2 | HEIGHT: 61 IN | HEART RATE: 82 BPM | WEIGHT: 155 LBS | DIASTOLIC BLOOD PRESSURE: 80 MMHG | RESPIRATION RATE: 16 BRPM

## 2023-06-15 PROCEDURE — 99024 POSTOP FOLLOW-UP VISIT: CPT

## 2023-06-15 RX ORDER — FAMOTIDINE 20 MG/1
20 TABLET, FILM COATED ORAL
Qty: 30 | Refills: 3 | Status: COMPLETED | COMMUNITY
Start: 2022-09-08 | End: 2023-06-15

## 2023-06-15 NOTE — PHYSICAL EXAM
[Respiration, Rhythm And Depth] : normal respiratory rhythm and effort [Auscultation Breath Sounds / Voice Sounds] : lungs were clear to auscultation bilaterally [Heart Rate And Rhythm] : heart rate was normal and rhythm regular [___ +] : bilateral ankle [unfilled]U+ pitting edema

## 2023-06-21 NOTE — DISCUSSION/SUMMARY
[FreeTextEntry1] : Accession: [id number]\par \par Collected Date/Time: 5/16/2023 15:12 EDT\par Received Date/Time: 5/17/2023 11:18 EDT\par \par Surgical Pathology Report - Auth (Verified)\par \par Specimen(s) Submitted\par 1-Esophagogastrectomy\par 2-Final Esophageal, margin\par \par Final Diagnosis\par 1. Portion of esophagus and stomach, esophagogastrectomy:\par -Negative for residual malignancy, status post-treatment.\par -Complete score: score 0.\par \par -Negative for vascular or perineural invasion.\par -Seventeen lymph nodes negative for malignancy (0/17).\par -Resection margins negative for dysplasia or malignancy.\par -See synoptic summary section for further details.\par \par 2. Final esophageal margin, excision:\par -Squamous lined esophageal margin negative for dysplasia or\par malignancy.\par Verified by: Kishor Gudino M.D.\par (Electronic Signature).

## 2023-06-21 NOTE — REASON FOR VISIT
[de-identified] : \par status post Robotic R VATS, [place] Marcos esophagectomy [de-identified] : 5/16/23 [de-identified] : This Patient is a 76 year old female with h/o esophageal cancer had chemo and radiation completed March 3rd, 2023 presented to [facility] on 5/16/23 for scheduled surgery. Patient taken to the OR by Dr. Alva and Dr. Altamirano and underwent the above surgery. Patient tolerated operation well and there were no post-operative complications identified. Patient remained hemodynamically stable in the PACU and transferred to the surgical floor. Diet was restarted and advanced as tolerated. Pain control was transitioned from IV to PO pain meds.\par \par

## 2023-06-21 NOTE — ASSESSMENT
[FreeTextEntry1] : Ms Valdez presents to the office today to discuss her clinical progress. She is improving and tolerating advancing her diet now. She feels tightness to the abdomen but is overall feeling well. She is having some mild reflux and I have recommended she maintain her Omeprazole. She had a normal BM today which prior she was incontinent of feces from diarrhea. She is continuing to improve and will follow up \par \par PLAN:\par - Return to care in 1 month for clinical check\par \par \par \par \par \par I, Dr. Altamirano personally performed the evaluation and management (E/M) services for this patient. That E/M includes conducting the initial examination, assessing all conditions, and establishing the plan of care. Today, Antonio Marie NP was here to observe my evaluation and management services for this patient.\par

## 2023-07-06 ENCOUNTER — NON-APPOINTMENT (OUTPATIENT)
Age: 73
End: 2023-07-06

## 2023-07-06 ENCOUNTER — APPOINTMENT (OUTPATIENT)
Dept: SURGICAL ONCOLOGY | Facility: CLINIC | Age: 73
End: 2023-07-06
Payer: MEDICARE

## 2023-07-06 VITALS
HEART RATE: 82 BPM | HEIGHT: 61 IN | SYSTOLIC BLOOD PRESSURE: 130 MMHG | BODY MASS INDEX: 27.94 KG/M2 | RESPIRATION RATE: 18 BRPM | DIASTOLIC BLOOD PRESSURE: 78 MMHG | WEIGHT: 148 LBS

## 2023-07-06 PROCEDURE — 99024 POSTOP FOLLOW-UP VISIT: CPT

## 2023-07-06 NOTE — HISTORY OF PRESENT ILLNESS
[de-identified] : 73 year female presents for a post op visit.\par \par She presented to Dr. Worthington in September 2022 with complaints of coughing, clearing throat, and increased mucus. On 12/5/22, she underwent an EGD with Dr. Bender which showed grade A esophagitis in the GEJ, compatible with reflux esophagitis.  Multiple biopsies taken.  Pathology of the gastroesophageal junction biopsy showed infiltrating adenocarcinoma, HER2 negative with no loss of nuclear expression of MMR proteins (MLH1, MSH2, MSH6, and PMS2). \par \par EUS was performed on 12/9/22 by Dr. Ocampo and showed a small ulcerated mass in the lower third of the esophagus, 35 cm from the incisors. The mass was non-obstructing and not circumferential. A 3 cm hiatal hernia was present. Staging uT3N0. \par \par PET/CT performed 12/28/22- Increased uptake at distal esophageal lesion consistent with malignant involvement.  No gross evidence of metastatic disease.\par \par She consulted with Dr. Tiffanie Tripp from medical oncology and Dr. Mara Wolfe from radiation oncology.  She is s/p 3 doses induction carbo/taxol completed 1/20/23, followed by 4 cycles of chemotherapy weekly concurrent with RT. Last radiation treatment on 2/28/23.  Due to neutropenia, 2 cycles were held.\par \par She is scheduled for restaging PET and CT scans on 4/4/23.\par \par PMH: multiple sclerosis diagnosed in 1988 with periodic flares over the years; GERD and dyspepsia; HTN; bifascicular block (signal block); HL; atypical basal cell carcinoma [id number])\par Surgical Hx: hiatal hernia repair 1983; cholecystectomy; many D &C's leading up to hysterectomy 1983; She has a neurostimulator in the left lower flank which is no longer functioning, wires trace up to her upper thoracic spine. \par Family Hx: mother colon ca; father multiple myeloma \par Social Hx: was a social smoker, quit smoking 25 years ago; ETOH social 1 drink 2 times weekly;  ( in [place]); no children; family close by.  She does not drive and is living currently with her 97-year-old mother and her cousin Luisa in [place]. Her permanent residence is [place] with her . \par \par INTERVAL HISTORY:\par \par ****SURGERY: s/p Robotic [place] Marcos esophagectomy with flexible bronchoscopy and upper  endoscopy on 5/16/23\par ****PATHOLOGY: Negative for residual malignancy, s/p treatment, negative LN (0/17), negative margins.\par \par 6/1/23- Reports 3 days after D/C from the hospital she started feeling unwell, last 3 days and today especially she feels overwhelming exhaustion, reports 2 episodes of incontinence this morning, she is nauseous. She has dry mouth, pedal edema, cardiologist did not want to reintroduce a diuretic.\par \par 7/6/23- She did not f/u with med onc or RT since the surgery. Patient states she is eating more now but can't eat the whole meal at one sitting. She uses ensure and soft food. She noted a lot of "belching" after eating. Abdomen feels "tight", any pain that she had before has improved and she is sleeping better. BM are daily and very soft, sometimes more then 1 a day. She lost weight. Fatigue.

## 2023-07-06 NOTE — CONSULT LETTER
[Dear  ___] : Dear  [unfilled], [Consult Letter:] : I had the pleasure of evaluating your patient, [unfilled]. [Please see my note below.] : Please see my note below. [Consult Closing:] : Thank you very much for allowing me to participate in the care of this patient.  If you have any questions, please do not hesitate to contact me. [Sincerely,] : Sincerely, [DrKevin  ___] : Dr. LAST [DrKevin ___] : Dr. LAST [FreeTextEntry3] : Alan Alva MD, MPH, FACS, FSSO\par , [facility] Surgical Oncology [facility]\par [facility] [place]\par Associate Professor of Surgery\par [employer] at [facility]

## 2023-07-06 NOTE — ASSESSMENT
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  \par \par She is s/p 3 doses induction carbo/taxol completed 1/20/23, followed by 4 cycles of chemotherapy weekly concurrent with RT.  Due to neutropenia, 2 cycles were held.\par \par ****SURGERY: s/p Robotic Blake Marcos esophagectomy with flexible bronchoscopy and upper  endoscopy on 5/16/23\par ****PATHOLOGY: Negative for residual malignancy, s/p treatment, negative LN (0/17), negative margins.\par \par Eating better, still decreased weight. Energy levels better, but wanes in the PM.\par Overall improving.\par \par PLAN:\par 1) Nutrition consultation\par 2) Imaging as per med onc\par 3) Follow-up in 3 months

## 2023-07-06 NOTE — REASON FOR VISIT
[Follow-Up Visit] : a follow-up visit for [FreeTextEntry2] : s/p robotic jada mark esophagectomy on 5/16/23

## 2023-07-10 ENCOUNTER — APPOINTMENT (OUTPATIENT)
Dept: OTOLARYNGOLOGY | Facility: CLINIC | Age: 73
End: 2023-07-10

## 2023-07-13 ENCOUNTER — APPOINTMENT (OUTPATIENT)
Dept: UROLOGY | Facility: CLINIC | Age: 73
End: 2023-07-13

## 2023-07-20 ENCOUNTER — APPOINTMENT (OUTPATIENT)
Dept: THORACIC SURGERY | Facility: CLINIC | Age: 73
End: 2023-07-20
Payer: MEDICARE

## 2023-07-20 VITALS
HEART RATE: 83 BPM | DIASTOLIC BLOOD PRESSURE: 76 MMHG | RESPIRATION RATE: 16 BRPM | OXYGEN SATURATION: 96 % | SYSTOLIC BLOOD PRESSURE: 160 MMHG

## 2023-07-20 DIAGNOSIS — C15.9 MALIGNANT NEOPLASM OF ESOPHAGUS, UNSPECIFIED: ICD-10-CM

## 2023-07-20 PROCEDURE — 99024 POSTOP FOLLOW-UP VISIT: CPT

## 2023-07-21 NOTE — ASSESSMENT
[FreeTextEntry1] : Ms Valdez presents to the office today to discuss her post operative course. She is overall well and is tolerating an advanced diet well. She has plateaued with her weight loss and has no dysphagia. We discussed that she will continue to follow up care with Dr Wolfe and will return to care here in 3 months for clinical evaluation. She agrees and will follow up accordingly.\par \par PLAN:\par - Return to care in 3 months for clinical evaluation\par \par \par \par \par \par I, Dr. Altamirano personally performed the evaluation and management (E/M) services for this patient. That E/M includes conducting the initial examination, assessing all conditions, and establishing the plan of care. Today, Antonio Marie NP was here to observe my evaluation and management services for this patient.\par

## 2023-07-21 NOTE — PHYSICAL EXAM
[Respiration, Rhythm And Depth] : normal respiratory rhythm and effort [___ +] : bilateral ankle [unfilled]U+ pitting edema [Heart Rate And Rhythm] : heart rate was normal and rhythm regular

## 2023-07-26 ENCOUNTER — OUTPATIENT (OUTPATIENT)
Dept: OUTPATIENT SERVICES | Facility: HOSPITAL | Age: 73
LOS: 1 days | Discharge: HOME | End: 2023-07-26

## 2023-07-26 DIAGNOSIS — Z90.49 ACQUIRED ABSENCE OF OTHER SPECIFIED PARTS OF DIGESTIVE TRACT: Chronic | ICD-10-CM

## 2023-07-26 DIAGNOSIS — Z90.710 ACQUIRED ABSENCE OF BOTH CERVIX AND UTERUS: Chronic | ICD-10-CM

## 2023-07-26 DIAGNOSIS — Z98.890 OTHER SPECIFIED POSTPROCEDURAL STATES: Chronic | ICD-10-CM

## 2023-07-26 DIAGNOSIS — Z96.89 PRESENCE OF OTHER SPECIFIED FUNCTIONAL IMPLANTS: Chronic | ICD-10-CM

## 2023-07-26 DIAGNOSIS — C15.9 MALIGNANT NEOPLASM OF ESOPHAGUS, UNSPECIFIED: ICD-10-CM

## 2023-07-27 ENCOUNTER — RESULT REVIEW (OUTPATIENT)
Age: 73
End: 2023-07-27

## 2023-07-27 ENCOUNTER — APPOINTMENT (OUTPATIENT)
Dept: HEMATOLOGY ONCOLOGY | Facility: CLINIC | Age: 73
End: 2023-07-27
Payer: MEDICARE

## 2023-07-27 ENCOUNTER — APPOINTMENT (OUTPATIENT)
Dept: ULTRASOUND IMAGING | Facility: CLINIC | Age: 73
End: 2023-07-27
Payer: MEDICARE

## 2023-07-27 ENCOUNTER — OUTPATIENT (OUTPATIENT)
Dept: OUTPATIENT SERVICES | Facility: HOSPITAL | Age: 73
LOS: 1 days | End: 2023-07-27
Payer: MEDICARE

## 2023-07-27 VITALS
HEART RATE: 75 BPM | SYSTOLIC BLOOD PRESSURE: 135 MMHG | HEIGHT: 62 IN | WEIGHT: 142.2 LBS | OXYGEN SATURATION: 98 % | DIASTOLIC BLOOD PRESSURE: 69 MMHG | RESPIRATION RATE: 16 BRPM | TEMPERATURE: 98.1 F | BODY MASS INDEX: 26.17 KG/M2

## 2023-07-27 DIAGNOSIS — Z96.89 PRESENCE OF OTHER SPECIFIED FUNCTIONAL IMPLANTS: Chronic | ICD-10-CM

## 2023-07-27 DIAGNOSIS — R60.0 LOCALIZED EDEMA: ICD-10-CM

## 2023-07-27 DIAGNOSIS — Z90.710 ACQUIRED ABSENCE OF BOTH CERVIX AND UTERUS: Chronic | ICD-10-CM

## 2023-07-27 LAB
ALBUMIN SERPL ELPH-MCNC: 4.1 G/DL — SIGNIFICANT CHANGE UP (ref 3.3–5)
ALP SERPL-CCNC: 72 U/L — SIGNIFICANT CHANGE UP (ref 40–120)
ALT FLD-CCNC: 29 U/L — SIGNIFICANT CHANGE UP (ref 10–45)
ANION GAP SERPL CALC-SCNC: 12 MMOL/L — SIGNIFICANT CHANGE UP (ref 5–17)
AST SERPL-CCNC: 32 U/L — SIGNIFICANT CHANGE UP (ref 10–40)
BASOPHILS # BLD AUTO: 0.03 K/UL — SIGNIFICANT CHANGE UP (ref 0–0.2)
BASOPHILS NFR BLD AUTO: 0.6 % — SIGNIFICANT CHANGE UP (ref 0–2)
BILIRUB SERPL-MCNC: 0.7 MG/DL — SIGNIFICANT CHANGE UP (ref 0.2–1.2)
BUN SERPL-MCNC: 13 MG/DL — SIGNIFICANT CHANGE UP (ref 7–23)
CALCIUM SERPL-MCNC: 9.8 MG/DL — SIGNIFICANT CHANGE UP (ref 8.4–10.5)
CHLORIDE SERPL-SCNC: 103 MMOL/L — SIGNIFICANT CHANGE UP (ref 96–108)
CO2 SERPL-SCNC: 26 MMOL/L — SIGNIFICANT CHANGE UP (ref 22–31)
CREAT SERPL-MCNC: 0.59 MG/DL — SIGNIFICANT CHANGE UP (ref 0.5–1.3)
EGFR: 95 ML/MIN/1.73M2 — SIGNIFICANT CHANGE UP
EOSINOPHIL # BLD AUTO: 0.01 K/UL — SIGNIFICANT CHANGE UP (ref 0–0.5)
EOSINOPHIL NFR BLD AUTO: 0.2 % — SIGNIFICANT CHANGE UP (ref 0–6)
GLUCOSE SERPL-MCNC: 104 MG/DL — HIGH (ref 70–99)
HCT VFR BLD CALC: 31.5 % — LOW (ref 34.5–45)
HGB BLD-MCNC: 10.5 G/DL — LOW (ref 11.5–15.5)
IMM GRANULOCYTES NFR BLD AUTO: 0.4 % — SIGNIFICANT CHANGE UP (ref 0–0.9)
LYMPHOCYTES # BLD AUTO: 1.27 K/UL — SIGNIFICANT CHANGE UP (ref 1–3.3)
LYMPHOCYTES # BLD AUTO: 23.3 % — SIGNIFICANT CHANGE UP (ref 13–44)
MCHC RBC-ENTMCNC: 29.3 PG — SIGNIFICANT CHANGE UP (ref 27–34)
MCHC RBC-ENTMCNC: 33.3 GM/DL — SIGNIFICANT CHANGE UP (ref 32–36)
MCV RBC AUTO: 88 FL — SIGNIFICANT CHANGE UP (ref 80–100)
MONOCYTES # BLD AUTO: 0.84 K/UL — SIGNIFICANT CHANGE UP (ref 0–0.9)
MONOCYTES NFR BLD AUTO: 15.4 % — HIGH (ref 2–14)
NEUTROPHILS # BLD AUTO: 3.27 K/UL — SIGNIFICANT CHANGE UP (ref 1.8–7.4)
NEUTROPHILS NFR BLD AUTO: 60.1 % — SIGNIFICANT CHANGE UP (ref 43–77)
NRBC # BLD: 0 /100 WBCS — SIGNIFICANT CHANGE UP (ref 0–0)
PLATELET # BLD AUTO: 244 K/UL — SIGNIFICANT CHANGE UP (ref 150–400)
POTASSIUM SERPL-MCNC: 3.4 MMOL/L — LOW (ref 3.5–5.3)
POTASSIUM SERPL-SCNC: 3.4 MMOL/L — LOW (ref 3.5–5.3)
PROT SERPL-MCNC: 6.5 G/DL — SIGNIFICANT CHANGE UP (ref 6–8.3)
RBC # BLD: 3.58 M/UL — LOW (ref 3.8–5.2)
RBC # FLD: 16.2 % — HIGH (ref 10.3–14.5)
SODIUM SERPL-SCNC: 142 MMOL/L — SIGNIFICANT CHANGE UP (ref 135–145)
WBC # BLD: 5.44 K/UL — SIGNIFICANT CHANGE UP (ref 3.8–10.5)
WBC # FLD AUTO: 5.44 K/UL — SIGNIFICANT CHANGE UP (ref 3.8–10.5)

## 2023-07-27 PROCEDURE — 99215 OFFICE O/P EST HI 40 MIN: CPT

## 2023-07-27 PROCEDURE — 93970 EXTREMITY STUDY: CPT

## 2023-07-27 PROCEDURE — 93970 EXTREMITY STUDY: CPT | Mod: 26

## 2023-07-27 NOTE — RESULTS/DATA
[FreeTextEntry1] : 5/16/23 Path: Portion of esophagus and stomach, esophagogastrectomy: Negative for residual malignancy, status post-treatment. Complete score: score 0. Negative for vascular or perineural invasion. Seventeen lymph nodes negative for malignancy (0/17). Resection margins negative for dysplasia or malignancy. See synoptic summary section for further details. Final esophageal margin, excision: Squamous lined esophageal margin negative for dysplasia or malignancy.\par Synoptic Summary:\par Esophagus\par Procedure: Esophagogastrectomy\par Tumor Site: Esophagogastric junction (EGJ) - Per prior biopsy report [id number]\par Relationship of Tumor to Esophagogastric Junction: Cannot be determined - No residual malignancy\par Distance of Tumor Center from Esophagogastric Junction: Cannot be determined\par Histologic Type: Adenocarcinoma\par Histologic Type Comment: Per prior biopsy report [id number]\par Tumor Size: Cannot be determined - No residual malignancy\par Tumor Extent: No evidence of primary tumor\par Treatment Effect: Present, with no viable cancer cells (complete response, score 0)\par Lymphovascular Invasion: Not identified\par Perineural Invasion: Not identified\par Margin Status for Invasive Carcinoma: All margins negative for invasive carcinoma\par Closest Margin(s) to Invasive Carcinoma: Cannot be determined\par Distance from Invasive Carcinoma to Closest Margin: Cannot be determined\par Margin Status for Dysplasia and Intestinal Metaplasia: All margins negative for dysplasia\par Regional Lymph Node Status: All regional lymph nodes negative for tumor\par Number of Lymph Nodes Examined: 17\par Pathologic Stage Classification\par TNM Descriptors: y (post-treatment)\par pT Category: pT0\par pN Category: pN0\par \par 4/4/23 PET: Findings most supportive of a complete/near complete metabolic response to therapy. A small mildly FDG avid focus seen immediately proximal to the original esophageal  lesion is of uncertain clinical significance, SUV 3.7. No definite metabolic evidence of metastatic disease. Subcentimeter right inguinal mildly FDG avid lymph node is noted likely inflammatory in nature. Asymmetric uptake in the left fossa of Rosenmuller may be inflammatory/infectious in nature.\par 4/4/23 CT C/A/P: No evidence of metastatic disease within the chest, abdomen or pelvis. No lymphadenopathy.\par \par 12/28/2022 PET:  1.  Increased uptake at distal esophageal lesion consistent with malignant involvement.\par 2.  No gross evidence of metastatic disease.\par \par 12/9/22 EUS: Hiatal hernia. Small mass at the GE junction. T3N0Mx on this exam. \par \par 12/5/22 Path: Stomach, biopsy: Benign gastric mucosa with minimal chronic inflammation and congestion in the lamina propria. A Diff-Quik stain for Helicobacter pylori is negative. Gastroesophageal junction, biopsy: Infiltrating adenocarcinoma. An immunohistochemical stain for HER2/luz is 0-1 +/negative. No loss of nuclear expression of MMR proteins (MLH1, MSH2, MSH6, and PMS2). These results show low probability of microsatellite instability-high (MSI-H).\par \par 12/5/22 EDG: Normal duodenum. Hiatal hernia. Grade A esophagitis in the gastroesophageal junction compatible with reflux esophagitis. (biopsy). Erythema in the antrum compatible with non-erosive gastritis. (biopsy).

## 2023-07-27 NOTE — HISTORY OF PRESENT ILLNESS
[Date: ____________] : Patient's last distress assessment performed on [unfilled]. [0 - No Distress] : Distress Level: 0 [de-identified] : The patient was diagnosed with esophageal adenocarcinoma in December 2022 at the age of 72. She presented to Dr. Worthington in September with complaints of coughing, clearing throat, and increased mucus. On 12/5/22, she underwent an EGD with Dr. Bender which showed grade A esophagitis in the gastroesophageal junction compatible with reflux esophagitis. Erythema in the antrum was compatible with non-erosive gastritis. Pathology of the gastroesophageal junction biopsy showed infiltrating adenocarcinoma. An immunohistochemical stain for HER2/luz is 0-1 +/negative. No loss of nuclear expression of MMR proteins (MLH1, MSH2, MSH6, and PMS2). AnEUS on 12/9/22 by Dr. Ocampo showed a small ulcerated mass in the lower third of the esophagus, 35 cm from the incisors. The mass was non-obstructing and not circumferential. A 3 cm hiatal hernia was present. Staging uT3N0.  [de-identified] : Medical Hx: multiple sclerosis diagnosed in 1988 with periodic flares over the years; GERD and dyspepsia; HTN; bifascicular block (signal block); HL; atypical basal cell carcinoma [id number])\par Surgical Hx: hiatal hernia repair 1983;  cholecystectomy; many D &C's leading up to hysterectomy 1983; She has a neurostimulator in the left lower flank which is no longer functioning, wires trace up to her upper thoracic spine. \par Family Hx: mother colon ca; father multiple myeloma \par Social Hx: was a social smoker, quit smoking 25 years ago; ETOH social 1 drink 2 times weekly;  ( in [place]); no children; family close by, lives with her 91 yo mother currently.  She does not drive and is living currently with her 97-year-old mother and her cousin Luisa in [place]. Her permanent residence is [place] with her .  [de-identified] : She c/o cough that had become worse over the last year. She has diffuse chronic 6/10 pain / neuropathy from MS, no esophageal pain. She denies dysphagia and reports an intentional 50 pound weight loss over the past few years. She is edentulous with dentures due to a reaction to actiq which she was taking for her multiple sclerosis years ago. She also has alopecia.\par \par She completed 3 cycles of carbo / Taxol induction weekly, 1/20/23. Completed 4/6 cycles of Carbo / taxol concurrent with RT, on 2/17/23 due to low ANC last 2 cycles.\par Was able to cook and be with family starting Tuesday after chemo. Cardio was able to 1/2 her diuretic dose to help with dehydration / kidney levels.\par fatigue from MS, baclofen was increased by neuro for MS spasms.

## 2023-07-27 NOTE — PHYSICAL EXAM
[Ambulatory and capable of all self care but unable to carry out any work activities] : Status 2- Ambulatory and capable of all self care but unable to carry out any work activities. Up and about more than 50% of waking hours [Normal] : affect appropriate [de-identified] : wt stable  [de-identified] : edentulous [de-identified] : + grade 2 murmur  [de-identified] : chronic MS symptoms

## 2023-07-27 NOTE — ASSESSMENT
[FreeTextEntry1] : Benefit for nivolumab was shown in the CheckMate 577 trial, in which 794 patients who had received neoadjuvant CRT for esophageal or EGJ cancer (70 percent AC) and had residual pathologic disease at the time of surgery were randomly assigned to nivolumab (240 mg) or placebo every 2 weeks for 16 weeks followed by nivolumab 480 mg or placebo every 4 weeks; the maximum treatment duration was one year [142]. Enrollment was irrespective of programmed death receptor-1 ligand 1 (PD-L1) overexpression. Tumor site was esophagus in 60 percent and EGJ in 40 percent; histology was AC in 71 percent and SCC in 29 percent. At a median follow-up of 24.4 months, median disease-free survival, the primary endpoint, was twice as long with nivolumab (22.4 versus 11 months, HR for disease progression or death was 0.69, 95% CI 0.56-0.86), and the benefits were seen across all patient subgroups (histology, location, initial and post-treatment disease stage, PD-L1 overexpression or not). Overall survival data were not mature. Although treatment-related adverse effects were frequent, most were grade 1 or 2 and only 9 percent of patients discontinued adjuvant nivolumab because of adverse effects. The benefits were gained without any significant decline in patient-reported health-related quality of life over the year of nivolumab treatment.\par \par Based on these results, the US Food and Drug Administration has approved nivolumab as adjuvant therapy for patients who previously received neoadjuvant CRT following complete resection of esophageal or gastroesophageal junction cancer with residual pathologic disease.

## 2023-07-28 DIAGNOSIS — H61.23 IMPACTED CERUMEN, BILATERAL: ICD-10-CM

## 2023-07-28 DIAGNOSIS — G35 MULTIPLE SCLEROSIS: ICD-10-CM

## 2023-07-28 DIAGNOSIS — Z86.79 PERSONAL HISTORY OF OTHER DISEASES OF THE CIRCULATORY SYSTEM: ICD-10-CM

## 2023-07-28 DIAGNOSIS — I45.4 NONSPECIFIC INTRAVENTRICULAR BLOCK: ICD-10-CM

## 2023-08-06 NOTE — PATIENT PROFILE ADULT - PATIENT REPRESENTATIVE: ( YOU CAN CHOOSE ANY PERSON THAT CAN ASSIST YOU WITH YOUR HEALTH CARE PREFERENCES, DOES NOT HAVE TO BE A SPOUSE, IMMEDIATE FAMILY OR SIGNIFICANT OTHER/PARTNER)
yes
Patient BIBA c/o anxiety. Patient reports feeling like she has something in her throat and that their bugs all over her. Patient appears uncomfortable and

## 2023-08-28 ENCOUNTER — APPOINTMENT (OUTPATIENT)
Dept: NUCLEAR MEDICINE | Facility: IMAGING CENTER | Age: 73
End: 2023-08-28
Payer: MEDICARE

## 2023-08-28 ENCOUNTER — OUTPATIENT (OUTPATIENT)
Dept: OUTPATIENT SERVICES | Facility: HOSPITAL | Age: 73
LOS: 1 days | End: 2023-08-28
Payer: MEDICARE

## 2023-08-28 DIAGNOSIS — Z96.89 PRESENCE OF OTHER SPECIFIED FUNCTIONAL IMPLANTS: Chronic | ICD-10-CM

## 2023-08-28 DIAGNOSIS — Z98.890 OTHER SPECIFIED POSTPROCEDURAL STATES: Chronic | ICD-10-CM

## 2023-08-28 DIAGNOSIS — Z90.710 ACQUIRED ABSENCE OF BOTH CERVIX AND UTERUS: Chronic | ICD-10-CM

## 2023-08-28 DIAGNOSIS — Z90.49 ACQUIRED ABSENCE OF OTHER SPECIFIED PARTS OF DIGESTIVE TRACT: Chronic | ICD-10-CM

## 2023-08-28 DIAGNOSIS — Z00.8 ENCOUNTER FOR OTHER GENERAL EXAMINATION: ICD-10-CM

## 2023-08-28 DIAGNOSIS — C15.9 MALIGNANT NEOPLASM OF ESOPHAGUS, UNSPECIFIED: ICD-10-CM

## 2023-08-28 PROCEDURE — 78815 PET IMAGE W/CT SKULL-THIGH: CPT | Mod: 26,PS

## 2023-08-28 PROCEDURE — A9552: CPT

## 2023-08-28 PROCEDURE — 78815 PET IMAGE W/CT SKULL-THIGH: CPT

## 2023-09-08 ENCOUNTER — APPOINTMENT (OUTPATIENT)
Dept: HEMATOLOGY ONCOLOGY | Facility: CLINIC | Age: 73
End: 2023-09-08
Payer: MEDICARE

## 2023-09-08 ENCOUNTER — RESULT REVIEW (OUTPATIENT)
Age: 73
End: 2023-09-08

## 2023-09-08 VITALS
BODY MASS INDEX: 24.38 KG/M2 | DIASTOLIC BLOOD PRESSURE: 65 MMHG | WEIGHT: 133.31 LBS | OXYGEN SATURATION: 98 % | SYSTOLIC BLOOD PRESSURE: 137 MMHG | RESPIRATION RATE: 15 BRPM | HEART RATE: 76 BPM

## 2023-09-08 DIAGNOSIS — H61.23 IMPACTED CERUMEN, BILATERAL: ICD-10-CM

## 2023-09-08 LAB
BASOPHILS # BLD AUTO: 0.02 K/UL — SIGNIFICANT CHANGE UP (ref 0–0.2)
BASOPHILS NFR BLD AUTO: 0.4 % — SIGNIFICANT CHANGE UP (ref 0–2)
EOSINOPHIL # BLD AUTO: 0.02 K/UL — SIGNIFICANT CHANGE UP (ref 0–0.5)
EOSINOPHIL NFR BLD AUTO: 0.4 % — SIGNIFICANT CHANGE UP (ref 0–6)
HCT VFR BLD CALC: 31.2 % — LOW (ref 34.5–45)
HGB BLD-MCNC: 10.7 G/DL — LOW (ref 11.5–15.5)
IMM GRANULOCYTES NFR BLD AUTO: 0.2 % — SIGNIFICANT CHANGE UP (ref 0–0.9)
LYMPHOCYTES # BLD AUTO: 1.48 K/UL — SIGNIFICANT CHANGE UP (ref 1–3.3)
LYMPHOCYTES # BLD AUTO: 32.4 % — SIGNIFICANT CHANGE UP (ref 13–44)
MCHC RBC-ENTMCNC: 28.8 PG — SIGNIFICANT CHANGE UP (ref 27–34)
MCHC RBC-ENTMCNC: 34.3 GM/DL — SIGNIFICANT CHANGE UP (ref 32–36)
MCV RBC AUTO: 84.1 FL — SIGNIFICANT CHANGE UP (ref 80–100)
MONOCYTES # BLD AUTO: 0.7 K/UL — SIGNIFICANT CHANGE UP (ref 0–0.9)
MONOCYTES NFR BLD AUTO: 15.3 % — HIGH (ref 2–14)
NEUTROPHILS # BLD AUTO: 2.34 K/UL — SIGNIFICANT CHANGE UP (ref 1.8–7.4)
NEUTROPHILS NFR BLD AUTO: 51.3 % — SIGNIFICANT CHANGE UP (ref 43–77)
NRBC # BLD: 0 /100 WBCS — SIGNIFICANT CHANGE UP (ref 0–0)
PLATELET # BLD AUTO: 267 K/UL — SIGNIFICANT CHANGE UP (ref 150–400)
RBC # BLD: 3.71 M/UL — LOW (ref 3.8–5.2)
RBC # FLD: 16.5 % — HIGH (ref 10.3–14.5)
WBC # BLD: 4.57 K/UL — SIGNIFICANT CHANGE UP (ref 3.8–10.5)
WBC # FLD AUTO: 4.57 K/UL — SIGNIFICANT CHANGE UP (ref 3.8–10.5)

## 2023-09-08 PROCEDURE — 99215 OFFICE O/P EST HI 40 MIN: CPT

## 2023-09-08 NOTE — HISTORY OF PRESENT ILLNESS
[Date: ____________] : Patient's last distress assessment performed on [unfilled]. [0 - No Distress] : Distress Level: 0 [de-identified] : The patient was diagnosed with esophageal adenocarcinoma in December 2022 at the age of 72. She presented to Dr. Worthington in September with complaints of coughing, clearing throat, and increased mucus. On 12/5/22, she underwent an EGD with Dr. Bender which showed grade A esophagitis in the gastroesophageal junction compatible with reflux esophagitis. Erythema in the antrum was compatible with non-erosive gastritis. Pathology of the gastroesophageal junction biopsy showed infiltrating adenocarcinoma. An immunohistochemical stain for HER2/luz is 0-1 +/negative. No loss of nuclear expression of MMR proteins (MLH1, MSH2, MSH6, and PMS2). AnEUS on 12/9/22 by Dr. Ocampo showed a small ulcerated mass in the lower third of the esophagus, 35 cm from the incisors. The mass was non-obstructing and not circumferential. A 3 cm hiatal hernia was present. Staging uT3N0.  [de-identified] : Medical Hx: multiple sclerosis diagnosed in 1988 with periodic flares over the years; GERD and dyspepsia; HTN; bifascicular block (signal block); HL; atypical basal cell carcinoma [id number])\par  Surgical Hx: hiatal hernia repair 1983;  cholecystectomy; many D &C's leading up to hysterectomy 1983; She has a neurostimulator in the left lower flank which is no longer functioning, wires trace up to her upper thoracic spine. \par  Family Hx: mother colon ca; father multiple myeloma \par  Social Hx: was a social smoker, quit smoking 25 years ago; ETOH social 1 drink 2 times weekly;  ( in [place]); no children; family close by, lives with her 91 yo mother currently.  She does not drive and is living currently with her 97-year-old mother and her cousin Luisa in [place]. Her permanent residence is [place] with her .  [de-identified] : She c/o cough that had become worse over the last year. She has diffuse chronic 6/10 pain / neuropathy from MS, no esophageal pain. She denies dysphagia and reports an intentional 50 pound weight loss over the past few years. She is edentulous with dentures due to a reaction to actiq which she was taking for her multiple sclerosis years ago. She also has alopecia.  She completed 3 cycles of carbo / Taxol induction weekly, 1/20/23. Completed 4/6 cycles of Carbo / taxol concurrent with RT, on 2/17/23 due to low ANC last 2 cycles. Was able to cook and be with family starting Tuesday after chemo. Cardio was able to 1/2 her diuretic dose to help with dehydration / kidney levels. fatigue from MS, baclofen was increased by neuro for MS spasms.   Persistent loss of weight and will work with nutiritionist and Dr. Wolfe

## 2023-09-08 NOTE — PHYSICAL EXAM
[Ambulatory and capable of all self care but unable to carry out any work activities] : Status 2- Ambulatory and capable of all self care but unable to carry out any work activities. Up and about more than 50% of waking hours [Normal] : affect appropriate [de-identified] : wt stable  [de-identified] : edentulous [de-identified] : + grade 2 murmur  [de-identified] : chronic MS symptoms

## 2023-09-08 NOTE — RESULTS/DATA
[FreeTextEntry1] : 8/28/23 PET: Activity seen at the proximal esophagogastrectomy staple line as well as at the distal stomach are nonspecific and likely physiologic or inflammatory in the setting of fairly recent surgery. Continued surveillance suggested to exclude pathology. Ill-defined activity in the pleura anterior to the posterior RIGHT eighth rib is an isolated finding within the lung fields and correlates to a small area of suspected pleural thickening and punctate calcification. Suggest dedicated chest CT. Otherwise, no suspicious findings elsewhere. *** ADDENDUM # 1 *** Case was additionally reviewed by request. Review was focused on the ill-defined activity in the pleura in the posterior RIGHT thorax. There are no other areas of increased uptake within the chest wall in this patient who underwent a robotic Blake-Marcos esophagectomy in between the current and prior PET/CT exams. Differential diagnosis for this finding includes residual postoperative changes. Malignancy is not strongly favored based on its overall appearance, but cannot otherwise be excluded in the absence of an alternative explanation. Correlate with operative report. If this cannot be correlated to an incision site, then further short-term evaluation with CT chest with IV contrast may be helpful to further characterize this finding.  5/16/23 Path: Portion of esophagus and stomach, esophagogastrectomy: Negative for residual malignancy, status post-treatment. Complete score: score 0. Negative for vascular or perineural invasion. Seventeen lymph nodes negative for malignancy (0/17). Resection margins negative for dysplasia or malignancy. See synoptic summary section for further details. Final esophageal margin, excision: Squamous lined esophageal margin negative for dysplasia or malignancy. Synoptic Summary: Esophagus Procedure: Esophagogastrectomy Tumor Site: Esophagogastric junction (EGJ) - Per prior biopsy report [id number] Relationship of Tumor to Esophagogastric Junction: Cannot be determined - No residual malignancy Distance of Tumor Center from Esophagogastric Junction: Cannot be determined Histologic Type: Adenocarcinoma Histologic Type Comment: Per prior biopsy report [id number] Tumor Size: Cannot be determined - No residual malignancy Tumor Extent: No evidence of primary tumor Treatment Effect: Present, with no viable cancer cells (complete response, score 0) Lymphovascular Invasion: Not identified Perineural Invasion: Not identified Margin Status for Invasive Carcinoma: All margins negative for invasive carcinoma Closest Margin(s) to Invasive Carcinoma: Cannot be determined Distance from Invasive Carcinoma to Closest Margin: Cannot be determined Margin Status for Dysplasia and Intestinal Metaplasia: All margins negative for dysplasia Regional Lymph Node Status: All regional lymph nodes negative for tumor Number of Lymph Nodes Examined: 17 Pathologic Stage Classification TNM Descriptors: y (post-treatment) pT Category: pT0 pN Category: pN0  4/4/23 PET: Findings most supportive of a complete/near complete metabolic response to therapy. A small mildly FDG avid focus seen immediately proximal to the original esophageal  lesion is of uncertain clinical significance, SUV 3.7. No definite metabolic evidence of metastatic disease. Subcentimeter right inguinal mildly FDG avid lymph node is noted likely inflammatory in nature. Asymmetric uptake in the left fossa of Rosenmuller may be inflammatory/infectious in nature. 4/4/23 CT C/A/P: No evidence of metastatic disease within the chest, abdomen or pelvis. No lymphadenopathy.  12/28/2022 PET:  1.  Increased uptake at distal esophageal lesion consistent with malignant involvement. 2.  No gross evidence of metastatic disease.  12/9/22 EUS: Hiatal hernia. Small mass at the GE junction. T3N0Mx on this exam.   12/5/22 Path: Stomach, biopsy: Benign gastric mucosa with minimal chronic inflammation and congestion in the lamina propria. A Diff-Quik stain for Helicobacter pylori is negative. Gastroesophageal junction, biopsy: Infiltrating adenocarcinoma. An immunohistochemical stain for HER2/luz is 0-1 +/negative. No loss of nuclear expression of MMR proteins (MLH1, MSH2, MSH6, and PMS2). These results show low probability of microsatellite instability-high (MSI-H).  12/5/22 EDG: Normal duodenum. Hiatal hernia. Grade A esophagitis in the gastroesophageal junction compatible with reflux esophagitis. (biopsy). Erythema in the antrum compatible with non-erosive gastritis. (biopsy).

## 2023-09-09 LAB
ALBUMIN SERPL ELPH-MCNC: 4.1 G/DL — SIGNIFICANT CHANGE UP (ref 3.3–5)
ALP SERPL-CCNC: 77 U/L — SIGNIFICANT CHANGE UP (ref 40–120)
ALT FLD-CCNC: 24 U/L — SIGNIFICANT CHANGE UP (ref 10–45)
ANION GAP SERPL CALC-SCNC: 13 MMOL/L — SIGNIFICANT CHANGE UP (ref 5–17)
AST SERPL-CCNC: 25 U/L — SIGNIFICANT CHANGE UP (ref 10–40)
BILIRUB SERPL-MCNC: 0.6 MG/DL — SIGNIFICANT CHANGE UP (ref 0.2–1.2)
BUN SERPL-MCNC: 15 MG/DL — SIGNIFICANT CHANGE UP (ref 7–23)
CALCIUM SERPL-MCNC: 9.7 MG/DL — SIGNIFICANT CHANGE UP (ref 8.4–10.5)
CHLORIDE SERPL-SCNC: 104 MMOL/L — SIGNIFICANT CHANGE UP (ref 96–108)
CO2 SERPL-SCNC: 26 MMOL/L — SIGNIFICANT CHANGE UP (ref 22–31)
CREAT SERPL-MCNC: 0.6 MG/DL — SIGNIFICANT CHANGE UP (ref 0.5–1.3)
EGFR: 94 ML/MIN/1.73M2 — SIGNIFICANT CHANGE UP
GLUCOSE SERPL-MCNC: 95 MG/DL — SIGNIFICANT CHANGE UP (ref 70–99)
MAGNESIUM SERPL-MCNC: 1.7 MG/DL — SIGNIFICANT CHANGE UP (ref 1.6–2.6)
POTASSIUM SERPL-MCNC: 3.2 MMOL/L — LOW (ref 3.5–5.3)
POTASSIUM SERPL-SCNC: 3.2 MMOL/L — LOW (ref 3.5–5.3)
PROT SERPL-MCNC: 6.3 G/DL — SIGNIFICANT CHANGE UP (ref 6–8.3)
SODIUM SERPL-SCNC: 142 MMOL/L — SIGNIFICANT CHANGE UP (ref 135–145)

## 2023-09-10 ENCOUNTER — NON-APPOINTMENT (OUTPATIENT)
Age: 73
End: 2023-09-10

## 2023-09-13 ENCOUNTER — APPOINTMENT (OUTPATIENT)
Dept: RADIATION ONCOLOGY | Facility: CLINIC | Age: 73
End: 2023-09-13
Payer: MEDICARE

## 2023-09-13 ENCOUNTER — NON-APPOINTMENT (OUTPATIENT)
Age: 73
End: 2023-09-13

## 2023-09-13 VITALS
WEIGHT: 134.56 LBS | DIASTOLIC BLOOD PRESSURE: 65 MMHG | RESPIRATION RATE: 16 BRPM | HEART RATE: 74 BPM | SYSTOLIC BLOOD PRESSURE: 135 MMHG | HEIGHT: 62 IN | BODY MASS INDEX: 24.76 KG/M2

## 2023-09-13 PROCEDURE — 99213 OFFICE O/P EST LOW 20 MIN: CPT

## 2023-09-13 RX ORDER — MONTELUKAST 10 MG/1
10 TABLET, FILM COATED ORAL
Qty: 90 | Refills: 3 | Status: DISCONTINUED | COMMUNITY
Start: 2022-03-16 | End: 2023-09-13

## 2023-09-13 RX ORDER — ONDANSETRON 8 MG/1
8 TABLET, ORALLY DISINTEGRATING ORAL
Qty: 90 | Refills: 3 | Status: DISCONTINUED | COMMUNITY
Start: 2022-12-19 | End: 2023-09-13

## 2023-09-13 RX ORDER — AZELASTINE HYDROCHLORIDE 137 UG/1
0.1 SPRAY, METERED NASAL TWICE DAILY
Qty: 1 | Refills: 0 | Status: DISCONTINUED | COMMUNITY
Start: 2022-09-08 | End: 2023-09-13

## 2023-09-13 RX ORDER — HYDROCHLOROTHIAZIDE 12.5 MG/1
12.5 CAPSULE ORAL
Refills: 0 | Status: ACTIVE | COMMUNITY

## 2023-09-13 RX ORDER — SUCRALFATE 1 G/10ML
1 SUSPENSION ORAL 4 TIMES DAILY
Qty: 600 | Refills: 2 | Status: DISCONTINUED | COMMUNITY
Start: 2023-02-14 | End: 2023-09-13

## 2023-09-13 RX ORDER — MONTELUKAST 10 MG/1
10 TABLET, FILM COATED ORAL
Refills: 0 | Status: DISCONTINUED | COMMUNITY
End: 2023-09-13

## 2023-09-13 RX ORDER — FLUTICASONE PROPIONATE 50 UG/1
50 SPRAY, METERED NASAL DAILY
Qty: 1 | Refills: 2 | Status: DISCONTINUED | COMMUNITY
Start: 2022-09-08 | End: 2023-09-13

## 2023-09-21 ENCOUNTER — NON-APPOINTMENT (OUTPATIENT)
Age: 73
End: 2023-09-21

## 2023-10-17 ENCOUNTER — OUTPATIENT (OUTPATIENT)
Dept: OUTPATIENT SERVICES | Facility: HOSPITAL | Age: 73
LOS: 1 days | Discharge: HOME | End: 2023-10-17

## 2023-10-17 DIAGNOSIS — Z90.49 ACQUIRED ABSENCE OF OTHER SPECIFIED PARTS OF DIGESTIVE TRACT: Chronic | ICD-10-CM

## 2023-10-17 DIAGNOSIS — Z96.89 PRESENCE OF OTHER SPECIFIED FUNCTIONAL IMPLANTS: Chronic | ICD-10-CM

## 2023-10-17 DIAGNOSIS — Z90.710 ACQUIRED ABSENCE OF BOTH CERVIX AND UTERUS: Chronic | ICD-10-CM

## 2023-10-17 DIAGNOSIS — C15.9 MALIGNANT NEOPLASM OF ESOPHAGUS, UNSPECIFIED: ICD-10-CM

## 2023-10-17 DIAGNOSIS — Z98.890 OTHER SPECIFIED POSTPROCEDURAL STATES: Chronic | ICD-10-CM

## 2023-10-19 ENCOUNTER — APPOINTMENT (OUTPATIENT)
Dept: HEMATOLOGY ONCOLOGY | Facility: CLINIC | Age: 73
End: 2023-10-19

## 2023-10-19 ENCOUNTER — RESULT REVIEW (OUTPATIENT)
Age: 73
End: 2023-10-19

## 2023-10-19 LAB
ALBUMIN SERPL ELPH-MCNC: 4.1 G/DL — SIGNIFICANT CHANGE UP (ref 3.3–5)
ALBUMIN SERPL ELPH-MCNC: 4.1 G/DL — SIGNIFICANT CHANGE UP (ref 3.3–5)
ALP SERPL-CCNC: 81 U/L — SIGNIFICANT CHANGE UP (ref 40–120)
ALP SERPL-CCNC: 81 U/L — SIGNIFICANT CHANGE UP (ref 40–120)
ALT FLD-CCNC: 32 U/L — SIGNIFICANT CHANGE UP (ref 10–45)
ALT FLD-CCNC: 32 U/L — SIGNIFICANT CHANGE UP (ref 10–45)
ANION GAP SERPL CALC-SCNC: 10 MMOL/L — SIGNIFICANT CHANGE UP (ref 5–17)
ANION GAP SERPL CALC-SCNC: 10 MMOL/L — SIGNIFICANT CHANGE UP (ref 5–17)
AST SERPL-CCNC: 30 U/L — SIGNIFICANT CHANGE UP (ref 10–40)
AST SERPL-CCNC: 30 U/L — SIGNIFICANT CHANGE UP (ref 10–40)
BASOPHILS # BLD AUTO: 0.02 K/UL — SIGNIFICANT CHANGE UP (ref 0–0.2)
BASOPHILS # BLD AUTO: 0.02 K/UL — SIGNIFICANT CHANGE UP (ref 0–0.2)
BASOPHILS NFR BLD AUTO: 0.5 % — SIGNIFICANT CHANGE UP (ref 0–2)
BASOPHILS NFR BLD AUTO: 0.5 % — SIGNIFICANT CHANGE UP (ref 0–2)
BILIRUB SERPL-MCNC: 0.4 MG/DL — SIGNIFICANT CHANGE UP (ref 0.2–1.2)
BILIRUB SERPL-MCNC: 0.4 MG/DL — SIGNIFICANT CHANGE UP (ref 0.2–1.2)
BUN SERPL-MCNC: 25 MG/DL — HIGH (ref 7–23)
BUN SERPL-MCNC: 25 MG/DL — HIGH (ref 7–23)
CALCIUM SERPL-MCNC: 9.4 MG/DL — SIGNIFICANT CHANGE UP (ref 8.4–10.5)
CALCIUM SERPL-MCNC: 9.4 MG/DL — SIGNIFICANT CHANGE UP (ref 8.4–10.5)
CHLORIDE SERPL-SCNC: 102 MMOL/L — SIGNIFICANT CHANGE UP (ref 96–108)
CHLORIDE SERPL-SCNC: 102 MMOL/L — SIGNIFICANT CHANGE UP (ref 96–108)
CO2 SERPL-SCNC: 27 MMOL/L — SIGNIFICANT CHANGE UP (ref 22–31)
CO2 SERPL-SCNC: 27 MMOL/L — SIGNIFICANT CHANGE UP (ref 22–31)
CREAT SERPL-MCNC: 0.52 MG/DL — SIGNIFICANT CHANGE UP (ref 0.5–1.3)
CREAT SERPL-MCNC: 0.52 MG/DL — SIGNIFICANT CHANGE UP (ref 0.5–1.3)
EGFR: 97 ML/MIN/1.73M2 — SIGNIFICANT CHANGE UP
EGFR: 97 ML/MIN/1.73M2 — SIGNIFICANT CHANGE UP
EOSINOPHIL # BLD AUTO: 0 K/UL — SIGNIFICANT CHANGE UP (ref 0–0.5)
EOSINOPHIL # BLD AUTO: 0 K/UL — SIGNIFICANT CHANGE UP (ref 0–0.5)
EOSINOPHIL NFR BLD AUTO: 0 % — SIGNIFICANT CHANGE UP (ref 0–6)
EOSINOPHIL NFR BLD AUTO: 0 % — SIGNIFICANT CHANGE UP (ref 0–6)
GLUCOSE SERPL-MCNC: 83 MG/DL — SIGNIFICANT CHANGE UP (ref 70–99)
GLUCOSE SERPL-MCNC: 83 MG/DL — SIGNIFICANT CHANGE UP (ref 70–99)
HCT VFR BLD CALC: 29.6 % — LOW (ref 34.5–45)
HCT VFR BLD CALC: 29.6 % — LOW (ref 34.5–45)
HGB BLD-MCNC: 10 G/DL — LOW (ref 11.5–15.5)
HGB BLD-MCNC: 10 G/DL — LOW (ref 11.5–15.5)
IMM GRANULOCYTES NFR BLD AUTO: 0 % — SIGNIFICANT CHANGE UP (ref 0–0.9)
IMM GRANULOCYTES NFR BLD AUTO: 0 % — SIGNIFICANT CHANGE UP (ref 0–0.9)
LYMPHOCYTES # BLD AUTO: 1.02 K/UL — SIGNIFICANT CHANGE UP (ref 1–3.3)
LYMPHOCYTES # BLD AUTO: 1.02 K/UL — SIGNIFICANT CHANGE UP (ref 1–3.3)
LYMPHOCYTES # BLD AUTO: 26.4 % — SIGNIFICANT CHANGE UP (ref 13–44)
LYMPHOCYTES # BLD AUTO: 26.4 % — SIGNIFICANT CHANGE UP (ref 13–44)
MAGNESIUM SERPL-MCNC: 2 MG/DL — SIGNIFICANT CHANGE UP (ref 1.6–2.6)
MAGNESIUM SERPL-MCNC: 2 MG/DL — SIGNIFICANT CHANGE UP (ref 1.6–2.6)
MCHC RBC-ENTMCNC: 29.9 PG — SIGNIFICANT CHANGE UP (ref 27–34)
MCHC RBC-ENTMCNC: 29.9 PG — SIGNIFICANT CHANGE UP (ref 27–34)
MCHC RBC-ENTMCNC: 33.8 GM/DL — SIGNIFICANT CHANGE UP (ref 32–36)
MCHC RBC-ENTMCNC: 33.8 GM/DL — SIGNIFICANT CHANGE UP (ref 32–36)
MCV RBC AUTO: 88.4 FL — SIGNIFICANT CHANGE UP (ref 80–100)
MCV RBC AUTO: 88.4 FL — SIGNIFICANT CHANGE UP (ref 80–100)
MONOCYTES # BLD AUTO: 0.53 K/UL — SIGNIFICANT CHANGE UP (ref 0–0.9)
MONOCYTES # BLD AUTO: 0.53 K/UL — SIGNIFICANT CHANGE UP (ref 0–0.9)
MONOCYTES NFR BLD AUTO: 13.7 % — SIGNIFICANT CHANGE UP (ref 2–14)
MONOCYTES NFR BLD AUTO: 13.7 % — SIGNIFICANT CHANGE UP (ref 2–14)
NEUTROPHILS # BLD AUTO: 2.29 K/UL — SIGNIFICANT CHANGE UP (ref 1.8–7.4)
NEUTROPHILS # BLD AUTO: 2.29 K/UL — SIGNIFICANT CHANGE UP (ref 1.8–7.4)
NEUTROPHILS NFR BLD AUTO: 59.4 % — SIGNIFICANT CHANGE UP (ref 43–77)
NEUTROPHILS NFR BLD AUTO: 59.4 % — SIGNIFICANT CHANGE UP (ref 43–77)
NRBC # BLD: 0 /100 WBCS — SIGNIFICANT CHANGE UP (ref 0–0)
NRBC # BLD: 0 /100 WBCS — SIGNIFICANT CHANGE UP (ref 0–0)
PLATELET # BLD AUTO: 216 K/UL — SIGNIFICANT CHANGE UP (ref 150–400)
PLATELET # BLD AUTO: 216 K/UL — SIGNIFICANT CHANGE UP (ref 150–400)
POTASSIUM SERPL-MCNC: 3.7 MMOL/L — SIGNIFICANT CHANGE UP (ref 3.5–5.3)
POTASSIUM SERPL-MCNC: 3.7 MMOL/L — SIGNIFICANT CHANGE UP (ref 3.5–5.3)
POTASSIUM SERPL-SCNC: 3.7 MMOL/L — SIGNIFICANT CHANGE UP (ref 3.5–5.3)
POTASSIUM SERPL-SCNC: 3.7 MMOL/L — SIGNIFICANT CHANGE UP (ref 3.5–5.3)
PROT SERPL-MCNC: 6.3 G/DL — SIGNIFICANT CHANGE UP (ref 6–8.3)
PROT SERPL-MCNC: 6.3 G/DL — SIGNIFICANT CHANGE UP (ref 6–8.3)
RBC # BLD: 3.35 M/UL — LOW (ref 3.8–5.2)
RBC # BLD: 3.35 M/UL — LOW (ref 3.8–5.2)
RBC # FLD: 15.7 % — HIGH (ref 10.3–14.5)
RBC # FLD: 15.7 % — HIGH (ref 10.3–14.5)
SODIUM SERPL-SCNC: 140 MMOL/L — SIGNIFICANT CHANGE UP (ref 135–145)
SODIUM SERPL-SCNC: 140 MMOL/L — SIGNIFICANT CHANGE UP (ref 135–145)
WBC # BLD: 3.86 K/UL — SIGNIFICANT CHANGE UP (ref 3.8–10.5)
WBC # BLD: 3.86 K/UL — SIGNIFICANT CHANGE UP (ref 3.8–10.5)
WBC # FLD AUTO: 3.86 K/UL — SIGNIFICANT CHANGE UP (ref 3.8–10.5)
WBC # FLD AUTO: 3.86 K/UL — SIGNIFICANT CHANGE UP (ref 3.8–10.5)

## 2023-10-31 ENCOUNTER — RX RENEWAL (OUTPATIENT)
Age: 73
End: 2023-10-31

## 2023-11-03 ENCOUNTER — RESULT REVIEW (OUTPATIENT)
Age: 73
End: 2023-11-03

## 2023-11-03 ENCOUNTER — APPOINTMENT (OUTPATIENT)
Dept: HEMATOLOGY ONCOLOGY | Facility: CLINIC | Age: 73
End: 2023-11-03
Payer: MEDICARE

## 2023-11-03 VITALS
OXYGEN SATURATION: 99 % | SYSTOLIC BLOOD PRESSURE: 131 MMHG | TEMPERATURE: 98.3 F | HEART RATE: 76 BPM | WEIGHT: 130 LBS | BODY MASS INDEX: 23.92 KG/M2 | RESPIRATION RATE: 16 BRPM | DIASTOLIC BLOOD PRESSURE: 67 MMHG | HEIGHT: 62 IN

## 2023-11-03 LAB
BASOPHILS # BLD AUTO: 0.02 K/UL — SIGNIFICANT CHANGE UP (ref 0–0.2)
BASOPHILS # BLD AUTO: 0.02 K/UL — SIGNIFICANT CHANGE UP (ref 0–0.2)
BASOPHILS NFR BLD AUTO: 0.5 % — SIGNIFICANT CHANGE UP (ref 0–2)
BASOPHILS NFR BLD AUTO: 0.5 % — SIGNIFICANT CHANGE UP (ref 0–2)
EOSINOPHIL # BLD AUTO: 0.01 K/UL — SIGNIFICANT CHANGE UP (ref 0–0.5)
EOSINOPHIL # BLD AUTO: 0.01 K/UL — SIGNIFICANT CHANGE UP (ref 0–0.5)
EOSINOPHIL NFR BLD AUTO: 0.2 % — SIGNIFICANT CHANGE UP (ref 0–6)
EOSINOPHIL NFR BLD AUTO: 0.2 % — SIGNIFICANT CHANGE UP (ref 0–6)
HCT VFR BLD CALC: 34.4 % — LOW (ref 34.5–45)
HCT VFR BLD CALC: 34.4 % — LOW (ref 34.5–45)
HGB BLD-MCNC: 11.5 G/DL — SIGNIFICANT CHANGE UP (ref 11.5–15.5)
HGB BLD-MCNC: 11.5 G/DL — SIGNIFICANT CHANGE UP (ref 11.5–15.5)
IMM GRANULOCYTES NFR BLD AUTO: 0 % — SIGNIFICANT CHANGE UP (ref 0–0.9)
IMM GRANULOCYTES NFR BLD AUTO: 0 % — SIGNIFICANT CHANGE UP (ref 0–0.9)
LYMPHOCYTES # BLD AUTO: 1.26 K/UL — SIGNIFICANT CHANGE UP (ref 1–3.3)
LYMPHOCYTES # BLD AUTO: 1.26 K/UL — SIGNIFICANT CHANGE UP (ref 1–3.3)
LYMPHOCYTES # BLD AUTO: 29.6 % — SIGNIFICANT CHANGE UP (ref 13–44)
LYMPHOCYTES # BLD AUTO: 29.6 % — SIGNIFICANT CHANGE UP (ref 13–44)
MCHC RBC-ENTMCNC: 29.6 PG — SIGNIFICANT CHANGE UP (ref 27–34)
MCHC RBC-ENTMCNC: 29.6 PG — SIGNIFICANT CHANGE UP (ref 27–34)
MCHC RBC-ENTMCNC: 33.4 GM/DL — SIGNIFICANT CHANGE UP (ref 32–36)
MCHC RBC-ENTMCNC: 33.4 GM/DL — SIGNIFICANT CHANGE UP (ref 32–36)
MCV RBC AUTO: 88.4 FL — SIGNIFICANT CHANGE UP (ref 80–100)
MCV RBC AUTO: 88.4 FL — SIGNIFICANT CHANGE UP (ref 80–100)
MONOCYTES # BLD AUTO: 0.48 K/UL — SIGNIFICANT CHANGE UP (ref 0–0.9)
MONOCYTES # BLD AUTO: 0.48 K/UL — SIGNIFICANT CHANGE UP (ref 0–0.9)
MONOCYTES NFR BLD AUTO: 11.3 % — SIGNIFICANT CHANGE UP (ref 2–14)
MONOCYTES NFR BLD AUTO: 11.3 % — SIGNIFICANT CHANGE UP (ref 2–14)
NEUTROPHILS # BLD AUTO: 2.49 K/UL — SIGNIFICANT CHANGE UP (ref 1.8–7.4)
NEUTROPHILS # BLD AUTO: 2.49 K/UL — SIGNIFICANT CHANGE UP (ref 1.8–7.4)
NEUTROPHILS NFR BLD AUTO: 58.4 % — SIGNIFICANT CHANGE UP (ref 43–77)
NEUTROPHILS NFR BLD AUTO: 58.4 % — SIGNIFICANT CHANGE UP (ref 43–77)
NRBC # BLD: 0 /100 WBCS — SIGNIFICANT CHANGE UP (ref 0–0)
NRBC # BLD: 0 /100 WBCS — SIGNIFICANT CHANGE UP (ref 0–0)
PLATELET # BLD AUTO: 257 K/UL — SIGNIFICANT CHANGE UP (ref 150–400)
PLATELET # BLD AUTO: 257 K/UL — SIGNIFICANT CHANGE UP (ref 150–400)
RBC # BLD: 3.89 M/UL — SIGNIFICANT CHANGE UP (ref 3.8–5.2)
RBC # BLD: 3.89 M/UL — SIGNIFICANT CHANGE UP (ref 3.8–5.2)
RBC # FLD: 15.4 % — HIGH (ref 10.3–14.5)
RBC # FLD: 15.4 % — HIGH (ref 10.3–14.5)
WBC # BLD: 4.26 K/UL — SIGNIFICANT CHANGE UP (ref 3.8–10.5)
WBC # BLD: 4.26 K/UL — SIGNIFICANT CHANGE UP (ref 3.8–10.5)
WBC # FLD AUTO: 4.26 K/UL — SIGNIFICANT CHANGE UP (ref 3.8–10.5)
WBC # FLD AUTO: 4.26 K/UL — SIGNIFICANT CHANGE UP (ref 3.8–10.5)

## 2023-11-03 PROCEDURE — 99214 OFFICE O/P EST MOD 30 MIN: CPT

## 2023-11-04 LAB
ALBUMIN SERPL ELPH-MCNC: 4.8 G/DL — SIGNIFICANT CHANGE UP (ref 3.3–5)
ALBUMIN SERPL ELPH-MCNC: 4.8 G/DL — SIGNIFICANT CHANGE UP (ref 3.3–5)
ALP SERPL-CCNC: 89 U/L — SIGNIFICANT CHANGE UP (ref 40–120)
ALP SERPL-CCNC: 89 U/L — SIGNIFICANT CHANGE UP (ref 40–120)
ALT FLD-CCNC: 32 U/L — SIGNIFICANT CHANGE UP (ref 10–45)
ALT FLD-CCNC: 32 U/L — SIGNIFICANT CHANGE UP (ref 10–45)
ANION GAP SERPL CALC-SCNC: 14 MMOL/L — SIGNIFICANT CHANGE UP (ref 5–17)
ANION GAP SERPL CALC-SCNC: 14 MMOL/L — SIGNIFICANT CHANGE UP (ref 5–17)
AST SERPL-CCNC: 28 U/L — SIGNIFICANT CHANGE UP (ref 10–40)
AST SERPL-CCNC: 28 U/L — SIGNIFICANT CHANGE UP (ref 10–40)
BILIRUB SERPL-MCNC: 0.6 MG/DL — SIGNIFICANT CHANGE UP (ref 0.2–1.2)
BILIRUB SERPL-MCNC: 0.6 MG/DL — SIGNIFICANT CHANGE UP (ref 0.2–1.2)
BUN SERPL-MCNC: 38 MG/DL — HIGH (ref 7–23)
BUN SERPL-MCNC: 38 MG/DL — HIGH (ref 7–23)
CALCIUM SERPL-MCNC: 10.1 MG/DL — SIGNIFICANT CHANGE UP (ref 8.4–10.5)
CALCIUM SERPL-MCNC: 10.1 MG/DL — SIGNIFICANT CHANGE UP (ref 8.4–10.5)
CHLORIDE SERPL-SCNC: 100 MMOL/L — SIGNIFICANT CHANGE UP (ref 96–108)
CHLORIDE SERPL-SCNC: 100 MMOL/L — SIGNIFICANT CHANGE UP (ref 96–108)
CO2 SERPL-SCNC: 26 MMOL/L — SIGNIFICANT CHANGE UP (ref 22–31)
CO2 SERPL-SCNC: 26 MMOL/L — SIGNIFICANT CHANGE UP (ref 22–31)
CREAT SERPL-MCNC: 0.63 MG/DL — SIGNIFICANT CHANGE UP (ref 0.5–1.3)
CREAT SERPL-MCNC: 0.63 MG/DL — SIGNIFICANT CHANGE UP (ref 0.5–1.3)
EGFR: 93 ML/MIN/1.73M2 — SIGNIFICANT CHANGE UP
EGFR: 93 ML/MIN/1.73M2 — SIGNIFICANT CHANGE UP
GLUCOSE SERPL-MCNC: 112 MG/DL — HIGH (ref 70–99)
GLUCOSE SERPL-MCNC: 112 MG/DL — HIGH (ref 70–99)
MAGNESIUM SERPL-MCNC: 2.1 MG/DL — SIGNIFICANT CHANGE UP (ref 1.6–2.6)
MAGNESIUM SERPL-MCNC: 2.1 MG/DL — SIGNIFICANT CHANGE UP (ref 1.6–2.6)
POTASSIUM SERPL-MCNC: 4.1 MMOL/L — SIGNIFICANT CHANGE UP (ref 3.5–5.3)
POTASSIUM SERPL-MCNC: 4.1 MMOL/L — SIGNIFICANT CHANGE UP (ref 3.5–5.3)
POTASSIUM SERPL-SCNC: 4.1 MMOL/L — SIGNIFICANT CHANGE UP (ref 3.5–5.3)
POTASSIUM SERPL-SCNC: 4.1 MMOL/L — SIGNIFICANT CHANGE UP (ref 3.5–5.3)
PROT SERPL-MCNC: 7.4 G/DL — SIGNIFICANT CHANGE UP (ref 6–8.3)
PROT SERPL-MCNC: 7.4 G/DL — SIGNIFICANT CHANGE UP (ref 6–8.3)
SODIUM SERPL-SCNC: 140 MMOL/L — SIGNIFICANT CHANGE UP (ref 135–145)
SODIUM SERPL-SCNC: 140 MMOL/L — SIGNIFICANT CHANGE UP (ref 135–145)

## 2023-11-09 DIAGNOSIS — G35 MULTIPLE SCLEROSIS: ICD-10-CM

## 2023-11-09 DIAGNOSIS — I45.4 NONSPECIFIC INTRAVENTRICULAR BLOCK: ICD-10-CM

## 2023-11-09 DIAGNOSIS — Z86.79 PERSONAL HISTORY OF OTHER DISEASES OF THE CIRCULATORY SYSTEM: ICD-10-CM

## 2023-11-22 ENCOUNTER — APPOINTMENT (OUTPATIENT)
Dept: NUCLEAR MEDICINE | Facility: CLINIC | Age: 73
End: 2023-11-22
Payer: MEDICARE

## 2023-11-22 ENCOUNTER — OUTPATIENT (OUTPATIENT)
Dept: OUTPATIENT SERVICES | Facility: HOSPITAL | Age: 73
LOS: 1 days | End: 2023-11-22
Payer: MEDICARE

## 2023-11-22 DIAGNOSIS — Z90.49 ACQUIRED ABSENCE OF OTHER SPECIFIED PARTS OF DIGESTIVE TRACT: Chronic | ICD-10-CM

## 2023-11-22 DIAGNOSIS — C15.9 MALIGNANT NEOPLASM OF ESOPHAGUS, UNSPECIFIED: ICD-10-CM

## 2023-11-22 PROCEDURE — 78815 PET IMAGE W/CT SKULL-THIGH: CPT | Mod: 26,PS

## 2023-11-22 PROCEDURE — A9552: CPT

## 2023-11-22 PROCEDURE — 78815 PET IMAGE W/CT SKULL-THIGH: CPT

## 2023-12-04 ENCOUNTER — RESULT REVIEW (OUTPATIENT)
Age: 73
End: 2023-12-04

## 2023-12-04 ENCOUNTER — APPOINTMENT (OUTPATIENT)
Dept: HEMATOLOGY ONCOLOGY | Facility: CLINIC | Age: 73
End: 2023-12-04
Payer: MEDICARE

## 2023-12-04 VITALS
HEART RATE: 65 BPM | DIASTOLIC BLOOD PRESSURE: 79 MMHG | BODY MASS INDEX: 24.29 KG/M2 | OXYGEN SATURATION: 98 % | HEIGHT: 62 IN | TEMPERATURE: 98.4 F | SYSTOLIC BLOOD PRESSURE: 124 MMHG | RESPIRATION RATE: 16 BRPM | WEIGHT: 132 LBS

## 2023-12-04 LAB
BASOPHILS # BLD AUTO: 0.02 K/UL — SIGNIFICANT CHANGE UP (ref 0–0.2)
BASOPHILS # BLD AUTO: 0.02 K/UL — SIGNIFICANT CHANGE UP (ref 0–0.2)
BASOPHILS NFR BLD AUTO: 0.5 % — SIGNIFICANT CHANGE UP (ref 0–2)
BASOPHILS NFR BLD AUTO: 0.5 % — SIGNIFICANT CHANGE UP (ref 0–2)
EOSINOPHIL # BLD AUTO: 0.02 K/UL — SIGNIFICANT CHANGE UP (ref 0–0.5)
EOSINOPHIL # BLD AUTO: 0.02 K/UL — SIGNIFICANT CHANGE UP (ref 0–0.5)
EOSINOPHIL NFR BLD AUTO: 0.5 % — SIGNIFICANT CHANGE UP (ref 0–6)
EOSINOPHIL NFR BLD AUTO: 0.5 % — SIGNIFICANT CHANGE UP (ref 0–6)
HCT VFR BLD CALC: 31.7 % — LOW (ref 34.5–45)
HCT VFR BLD CALC: 31.7 % — LOW (ref 34.5–45)
HGB BLD-MCNC: 10.5 G/DL — LOW (ref 11.5–15.5)
HGB BLD-MCNC: 10.5 G/DL — LOW (ref 11.5–15.5)
IMM GRANULOCYTES NFR BLD AUTO: 0 % — SIGNIFICANT CHANGE UP (ref 0–0.9)
IMM GRANULOCYTES NFR BLD AUTO: 0 % — SIGNIFICANT CHANGE UP (ref 0–0.9)
LYMPHOCYTES # BLD AUTO: 1.19 K/UL — SIGNIFICANT CHANGE UP (ref 1–3.3)
LYMPHOCYTES # BLD AUTO: 1.19 K/UL — SIGNIFICANT CHANGE UP (ref 1–3.3)
LYMPHOCYTES # BLD AUTO: 27.6 % — SIGNIFICANT CHANGE UP (ref 13–44)
LYMPHOCYTES # BLD AUTO: 27.6 % — SIGNIFICANT CHANGE UP (ref 13–44)
MCHC RBC-ENTMCNC: 29.3 PG — SIGNIFICANT CHANGE UP (ref 27–34)
MCHC RBC-ENTMCNC: 29.3 PG — SIGNIFICANT CHANGE UP (ref 27–34)
MCHC RBC-ENTMCNC: 33.1 GM/DL — SIGNIFICANT CHANGE UP (ref 32–36)
MCHC RBC-ENTMCNC: 33.1 GM/DL — SIGNIFICANT CHANGE UP (ref 32–36)
MCV RBC AUTO: 88.5 FL — SIGNIFICANT CHANGE UP (ref 80–100)
MCV RBC AUTO: 88.5 FL — SIGNIFICANT CHANGE UP (ref 80–100)
MONOCYTES # BLD AUTO: 0.77 K/UL — SIGNIFICANT CHANGE UP (ref 0–0.9)
MONOCYTES # BLD AUTO: 0.77 K/UL — SIGNIFICANT CHANGE UP (ref 0–0.9)
MONOCYTES NFR BLD AUTO: 17.9 % — HIGH (ref 2–14)
MONOCYTES NFR BLD AUTO: 17.9 % — HIGH (ref 2–14)
NEUTROPHILS # BLD AUTO: 2.31 K/UL — SIGNIFICANT CHANGE UP (ref 1.8–7.4)
NEUTROPHILS # BLD AUTO: 2.31 K/UL — SIGNIFICANT CHANGE UP (ref 1.8–7.4)
NEUTROPHILS NFR BLD AUTO: 53.5 % — SIGNIFICANT CHANGE UP (ref 43–77)
NEUTROPHILS NFR BLD AUTO: 53.5 % — SIGNIFICANT CHANGE UP (ref 43–77)
NRBC # BLD: 0 /100 WBCS — SIGNIFICANT CHANGE UP (ref 0–0)
NRBC # BLD: 0 /100 WBCS — SIGNIFICANT CHANGE UP (ref 0–0)
PLATELET # BLD AUTO: 202 K/UL — SIGNIFICANT CHANGE UP (ref 150–400)
PLATELET # BLD AUTO: 202 K/UL — SIGNIFICANT CHANGE UP (ref 150–400)
RBC # BLD: 3.58 M/UL — LOW (ref 3.8–5.2)
RBC # BLD: 3.58 M/UL — LOW (ref 3.8–5.2)
RBC # FLD: 15.3 % — HIGH (ref 10.3–14.5)
RBC # FLD: 15.3 % — HIGH (ref 10.3–14.5)
WBC # BLD: 4.31 K/UL — SIGNIFICANT CHANGE UP (ref 3.8–10.5)
WBC # BLD: 4.31 K/UL — SIGNIFICANT CHANGE UP (ref 3.8–10.5)
WBC # FLD AUTO: 4.31 K/UL — SIGNIFICANT CHANGE UP (ref 3.8–10.5)
WBC # FLD AUTO: 4.31 K/UL — SIGNIFICANT CHANGE UP (ref 3.8–10.5)

## 2023-12-04 PROCEDURE — 99215 OFFICE O/P EST HI 40 MIN: CPT

## 2023-12-05 LAB
ALBUMIN SERPL ELPH-MCNC: 4.1 G/DL — SIGNIFICANT CHANGE UP (ref 3.3–5)
ALBUMIN SERPL ELPH-MCNC: 4.1 G/DL — SIGNIFICANT CHANGE UP (ref 3.3–5)
ALP SERPL-CCNC: 86 U/L — SIGNIFICANT CHANGE UP (ref 40–120)
ALP SERPL-CCNC: 86 U/L — SIGNIFICANT CHANGE UP (ref 40–120)
ALT FLD-CCNC: 32 U/L — SIGNIFICANT CHANGE UP (ref 10–45)
ALT FLD-CCNC: 32 U/L — SIGNIFICANT CHANGE UP (ref 10–45)
ANION GAP SERPL CALC-SCNC: 10 MMOL/L — SIGNIFICANT CHANGE UP (ref 5–17)
ANION GAP SERPL CALC-SCNC: 10 MMOL/L — SIGNIFICANT CHANGE UP (ref 5–17)
AST SERPL-CCNC: 29 U/L — SIGNIFICANT CHANGE UP (ref 10–40)
AST SERPL-CCNC: 29 U/L — SIGNIFICANT CHANGE UP (ref 10–40)
BILIRUB SERPL-MCNC: 0.3 MG/DL — SIGNIFICANT CHANGE UP (ref 0.2–1.2)
BILIRUB SERPL-MCNC: 0.3 MG/DL — SIGNIFICANT CHANGE UP (ref 0.2–1.2)
BUN SERPL-MCNC: 24 MG/DL — HIGH (ref 7–23)
BUN SERPL-MCNC: 24 MG/DL — HIGH (ref 7–23)
CALCIUM SERPL-MCNC: 9.7 MG/DL — SIGNIFICANT CHANGE UP (ref 8.4–10.5)
CALCIUM SERPL-MCNC: 9.7 MG/DL — SIGNIFICANT CHANGE UP (ref 8.4–10.5)
CHLORIDE SERPL-SCNC: 102 MMOL/L — SIGNIFICANT CHANGE UP (ref 96–108)
CHLORIDE SERPL-SCNC: 102 MMOL/L — SIGNIFICANT CHANGE UP (ref 96–108)
CO2 SERPL-SCNC: 27 MMOL/L — SIGNIFICANT CHANGE UP (ref 22–31)
CO2 SERPL-SCNC: 27 MMOL/L — SIGNIFICANT CHANGE UP (ref 22–31)
CREAT SERPL-MCNC: 0.57 MG/DL — SIGNIFICANT CHANGE UP (ref 0.5–1.3)
CREAT SERPL-MCNC: 0.57 MG/DL — SIGNIFICANT CHANGE UP (ref 0.5–1.3)
EGFR: 95 ML/MIN/1.73M2 — SIGNIFICANT CHANGE UP
EGFR: 95 ML/MIN/1.73M2 — SIGNIFICANT CHANGE UP
GLUCOSE SERPL-MCNC: 86 MG/DL — SIGNIFICANT CHANGE UP (ref 70–99)
GLUCOSE SERPL-MCNC: 86 MG/DL — SIGNIFICANT CHANGE UP (ref 70–99)
MAGNESIUM SERPL-MCNC: 2.1 MG/DL — SIGNIFICANT CHANGE UP (ref 1.6–2.6)
MAGNESIUM SERPL-MCNC: 2.1 MG/DL — SIGNIFICANT CHANGE UP (ref 1.6–2.6)
POTASSIUM SERPL-MCNC: 4 MMOL/L — SIGNIFICANT CHANGE UP (ref 3.5–5.3)
POTASSIUM SERPL-MCNC: 4 MMOL/L — SIGNIFICANT CHANGE UP (ref 3.5–5.3)
POTASSIUM SERPL-SCNC: 4 MMOL/L — SIGNIFICANT CHANGE UP (ref 3.5–5.3)
POTASSIUM SERPL-SCNC: 4 MMOL/L — SIGNIFICANT CHANGE UP (ref 3.5–5.3)
PROT SERPL-MCNC: 6.2 G/DL — SIGNIFICANT CHANGE UP (ref 6–8.3)
PROT SERPL-MCNC: 6.2 G/DL — SIGNIFICANT CHANGE UP (ref 6–8.3)
SODIUM SERPL-SCNC: 139 MMOL/L — SIGNIFICANT CHANGE UP (ref 135–145)
SODIUM SERPL-SCNC: 139 MMOL/L — SIGNIFICANT CHANGE UP (ref 135–145)

## 2023-12-06 ENCOUNTER — RX RENEWAL (OUTPATIENT)
Age: 73
End: 2023-12-06

## 2023-12-06 RX ORDER — OMEPRAZOLE 40 MG/1
40 CAPSULE, DELAYED RELEASE ORAL
Qty: 30 | Refills: 0 | Status: ACTIVE | COMMUNITY
Start: 2023-03-17 | End: 1900-01-01

## 2023-12-07 ENCOUNTER — APPOINTMENT (OUTPATIENT)
Dept: UROLOGY | Facility: CLINIC | Age: 73
End: 2023-12-07
Payer: MEDICARE

## 2023-12-07 VITALS
WEIGHT: 132 LBS | DIASTOLIC BLOOD PRESSURE: 74 MMHG | BODY MASS INDEX: 24.29 KG/M2 | OXYGEN SATURATION: 98 % | HEART RATE: 70 BPM | SYSTOLIC BLOOD PRESSURE: 130 MMHG | HEIGHT: 62 IN

## 2023-12-07 DIAGNOSIS — N39.0 URINARY TRACT INFECTION, SITE NOT SPECIFIED: ICD-10-CM

## 2023-12-07 DIAGNOSIS — N76.0 ACUTE VAGINITIS: ICD-10-CM

## 2023-12-07 PROCEDURE — 99214 OFFICE O/P EST MOD 30 MIN: CPT

## 2023-12-07 RX ORDER — FLUCONAZOLE 150 MG/1
150 TABLET ORAL
Qty: 1 | Refills: 0 | Status: ACTIVE | COMMUNITY
Start: 2023-12-07 | End: 1900-01-01

## 2023-12-11 LAB — BACTERIA UR CULT: NORMAL

## 2023-12-14 NOTE — H&P PST ADULT - TONSILS
Caller: Ethel Child    Relationship: Self    Best call back number: [phone]     What was the call regarding: PATIENT WOULD LIKE TO KNOW IF THE OFFICE CAN SEND A PRESCRIPTION FOR THE RSV VACCINE TO HER PHARMACY [facility]/pharmacy #[id number] - [place], [place] - [address] [place] [address] 25 - [phone]  - [phone] FX        normal/no redness/no swelling/no discharge

## 2023-12-20 ENCOUNTER — APPOINTMENT (OUTPATIENT)
Dept: OTOLARYNGOLOGY | Facility: CLINIC | Age: 73
End: 2023-12-20
Payer: MEDICARE

## 2023-12-20 VITALS — HEIGHT: 62 IN | BODY MASS INDEX: 23.55 KG/M2 | WEIGHT: 128 LBS

## 2023-12-20 DIAGNOSIS — K21.9 GASTRO-ESOPHAGEAL REFLUX DISEASE W/OUT ESOPHAGITIS: ICD-10-CM

## 2023-12-20 DIAGNOSIS — R09.A2 FOREIGN BODY SENSATION, THROAT: ICD-10-CM

## 2023-12-20 DIAGNOSIS — J38.7 OTHER DISEASES OF LARYNX: ICD-10-CM

## 2023-12-20 PROCEDURE — 99214 OFFICE O/P EST MOD 30 MIN: CPT | Mod: 25

## 2023-12-20 PROCEDURE — 31575 DIAGNOSTIC LARYNGOSCOPY: CPT

## 2023-12-20 NOTE — HISTORY OF PRESENT ILLNESS
[de-identified] : Here for follow up - did have chemo and RT and surgery for esophageal cancer.  Here for recheck of larynx. On omeprazole and  famotidine - recommended by surgeons.  Only miniimal refljux sx.  Hank 5/2023

## 2023-12-20 NOTE — ASSESSMENT
[FreeTextEntry1] : Patient s/p RT  and chemo and surgery for esophageal ca - hx of LPR -  doing well since treatment - has mild LPR - recommended use omeprazole as needed and famotidine at bed and as per oncology team   Follow up 2-3 mos.  No other laryngeal lesion seen

## 2024-01-02 ENCOUNTER — TRANSCRIPTION ENCOUNTER (OUTPATIENT)
Age: 74
End: 2024-01-02

## 2024-01-02 RX ORDER — PANCRELIPASE 36000; 180000; 114000 [USP'U]/1; [USP'U]/1; [USP'U]/1
36000-114000 CAPSULE, DELAYED RELEASE PELLETS ORAL
Qty: 200 | Refills: 6 | Status: ACTIVE | COMMUNITY
Start: 2023-09-25 | End: 1900-01-01

## 2024-01-03 ENCOUNTER — APPOINTMENT (OUTPATIENT)
Dept: GASTROENTEROLOGY | Facility: CLINIC | Age: 74
End: 2024-01-03

## 2024-01-08 ENCOUNTER — RESULT REVIEW (OUTPATIENT)
Age: 74
End: 2024-01-08

## 2024-01-08 ENCOUNTER — TRANSCRIPTION ENCOUNTER (OUTPATIENT)
Age: 74
End: 2024-01-08

## 2024-01-09 ENCOUNTER — TRANSCRIPTION ENCOUNTER (OUTPATIENT)
Age: 74
End: 2024-01-09

## 2024-02-05 ENCOUNTER — OUTPATIENT (OUTPATIENT)
Dept: OUTPATIENT SERVICES | Facility: HOSPITAL | Age: 74
LOS: 1 days | End: 2024-02-05
Payer: MEDICARE

## 2024-02-05 ENCOUNTER — APPOINTMENT (OUTPATIENT)
Dept: CT IMAGING | Facility: CLINIC | Age: 74
End: 2024-02-05
Payer: MEDICARE

## 2024-02-05 DIAGNOSIS — Z90.710 ACQUIRED ABSENCE OF BOTH CERVIX AND UTERUS: Chronic | ICD-10-CM

## 2024-02-05 DIAGNOSIS — Z98.890 OTHER SPECIFIED POSTPROCEDURAL STATES: Chronic | ICD-10-CM

## 2024-02-05 DIAGNOSIS — Z90.49 ACQUIRED ABSENCE OF OTHER SPECIFIED PARTS OF DIGESTIVE TRACT: Chronic | ICD-10-CM

## 2024-02-05 DIAGNOSIS — C15.9 MALIGNANT NEOPLASM OF ESOPHAGUS, UNSPECIFIED: ICD-10-CM

## 2024-02-05 DIAGNOSIS — Z96.89 PRESENCE OF OTHER SPECIFIED FUNCTIONAL IMPLANTS: Chronic | ICD-10-CM

## 2024-02-05 DIAGNOSIS — Z00.8 ENCOUNTER FOR OTHER GENERAL EXAMINATION: ICD-10-CM

## 2024-02-05 PROCEDURE — 71260 CT THORAX DX C+: CPT | Mod: 26

## 2024-02-05 PROCEDURE — 74177 CT ABD & PELVIS W/CONTRAST: CPT | Mod: 26

## 2024-02-05 PROCEDURE — 71260 CT THORAX DX C+: CPT

## 2024-02-05 PROCEDURE — 74177 CT ABD & PELVIS W/CONTRAST: CPT

## 2024-02-06 ENCOUNTER — TRANSCRIPTION ENCOUNTER (OUTPATIENT)
Age: 74
End: 2024-02-06

## 2024-02-09 ENCOUNTER — OUTPATIENT (OUTPATIENT)
Dept: OUTPATIENT SERVICES | Facility: HOSPITAL | Age: 74
LOS: 1 days | Discharge: HOME | End: 2024-02-09

## 2024-02-09 DIAGNOSIS — C15.9 MALIGNANT NEOPLASM OF ESOPHAGUS, UNSPECIFIED: ICD-10-CM

## 2024-02-09 DIAGNOSIS — Z90.49 ACQUIRED ABSENCE OF OTHER SPECIFIED PARTS OF DIGESTIVE TRACT: Chronic | ICD-10-CM

## 2024-02-09 DIAGNOSIS — Z96.89 PRESENCE OF OTHER SPECIFIED FUNCTIONAL IMPLANTS: Chronic | ICD-10-CM

## 2024-02-09 DIAGNOSIS — Z98.890 OTHER SPECIFIED POSTPROCEDURAL STATES: Chronic | ICD-10-CM

## 2024-02-09 DIAGNOSIS — Z90.710 ACQUIRED ABSENCE OF BOTH CERVIX AND UTERUS: Chronic | ICD-10-CM

## 2024-02-12 ENCOUNTER — RESULT REVIEW (OUTPATIENT)
Age: 74
End: 2024-02-12

## 2024-02-12 ENCOUNTER — APPOINTMENT (OUTPATIENT)
Dept: HEMATOLOGY ONCOLOGY | Facility: CLINIC | Age: 74
End: 2024-02-12
Payer: MEDICARE

## 2024-02-12 VITALS
BODY MASS INDEX: 23.74 KG/M2 | SYSTOLIC BLOOD PRESSURE: 137 MMHG | HEART RATE: 63 BPM | OXYGEN SATURATION: 98 % | DIASTOLIC BLOOD PRESSURE: 72 MMHG | TEMPERATURE: 98.2 F | HEIGHT: 62 IN | WEIGHT: 129 LBS

## 2024-02-12 LAB
BASOPHILS # BLD AUTO: 0.02 K/UL — SIGNIFICANT CHANGE UP (ref 0–0.2)
BASOPHILS NFR BLD AUTO: 0.3 % — SIGNIFICANT CHANGE UP (ref 0–2)
EOSINOPHIL # BLD AUTO: 0 K/UL — SIGNIFICANT CHANGE UP (ref 0–0.5)
EOSINOPHIL NFR BLD AUTO: 0 % — SIGNIFICANT CHANGE UP (ref 0–6)
HCT VFR BLD CALC: 32.9 % — LOW (ref 34.5–45)
HGB BLD-MCNC: 10.9 G/DL — LOW (ref 11.5–15.5)
IMM GRANULOCYTES NFR BLD AUTO: 0.2 % — SIGNIFICANT CHANGE UP (ref 0–0.9)
LYMPHOCYTES # BLD AUTO: 1.42 K/UL — SIGNIFICANT CHANGE UP (ref 1–3.3)
LYMPHOCYTES # BLD AUTO: 22.9 % — SIGNIFICANT CHANGE UP (ref 13–44)
MCHC RBC-ENTMCNC: 29.1 PG — SIGNIFICANT CHANGE UP (ref 27–34)
MCHC RBC-ENTMCNC: 33.1 GM/DL — SIGNIFICANT CHANGE UP (ref 32–36)
MCV RBC AUTO: 87.7 FL — SIGNIFICANT CHANGE UP (ref 80–100)
MONOCYTES # BLD AUTO: 0.68 K/UL — SIGNIFICANT CHANGE UP (ref 0–0.9)
MONOCYTES NFR BLD AUTO: 11 % — SIGNIFICANT CHANGE UP (ref 2–14)
NEUTROPHILS # BLD AUTO: 4.06 K/UL — SIGNIFICANT CHANGE UP (ref 1.8–7.4)
NEUTROPHILS NFR BLD AUTO: 65.6 % — SIGNIFICANT CHANGE UP (ref 43–77)
NRBC # BLD: 0 /100 WBCS — SIGNIFICANT CHANGE UP (ref 0–0)
PLATELET # BLD AUTO: 187 K/UL — SIGNIFICANT CHANGE UP (ref 150–400)
RBC # BLD: 3.75 M/UL — LOW (ref 3.8–5.2)
RBC # FLD: 14.7 % — HIGH (ref 10.3–14.5)
WBC # BLD: 6.19 K/UL — SIGNIFICANT CHANGE UP (ref 3.8–10.5)
WBC # FLD AUTO: 6.19 K/UL — SIGNIFICANT CHANGE UP (ref 3.8–10.5)

## 2024-02-12 PROCEDURE — 99215 OFFICE O/P EST HI 40 MIN: CPT

## 2024-02-12 NOTE — HISTORY OF PRESENT ILLNESS
[Date: ____________] : Patient's last distress assessment performed on [unfilled]. [0 - No Distress] : Distress Level: 0 [de-identified] : The patient was diagnosed with esophageal adenocarcinoma in December 2022 at the age of 72. She presented to Dr. Worthington in September with complaints of coughing, clearing throat, and increased mucus. On 12/5/22, she underwent an EGD with Dr. Bender which showed grade A esophagitis in the gastroesophageal junction compatible with reflux esophagitis. Erythema in the antrum was compatible with non-erosive gastritis. Pathology of the gastroesophageal junction biopsy showed infiltrating adenocarcinoma. An immunohistochemical stain for HER2/luz is 0-1 +/negative. No loss of nuclear expression of MMR proteins (MLH1, MSH2, MSH6, and PMS2). AnEUS on 12/9/22 by Dr. Ocampo showed a small ulcerated mass in the lower third of the esophagus, 35 cm from the incisors. The mass was non-obstructing and not circumferential. A 3 cm hiatal hernia was present. Staging uT3N0.  [de-identified] : Medical Hx: multiple sclerosis diagnosed in 1988 with periodic flares over the years; GERD and dyspepsia; HTN; bifascicular block (signal block); HL; atypical basal cell carcinoma [id number])\par  Surgical Hx: hiatal hernia repair 1983;  cholecystectomy; many D &C's leading up to hysterectomy 1983; She has a neurostimulator in the left lower flank which is no longer functioning, wires trace up to her upper thoracic spine. \par  Family Hx: mother colon ca; father multiple myeloma \par  Social Hx: was a social smoker, quit smoking 25 years ago; ETOH social 1 drink 2 times weekly;  ( in [place]); no children; family close by, lives with her 89 yo mother currently.  She does not drive and is living currently with her 97-year-old mother and her cousin Luisa in [place]. Her permanent residence is [place] with her .  [de-identified] : She c/o cough that had become worse over the last year. She has diffuse chronic 6/10 pain / neuropathy from MS, no esophageal pain. She is edentulous with dentures due to a reaction to actiq which she was taking for her multiple sclerosis years ago. She also has alopecia.  She completed 3 cycles of carbo / Taxol induction weekly, 1/20/23. Completed 4/6 cycles of Carbo / taxol concurrent with RT, on 2/17/23 due to low ANC last 2 cycles. Persistent loss of weight and will work with nutritionist and Dr. Wolfe   Presents today had "a COVID scare" neg PCR at a walk-in clinic, did have stomach virus with fever. She has lost 4 lbs since last visit. Within the same 5 lbs since Sept. Denies MS sxs. Creon working well, uses as a sprinkle on food, no longer having acid reflux on creon. She feels well overall.

## 2024-02-12 NOTE — ASSESSMENT
[With Patient/Caregiver] : With Patient/Caregiver [FreeTextEntry1] : Benefit for nivolumab was shown in the CheckMate 577 trial, in which 794 patients who had received neoadjuvant CRT for esophageal or EGJ cancer (70 percent AC) and had residual pathologic disease at the time of surgery were randomly assigned to nivolumab (240 mg) or placebo every 2 weeks for 16 weeks followed by nivolumab 480 mg or placebo every 4 weeks; the maximum treatment duration was one year [142]. Enrollment was irrespective of programmed death receptor-1 ligand 1 (PD-L1) overexpression. Tumor site was esophagus in 60 percent and EGJ in 40 percent; histology was AC in 71 percent and SCC in 29 percent. At a median follow-up of 24.4 months, median disease-free survival, the primary endpoint, was twice as long with nivolumab (22.4 versus 11 months, HR for disease progression or death was 0.69, 95% CI 0.56-0.86), and the benefits were seen across all patient subgroups (histology, location, initial and post-treatment disease stage, PD-L1 overexpression or not). Overall survival data were not mature. Although treatment-related adverse effects were frequent, most were grade 1 or 2 and only 9 percent of patients discontinued adjuvant nivolumab because of adverse effects. The benefits were gained without any significant decline in patient-reported health-related quality of life over the year of nivolumab treatment.  Based on these results, the US Food and Drug Administration has approved nivolumab as adjuvant therapy for patients who previously received neoadjuvant CRT following complete resection of esophageal or gastroesophageal junction cancer with residual pathologic disease. [AdvancecareDate] : 12/4/23

## 2024-02-12 NOTE — REVIEW OF SYSTEMS
[Recent Change In Weight] : ~T recent weight change [Constipation] : constipation [Diarrhea: Grade 0] : Diarrhea: Grade 0 [Joint Pain] : joint pain [Muscle Pain] : muscle pain [Muscle Weakness] : muscle weakness [Negative] : Allergic/Immunologic [Dysphagia] : no dysphagia [Chest Pain] : no chest pain [Shortness Of Breath] : no shortness of breath [Abdominal Pain] : no abdominal pain [FreeTextEntry2] : wt loss; alopecia  [FreeTextEntry7] : OTC meds and resolves  [FreeTextEntry9] : chronic from MS

## 2024-02-12 NOTE — PHYSICAL EXAM
[Restricted in physically strenuous activity but ambulatory and able to carry out work of a light or sedentary nature] : Status 1- Restricted in physically strenuous activity but ambulatory and able to carry out work of a light or sedentary nature, e.g., light house work, office work [Normal] : affect appropriate [de-identified] : wt loss 4 lbs; alopecia  [de-identified] : + grade 2 murmur  [de-identified] : chronic MS symptoms

## 2024-02-12 NOTE — RESULTS/DATA
[FreeTextEntry1] : 2/5/24 CT C/A/P: No evidence of recurrent disease.  11/22/23 PET: 11/27/23 CT: Activity seen at the proximal esophagogastrectomy staple line as well as at the distal stomach remain nonspecific and likely physiologic. Ill-defined activity in intercostal space medially to posterior RIGHT eighth rib may be due to resolving postsurgical changes. Uptake seen there is quantitatively unchanged but is now smaller. Findings adjacent to the RIGHT SI joint may be due to worsening degenerative change. Finding at the proximal RIGHT femur is also felt to be benign/degenerative. If desired, this may be further evaluated with MRI vs CT.    8/28/23 PET: Activity seen at the proximal esophagogastrectomy staple line as well as at the distal stomach are nonspecific and likely physiologic or inflammatory in the setting of fairly recent surgery. Continued surveillance suggested to exclude pathology. Ill-defined activity in the pleura anterior to the posterior RIGHT eighth rib is an isolated finding within the lung fields and correlates to a small area of suspected pleural thickening and punctate calcification. Suggest dedicated chest CT. Otherwise, no suspicious findings elsewhere. *** ADDENDUM # 1 *** Case was additionally reviewed by request. Review was focused on the ill-defined activity in the pleura in the posterior RIGHT thorax. There are no other areas of increased uptake within the chest wall in this patient who underwent a robotic Blake-Marcos esophagectomy in between the current and prior PET/CT exams. Differential diagnosis for this finding includes residual postoperative changes. Malignancy is not strongly favored based on its overall appearance, but cannot otherwise be excluded in the absence of an alternative explanation. Correlate with operative report. If this cannot be correlated to an incision site, then further short-term evaluation with CT chest with IV contrast may be helpful to further characterize this finding.  5/16/23 Path: Portion of esophagus and stomach, esophagogastrectomy: Negative for residual malignancy, status post-treatment. Complete score: score 0. Negative for vascular or perineural invasion. Seventeen lymph nodes negative for malignancy (0/17). Resection margins negative for dysplasia or malignancy. See synoptic summary section for further details. Final esophageal margin, excision: Squamous lined esophageal margin negative for dysplasia or malignancy. Synoptic Summary: Esophagus Procedure: Esophagogastrectomy Tumor Site: Esophagogastric junction (EGJ) - Per prior biopsy report [id number] Relationship of Tumor to Esophagogastric Junction: Cannot be determined - No residual malignancy Distance of Tumor Center from Esophagogastric Junction: Cannot be determined Histologic Type: Adenocarcinoma Histologic Type Comment: Per prior biopsy report [id number] Tumor Size: Cannot be determined - No residual malignancy Tumor Extent: No evidence of primary tumor Treatment Effect: Present, with no viable cancer cells (complete response, score 0) Lymphovascular Invasion: Not identified Perineural Invasion: Not identified Margin Status for Invasive Carcinoma: All margins negative for invasive carcinoma Closest Margin(s) to Invasive Carcinoma: Cannot be determined Distance from Invasive Carcinoma to Closest Margin: Cannot be determined Margin Status for Dysplasia and Intestinal Metaplasia: All margins negative for dysplasia Regional Lymph Node Status: All regional lymph nodes negative for tumor Number of Lymph Nodes Examined: 17 Pathologic Stage Classification TNM Descriptors: y (post-treatment) pT Category: pT0 pN Category: pN0  4/4/23 PET: Findings most supportive of a complete/near complete metabolic response to therapy. A small mildly FDG avid focus seen immediately proximal to the original esophageal  lesion is of uncertain clinical significance, SUV 3.7. No definite metabolic evidence of metastatic disease. Subcentimeter right inguinal mildly FDG avid lymph node is noted likely inflammatory in nature. Asymmetric uptake in the left fossa of Rosenmuller may be inflammatory/infectious in nature. 4/4/23 CT C/A/P: No evidence of metastatic disease within the chest, abdomen or pelvis. No lymphadenopathy.  12/28/2022 PET:  1.  Increased uptake at distal esophageal lesion consistent with malignant involvement. 2.  No gross evidence of metastatic disease.  12/9/22 EUS: Hiatal hernia. Small mass at the GE junction. T3N0Mx on this exam.   12/5/22 Path: Stomach, biopsy: Benign gastric mucosa with minimal chronic inflammation and congestion in the lamina propria. A Diff-Quik stain for Helicobacter pylori is negative. Gastroesophageal junction, biopsy: Infiltrating adenocarcinoma. An immunohistochemical stain for HER2/luz is 0-1 +/negative. No loss of nuclear expression of MMR proteins (MLH1, MSH2, MSH6, and PMS2). These results show low probability of microsatellite instability-high (MSI-H).  12/5/22 EDG: Normal duodenum. Hiatal hernia. Grade A esophagitis in the gastroesophageal junction compatible with reflux esophagitis. (biopsy). Erythema in the antrum compatible with non-erosive gastritis. (biopsy).

## 2024-02-13 DIAGNOSIS — Z86.79 PERSONAL HISTORY OF OTHER DISEASES OF THE CIRCULATORY SYSTEM: ICD-10-CM

## 2024-02-13 DIAGNOSIS — G35 MULTIPLE SCLEROSIS: ICD-10-CM

## 2024-02-13 DIAGNOSIS — I45.4 NONSPECIFIC INTRAVENTRICULAR BLOCK: ICD-10-CM

## 2024-02-13 LAB
ALBUMIN SERPL ELPH-MCNC: 4.3 G/DL — SIGNIFICANT CHANGE UP (ref 3.3–5)
ALP SERPL-CCNC: 97 U/L — SIGNIFICANT CHANGE UP (ref 40–120)
ALT FLD-CCNC: 22 U/L — SIGNIFICANT CHANGE UP (ref 10–45)
ANION GAP SERPL CALC-SCNC: 15 MMOL/L — SIGNIFICANT CHANGE UP (ref 5–17)
AST SERPL-CCNC: 22 U/L — SIGNIFICANT CHANGE UP (ref 10–40)
BILIRUB SERPL-MCNC: 0.3 MG/DL — SIGNIFICANT CHANGE UP (ref 0.2–1.2)
BUN SERPL-MCNC: 24 MG/DL — HIGH (ref 7–23)
CALCIUM SERPL-MCNC: 9.5 MG/DL — SIGNIFICANT CHANGE UP (ref 8.4–10.5)
CHLORIDE SERPL-SCNC: 100 MMOL/L — SIGNIFICANT CHANGE UP (ref 96–108)
CO2 SERPL-SCNC: 23 MMOL/L — SIGNIFICANT CHANGE UP (ref 22–31)
CREAT SERPL-MCNC: 0.6 MG/DL — SIGNIFICANT CHANGE UP (ref 0.5–1.3)
EGFR: 94 ML/MIN/1.73M2 — SIGNIFICANT CHANGE UP
GLUCOSE SERPL-MCNC: 92 MG/DL — SIGNIFICANT CHANGE UP (ref 70–99)
POTASSIUM SERPL-MCNC: 3.7 MMOL/L — SIGNIFICANT CHANGE UP (ref 3.5–5.3)
POTASSIUM SERPL-SCNC: 3.7 MMOL/L — SIGNIFICANT CHANGE UP (ref 3.5–5.3)
PROT SERPL-MCNC: 6.7 G/DL — SIGNIFICANT CHANGE UP (ref 6–8.3)
SODIUM SERPL-SCNC: 138 MMOL/L — SIGNIFICANT CHANGE UP (ref 135–145)
T3FREE SERPL-MCNC: 2.15 PG/ML — SIGNIFICANT CHANGE UP (ref 2–4.4)
T4 FREE SERPL-MCNC: 1.2 NG/DL — SIGNIFICANT CHANGE UP (ref 0.9–1.8)
TSH SERPL-MCNC: 2.46 UIU/ML — SIGNIFICANT CHANGE UP (ref 0.27–4.2)

## 2024-02-15 ENCOUNTER — APPOINTMENT (OUTPATIENT)
Dept: GASTROENTEROLOGY | Facility: CLINIC | Age: 74
End: 2024-02-15

## 2024-02-26 ENCOUNTER — RX RENEWAL (OUTPATIENT)
Age: 74
End: 2024-02-26

## 2024-03-19 ENCOUNTER — RESULT REVIEW (OUTPATIENT)
Age: 74
End: 2024-03-19

## 2024-03-19 ENCOUNTER — OUTPATIENT (OUTPATIENT)
Dept: OUTPATIENT SERVICES | Facility: HOSPITAL | Age: 74
LOS: 1 days | End: 2024-03-19
Payer: MEDICARE

## 2024-03-19 ENCOUNTER — APPOINTMENT (OUTPATIENT)
Dept: MAMMOGRAPHY | Facility: CLINIC | Age: 74
End: 2024-03-19
Payer: MEDICARE

## 2024-03-19 DIAGNOSIS — Z90.49 ACQUIRED ABSENCE OF OTHER SPECIFIED PARTS OF DIGESTIVE TRACT: Chronic | ICD-10-CM

## 2024-03-19 DIAGNOSIS — Z90.710 ACQUIRED ABSENCE OF BOTH CERVIX AND UTERUS: Chronic | ICD-10-CM

## 2024-03-19 DIAGNOSIS — Z12.39 ENCOUNTER FOR OTHER SCREENING FOR MALIGNANT NEOPLASM OF BREAST: ICD-10-CM

## 2024-03-19 DIAGNOSIS — Z96.89 PRESENCE OF OTHER SPECIFIED FUNCTIONAL IMPLANTS: Chronic | ICD-10-CM

## 2024-03-19 DIAGNOSIS — Z98.890 OTHER SPECIFIED POSTPROCEDURAL STATES: Chronic | ICD-10-CM

## 2024-03-19 PROCEDURE — 77067 SCR MAMMO BI INCL CAD: CPT | Mod: 26

## 2024-03-19 PROCEDURE — 77063 BREAST TOMOSYNTHESIS BI: CPT | Mod: 26

## 2024-03-19 PROCEDURE — 77067 SCR MAMMO BI INCL CAD: CPT

## 2024-03-19 PROCEDURE — 77063 BREAST TOMOSYNTHESIS BI: CPT

## 2024-03-25 NOTE — ED ADULT TRIAGE NOTE - BANDS:
Fall Risk; Allergy; ataxic/impaired balance/impaired coordination/decreased flexibility/impaired postural control/decreased ROM/decreased strength

## 2024-04-05 ENCOUNTER — APPOINTMENT (OUTPATIENT)
Dept: GASTROENTEROLOGY | Facility: CLINIC | Age: 74
End: 2024-04-05
Payer: MEDICARE

## 2024-04-05 VITALS
BODY MASS INDEX: 23.92 KG/M2 | WEIGHT: 130 LBS | SYSTOLIC BLOOD PRESSURE: 140 MMHG | HEIGHT: 62 IN | DIASTOLIC BLOOD PRESSURE: 60 MMHG

## 2024-04-05 DIAGNOSIS — K21.9 GASTRO-ESOPHAGEAL REFLUX DISEASE W/OUT ESOPHAGITIS: ICD-10-CM

## 2024-04-05 PROCEDURE — 99214 OFFICE O/P EST MOD 30 MIN: CPT

## 2024-04-05 NOTE — PHYSICAL EXAM
[Alert] : alert [Normal Voice/Communication] : normal voice/communication [Healthy Appearing] : healthy appearing [Sclera] : the sclera and conjunctiva were normal [Hearing Threshold Finger Rub Not [place]] : hearing was normal [Normal Lips/Gums] : the lips and gums were normal [Normal Appearance] : the appearance of the neck was normal [No Respiratory Distress] : no respiratory distress [Auscultation Breath Sounds / Voice Sounds] : lungs were clear to auscultation bilaterally [Heart Rate And Rhythm] : heart rate was normal and rhythm regular [Normal S1, S2] : normal S1 and S2 [Bowel Sounds] : normal bowel sounds [Abdomen Tenderness] : non-tender [Abdomen Soft] : soft [Abnormal Walk] : normal gait [Normal Color / Pigmentation] : normal skin color and pigmentation [Oriented To Time, Place, And Person] : oriented to person, place, and time

## 2024-04-08 ENCOUNTER — RX RENEWAL (OUTPATIENT)
Age: 74
End: 2024-04-08

## 2024-04-08 RX ORDER — OMEPRAZOLE 40 MG/1
40 CAPSULE, DELAYED RELEASE ORAL
Qty: 90 | Refills: 0 | Status: ACTIVE | COMMUNITY
Start: 2024-01-08 | End: 1900-01-01

## 2024-04-09 NOTE — HISTORY OF PRESENT ILLNESS
[FreeTextEntry1] : Chani Valdez is a 74-year-old female with PMHx of esophageal adenocarcinoma, GERD, and MS presents for follow up regarding GERD. Pt reports utilizing pantoprazole and famotidine for management of symptoms, however not experiencing relief. Pt reports episodes of diarrhea she manages with Imodium. Previous colonoscopy over 10 years ago. Denies FMH of CRC. Denies significant straining or overt bleeding such as melena or hematochezia. Denies nausea, vomiting, or dysphagia. Denies unintentional weight loss.

## 2024-04-09 NOTE — REVIEW OF SYSTEMS
[As Noted in HPI] : as noted in HPI [Diarrhea] : diarrhea [Heartburn] : heartburn [Negative] : Heme/Lymph [Abdominal Pain] : no abdominal pain [Vomiting] : no vomiting [Constipation] : no constipation [Melena (black stool)] : no melena [Bleeding] : no bleeding [Fecal Incontinence (soiling)] : no fecal incontinence [Bloating (gassiness)] : no bloating

## 2024-04-09 NOTE — ASSESSMENT
[FreeTextEntry1] : 74-year-old female presents for follow up on persistent GERD symptoms.   Plan:  GERD: Will add Carafate to regimen for better symptom management. Pt to call office if symptoms worsen despite medication.  Diarrhea: Pt can continue Imodium for management of symptoms. If symptoms worsen can discuss alternative medication management.  Colonoscopy: Pt is due for screening but does not wish to move forward with scheduling at this time. Discussed risks vs benefits. Pt states she will contact us once summer passes about scheduling.  Pt agrees to plan, all questions answered. Seen and discussed with Dr. Bender. I spent 35 minutes on this encounter.  add carafate for refractory gerd add imodium and will readdress screening colonoscopy at next visit dp

## 2024-04-12 ENCOUNTER — RX RENEWAL (OUTPATIENT)
Age: 74
End: 2024-04-12

## 2024-04-12 RX ORDER — FAMOTIDINE 20 MG/1
20 TABLET, FILM COATED ORAL
Qty: 90 | Refills: 1 | Status: ACTIVE | COMMUNITY
Start: 2023-05-09 | End: 1900-01-01

## 2024-05-07 ENCOUNTER — RESULT REVIEW (OUTPATIENT)
Age: 74
End: 2024-05-07

## 2024-05-09 ENCOUNTER — RX RENEWAL (OUTPATIENT)
Age: 74
End: 2024-05-09

## 2024-05-09 RX ORDER — ESTRADIOL 0.1 MG/G
0.1 CREAM VAGINAL
Qty: 42.5 | Refills: 1 | Status: ACTIVE | COMMUNITY
Start: 2022-06-09 | End: 1900-01-01

## 2024-06-04 ENCOUNTER — OUTPATIENT (OUTPATIENT)
Dept: OUTPATIENT SERVICES | Facility: HOSPITAL | Age: 74
LOS: 1 days | End: 2024-06-04
Payer: MEDICARE

## 2024-06-04 ENCOUNTER — APPOINTMENT (OUTPATIENT)
Dept: CT IMAGING | Facility: CLINIC | Age: 74
End: 2024-06-04
Payer: MEDICARE

## 2024-06-04 DIAGNOSIS — Z90.49 ACQUIRED ABSENCE OF OTHER SPECIFIED PARTS OF DIGESTIVE TRACT: Chronic | ICD-10-CM

## 2024-06-04 DIAGNOSIS — C15.9 MALIGNANT NEOPLASM OF ESOPHAGUS, UNSPECIFIED: ICD-10-CM

## 2024-06-04 DIAGNOSIS — Z98.890 OTHER SPECIFIED POSTPROCEDURAL STATES: Chronic | ICD-10-CM

## 2024-06-04 DIAGNOSIS — Z96.89 PRESENCE OF OTHER SPECIFIED FUNCTIONAL IMPLANTS: Chronic | ICD-10-CM

## 2024-06-04 DIAGNOSIS — Z90.710 ACQUIRED ABSENCE OF BOTH CERVIX AND UTERUS: Chronic | ICD-10-CM

## 2024-06-04 PROCEDURE — 74177 CT ABD & PELVIS W/CONTRAST: CPT

## 2024-06-04 PROCEDURE — 74177 CT ABD & PELVIS W/CONTRAST: CPT | Mod: 26,MH

## 2024-06-10 ENCOUNTER — RESULT REVIEW (OUTPATIENT)
Age: 74
End: 2024-06-10

## 2024-06-10 ENCOUNTER — APPOINTMENT (OUTPATIENT)
Dept: HEMATOLOGY ONCOLOGY | Facility: CLINIC | Age: 74
End: 2024-06-10
Payer: MEDICARE

## 2024-06-10 VITALS
DIASTOLIC BLOOD PRESSURE: 63 MMHG | HEART RATE: 68 BPM | SYSTOLIC BLOOD PRESSURE: 121 MMHG | HEIGHT: 62 IN | TEMPERATURE: 98.3 F | WEIGHT: 126 LBS | BODY MASS INDEX: 23.19 KG/M2 | OXYGEN SATURATION: 95 %

## 2024-06-10 DIAGNOSIS — Z86.79 PERSONAL HISTORY OF OTHER DISEASES OF THE CIRCULATORY SYSTEM: ICD-10-CM

## 2024-06-10 DIAGNOSIS — G35 MULTIPLE SCLEROSIS: ICD-10-CM

## 2024-06-10 DIAGNOSIS — C15.9 MALIGNANT NEOPLASM OF ESOPHAGUS, UNSPECIFIED: ICD-10-CM

## 2024-06-10 DIAGNOSIS — Z77.22 CONTACT WITH AND (SUSPECTED) EXPOSURE TO ENVIRONMENTAL TOBACCO SMOKE (ACUTE) (CHRONIC): ICD-10-CM

## 2024-06-10 DIAGNOSIS — I45.4 NONSPECIFIC INTRAVENTRICULAR BLOCK: ICD-10-CM

## 2024-06-10 PROCEDURE — G2211 COMPLEX E/M VISIT ADD ON: CPT

## 2024-06-10 PROCEDURE — 99213 OFFICE O/P EST LOW 20 MIN: CPT

## 2024-06-10 NOTE — HISTORY OF PRESENT ILLNESS
[de-identified] : The patient was diagnosed with esophageal adenocarcinoma in December 2022 at the age of 72. She presented to Dr. Worthington in September with complaints of coughing, clearing throat, and increased mucus. On 12/5/22, she underwent an EGD with Dr. Bender which showed grade A esophagitis in the gastroesophageal junction compatible with reflux esophagitis. Erythema in the antrum was compatible with non-erosive gastritis. Pathology of the gastroesophageal junction biopsy showed infiltrating adenocarcinoma. An immunohistochemical stain for HER2/luz is 0-1 +/negative. No loss of nuclear expression of MMR proteins (MLH1, MSH2, MSH6, and PMS2). AnEUS on 12/9/22 by Dr. Ocampo showed a small ulcerated mass in the lower third of the esophagus, 35 cm from the incisors. The mass was non-obstructing and not circumferential. A 3 cm hiatal hernia was present. Staging uT3N0.  [de-identified] : Medical Hx: multiple sclerosis diagnosed in 1988 with periodic flares over the years; GERD and dyspepsia; HTN; bifascicular block (signal block); HL; atypical basal cell carcinoma [id number])\par  Surgical Hx: hiatal hernia repair 1983;  cholecystectomy; many D &C's leading up to hysterectomy 1983; She has a neurostimulator in the left lower flank which is no longer functioning, wires trace up to her upper thoracic spine. \par  Family Hx: mother colon ca; father multiple myeloma \par  Social Hx: was a social smoker, quit smoking 25 years ago; ETOH social 1 drink 2 times weekly;  ( in [place]); no children; family close by, lives with her 91 yo mother currently.  She does not drive and is living currently with her 97-year-old mother and her cousin Luisa in [place]. Her permanent residence is [place] with her .  [de-identified] : She has diffuse chronic pain / neuropathy from MS, no esophageal pain. She is edentulous with dentures due to a reaction to actiq which she was taking for her multiple sclerosis years ago. She also has alopecia. She eats multiple small meals a day, "feels full fast". She has lost 4 lbs since last visit, increased stress the last week with family. Creon working well, she is on carafate tabs from GI for reflux. Reports stools improved. She feels well overall.  She completed 3 cycles of carbo / Taxol induction weekly, 1/20/23. Completed 4/6 cycles of Carbo / taxol concurrent with RT, on 2/17/23 due to low ANC last 2 cycles.

## 2024-06-10 NOTE — PHYSICAL EXAM
[de-identified] : wt loss 4 lbs; alopecia  [de-identified] : + grade 2 murmur  [de-identified] : chronic MS symptoms

## 2024-06-10 NOTE — REVIEW OF SYSTEMS
[Dysphagia] : no dysphagia [Chest Pain] : no chest pain [Shortness Of Breath] : no shortness of breath [Abdominal Pain] : no abdominal pain [FreeTextEntry2] : wt loss; alopecia  [FreeTextEntry7] : OTC meds and resolves  [FreeTextEntry9] : chronic from MS

## 2024-06-10 NOTE — ASSESSMENT
[FreeTextEntry1] : Benefit for nivolumab was shown in the CheckMate 577 trial, in which [id number] patients who had received neoadjuvant CRT for esophageal or EGJ cancer (70 percent AC) and had residual pathologic disease at the time of surgery were randomly assigned to nivolumab (240 mg) or placebo every 2 weeks for 16 weeks followed by nivolumab 480 mg or placebo every 4 weeks; the maximum treatment duration was one year [142]. Enrollment was irrespective of programmed death receptor-1 ligand 1 (PD-L1) overexpression. Tumor site was esophagus in 60 percent and EGJ in 40 percent; histology was AC in 71 percent and SCC in 29 percent. At a median follow-up of 24.4 months, median disease-free survival, the primary endpoint, was twice as long with nivolumab (22.4 versus 11 months, HR for disease progression or death was 0.69, 95% CI 0.56-0.86), and the benefits were seen across all patient subgroups (histology, location, initial and post-treatment disease stage, PD-L1 overexpression or not). Overall survival data were not mature. Although treatment-related adverse effects were frequent, most were grade 1 or 2 and only 9 percent of patients discontinued adjuvant nivolumab because of adverse effects. The benefits were gained without any significant decline in patient-reported health-related quality of life over the year of nivolumab treatment.  Based on these results, the [place] Food and Drug Administration has approved nivolumab as adjuvant therapy for patients who previously received neoadjuvant CRT following complete resection of esophageal or gastroesophageal junction cancer with residual pathologic disease. [AdvancecareDate] : 12/4/23

## 2024-06-10 NOTE — RESULTS/DATA
[FreeTextEntry1] : 6/4/24 CT A/P: Stable examination. No evidence of recurrent disease in the abdomen/pelvis.  2/5/24 CT C/A/P: No evidence of recurrent disease.  11/22/23 PET: 11/27/23 CT: Activity seen at the proximal esophagogastrectomy staple line as well as at the distal stomach remain nonspecific and likely physiologic. Ill-defined activity in intercostal space medially to posterior RIGHT eighth rib may be due to resolving postsurgical changes. Uptake seen there is quantitatively unchanged but is now smaller. Findings adjacent to the RIGHT SI joint may be due to worsening degenerative change. Finding at the proximal RIGHT femur is also felt to be benign/degenerative. If desired, this may be further evaluated with MRI vs CT.    8/28/23 PET: Activity seen at the proximal esophagogastrectomy staple line as well as at the distal stomach are nonspecific and likely physiologic or inflammatory in the setting of fairly recent surgery. Continued surveillance suggested to exclude pathology. Ill-defined activity in the pleura anterior to the posterior RIGHT eighth rib is an isolated finding within the lung fields and correlates to a small area of suspected pleural thickening and punctate calcification. Suggest dedicated chest CT. Otherwise, no suspicious findings elsewhere. *** ADDENDUM # 1 *** Case was additionally reviewed by request. Review was focused on the ill-defined activity in the pleura in the posterior RIGHT thorax. There are no other areas of increased uptake within the chest wall in this patient who underwent a robotic Wheatcroft-Marcos esophagectomy in between the current and prior PET/CT exams. Differential diagnosis for this finding includes residual postoperative changes. Malignancy is not strongly favored based on its overall appearance, but cannot otherwise be excluded in the absence of an alternative explanation. Correlate with operative report. If this cannot be correlated to an incision site, then further short-term evaluation with CT chest with IV contrast may be helpful to further characterize this finding.  5/16/23 Path: Portion of esophagus and stomach, esophagogastrectomy: Negative for residual malignancy, status post-treatment. Complete score: score 0. Negative for vascular or perineural invasion. Seventeen lymph nodes negative for malignancy (0/17). Resection margins negative for dysplasia or malignancy. See synoptic summary section for further details. Final esophageal margin, excision: Squamous lined esophageal margin negative for dysplasia or malignancy. Synoptic Summary: Esophagus Procedure: Esophagogastrectomy Tumor Site: Esophagogastric junction (EGJ) - Per prior biopsy report [id number] Relationship of Tumor to Esophagogastric Junction: Cannot be determined - No residual malignancy Distance of Tumor Center from Esophagogastric Junction: Cannot be determined Histologic Type: Adenocarcinoma Histologic Type Comment: Per prior biopsy report [id number] Tumor Size: Cannot be determined - No residual malignancy Tumor Extent: No evidence of primary tumor Treatment Effect: Present, with no viable cancer cells (complete response, score 0) Lymphovascular Invasion: Not identified Perineural Invasion: Not identified Margin Status for Invasive Carcinoma: All margins negative for invasive carcinoma Closest Margin(s) to Invasive Carcinoma: Cannot be determined Distance from Invasive Carcinoma to Closest Margin: Cannot be determined Margin Status for Dysplasia and Intestinal Metaplasia: All margins negative for dysplasia Regional Lymph Node Status: All regional lymph nodes negative for tumor Number of Lymph Nodes Examined: 17 Pathologic Stage Classification TNM Descriptors: y (post-treatment) pT Category: pT0 pN Category: pN0  4/4/23 PET: Findings most supportive of a complete/near complete metabolic response to therapy. A small mildly FDG avid focus seen immediately proximal to the original esophageal  lesion is of uncertain clinical significance, SUV 3.7. No definite metabolic evidence of metastatic disease. Subcentimeter right inguinal mildly FDG avid lymph node is noted likely inflammatory in nature. Asymmetric uptake in the left fossa of Rosenmuller may be inflammatory/infectious in nature. 4/4/23 CT C/A/P: No evidence of metastatic disease within the chest, abdomen or pelvis. No lymphadenopathy.  12/28/2022 PET:  1.  Increased uptake at distal esophageal lesion consistent with malignant involvement. 2.  No gross evidence of metastatic disease.  12/9/22 EUS: Hiatal hernia. Small mass at the GE junction. T3N0Mx on this exam.   12/5/22 Path: Stomach, biopsy: Benign gastric mucosa with minimal chronic inflammation and congestion in the lamina propria. A Diff-Quik stain for Helicobacter pylori is negative. Gastroesophageal junction, biopsy: Infiltrating adenocarcinoma. An immunohistochemical stain for HER2/luz is 0-1 +/negative. No loss of nuclear expression of MMR proteins (MLH1, MSH2, MSH6, and PMS2). These results show low probability of microsatellite instability-high (MSI-H).  12/5/22 EDG: Normal duodenum. Hiatal hernia. Grade A esophagitis in the gastroesophageal junction compatible with reflux esophagitis. (biopsy). Erythema in the antrum compatible with non-erosive gastritis. (biopsy).

## 2024-06-16 ENCOUNTER — INPATIENT (INPATIENT)
Facility: HOSPITAL | Age: 74
LOS: 4 days | Discharge: HOME | DRG: 58 | End: 2024-06-21
Attending: INTERNAL MEDICINE | Admitting: HOSPITALIST
Payer: MEDICARE

## 2024-06-16 VITALS
SYSTOLIC BLOOD PRESSURE: 117 MMHG | DIASTOLIC BLOOD PRESSURE: 48 MMHG | TEMPERATURE: 98 F | OXYGEN SATURATION: 98 % | HEART RATE: 53 BPM | HEIGHT: 62 IN | WEIGHT: 115.08 LBS | RESPIRATION RATE: 18 BRPM

## 2024-06-16 DIAGNOSIS — Z96.89 PRESENCE OF OTHER SPECIFIED FUNCTIONAL IMPLANTS: Chronic | ICD-10-CM

## 2024-06-16 DIAGNOSIS — Z90.49 ACQUIRED ABSENCE OF OTHER SPECIFIED PARTS OF DIGESTIVE TRACT: Chronic | ICD-10-CM

## 2024-06-16 DIAGNOSIS — R07.89 OTHER CHEST PAIN: ICD-10-CM

## 2024-06-16 DIAGNOSIS — Z98.890 OTHER SPECIFIED POSTPROCEDURAL STATES: Chronic | ICD-10-CM

## 2024-06-16 DIAGNOSIS — Z90.710 ACQUIRED ABSENCE OF BOTH CERVIX AND UTERUS: Chronic | ICD-10-CM

## 2024-06-16 LAB
ADD ON TEST-SPECIMEN IN LAB: SIGNIFICANT CHANGE UP
ALBUMIN SERPL ELPH-MCNC: 3.8 G/DL — SIGNIFICANT CHANGE UP (ref 3.3–5)
ALP SERPL-CCNC: 112 U/L — SIGNIFICANT CHANGE UP (ref 40–120)
ALT FLD-CCNC: 27 U/L — SIGNIFICANT CHANGE UP (ref 12–78)
ANION GAP SERPL CALC-SCNC: 9 MMOL/L — SIGNIFICANT CHANGE UP (ref 5–17)
APTT BLD: 32.8 SEC — SIGNIFICANT CHANGE UP (ref 24.5–35.6)
AST SERPL-CCNC: 30 U/L — SIGNIFICANT CHANGE UP (ref 15–37)
BASOPHILS # BLD AUTO: 0.02 K/UL — SIGNIFICANT CHANGE UP (ref 0–0.2)
BASOPHILS NFR BLD AUTO: 0.4 % — SIGNIFICANT CHANGE UP (ref 0–2)
BILIRUB SERPL-MCNC: 0.5 MG/DL — SIGNIFICANT CHANGE UP (ref 0.2–1.2)
BUN SERPL-MCNC: 42 MG/DL — HIGH (ref 7–23)
CALCIUM SERPL-MCNC: 10.1 MG/DL — SIGNIFICANT CHANGE UP (ref 8.5–10.1)
CHLORIDE SERPL-SCNC: 106 MMOL/L — SIGNIFICANT CHANGE UP (ref 96–108)
CO2 SERPL-SCNC: 24 MMOL/L — SIGNIFICANT CHANGE UP (ref 22–31)
CREAT SERPL-MCNC: 1.08 MG/DL — SIGNIFICANT CHANGE UP (ref 0.5–1.3)
D DIMER BLD IA.RAPID-MCNC: <150 NG/ML DDU — SIGNIFICANT CHANGE UP
EGFR: 54 ML/MIN/1.73M2 — LOW
EOSINOPHIL # BLD AUTO: 0 K/UL — SIGNIFICANT CHANGE UP (ref 0–0.5)
EOSINOPHIL NFR BLD AUTO: 0 % — SIGNIFICANT CHANGE UP (ref 0–6)
GLUCOSE SERPL-MCNC: 112 MG/DL — HIGH (ref 70–99)
HCT VFR BLD CALC: 38.9 % — SIGNIFICANT CHANGE UP (ref 34.5–45)
HGB BLD-MCNC: 13.2 G/DL — SIGNIFICANT CHANGE UP (ref 11.5–15.5)
IMM GRANULOCYTES NFR BLD AUTO: 0.2 % — SIGNIFICANT CHANGE UP (ref 0–0.9)
INR BLD: 0.89 RATIO — SIGNIFICANT CHANGE UP (ref 0.85–1.18)
LYMPHOCYTES # BLD AUTO: 1.67 K/UL — SIGNIFICANT CHANGE UP (ref 1–3.3)
LYMPHOCYTES # BLD AUTO: 32.4 % — SIGNIFICANT CHANGE UP (ref 13–44)
MAGNESIUM SERPL-MCNC: 2.7 MG/DL — HIGH (ref 1.6–2.6)
MANUAL SMEAR VERIFICATION: SIGNIFICANT CHANGE UP
MCHC RBC-ENTMCNC: 28.9 PG — SIGNIFICANT CHANGE UP (ref 27–34)
MCHC RBC-ENTMCNC: 33.9 GM/DL — SIGNIFICANT CHANGE UP (ref 32–36)
MCV RBC AUTO: 85.3 FL — SIGNIFICANT CHANGE UP (ref 80–100)
MONOCYTES # BLD AUTO: 0.44 K/UL — SIGNIFICANT CHANGE UP (ref 0–0.9)
MONOCYTES NFR BLD AUTO: 8.5 % — SIGNIFICANT CHANGE UP (ref 2–14)
NEUTROPHILS # BLD AUTO: 3.01 K/UL — SIGNIFICANT CHANGE UP (ref 1.8–7.4)
NEUTROPHILS NFR BLD AUTO: 58.5 % — SIGNIFICANT CHANGE UP (ref 43–77)
NT-PROBNP SERPL-SCNC: 148 PG/ML — SIGNIFICANT CHANGE UP (ref 0–450)
PLAT MORPH BLD: NORMAL — SIGNIFICANT CHANGE UP
PLATELET # BLD AUTO: 269 K/UL — SIGNIFICANT CHANGE UP (ref 150–400)
POTASSIUM SERPL-MCNC: 3.5 MMOL/L — SIGNIFICANT CHANGE UP (ref 3.5–5.3)
POTASSIUM SERPL-SCNC: 3.5 MMOL/L — SIGNIFICANT CHANGE UP (ref 3.5–5.3)
PROT SERPL-MCNC: 8 GM/DL — SIGNIFICANT CHANGE UP (ref 6–8.3)
PROTHROM AB SERPL-ACNC: 10.1 SEC — SIGNIFICANT CHANGE UP (ref 9.5–13)
RBC # BLD: 4.56 M/UL — SIGNIFICANT CHANGE UP (ref 3.8–5.2)
RBC # FLD: 14.6 % — HIGH (ref 10.3–14.5)
RBC BLD AUTO: NORMAL — SIGNIFICANT CHANGE UP
SODIUM SERPL-SCNC: 139 MMOL/L — SIGNIFICANT CHANGE UP (ref 135–145)
TROPONIN I, HIGH SENSITIVITY RESULT: 25.88 NG/L — SIGNIFICANT CHANGE UP
WBC # BLD: 5.15 K/UL — SIGNIFICANT CHANGE UP (ref 3.8–10.5)
WBC # FLD AUTO: 5.15 K/UL — SIGNIFICANT CHANGE UP (ref 3.8–10.5)

## 2024-06-16 PROCEDURE — 97116 GAIT TRAINING THERAPY: CPT | Mod: GP

## 2024-06-16 PROCEDURE — 70450 CT HEAD/BRAIN W/O DYE: CPT | Mod: MC

## 2024-06-16 PROCEDURE — 74176 CT ABD & PELVIS W/O CONTRAST: CPT | Mod: 26

## 2024-06-16 PROCEDURE — 70450 CT HEAD/BRAIN W/O DYE: CPT | Mod: 26

## 2024-06-16 PROCEDURE — 97162 PT EVAL MOD COMPLEX 30 MIN: CPT | Mod: GP

## 2024-06-16 PROCEDURE — 76376 3D RENDER W/INTRP POSTPROCES: CPT

## 2024-06-16 PROCEDURE — 93010 ELECTROCARDIOGRAM REPORT: CPT

## 2024-06-16 PROCEDURE — 99285 EMERGENCY DEPT VISIT HI MDM: CPT | Mod: FS

## 2024-06-16 PROCEDURE — 93306 TTE W/DOPPLER COMPLETE: CPT

## 2024-06-16 PROCEDURE — 74176 CT ABD & PELVIS W/O CONTRAST: CPT | Mod: MC

## 2024-06-16 PROCEDURE — 71045 X-RAY EXAM CHEST 1 VIEW: CPT | Mod: 26

## 2024-06-16 PROCEDURE — 99223 1ST HOSP IP/OBS HIGH 75: CPT

## 2024-06-16 PROCEDURE — 97530 THERAPEUTIC ACTIVITIES: CPT | Mod: GP

## 2024-06-16 RX ORDER — LORAZEPAM 0.5 MG
1 TABLET ORAL ONCE
Refills: 0 | Status: DISCONTINUED | OUTPATIENT
Start: 2024-06-16 | End: 2024-06-16

## 2024-06-16 RX ORDER — BACLOFEN 100 %
2 POWDER (GRAM) MISCELLANEOUS
Refills: 0 | DISCHARGE

## 2024-06-16 RX ORDER — ACETAMINOPHEN 325 MG
650 TABLET ORAL EVERY 6 HOURS
Refills: 0 | Status: DISCONTINUED | OUTPATIENT
Start: 2024-06-16 | End: 2024-06-21

## 2024-06-16 RX ORDER — LORAZEPAM 0.5 MG
0.5 TABLET ORAL ONCE
Refills: 0 | Status: DISCONTINUED | OUTPATIENT
Start: 2024-06-16 | End: 2024-06-16

## 2024-06-16 RX ORDER — SODIUM CHLORIDE 0.9 % (FLUSH) 0.9 %
1000 SYRINGE (ML) INJECTION ONCE
Refills: 0 | Status: COMPLETED | OUTPATIENT
Start: 2024-06-16 | End: 2024-06-16

## 2024-06-16 RX ORDER — FAMOTIDINE 10 MG/ML
1 INJECTION INTRAVENOUS
Refills: 0 | DISCHARGE

## 2024-06-16 RX ORDER — SODIUM CHLORIDE 0.9 % (FLUSH) 0.9 %
1000 SYRINGE (ML) INJECTION
Refills: 0 | Status: COMPLETED | OUTPATIENT
Start: 2024-06-16 | End: 2024-06-17

## 2024-06-16 RX ORDER — AZELASTINE 137 UG/1
2 SPRAY, METERED NASAL
Qty: 0 | Refills: 0 | DISCHARGE

## 2024-06-16 RX ORDER — DIPHENHYDRAMINE HCL 12.5MG/5ML
25 ELIXIR ORAL ONCE
Refills: 0 | Status: COMPLETED | OUTPATIENT
Start: 2024-06-16 | End: 2024-06-16

## 2024-06-16 RX ORDER — FAMOTIDINE 40 MG
20 TABLET ORAL ONCE
Refills: 0 | Status: COMPLETED | OUTPATIENT
Start: 2024-06-16 | End: 2024-06-16

## 2024-06-16 RX ORDER — MAGNESIUM, ALUMINUM HYDROXIDE 400-400
30 TABLET,CHEWABLE ORAL ONCE
Refills: 0 | Status: COMPLETED | OUTPATIENT
Start: 2024-06-16 | End: 2024-06-16

## 2024-06-16 RX ORDER — LIPASE/PROTEASE/AMYLASE 4.5-25-20K
1 CAPSULE,DELAYED RELEASE (ENTERIC COATED) ORAL
Refills: 0 | DISCHARGE

## 2024-06-16 RX ORDER — FLUTICASONE PROPIONATE 50 MCG
1 SPRAY, SUSPENSION NASAL
Qty: 0 | Refills: 0 | DISCHARGE

## 2024-06-16 RX ORDER — OMEPRAZOLE 10 MG/1
1 CAPSULE, DELAYED RELEASE ORAL
Qty: 0 | Refills: 0 | DISCHARGE

## 2024-06-16 RX ORDER — ATORVASTATIN CALCIUM 80 MG/1
1 TABLET, FILM COATED ORAL
Qty: 0 | Refills: 0 | DISCHARGE

## 2024-06-16 RX ORDER — ONDANSETRON HYDROCHLORIDE 2 MG/ML
4 INJECTION INTRAMUSCULAR; INTRAVENOUS ONCE
Refills: 0 | Status: COMPLETED | OUTPATIENT
Start: 2024-06-16 | End: 2024-06-16

## 2024-06-16 RX ORDER — MONTELUKAST 4 MG/1
1 TABLET, CHEWABLE ORAL
Refills: 0 | DISCHARGE

## 2024-06-16 RX ORDER — ONDANSETRON HYDROCHLORIDE 2 MG/ML
4 INJECTION INTRAMUSCULAR; INTRAVENOUS EVERY 8 HOURS
Refills: 0 | Status: DISCONTINUED | OUTPATIENT
Start: 2024-06-16 | End: 2024-06-21

## 2024-06-16 RX ORDER — METOCLOPRAMIDE 5 MG/5ML
10 SOLUTION ORAL ONCE
Refills: 0 | Status: COMPLETED | OUTPATIENT
Start: 2024-06-16 | End: 2024-06-16

## 2024-06-16 RX ORDER — SUCRALFATE 1 G
10 TABLET ORAL
Refills: 0 | DISCHARGE

## 2024-06-16 RX ORDER — MAGNESIUM, ALUMINUM HYDROXIDE 400-400
30 TABLET,CHEWABLE ORAL EVERY 4 HOURS
Refills: 0 | Status: DISCONTINUED | OUTPATIENT
Start: 2024-06-16 | End: 2024-06-21

## 2024-06-16 RX ORDER — ESTRADIOL 4 UG/1
1 INSERT VAGINAL
Refills: 0 | DISCHARGE

## 2024-06-16 RX ORDER — DALFAMPRIDINE 10 MG/1
1 TABLET, FILM COATED, EXTENDED RELEASE ORAL
Qty: 0 | Refills: 0 | DISCHARGE

## 2024-06-16 RX ORDER — METHYLPREDNISOLONE ACETATE 20 MG/ML
500 VIAL (ML) INJECTION ONCE
Refills: 0 | Status: COMPLETED | OUTPATIENT
Start: 2024-06-16 | End: 2024-06-16

## 2024-06-16 RX ADMIN — Medication 500 MILLIGRAM(S): at 17:38

## 2024-06-16 RX ADMIN — Medication 50 MILLIGRAM(S): at 16:38

## 2024-06-16 RX ADMIN — Medication 1 MILLIGRAM(S): at 19:00

## 2024-06-16 RX ADMIN — METOCLOPRAMIDE 10 MILLIGRAM(S): 5 SOLUTION ORAL at 18:10

## 2024-06-16 RX ADMIN — Medication 30 MILLILITER(S): at 15:52

## 2024-06-16 RX ADMIN — Medication 25 MILLIGRAM(S): at 18:10

## 2024-06-16 RX ADMIN — Medication 1000 MILLILITER(S): at 16:51

## 2024-06-16 RX ADMIN — Medication 20 MILLIGRAM(S): at 15:51

## 2024-06-16 RX ADMIN — Medication 1000 MILLILITER(S): at 15:51

## 2024-06-16 RX ADMIN — ONDANSETRON HYDROCHLORIDE 4 MILLIGRAM(S): 2 INJECTION INTRAMUSCULAR; INTRAVENOUS at 21:31

## 2024-06-16 RX ADMIN — ONDANSETRON HYDROCHLORIDE 4 MILLIGRAM(S): 2 INJECTION INTRAMUSCULAR; INTRAVENOUS at 15:51

## 2024-06-16 RX ADMIN — Medication 0.5 MILLIGRAM(S): at 21:31

## 2024-06-17 ENCOUNTER — RESULT REVIEW (OUTPATIENT)
Age: 74
End: 2024-06-17

## 2024-06-17 PROCEDURE — 93306 TTE W/DOPPLER COMPLETE: CPT | Mod: 26

## 2024-06-17 PROCEDURE — 99233 SBSQ HOSP IP/OBS HIGH 50: CPT

## 2024-06-17 PROCEDURE — 99223 1ST HOSP IP/OBS HIGH 75: CPT

## 2024-06-17 PROCEDURE — 76376 3D RENDER W/INTRP POSTPROCES: CPT | Mod: 26

## 2024-06-17 RX ORDER — BACLOFEN 20 MG
20 TABLET ORAL EVERY 12 HOURS
Refills: 0 | Status: DISCONTINUED | OUTPATIENT
Start: 2024-06-17 | End: 2024-06-21

## 2024-06-17 RX ORDER — ATORVASTATIN CALCIUM 20 MG/1
40 TABLET, FILM COATED ORAL AT BEDTIME
Refills: 0 | Status: DISCONTINUED | OUTPATIENT
Start: 2024-06-17 | End: 2024-06-21

## 2024-06-17 RX ORDER — FAMOTIDINE 40 MG
20 TABLET ORAL DAILY
Refills: 0 | Status: DISCONTINUED | OUTPATIENT
Start: 2024-06-17 | End: 2024-06-21

## 2024-06-17 RX ORDER — LIPASE/PROTEASE/AMYLASE 4.5-25-20K
1 CAPSULE,DELAYED RELEASE (ENTERIC COATED) ORAL
Refills: 0 | Status: DISCONTINUED | OUTPATIENT
Start: 2024-06-17 | End: 2024-06-21

## 2024-06-17 RX ORDER — METHYLPREDNISOLONE ACETATE 20 MG/ML
500 VIAL (ML) INJECTION DAILY
Refills: 0 | Status: COMPLETED | OUTPATIENT
Start: 2024-06-17 | End: 2024-06-19

## 2024-06-17 RX ORDER — METHYLPREDNISOLONE ACETATE 20 MG/ML
500 VIAL (ML) INJECTION DAILY
Refills: 0 | Status: DISCONTINUED | OUTPATIENT
Start: 2024-06-17 | End: 2024-06-17

## 2024-06-17 RX ORDER — SUCRALFATE 1 G
1 TABLET ORAL
Refills: 0 | Status: DISCONTINUED | OUTPATIENT
Start: 2024-06-17 | End: 2024-06-17

## 2024-06-17 RX ORDER — SUCRALFATE 1 G
1 TABLET ORAL
Refills: 0 | Status: DISCONTINUED | OUTPATIENT
Start: 2024-06-17 | End: 2024-06-21

## 2024-06-17 RX ORDER — MONTELUKAST SODIUM 10 MG/1
10 TABLET, FILM COATED ORAL AT BEDTIME
Refills: 0 | Status: DISCONTINUED | OUTPATIENT
Start: 2024-06-17 | End: 2024-06-21

## 2024-06-17 RX ADMIN — Medication 200 MILLIGRAM(S): at 11:40

## 2024-06-17 RX ADMIN — Medication 100 MILLILITER(S): at 00:43

## 2024-06-17 RX ADMIN — MONTELUKAST SODIUM 10 MILLIGRAM(S): 10 TABLET, FILM COATED ORAL at 21:54

## 2024-06-17 RX ADMIN — Medication 20 MILLIGRAM(S): at 21:54

## 2024-06-17 RX ADMIN — Medication 100 MILLILITER(S): at 11:44

## 2024-06-17 RX ADMIN — ONDANSETRON HYDROCHLORIDE 4 MILLIGRAM(S): 2 INJECTION INTRAMUSCULAR; INTRAVENOUS at 21:58

## 2024-06-17 RX ADMIN — ATORVASTATIN CALCIUM 40 MILLIGRAM(S): 20 TABLET, FILM COATED ORAL at 21:55

## 2024-06-18 DIAGNOSIS — R07.9 CHEST PAIN, UNSPECIFIED: ICD-10-CM

## 2024-06-18 DIAGNOSIS — I10 ESSENTIAL (PRIMARY) HYPERTENSION: ICD-10-CM

## 2024-06-18 DIAGNOSIS — R94.31 ABNORMAL ELECTROCARDIOGRAM [ECG] [EKG]: ICD-10-CM

## 2024-06-18 DIAGNOSIS — E78.5 HYPERLIPIDEMIA, UNSPECIFIED: ICD-10-CM

## 2024-06-18 PROCEDURE — 99233 SBSQ HOSP IP/OBS HIGH 50: CPT

## 2024-06-18 PROCEDURE — 99232 SBSQ HOSP IP/OBS MODERATE 35: CPT

## 2024-06-18 PROCEDURE — 99223 1ST HOSP IP/OBS HIGH 75: CPT

## 2024-06-18 RX ADMIN — MONTELUKAST SODIUM 10 MILLIGRAM(S): 10 TABLET, FILM COATED ORAL at 20:56

## 2024-06-18 RX ADMIN — Medication 1 CAPSULE(S): at 12:21

## 2024-06-18 RX ADMIN — Medication 200 MILLIGRAM(S): at 08:14

## 2024-06-18 RX ADMIN — Medication 20 MILLIGRAM(S): at 08:12

## 2024-06-18 RX ADMIN — Medication 1 GRAM(S): at 17:07

## 2024-06-18 RX ADMIN — Medication 1 GRAM(S): at 05:48

## 2024-06-18 RX ADMIN — Medication 20 MILLIGRAM(S): at 20:56

## 2024-06-18 RX ADMIN — ATORVASTATIN CALCIUM 40 MILLIGRAM(S): 20 TABLET, FILM COATED ORAL at 20:56

## 2024-06-18 RX ADMIN — Medication 20 MILLIGRAM(S): at 08:11

## 2024-06-18 RX ADMIN — Medication 1 CAPSULE(S): at 08:11

## 2024-06-18 RX ADMIN — Medication 1 CAPSULE(S): at 17:07

## 2024-06-18 RX ADMIN — Medication 1 GRAM(S): at 12:21

## 2024-06-19 PROCEDURE — 99233 SBSQ HOSP IP/OBS HIGH 50: CPT

## 2024-06-19 RX ORDER — TRAMADOL HYDROCHLORIDE 50 MG/1
50 TABLET, FILM COATED ORAL
Refills: 0 | Status: DISCONTINUED | OUTPATIENT
Start: 2024-06-19 | End: 2024-06-21

## 2024-06-19 RX ORDER — PANTOPRAZOLE SODIUM 40 MG/10ML
40 INJECTION, POWDER, FOR SOLUTION INTRAVENOUS
Refills: 0 | Status: DISCONTINUED | OUTPATIENT
Start: 2024-06-19 | End: 2024-06-21

## 2024-06-19 RX ADMIN — ATORVASTATIN CALCIUM 40 MILLIGRAM(S): 20 TABLET, FILM COATED ORAL at 21:39

## 2024-06-19 RX ADMIN — Medication 1 CAPSULE(S): at 09:06

## 2024-06-19 RX ADMIN — Medication 1 GRAM(S): at 05:00

## 2024-06-19 RX ADMIN — MONTELUKAST SODIUM 10 MILLIGRAM(S): 10 TABLET, FILM COATED ORAL at 21:39

## 2024-06-19 RX ADMIN — Medication 20 MILLIGRAM(S): at 21:38

## 2024-06-19 RX ADMIN — Medication 200 MILLIGRAM(S): at 09:06

## 2024-06-19 RX ADMIN — Medication 1 CAPSULE(S): at 17:05

## 2024-06-19 RX ADMIN — TRAMADOL HYDROCHLORIDE 50 MILLIGRAM(S): 50 TABLET, FILM COATED ORAL at 11:16

## 2024-06-19 RX ADMIN — TRAMADOL HYDROCHLORIDE 50 MILLIGRAM(S): 50 TABLET, FILM COATED ORAL at 09:58

## 2024-06-19 RX ADMIN — Medication 20 MILLIGRAM(S): at 09:07

## 2024-06-19 RX ADMIN — Medication 1 GRAM(S): at 11:43

## 2024-06-19 RX ADMIN — Medication 1 CAPSULE(S): at 11:43

## 2024-06-19 RX ADMIN — Medication 1 GRAM(S): at 17:05

## 2024-06-19 RX ADMIN — Medication 1 GRAM(S): at 21:41

## 2024-06-19 RX ADMIN — Medication 20 MILLIGRAM(S): at 09:06

## 2024-06-19 RX ADMIN — PANTOPRAZOLE SODIUM 40 MILLIGRAM(S): 40 INJECTION, POWDER, FOR SOLUTION INTRAVENOUS at 09:58

## 2024-06-20 PROCEDURE — 99232 SBSQ HOSP IP/OBS MODERATE 35: CPT

## 2024-06-20 RX ORDER — SENNOSIDES 8.6 MG
2 TABLET ORAL DAILY
Refills: 0 | Status: DISCONTINUED | OUTPATIENT
Start: 2024-06-20 | End: 2024-06-21

## 2024-06-20 RX ADMIN — Medication 2 TABLET(S): at 09:48

## 2024-06-20 RX ADMIN — Medication 1 CAPSULE(S): at 08:16

## 2024-06-20 RX ADMIN — Medication 1 CAPSULE(S): at 17:04

## 2024-06-20 RX ADMIN — Medication 20 MILLIGRAM(S): at 09:33

## 2024-06-20 RX ADMIN — Medication 1 GRAM(S): at 06:26

## 2024-06-20 RX ADMIN — Medication 1 GRAM(S): at 09:33

## 2024-06-20 RX ADMIN — ATORVASTATIN CALCIUM 40 MILLIGRAM(S): 20 TABLET, FILM COATED ORAL at 21:50

## 2024-06-20 RX ADMIN — Medication 20 MILLIGRAM(S): at 21:51

## 2024-06-20 RX ADMIN — Medication 1 GRAM(S): at 17:04

## 2024-06-20 RX ADMIN — PANTOPRAZOLE SODIUM 40 MILLIGRAM(S): 40 INJECTION, POWDER, FOR SOLUTION INTRAVENOUS at 06:26

## 2024-06-20 RX ADMIN — Medication 1 GRAM(S): at 21:51

## 2024-06-20 RX ADMIN — Medication 1 CAPSULE(S): at 13:02

## 2024-06-20 RX ADMIN — MONTELUKAST SODIUM 10 MILLIGRAM(S): 10 TABLET, FILM COATED ORAL at 21:50

## 2024-06-21 ENCOUNTER — TRANSCRIPTION ENCOUNTER (OUTPATIENT)
Age: 74
End: 2024-06-21

## 2024-06-21 VITALS
OXYGEN SATURATION: 95 % | SYSTOLIC BLOOD PRESSURE: 149 MMHG | RESPIRATION RATE: 18 BRPM | DIASTOLIC BLOOD PRESSURE: 56 MMHG | TEMPERATURE: 98 F | HEART RATE: 45 BPM

## 2024-06-21 PROCEDURE — 99239 HOSP IP/OBS DSCHRG MGMT >30: CPT

## 2024-06-21 RX ORDER — SUCRALFATE 1 G
10 TABLET ORAL
Qty: 280 | Refills: 0
Start: 2024-06-21 | End: 2024-07-04

## 2024-06-21 RX ORDER — HYDRALAZINE HCL 50 MG
1 TABLET ORAL
Qty: 0 | Refills: 0 | DISCHARGE

## 2024-06-21 RX ORDER — HYDROCHLOROTHIAZIDE 25 MG
1 TABLET ORAL
Refills: 0 | DISCHARGE

## 2024-06-21 RX ORDER — AMLODIPINE BESYLATE 2.5 MG/1
1 TABLET ORAL
Qty: 30 | Refills: 0
Start: 2024-06-21 | End: 2024-07-20

## 2024-06-21 RX ORDER — LOSARTAN POTASSIUM 100 MG/1
1 TABLET, FILM COATED ORAL
Qty: 0 | Refills: 0 | DISCHARGE

## 2024-06-21 RX ORDER — SUCRALFATE 1 G
1 TABLET ORAL
Refills: 0 | DISCHARGE

## 2024-06-21 RX ORDER — AMLODIPINE BESYLATE 2.5 MG/1
1 TABLET ORAL
Qty: 0 | Refills: 0 | DISCHARGE

## 2024-06-21 RX ADMIN — Medication 20 MILLIGRAM(S): at 09:11

## 2024-06-21 RX ADMIN — Medication 1 CAPSULE(S): at 09:11

## 2024-06-21 RX ADMIN — Medication 1 GRAM(S): at 13:21

## 2024-06-21 RX ADMIN — Medication 1 GRAM(S): at 06:32

## 2024-06-21 RX ADMIN — PANTOPRAZOLE SODIUM 40 MILLIGRAM(S): 40 INJECTION, POWDER, FOR SOLUTION INTRAVENOUS at 06:32

## 2024-07-01 NOTE — PHYSICAL THERAPY INITIAL EVALUATION ADULT - PATIENT/FAMILY/SIGNIFICANT OTHER GOALS STATEMENT, PT EVAL
07/01/2024  Yeny Beaver is a 25 y.o., female.    Past Medical History:   Diagnosis Date    Cardiac murmur     Endometriosis     History of bee sting allergy     Migraine headache 11/13/2019    PCOS (polycystic ovarian syndrome)     Periumbilical hernia 08/20/2020    repaired by Dr. Dwyer on 10/07/2020    PONV (postoperative nausea and vomiting)     Retroflexion of uterus 07/31/2018    3rd degree       Past Surgical History:   Procedure Laterality Date    CHOLECYSTECTOMY  3-2022    Cyst removed from cervix      Cyst removed from ovary      DILATION AND CURETTAGE OF UTERUS      HERNIA REPAIR      HYSTERECTOMY      LAPAROSCOPIC CHOLECYSTECTOMY N/A 03/09/2022    Procedure: CHOLECYSTECTOMY, LAPAROSCOPIC;  Surgeon: Denny Dwyer MD;  Location: [facility];  Service: General;  Laterality: N/A;    LOOP ELECTROSURGICAL EXCISION PROCEDURE (LEEP)      Operative Laparoscopy with lysis of adhesions      RH/BSO with limited cystoscopy  09/24/2018    TONSILLECTOMY  2018       Family History   Problem Relation Name Age of Onset    Cancer Mother      No Known Problems Father      Asthma Brother      Breast cancer Other      Diabetes Other         Social History     Socioeconomic History    Marital status:    Tobacco Use    Smoking status: Never     Passive exposure: Current    Smokeless tobacco: Never   Substance and Sexual Activity    Alcohol use: Yes     Alcohol/week: 4.0 standard drinks of alcohol     Types: 2 Glasses of wine, 2 Shots of liquor per week     Comment: occ    Drug use: Never    Sexual activity: Yes     Partners: Male     Birth control/protection: See Surgical Hx   Social History Narrative    Lives at home with .       Current Outpatient Medications   Medication Sig Dispense Refill    albuterol (PROVENTIL) 2.5 mg /3 mL (0.083 %) nebulizer solution Take 3 mLs (2.5 mg total) by nebulization  every 4 to 6 hours as needed for Wheezing. 1 each 1    albuterol (PROVENTIL/VENTOLIN HFA) 90 mcg/actuation inhaler Inhale 1-2 puffs into the lungs every 6 (six) hours as needed for Wheezing. Rescue 18 g 1    brexpiprazole (REXULTI) 1 mg Tab Take 1 tablet (1 mg total) by mouth once daily. 30 tablet 5    butalbital-acetaminophen-caff -40 mg Cap Take 1 capsule by mouth every 4 (four) hours as needed (migraine).      cyanocobalamin 1,000 mcg/mL injection cyanocobalamin (vit B-12) 1,000 mcg/mL injection solution  INJECT 1 ML INTRAMUSCULARLY MONTHLY 1 mL 5    estradioL (ESTRACE) 2 MG tablet Take 1 tablet (2 mg total) by mouth 2 (two) times daily. 60 tablet 2    fluticasone-salmeterol diskus inhaler 250-50 mcg Inhale 1 puff into the lungs daily as needed (wheezing). Controller 1 each 3    gabapentin (NEURONTIN) 100 MG capsule Take 1 capsule (100 mg total) by mouth 3 (three) times daily. 90 capsule 1    GAVILYTE-G 236-22.74-6.74 -5.86 gram suspension Take 4,000 mLs by mouth once.      methylPREDNISolone (MEDROL DOSEPACK) 4 mg tablet use as directed 21 each 0    nabumetone (RELAFEN) 500 MG tablet Take 500 mg by mouth 2 (two) times daily as needed.      omeprazole (PRILOSEC) 40 MG capsule Take 40 mg by mouth every morning.      ondansetron (ZOFRAN-ODT) 8 MG TbDL Take 1 tablet (8 mg total) by mouth every 6 (six) hours as needed (nausea). 30 tablet 1    promethazine (PHENERGAN) 12.5 MG Tab Take 12.5 mg by mouth 4 (four) times daily as needed.      tiZANidine (ZANAFLEX) 4 MG tablet TAKE 1 OR 2 TABLETS BY MOUTH AT BEDTIME AS NEEDED FOR NECK PAIN / SPASM      triamcinolone acetonide 0.1% (KENALOG) 0.1 % ointment Apply to affected area 2 TIMES A DAY, NO LONGER THEN 1 WEEK 454 g 0     Current Facility-Administered Medications   Medication Dose Route Frequency Provider Last Rate Last Admin    ketorolac injection 60 mg  60 mg Intramuscular 1 time in Clinic/HOD Valeria Melendrez NP           Review of patient's allergies  indicates:   Allergen Reactions    Adhesive      Adhesive on hormone patch    Allergen ext-venom-honey bee Other (See Comments)    Insect venom      Bee sting     Patient Active Problem List   Diagnosis    Dyskinesia of gallbladder    Migraine headache    Vitamin B12 deficiency    Gastroesophageal reflux disease without esophagitis    Anxiety    Hormone replacement therapy    Vitamin D deficiency    Mild intermittent asthma    Flank pain    Current mild episode of major depressive disorder    Acute midline low back pain with bilateral sciatica    Costochondritis    Right lower quadrant abdominal pain    Acute pain of right knee    Chronic midline low back pain with right-sided sciatica    Middle ear effusion, bilateral       Pre-op Assessment    I have reviewed the Patient Summary Reports.     I have reviewed the Nursing Notes. I have reviewed the NPO Status.   I have reviewed the Medications.     Review of Systems  Anesthesia Hx:  No problems with previous Anesthesia                Pulmonary:    Asthma       Asthma:               Hepatic/GI:     GERD      Gerd          Neurological:    Neuromuscular Disease,  Headaches      Dx of Headaches                         Neuromuscular Disease   Endocrine:        Obesity / BMI > 30  Psych:  Psychiatric History                  Physical Exam  General: Well nourished, Alert, Oriented and Cooperative    Airway:  Mallampati: II   Mouth Opening: Normal  Neck ROM: Normal ROM    Dental:  Intact    Chest/Lungs:  Normal Respiratory Rate    Heart:  Rate: Normal        Anesthesia Plan  Type of Anesthesia, risks & benefits discussed:    Anesthesia Type: Gen Natural Airway, MAC  Intra-op Monitoring Plan: Standard ASA Monitors  Post Op Pain Control Plan: multimodal analgesia and IV/PO Opioids PRN  Induction:  IV  Informed Consent: Informed consent signed with the Patient and all parties understand the risks and agree with anesthesia plan.  All questions answered. Patient consented to  blood products? Yes  ASA Score: 3  Day of Surgery Review of History & Physical: I have interviewed and examined the patient. I have reviewed the patient's H&P dated: There are no significant changes.     Ready For Surgery From Anesthesia Perspective.     .       to feel better

## 2024-07-02 ENCOUNTER — RX RENEWAL (OUTPATIENT)
Age: 74
End: 2024-07-02

## 2024-07-02 DIAGNOSIS — Z85.828 PERSONAL HISTORY OF OTHER MALIGNANT NEOPLASM OF SKIN: ICD-10-CM

## 2024-07-02 DIAGNOSIS — G35 MULTIPLE SCLEROSIS: ICD-10-CM

## 2024-07-02 DIAGNOSIS — E78.5 HYPERLIPIDEMIA, UNSPECIFIED: ICD-10-CM

## 2024-07-02 DIAGNOSIS — Z91.018 ALLERGY TO OTHER FOODS: ICD-10-CM

## 2024-07-02 DIAGNOSIS — Z85.01 PERSONAL HISTORY OF MALIGNANT NEOPLASM OF ESOPHAGUS: ICD-10-CM

## 2024-07-02 DIAGNOSIS — I10 ESSENTIAL (PRIMARY) HYPERTENSION: ICD-10-CM

## 2024-07-02 DIAGNOSIS — Z88.1 ALLERGY STATUS TO OTHER ANTIBIOTIC AGENTS STATUS: ICD-10-CM

## 2024-07-02 DIAGNOSIS — Z88.0 ALLERGY STATUS TO PENICILLIN: ICD-10-CM

## 2024-07-02 DIAGNOSIS — Z88.8 ALLERGY STATUS TO OTHER DRUGS, MEDICAMENTS AND BIOLOGICAL SUBSTANCES: ICD-10-CM

## 2024-07-02 DIAGNOSIS — Z91.010 ALLERGY TO PEANUTS: ICD-10-CM

## 2024-07-02 DIAGNOSIS — E43 UNSPECIFIED SEVERE PROTEIN-CALORIE MALNUTRITION: ICD-10-CM

## 2024-07-02 DIAGNOSIS — K21.9 GASTRO-ESOPHAGEAL REFLUX DISEASE WITHOUT ESOPHAGITIS: ICD-10-CM

## 2024-07-09 ENCOUNTER — RX RENEWAL (OUTPATIENT)
Age: 74
End: 2024-07-09

## 2024-07-18 ENCOUNTER — RX RENEWAL (OUTPATIENT)
Age: 74
End: 2024-07-18

## 2024-08-10 ENCOUNTER — NON-APPOINTMENT (OUTPATIENT)
Age: 74
End: 2024-08-10

## 2024-10-03 ENCOUNTER — RX RENEWAL (OUTPATIENT)
Age: 74
End: 2024-10-03

## 2024-11-04 NOTE — PATIENT PROFILE ADULT - STATED REASON FOR ADMISSION
[FreeTextEntry1] : Hepatic steatosis: Recommend low fat diet, wt loss, exercise, and DASH diet, avoid excess alcohol/tylenol, f/u LFTs ordered B12 def: c/w b12 1000 mcg po daily, f/u b12 level ordered HLD: 7/24 lipid profile reviewed, Recommend low fat diet, wt loss, exercise, nutritional counseling provided, f/u lipid panel ordered, c/w pravastatin 40 mg po daily T2DM: Recommend low carb diet, wt loss, exercise, f/u a1c ordered, a1c 7/24 reviewed, c/w current insulin regimen, metformin 1000 mg po bid, recommend annual ophthalmology eval Hypothyroid: f/u TSH/free T4 ordered, c/w levothyroxine 25 mcg po daily RTC 4 wks urinary burning

## 2024-12-02 ENCOUNTER — OUTPATIENT (OUTPATIENT)
Dept: OUTPATIENT SERVICES | Facility: HOSPITAL | Age: 74
LOS: 1 days | End: 2024-12-02
Payer: MEDICARE

## 2024-12-02 ENCOUNTER — APPOINTMENT (OUTPATIENT)
Dept: CT IMAGING | Facility: CLINIC | Age: 74
End: 2024-12-02
Payer: MEDICARE

## 2024-12-02 DIAGNOSIS — Z96.89 PRESENCE OF OTHER SPECIFIED FUNCTIONAL IMPLANTS: Chronic | ICD-10-CM

## 2024-12-02 DIAGNOSIS — Z90.710 ACQUIRED ABSENCE OF BOTH CERVIX AND UTERUS: Chronic | ICD-10-CM

## 2024-12-02 DIAGNOSIS — C15.9 MALIGNANT NEOPLASM OF ESOPHAGUS, UNSPECIFIED: ICD-10-CM

## 2024-12-02 DIAGNOSIS — Z98.890 OTHER SPECIFIED POSTPROCEDURAL STATES: Chronic | ICD-10-CM

## 2024-12-02 DIAGNOSIS — Z90.49 ACQUIRED ABSENCE OF OTHER SPECIFIED PARTS OF DIGESTIVE TRACT: Chronic | ICD-10-CM

## 2024-12-02 PROCEDURE — 74177 CT ABD & PELVIS W/CONTRAST: CPT

## 2024-12-02 PROCEDURE — 71260 CT THORAX DX C+: CPT | Mod: 26,MH

## 2024-12-02 PROCEDURE — 71260 CT THORAX DX C+: CPT

## 2024-12-02 PROCEDURE — 74177 CT ABD & PELVIS W/CONTRAST: CPT | Mod: 26,MH

## 2024-12-06 ENCOUNTER — OUTPATIENT (OUTPATIENT)
Dept: OUTPATIENT SERVICES | Facility: HOSPITAL | Age: 74
LOS: 1 days | Discharge: HOME | End: 2024-12-06

## 2024-12-06 DIAGNOSIS — Z96.89 PRESENCE OF OTHER SPECIFIED FUNCTIONAL IMPLANTS: Chronic | ICD-10-CM

## 2024-12-06 DIAGNOSIS — C15.9 MALIGNANT NEOPLASM OF ESOPHAGUS, UNSPECIFIED: ICD-10-CM

## 2024-12-06 DIAGNOSIS — Z98.890 OTHER SPECIFIED POSTPROCEDURAL STATES: Chronic | ICD-10-CM

## 2024-12-06 DIAGNOSIS — Z90.49 ACQUIRED ABSENCE OF OTHER SPECIFIED PARTS OF DIGESTIVE TRACT: Chronic | ICD-10-CM

## 2024-12-06 DIAGNOSIS — Z90.710 ACQUIRED ABSENCE OF BOTH CERVIX AND UTERUS: Chronic | ICD-10-CM

## 2024-12-09 ENCOUNTER — APPOINTMENT (OUTPATIENT)
Dept: OTOLARYNGOLOGY | Facility: CLINIC | Age: 74
End: 2024-12-09
Payer: MEDICARE

## 2024-12-09 VITALS — BODY MASS INDEX: 24.11 KG/M2 | WEIGHT: 131 LBS | HEIGHT: 62 IN

## 2024-12-09 DIAGNOSIS — J38.7 OTHER DISEASES OF LARYNX: ICD-10-CM

## 2024-12-09 DIAGNOSIS — H93.8X3 OTHER SPECIFIED DISORDERS OF EAR, BILATERAL: ICD-10-CM

## 2024-12-09 DIAGNOSIS — H61.23 IMPACTED CERUMEN, BILATERAL: ICD-10-CM

## 2024-12-09 DIAGNOSIS — K21.9 GASTRO-ESOPHAGEAL REFLUX DISEASE W/OUT ESOPHAGITIS: ICD-10-CM

## 2024-12-09 DIAGNOSIS — C15.9 MALIGNANT NEOPLASM OF ESOPHAGUS, UNSPECIFIED: ICD-10-CM

## 2024-12-09 PROCEDURE — 69210 REMOVE IMPACTED EAR WAX UNI: CPT

## 2024-12-09 PROCEDURE — 31575 DIAGNOSTIC LARYNGOSCOPY: CPT

## 2024-12-09 PROCEDURE — 99214 OFFICE O/P EST MOD 30 MIN: CPT | Mod: 25

## 2024-12-09 RX ORDER — OMEPRAZOLE 40 MG/1
40 CAPSULE, DELAYED RELEASE ORAL
Qty: 90 | Refills: 1 | Status: ACTIVE | COMMUNITY
Start: 2024-12-09 | End: 1900-01-01

## 2024-12-09 RX ORDER — ROSUVASTATIN CALCIUM 20 MG/1
20 TABLET, FILM COATED ORAL
Qty: 90 | Refills: 0 | Status: ACTIVE | COMMUNITY
Start: 2024-11-15

## 2024-12-09 RX ORDER — FAMOTIDINE 20 MG/1
20 TABLET, FILM COATED ORAL
Qty: 90 | Refills: 1 | Status: ACTIVE | COMMUNITY
Start: 2024-12-09 | End: 1900-01-01

## 2024-12-09 RX ORDER — AMLODIPINE BESYLATE 5 MG/1
5 TABLET ORAL
Qty: 90 | Refills: 0 | Status: ACTIVE | COMMUNITY
Start: 2024-09-18

## 2024-12-10 NOTE — ASU PREOP CHECKLIST - PATIENT SENT TO
[FreeTextEntry1] : 9/9/24 RHC Baseline /87/113, SpO2 89% on room air, SVC 57%, RA 65% PA 62% RA 10 RV 65/12 PA 69/34/41 PAWP 13 TP 28, DPG 21, PVR 9.3 LONG CO/CI (td) 3.0/1/9, SV 51, Svi 21 SVR 2746 dynes  PVR/SVR 0.27 Brennon 3.4, RVSWI 13.5  Val HR 51 /84/114, SpO2 100% PA 60/27/34 PAWP 15 CO/CI 73/1.75, SV 53, Svi 34 TPG 19, DPG 12  PVR 6.9  Severe precapillary disease, while she is at risk for WHO Group II and III PH, her hemodynamics are most consistent with  group I phenotype. Discussed with patient and  at bedside post procedure re: need for vasodilator therapy. Will plan for tadalafil + Opsumit for ease of polypharmacy, likely would benefit from full triple therapy + sotatercept, may be limited due to medication side effects, adherence and comorbidities. To follow up in clinic tomorrow to discuss further.  ***** 8/27/24 Pro   Na 146 (H) A1c 5.5% HIV, Hepatitis B and C non reactive Centromere Ab 2.7 (H) ESR, Rheumatoid factor, TSH, Scleroderma, ds DNA, Sjogren's SHERLY  WNL ***** 7/30/24 CT CHEST 1. Nonspecific bilateral pulmonary nodules measuring up to 1.3 cm in the right lower lobe. Recommend follow-up chest CT in 3 months to determine stability.  2. Severely dilated pulmonary artery representing pulmonary hypertension. Mosaic attenuation of the lungs likely secondary to pulmonary hypertension. **** 7/22/24 PFT FVC 1.65 (63) -> 1.85 (71) FEV1 0.77 (38) -> 0.78 (39) FEV1/FVC 59 -> 53 TLC 3.07 (62) DLCO 7.58 (41) There is moderate obstruction w/o sig BD response. Moderate restriction. No Air trapping. DLCO severely reduced *** 7/22/24 6MWT 183 meters Resting SpO2 92% on room air. Desaturation to 88% requiring supplemental O2 at 5L to maintain SpO2 > 88%. Mild Dyspnea (Mir 1) and fatigue (Mir 2.5). No rests taken. **** 7/3/24 ECHO 1. Left ventricular cavity is normal in size. Left ventricular systolic function is normal with an ejection fraction of 69 % by Jacobo's method of disks.  2. There is mild (grade 1) left ventricular diastolic dysfunction.  3. Mild left ventricular hypertrophy.  4. Normal right ventricular cavity size and normal right ventricular systolic function.  5. The left atrium is mildly dilated.  6. The right atrium is mildly dilated.  7. Aortic root at the sinuses of Valsalva is dilated, measuring 4.20 cm (indexed 2.74 cm/m). Ascending aorta diameter is dilated, measuring 4.50 cm (indexed 2.93 cm/m).  8. Mild aortic regurgitation.  9. Mild to moderate pulmonic regurgitation.  10. Dilated pulmonary artery. Main PA measures 4.0 cm. 1 1. Mild mitral regurgitation.  12. Mild tricuspid regurgitation.  13. Estimated pulmonary artery systolic pressure is 66 mmHg, consistent with severe pulmonary hypertension.  14. No pericardial effusion seen. 15. Compared to the transthoracic echocardiogram performed on 8/7/2018, ascending aorta is dilated, severe PHTN is present.  
operating room

## 2024-12-16 ENCOUNTER — RESULT REVIEW (OUTPATIENT)
Age: 74
End: 2024-12-16

## 2024-12-16 ENCOUNTER — APPOINTMENT (OUTPATIENT)
Dept: HEMATOLOGY ONCOLOGY | Facility: CLINIC | Age: 74
End: 2024-12-16
Payer: MEDICARE

## 2024-12-16 VITALS
WEIGHT: 129.31 LBS | HEIGHT: 62 IN | TEMPERATURE: 97 F | SYSTOLIC BLOOD PRESSURE: 137 MMHG | HEART RATE: 64 BPM | BODY MASS INDEX: 23.79 KG/M2 | OXYGEN SATURATION: 99 % | DIASTOLIC BLOOD PRESSURE: 82 MMHG

## 2024-12-16 DIAGNOSIS — I45.4 NONSPECIFIC INTRAVENTRICULAR BLOCK: ICD-10-CM

## 2024-12-16 DIAGNOSIS — G35 MULTIPLE SCLEROSIS: ICD-10-CM

## 2024-12-16 DIAGNOSIS — C15.9 MALIGNANT NEOPLASM OF ESOPHAGUS, UNSPECIFIED: ICD-10-CM

## 2024-12-16 DIAGNOSIS — Z86.79 PERSONAL HISTORY OF OTHER DISEASES OF THE CIRCULATORY SYSTEM: ICD-10-CM

## 2024-12-16 LAB
BASOPHILS # BLD AUTO: 0.03 K/UL — SIGNIFICANT CHANGE UP (ref 0–0.2)
BASOPHILS NFR BLD AUTO: 0.8 % — SIGNIFICANT CHANGE UP (ref 0–2)
EOSINOPHIL # BLD AUTO: 0.03 K/UL — SIGNIFICANT CHANGE UP (ref 0–0.5)
EOSINOPHIL NFR BLD AUTO: 0.8 % — SIGNIFICANT CHANGE UP (ref 0–6)
HCT VFR BLD CALC: 35.4 % — SIGNIFICANT CHANGE UP (ref 34.5–45)
HGB BLD-MCNC: 11.3 G/DL — LOW (ref 11.5–15.5)
IMM GRANULOCYTES NFR BLD AUTO: 0.3 % — SIGNIFICANT CHANGE UP (ref 0–0.9)
LYMPHOCYTES # BLD AUTO: 1.3 K/UL — SIGNIFICANT CHANGE UP (ref 1–3.3)
LYMPHOCYTES # BLD AUTO: 32.8 % — SIGNIFICANT CHANGE UP (ref 13–44)
MCHC RBC-ENTMCNC: 27.8 PG — SIGNIFICANT CHANGE UP (ref 27–34)
MCHC RBC-ENTMCNC: 31.9 G/DL — LOW (ref 32–36)
MCV RBC AUTO: 87.2 FL — SIGNIFICANT CHANGE UP (ref 80–100)
MONOCYTES # BLD AUTO: 0.52 K/UL — SIGNIFICANT CHANGE UP (ref 0–0.9)
MONOCYTES NFR BLD AUTO: 13.1 % — SIGNIFICANT CHANGE UP (ref 2–14)
NEUTROPHILS # BLD AUTO: 2.07 K/UL — SIGNIFICANT CHANGE UP (ref 1.8–7.4)
NEUTROPHILS NFR BLD AUTO: 52.2 % — SIGNIFICANT CHANGE UP (ref 43–77)
NRBC # BLD: 0 /100 WBCS — SIGNIFICANT CHANGE UP (ref 0–0)
NRBC BLD-RTO: 0 /100 WBCS — SIGNIFICANT CHANGE UP (ref 0–0)
PLATELET # BLD AUTO: 227 K/UL — SIGNIFICANT CHANGE UP (ref 150–400)
RBC # BLD: 4.06 M/UL — SIGNIFICANT CHANGE UP (ref 3.8–5.2)
RBC # FLD: 18.3 % — HIGH (ref 10.3–14.5)
WBC # BLD: 3.96 K/UL — SIGNIFICANT CHANGE UP (ref 3.8–10.5)
WBC # FLD AUTO: 3.96 K/UL — SIGNIFICANT CHANGE UP (ref 3.8–10.5)

## 2024-12-16 PROCEDURE — 99214 OFFICE O/P EST MOD 30 MIN: CPT

## 2024-12-16 PROCEDURE — G2211 COMPLEX E/M VISIT ADD ON: CPT

## 2024-12-16 RX ORDER — SPIRONOLACTONE 25 MG/1
25 TABLET ORAL
Qty: 90 | Refills: 0 | Status: ACTIVE | COMMUNITY
Start: 2024-09-18

## 2024-12-16 RX ORDER — NIRMATRELVIR AND RITONAVIR 150-100 MG
10 X 150 MG & KIT ORAL
Qty: 20 | Refills: 0 | Status: DISCONTINUED | COMMUNITY
Start: 2024-08-11 | End: 2024-12-16

## 2024-12-17 LAB
ALBUMIN SERPL ELPH-MCNC: 4.1 G/DL
ALP BLD-CCNC: 103 U/L
ALT SERPL-CCNC: 43 U/L
ANION GAP SERPL CALC-SCNC: 10 MMOL/L
AST SERPL-CCNC: 37 U/L
BILIRUB SERPL-MCNC: 0.3 MG/DL
BUN SERPL-MCNC: 13 MG/DL
CALCIUM SERPL-MCNC: 9.2 MG/DL
CHLORIDE SERPL-SCNC: 104 MMOL/L
CO2 SERPL-SCNC: 26 MMOL/L
CREAT SERPL-MCNC: 0.59 MG/DL
EGFR: 94 ML/MIN/1.73M2
GLUCOSE SERPL-MCNC: 81 MG/DL
MAGNESIUM SERPL-MCNC: 2.3 MG/DL
POTASSIUM SERPL-SCNC: 3.9 MMOL/L
PROT SERPL-MCNC: 6.6 G/DL
SODIUM SERPL-SCNC: 141 MMOL/L
T3FREE SERPL-MCNC: 3.15 PG/ML
T4 FREE SERPL-MCNC: 1.3 NG/DL
TSH SERPL-ACNC: 1.65 UIU/ML

## 2024-12-26 DIAGNOSIS — G35 MULTIPLE SCLEROSIS: ICD-10-CM

## 2025-01-10 ENCOUNTER — RX RENEWAL (OUTPATIENT)
Age: 75
End: 2025-01-10

## 2025-01-27 NOTE — ED ADULT NURSE NOTE - WILL THE PATIENT ACCEPT THE PFIZER COVID-19 VACCINE IF ELIGIBLE AND IT IS AVAILABLE?
No
Notable wheeze on exam, unclear if chronic. Reportedly has 02 PRN at home. Patient smoking tobacco, not clarifying how much.    -Duonebs Q6hrs  -Cont. Symbicort BID  -Cont. Spiriva  -Willing to try Nicorette gum.

## 2025-03-14 ENCOUNTER — RX RENEWAL (OUTPATIENT)
Age: 75
End: 2025-03-14

## 2025-06-11 ENCOUNTER — NON-APPOINTMENT (OUTPATIENT)
Age: 75
End: 2025-06-11

## 2025-06-12 ENCOUNTER — APPOINTMENT (OUTPATIENT)
Dept: CT IMAGING | Facility: CLINIC | Age: 75
End: 2025-06-12
Payer: MEDICARE

## 2025-06-12 ENCOUNTER — OUTPATIENT (OUTPATIENT)
Dept: OUTPATIENT SERVICES | Facility: HOSPITAL | Age: 75
LOS: 1 days | End: 2025-06-12

## 2025-06-12 DIAGNOSIS — Z98.890 OTHER SPECIFIED POSTPROCEDURAL STATES: Chronic | ICD-10-CM

## 2025-06-12 DIAGNOSIS — Z90.710 ACQUIRED ABSENCE OF BOTH CERVIX AND UTERUS: Chronic | ICD-10-CM

## 2025-06-12 DIAGNOSIS — Z90.49 ACQUIRED ABSENCE OF OTHER SPECIFIED PARTS OF DIGESTIVE TRACT: Chronic | ICD-10-CM

## 2025-06-12 DIAGNOSIS — Z96.89 PRESENCE OF OTHER SPECIFIED FUNCTIONAL IMPLANTS: Chronic | ICD-10-CM

## 2025-06-12 PROCEDURE — 74177 CT ABD & PELVIS W/CONTRAST: CPT | Mod: 26

## 2025-06-12 PROCEDURE — 71260 CT THORAX DX C+: CPT | Mod: 26

## 2025-06-13 ENCOUNTER — APPOINTMENT (OUTPATIENT)
Dept: UROLOGY | Facility: CLINIC | Age: 75
End: 2025-06-13

## 2025-06-13 VITALS — HEART RATE: 56 BPM | DIASTOLIC BLOOD PRESSURE: 80 MMHG | SYSTOLIC BLOOD PRESSURE: 164 MMHG | OXYGEN SATURATION: 96 %

## 2025-06-13 PROCEDURE — 99213 OFFICE O/P EST LOW 20 MIN: CPT

## 2025-06-17 ENCOUNTER — OUTPATIENT (OUTPATIENT)
Dept: OUTPATIENT SERVICES | Facility: HOSPITAL | Age: 75
LOS: 1 days | Discharge: HOME | End: 2025-06-17

## 2025-06-17 DIAGNOSIS — Z98.890 OTHER SPECIFIED POSTPROCEDURAL STATES: Chronic | ICD-10-CM

## 2025-06-17 DIAGNOSIS — Z96.89 PRESENCE OF OTHER SPECIFIED FUNCTIONAL IMPLANTS: Chronic | ICD-10-CM

## 2025-06-17 DIAGNOSIS — C15.9 MALIGNANT NEOPLASM OF ESOPHAGUS, UNSPECIFIED: ICD-10-CM

## 2025-06-17 DIAGNOSIS — Z90.49 ACQUIRED ABSENCE OF OTHER SPECIFIED PARTS OF DIGESTIVE TRACT: Chronic | ICD-10-CM

## 2025-06-17 DIAGNOSIS — Z90.710 ACQUIRED ABSENCE OF BOTH CERVIX AND UTERUS: Chronic | ICD-10-CM

## 2025-06-17 DIAGNOSIS — G35 MULTIPLE SCLEROSIS: ICD-10-CM

## 2025-07-03 ENCOUNTER — APPOINTMENT (OUTPATIENT)
Dept: HEMATOLOGY ONCOLOGY | Facility: CLINIC | Age: 75
End: 2025-07-03
Payer: MEDICARE

## 2025-07-03 PROBLEM — D49.0 IPMN (INTRADUCTAL PAPILLARY MUCINOUS NEOPLASM): Status: ACTIVE | Noted: 2025-07-03

## 2025-07-03 PROCEDURE — 99215 OFFICE O/P EST HI 40 MIN: CPT | Mod: 93

## 2025-09-04 ENCOUNTER — TRANSCRIPTION ENCOUNTER (OUTPATIENT)
Age: 75
End: 2025-09-04

## (undated) DEVICE — ENDOCATCH 10MM SPECIMEN POUCH

## (undated) DEVICE — CANISTER SUCTION 2000CC

## (undated) DEVICE — XI ARM NEEDLE DRIVER SUTURECUT MEGA 8MM

## (undated) DEVICE — PREP CHLORAPREP HI-LITE ORANGE 26ML

## (undated) DEVICE — SUT PDS II 0 18" ENDOLOOP LIGATURE

## (undated) DEVICE — XI VESSEL SEALER

## (undated) DEVICE — DRAIN PENROSE .25" X 18" LATEX

## (undated) DEVICE — DRSG OPSITE 13.75 X 4"

## (undated) DEVICE — DRSG STERISTRIPS 0.5 X 4"

## (undated) DEVICE — XI ARM NEEDLE DRIVER LARGE

## (undated) DEVICE — XI ARM CLIP APPLIER LARGE

## (undated) DEVICE — TIP METZENBAUM SCISSOR MONOPOLAR ENDOCUT (ORANGE)

## (undated) DEVICE — TUBING STRYKEFLOW II SUCTION / IRRIGATOR

## (undated) DEVICE — SOL IRR BAG H2O 1000ML

## (undated) DEVICE — TUBING IV SET MICROCLAVE ADAPTER

## (undated) DEVICE — SHEARS COVIDIEN ENDO SHEAR 5MM X 31CM W UNIPOLAR CAUTERY

## (undated) DEVICE — ELCTR BOVIE PENCIL HANDPIECE

## (undated) DEVICE — CHEST DRAIN PLEUR-EVAC WET/WET ADULT-PEDS SINGLE (QUICK)

## (undated) DEVICE — DRSG GAUZE PACKTNER ROLL

## (undated) DEVICE — DRAPE INSTRUMENT POUCH 6.75" X 11"

## (undated) DEVICE — XI ARM PERMANENT CAUTERY HOOK

## (undated) DEVICE — XI ARM DISSECTOR CURVED BIPOLAR 8MM

## (undated) DEVICE — WARMING BLANKET LOWER ADULT

## (undated) DEVICE — XI ENDOWRIST 12 - 8 MM CANNULA REDUCER

## (undated) DEVICE — LIJ-ESU BOVIE MACHINE - ROBOTIC 11483366: Type: DURABLE MEDICAL EQUIPMENT

## (undated) DEVICE — SUT POLYSORB 0 36" GU-46

## (undated) DEVICE — GLV 7.5 PROTEXIS (WHITE)

## (undated) DEVICE — BLADE SCALPEL SAFETYLOCK #10

## (undated) DEVICE — ELCTR BOVIE TIP BLADE INSULATED 2.75" EDGE

## (undated) DEVICE — GLV 6.5 PROTEXIS (WHITE)

## (undated) DEVICE — XI SEAL UNIV 5- 8 MM

## (undated) DEVICE — TUBING AIRSEAL TRI-LUMEN FILTERED

## (undated) DEVICE — NDL COUNTER FOAM AND MAGNET 40-70

## (undated) DEVICE — SUT POLYSORB 0 60" TIES UNDYED

## (undated) DEVICE — POSITIONER PINK PAD PIGAZZI SYSTEM

## (undated) DEVICE — SUT SOFSILK 2-0 30" V-20

## (undated) DEVICE — GLV 8 PROTEXIS (WHITE)

## (undated) DEVICE — D HELP - CLEARVIEW CLEARIFY SYSTEM

## (undated) DEVICE — SOL IRR POUR H2O 250ML

## (undated) DEVICE — TROCAR SURGIQUEST AIRSEAL 12MMX100MM

## (undated) DEVICE — XI ARM PERMANENT CAUTERY SPATULA

## (undated) DEVICE — PACK ADVANCED LAPAROSCOPIC NS

## (undated) DEVICE — VISITEC 4X4

## (undated) DEVICE — XI ARM FORCEP CADIERE 8MM

## (undated) DEVICE — XI ARM FORCEP TENACULUM

## (undated) DEVICE — TUBING INSUFFLATION LAP FILTER 10FT

## (undated) DEVICE — PACK BASIN SPECIAL PROCEDURE

## (undated) DEVICE — XI ARM GRASPER TIP UP FENESTRATED

## (undated) DEVICE — DRAPE IOBAN 23" X 23"

## (undated) DEVICE — POSITIONER FOAM HEAD CRADLE (PINK)

## (undated) DEVICE — ENDOCATCH II 15MM

## (undated) DEVICE — DRAPE MAYO STAND 30"

## (undated) DEVICE — XI OBTURATOR OPTICAL BLADELESS 8MM

## (undated) DEVICE — BLADE SCALPEL SAFETYLOCK #15

## (undated) DEVICE — XI 12MM AND STAPLER CANNULA SEAL

## (undated) DEVICE — SUT POLYSORB 2-0 30" V-20 UNDYED

## (undated) DEVICE — XI ARM FORCEP PROGRASP 8MM

## (undated) DEVICE — CHEST DRAIN OASIS DRY SUCTION WATER SEAL

## (undated) DEVICE — URETERAL CATH RED RUBBER 14FR (GREEN)

## (undated) DEVICE — SPECIMEN CONTAINER 100ML

## (undated) DEVICE — XI ARM FORCEP MARYLAND BIPOLAR

## (undated) DEVICE — VALVE YELLOW PORT SEAL PLUS 5MM

## (undated) DEVICE — SOL IRR POUR NS 0.9% 500ML

## (undated) DEVICE — ELCTR BOVIE TIP BLADE INSULATED 6.5" EDGE

## (undated) DEVICE — MARKING PEN W RULER

## (undated) DEVICE — SCOPE WARMER SEAL DISP

## (undated) DEVICE — ELCTR BOVIE PENCIL SMOKE EVACUATION

## (undated) DEVICE — TROCAR COVIDIEN BLUNT TIP HASSAN 10MM

## (undated) DEVICE — SUT SOFSILK 0 30" V-20

## (undated) DEVICE — VENODYNE/SCD SLEEVE CALF MEDIUM

## (undated) DEVICE — WARMING BLANKET UPPER ADULT

## (undated) DEVICE — DRAPE GENERAL ENDOSCOPY

## (undated) DEVICE — GRASPER LAPA 5MMX35CM

## (undated) DEVICE — Device

## (undated) DEVICE — TUBING SUCTION 20FT

## (undated) DEVICE — MEDICATION LABELS W MARKER

## (undated) DEVICE — PACK ROBOTIC LIJ

## (undated) DEVICE — STAPLER COVIDIEN ENDO GIA STANDARD HANDLE

## (undated) DEVICE — XI DRAPE COLUMN

## (undated) DEVICE — XI ARM CLIP APPLIER MEDIUM-LARGE

## (undated) DEVICE — DRAIN PENROSE .5" X 18" LATEX

## (undated) DEVICE — DRAPE BACK TABLE COVER HEAVY DUTY 60"

## (undated) DEVICE — TROCAR COVIDIEN VERSASTEP PLUS 15MM STANDARD

## (undated) DEVICE — DRAIN RESERVOIR FOR JACKSON PRATT 100CC CARDINAL

## (undated) DEVICE — XI ARM FORCEP FENESTRATED BIPOLAR 8MM

## (undated) DEVICE — POSITIONER FOAM EGG CRATE ULNAR 2PCS (PINK)

## (undated) DEVICE — XI DRAPE ARM

## (undated) DEVICE — XI STAPLER SUREFORM 45

## (undated) DEVICE — SUT SOFSILK 3-0 30" V-20

## (undated) DEVICE — ENDOCATCH GENERAL 15MM (PURPLE)

## (undated) DEVICE — XI ARM SCISSOR MONO CURVED

## (undated) DEVICE — GLV 7 PROTEXIS (WHITE)

## (undated) DEVICE — DRAPE MAYO STAND 23"

## (undated) DEVICE — FOLEY TRAY 16FR 5CC LF LUBRISIL ADVANCE TEMP CLOSED

## (undated) DEVICE — LAP PAD 18 X 18"

## (undated) DEVICE — SUT SOFSILK 2-0 18" C-23

## (undated) DEVICE — POSITIONER PURPLE ARM ONE STEP (LARGE)

## (undated) DEVICE — SUT POLYSORB 4-0 P-12 UNDYED

## (undated) DEVICE — XI OBTURATOR ROBOTIC BLADELESS 12MM